# Patient Record
Sex: FEMALE | Race: BLACK OR AFRICAN AMERICAN | Employment: FULL TIME | URBAN - METROPOLITAN AREA
[De-identification: names, ages, dates, MRNs, and addresses within clinical notes are randomized per-mention and may not be internally consistent; named-entity substitution may affect disease eponyms.]

---

## 2017-01-01 ENCOUNTER — HOSPITAL ENCOUNTER (EMERGENCY)
Age: 42
Discharge: HOME | End: 2017-01-01
Attending: EMERGENCY MEDICINE | Admitting: EMERGENCY MEDICINE
Payer: MEDICAID

## 2017-01-01 VITALS
TEMPERATURE: 98.3 F | HEART RATE: 70 BPM | OXYGEN SATURATION: 100 % | HEIGHT: 65 IN | BODY MASS INDEX: 21.99 KG/M2 | DIASTOLIC BLOOD PRESSURE: 65 MMHG | SYSTOLIC BLOOD PRESSURE: 116 MMHG | WEIGHT: 132 LBS

## 2017-01-01 DIAGNOSIS — G43.909 MIGRAINE WITHOUT STATUS MIGRAINOSUS, NOT INTRACTABLE, UNSPECIFIED MIGRAINE TYPE: Primary | ICD-10-CM

## 2017-01-01 PROCEDURE — 96374 THER/PROPH/DIAG INJ IV PUSH: CPT

## 2017-01-01 PROCEDURE — 99282 EMERGENCY DEPT VISIT SF MDM: CPT

## 2017-01-01 PROCEDURE — 96376 TX/PRO/DX INJ SAME DRUG ADON: CPT

## 2017-01-01 PROCEDURE — 74011250636 HC RX REV CODE- 250/636: Performed by: PHYSICIAN ASSISTANT

## 2017-01-01 PROCEDURE — 96375 TX/PRO/DX INJ NEW DRUG ADDON: CPT

## 2017-01-01 RX ORDER — DIPHENHYDRAMINE HYDROCHLORIDE 50 MG/ML
25 INJECTION, SOLUTION INTRAMUSCULAR; INTRAVENOUS
Status: COMPLETED | OUTPATIENT
Start: 2017-01-01 | End: 2017-01-01

## 2017-01-01 RX ORDER — METOCLOPRAMIDE HYDROCHLORIDE 5 MG/ML
5 INJECTION INTRAMUSCULAR; INTRAVENOUS EVERY 6 HOURS
Status: DISCONTINUED | OUTPATIENT
Start: 2017-01-01 | End: 2017-01-01 | Stop reason: HOSPADM

## 2017-01-01 RX ORDER — HYDROMORPHONE HYDROCHLORIDE 1 MG/ML
1 INJECTION, SOLUTION INTRAMUSCULAR; INTRAVENOUS; SUBCUTANEOUS
Status: COMPLETED | OUTPATIENT
Start: 2017-01-01 | End: 2017-01-01

## 2017-01-01 RX ORDER — ONDANSETRON 4 MG/1
4 TABLET, ORALLY DISINTEGRATING ORAL
Qty: 15 TAB | Refills: 0 | Status: SHIPPED | OUTPATIENT
Start: 2017-01-01 | End: 2017-02-27 | Stop reason: SDUPTHER

## 2017-01-01 RX ORDER — HYDROMORPHONE HYDROCHLORIDE 1 MG/ML
0.5 INJECTION, SOLUTION INTRAMUSCULAR; INTRAVENOUS; SUBCUTANEOUS
Status: COMPLETED | OUTPATIENT
Start: 2017-01-01 | End: 2017-01-01

## 2017-01-01 RX ORDER — SUMATRIPTAN 25 MG/1
25 TABLET, FILM COATED ORAL
Qty: 15 TAB | Refills: 0 | Status: SHIPPED | OUTPATIENT
Start: 2017-01-01 | End: 2017-07-24

## 2017-01-01 RX ADMIN — METOCLOPRAMIDE 5 MG: 5 INJECTION, SOLUTION INTRAMUSCULAR; INTRAVENOUS at 19:26

## 2017-01-01 RX ADMIN — DIPHENHYDRAMINE HYDROCHLORIDE 25 MG: 50 INJECTION INTRAMUSCULAR; INTRAVENOUS at 19:23

## 2017-01-01 RX ADMIN — HYDROMORPHONE HYDROCHLORIDE 0.5 MG: 1 INJECTION, SOLUTION INTRAMUSCULAR; INTRAVENOUS; SUBCUTANEOUS at 19:26

## 2017-01-01 RX ADMIN — HYDROMORPHONE HYDROCHLORIDE 1 MG: 1 INJECTION, SOLUTION INTRAMUSCULAR; INTRAVENOUS; SUBCUTANEOUS at 20:07

## 2017-01-01 NOTE — Clinical Note
SPECIFIC PATIENT INSTRUCTIONS FROM THE PROVIDER WHO TREATED YOU IN THE ER TODAY: 
1. Return if any concerns or worsening of condition(s) 2. Imitrex as prescribed for headache. 3.  Take zofran as prescribed for nausea and vomiting. 4.  FOLLOW UP AP POINTMENT:  Your primary doctor in 1-2 days.

## 2017-01-01 NOTE — ED PROVIDER NOTES
HPI Comments: Pt is a 47 yo Female with Hx of asthma and migraines, presents to ED d/t migraine HA that started yesterday, was gradual in onset, and described as aching pain primarily on the left side of her head, worse in the temple. Associated symptoms include dizziness, nausea, photophobia and phonophobia. All these associated symptoms as well as the HA are very typical of her past migraines. She gets 1 to 2 migraines per month. She typically takes fioricet at home for her migraines but is currently out of her prescription, but reports that even that only usually offers mild to moderate relief. Pt tried tylenol today for HA without improvement. Denies fever, vision changes, CP, SOB, ab pain, vomiting, urinary symptoms. She is followed by Dr. Symone Driver for HAs and primary care. Patient is a 46 y.o. female presenting with headaches. The history is provided by the patient. Headache    This is a new problem. The current episode started yesterday. The headache is aggravated by photophobia and nausea. The pain is located in the left unilateral and temporal region. The pain is at a severity of 9/10. Associated symptoms include dizziness and nausea. Pertinent negatives include no anorexia, no fever, no malaise/fatigue, no chest pressure, no near-syncope, no orthopnea, no palpitations, no syncope, no shortness of breath, no weakness, no tingling, no visual change and no vomiting. She has tried acetaminophen and darkened room for the symptoms. The treatment provided no relief.         Past Medical History:   Diagnosis Date    Anxiety attack     Asthma     Hernia     Migraine     Psychiatric disorder      anxiety        Past Surgical History:   Procedure Laterality Date    Hx back surgery      Hx  section      Pr  delivery only           Family History:   Problem Relation Age of Onset    Heart Disease Other      grandmothers    Asthma Brother     Lung Disease Paternal Aunt      cronic bronchitis    Asthma Paternal Grandmother        Social History     Social History    Marital status:      Spouse name: N/A    Number of children: N/A    Years of education: N/A     Occupational History    Not on file. Social History Main Topics    Smoking status: Never Smoker    Smokeless tobacco: Never Used    Alcohol use No    Drug use: No    Sexual activity: Yes     Partners: Male     Other Topics Concern    Not on file     Social History Narrative         ALLERGIES: Aspirin; Ibuprofen; Pcn [penicillins]; Claritin [loratadine]; Phenergan [promethazine]; Seafood; and Shellfish containing products    Review of Systems   Constitutional: Negative for chills, fever and malaise/fatigue. HENT: Negative for ear pain, rhinorrhea and sore throat. Eyes: Negative for pain and redness. Respiratory: Negative for cough and shortness of breath. Cardiovascular: Negative for chest pain, palpitations, orthopnea, syncope and near-syncope. Gastrointestinal: Positive for nausea. Negative for abdominal pain, anorexia, constipation, diarrhea and vomiting. Genitourinary: Negative for dysuria. Musculoskeletal: Negative for neck pain and neck stiffness. Skin: Negative. Neurological: Positive for dizziness and headaches. Negative for tingling, tremors, seizures, syncope, facial asymmetry, speech difficulty, weakness, light-headedness and numbness. Psychiatric/Behavioral: Negative. Vitals:    01/01/17 1826   BP: 118/69   Pulse: 77   Temp: 98.3 °F (36.8 °C)   SpO2: 100%   Weight: 59.9 kg (132 lb)   Height: 5' 5\" (1.651 m)            Physical Exam   Constitutional: She is oriented to person, place, and time. She appears well-developed and well-nourished. No distress. Pt seated in dark room, holding temples. HENT:   Head: Normocephalic and atraumatic.    Right Ear: Tympanic membrane, external ear and ear canal normal.   Left Ear: Tympanic membrane, external ear and ear canal normal. Nose: Nose normal.   Mouth/Throat: Oropharynx is clear and moist and mucous membranes are normal. No oropharyngeal exudate. Eyes: Conjunctivae and EOM are normal. Pupils are equal, round, and reactive to light. Right eye exhibits no discharge. Left eye exhibits no discharge. Neck: Normal range of motion. Cardiovascular: Normal rate, regular rhythm and normal heart sounds. Pulmonary/Chest: Effort normal and breath sounds normal. No respiratory distress. She has no wheezes. She has no rales. Abdominal: Soft. Bowel sounds are normal. There is no tenderness. Musculoskeletal: Normal range of motion. Lymphadenopathy:     She has no cervical adenopathy. Neurological: She is alert and oriented to person, place, and time. She has normal strength. She is not disoriented. She displays no atrophy, no tremor and normal reflexes. No cranial nerve deficit or sensory deficit. She exhibits normal muscle tone. She displays no seizure activity. Coordination and gait normal. GCS eye subscore is 4. GCS verbal subscore is 5. GCS motor subscore is 6. Skin: Skin is warm and dry. She is not diaphoretic. Psychiatric: She has a normal mood and affect. Her behavior is normal. Judgment and thought content normal.   Nursing note and vitals reviewed. MDM  Number of Diagnoses or Management Options  Migraine without status migrainosus, not intractable, unspecified migraine type:   Diagnosis management comments: DDx: HA, migraine, sinusitis, meningitis, SAH, head injury/concussion, contusion, TIA, stroke, aneurism, change in visual acuity, vertigo, eye emergency, dental pain, viral illness, temporal/Giant cell arteritis, scalp/skin etiology, malignancy    ED COURSE:    This patient presents with a headache that is similar to previous headaches. No atypical features by history.   The patient does not appear toxic and is neurologically normal on exam.  No evidence for SAH, meningitis, tumor, or other malignant cause today based on evaluation today. Given the history and physical exam, I believe symptomatic treatment is appropriate at this time. Pt has high risk allergy to NSAIDS, so will give low dose of dilaudid rather than toradol, along with reglan and benadryl for migraine control here in the ED. Pastor Dwayne PA-C 7:10 PM     Pt reassessed, the patient's migraine headache was markedly improved with the noted medications. Pastor Dwayne PA-C 8:26 PM     IMPRESSION AND MEDICAL DECISION MAKING:  Based upon the patient's presentation with noted HPI and PE, along with the work up done in the emergency department, I believe that the patient is having the patient's typical migraine presentation. Will discharge patient to home with symptomatic abortive prescription for migraines. Pt reports that fioricet has not worked well in past, will give imitrex instead. The patient understands the need for follow-up and the symptoms and signs that would mandate immediate return to the ED for re-evaluation. Follow-up Activity limitations:  None  Condition on Discharge:  Stable     DIAGNOSIS:  1. Migraine headache in patient with history of migraines. SPECIFIC PATIENT INSTRUCTIONS FROM THE PROVIDER WHO TREATED YOU IN THE ER TODAY:  1. Return if any concerns or worsening of condition(s)  2. Imitrex as prescribed for headache. 3.  Take zofran as prescribed for nausea and vomiting. 4.  FOLLOW UP APPOINTMENT:  Your primary doctor in 1-2 days. Pt results have been reviewed with them. They have been counseled regarding diagnosis, treatment, and plan. Pt verbally conveys understanding and agreement of the signs, symptoms, diagnosis, treatment and prognosis and additionally agrees to follow up as discussed. Pt also agrees with the care-plan and conveys that all of their questions have been answered.  I have also provided discharge instructions for them that include: educational information regarding their diagnosis and treatment, and list of reasons why they would want to return to the ED prior to their follow-up appointment, should their condition change. Inna Keyes PA-C         Amount and/or Complexity of Data Reviewed  Discussion of test results with the performing providers: yes  Decide to obtain previous medical records or to obtain history from someone other than the patient: yes  Obtain history from someone other than the patient: yes  Review and summarize past medical records: yes  Discuss the patient with other providers: yes    Risk of Complications, Morbidity, and/or Mortality  Presenting problems: low  Diagnostic procedures: low  Management options: low    Patient Progress  Patient progress: stable    ED Course       Procedures    Diagnosis:   1. Migraine without status migrainosus, not intractable, unspecified migraine type          Disposition: home    Follow-up Information     Follow up With Details Comments Contact Info    [facility] EMERGENCY DEPT  As needed, If symptoms worsen [address] [place], [place] In 2 days  [address] [place], [place] [id number]  [phone]            Patient's Medications   Start Taking    ONDANSETRON (ZOFRAN ODT) 4 MG DISINTEGRATING TABLET    Take 1 Tab by mouth every eight (8) hours as needed for Nausea. SUMATRIPTAN (IMITREX) 25 MG TABLET    Take 1 Tab by mouth once as needed for Migraine. May repeat dose (one tab) once after 2 hours. Maximum dose is 200mg in 24 hours. Continue Taking    ALBUTEROL (PROAIR HFA) 90 MCG/ACTUATION INHALER    Take 2 Puffs by inhalation every four (4) hours as needed for Wheezing. ALBUTEROL-IPRATROPIUM (DUO-NEB) 2.5 MG-0.5 MG/3 ML NEBU    3 mL by Nebulization route every four (4) hours as needed. For SOB or Wheezing Diag Code J45.909 File Medicare B    BUDESONIDE-FORMOTEROL (SYMBICORT) 160-4.5 MCG/ACTUATION HFA INHALER    Take 2 Puffs by inhalation two (2) times a day. EPINEPHRINE (EPIPEN 2-GALE) 0.3 MG/0.3 ML INJECTION    0.3 mL by IntraMUSCular route once as needed for up to 1 dose. FEXOFENADINE-PSEUDOEPHEDRINE (ALLEGRA-D 24 HOUR) 180-240 MG PER TABLET    Take 1 Tab by mouth daily. FLUTICASONE (FLONASE) 50 MCG/ACTUATION NASAL SPRAY    instill 2 sprays into each nostril once daily    MONTELUKAST (SINGULAIR) 10 MG TABLET    Take 1 Tab by mouth daily. PRENATAL VITS W-CA,FE,FA,<1MG, (PRENATAL VITAMIN PO)    Take  by mouth.      These Medications have changed    No medications on file   Stop Taking    No medications on file

## 2017-01-16 RX ORDER — BUDESONIDE AND FORMOTEROL FUMARATE DIHYDRATE 160; 4.5 UG/1; UG/1
2 AEROSOL RESPIRATORY (INHALATION) 2 TIMES DAILY
Qty: 1 INHALER | Refills: 5 | Status: SHIPPED | OUTPATIENT
Start: 2017-01-16 | End: 2017-01-30 | Stop reason: CLARIF

## 2017-01-16 NOTE — TELEPHONE ENCOUNTER
Pt needs a refill on her symbicort sent to the [facility] on [address]. She has been out of medication since Saturday so is it possible to get one of the other doctors to refill it for her?  Please call [phone]

## 2017-01-17 ENCOUNTER — TELEPHONE (OUTPATIENT)
Dept: PULMONOLOGY | Age: 42
End: 2017-01-17

## 2017-01-20 ENCOUNTER — HOSPITAL ENCOUNTER (EMERGENCY)
Age: 42
Discharge: HOME | End: 2017-01-20
Attending: EMERGENCY MEDICINE
Payer: MEDICAID

## 2017-01-20 VITALS
DIASTOLIC BLOOD PRESSURE: 62 MMHG | RESPIRATION RATE: 18 BRPM | WEIGHT: 130 LBS | OXYGEN SATURATION: 100 % | BODY MASS INDEX: 21.66 KG/M2 | HEIGHT: 65 IN | HEART RATE: 95 BPM | TEMPERATURE: 98.3 F | SYSTOLIC BLOOD PRESSURE: 112 MMHG

## 2017-01-20 DIAGNOSIS — G43.009 MIGRAINE WITHOUT AURA AND WITHOUT STATUS MIGRAINOSUS, NOT INTRACTABLE: Primary | ICD-10-CM

## 2017-01-20 LAB
APPEARANCE UR: CLEAR
BILIRUB UR QL: NEGATIVE
COLOR UR: YELLOW
GLUCOSE UR STRIP.AUTO-MCNC: NEGATIVE MG/DL
HGB UR QL STRIP: NEGATIVE
KETONES UR QL STRIP.AUTO: NEGATIVE MG/DL
LEUKOCYTE ESTERASE UR QL STRIP.AUTO: NEGATIVE
NITRITE UR QL STRIP.AUTO: NEGATIVE
PH UR STRIP: 6.5 [PH] (ref 5–8)
PROT UR STRIP-MCNC: NEGATIVE MG/DL
SP GR UR REFRACTOMETRY: 1.02 (ref 1–1.03)
UROBILINOGEN UR QL STRIP.AUTO: 1 EU/DL (ref 0.2–1)

## 2017-01-20 PROCEDURE — 99283 EMERGENCY DEPT VISIT LOW MDM: CPT

## 2017-01-20 PROCEDURE — 74011000258 HC RX REV CODE- 258: Performed by: EMERGENCY MEDICINE

## 2017-01-20 PROCEDURE — 96375 TX/PRO/DX INJ NEW DRUG ADDON: CPT

## 2017-01-20 PROCEDURE — 81003 URINALYSIS AUTO W/O SCOPE: CPT | Performed by: EMERGENCY MEDICINE

## 2017-01-20 PROCEDURE — 96376 TX/PRO/DX INJ SAME DRUG ADON: CPT

## 2017-01-20 PROCEDURE — 74011250636 HC RX REV CODE- 250/636: Performed by: EMERGENCY MEDICINE

## 2017-01-20 PROCEDURE — 74011250636 HC RX REV CODE- 250/636: Performed by: PHYSICIAN ASSISTANT

## 2017-01-20 PROCEDURE — 96374 THER/PROPH/DIAG INJ IV PUSH: CPT

## 2017-01-20 PROCEDURE — 96361 HYDRATE IV INFUSION ADD-ON: CPT

## 2017-01-20 RX ORDER — METOCLOPRAMIDE HYDROCHLORIDE 5 MG/ML
5 INJECTION INTRAMUSCULAR; INTRAVENOUS EVERY 6 HOURS
Status: DISCONTINUED | OUTPATIENT
Start: 2017-01-20 | End: 2017-01-21 | Stop reason: HOSPADM

## 2017-01-20 RX ORDER — DIPHENHYDRAMINE HYDROCHLORIDE 50 MG/ML
25 INJECTION, SOLUTION INTRAMUSCULAR; INTRAVENOUS ONCE
Status: COMPLETED | OUTPATIENT
Start: 2017-01-20 | End: 2017-01-20

## 2017-01-20 RX ORDER — BUTALBITAL, ACETAMINOPHEN, CAFFEINE AND CODEINE PHOSPHATE 50; 325; 40; 30 MG/1; MG/1; MG/1; MG/1
1 CAPSULE ORAL
Qty: 15 CAP | Refills: 0 | Status: SHIPPED | OUTPATIENT
Start: 2017-01-20 | End: 2017-07-24

## 2017-01-20 RX ADMIN — DIPHENHYDRAMINE HYDROCHLORIDE 25 MG: 50 INJECTION INTRAMUSCULAR; INTRAVENOUS at 20:07

## 2017-01-20 RX ADMIN — METOCLOPRAMIDE 5 MG: 5 INJECTION, SOLUTION INTRAMUSCULAR; INTRAVENOUS at 19:05

## 2017-01-20 RX ADMIN — SODIUM CHLORIDE 1000 ML: 900 INJECTION, SOLUTION INTRAVENOUS at 19:05

## 2017-01-20 RX ADMIN — SODIUM CHLORIDE 10 MG: 900 INJECTION, SOLUTION INTRAVENOUS at 20:07

## 2017-01-20 NOTE — ED TRIAGE NOTES
Pt presents to the ED with CC migraine onset this morning. Pt reports out of Fioricet and Zofran. Pt states pain is severe as other migraines. Pt reports dizziness, states typical with previous migraines.  Pt appear in NOAD

## 2017-01-20 NOTE — ED NOTES
I performed a brief evaluation, including history and physical, of the patient here in triage and I have determined that pt will need further treatment and evaluation from the main side ER physician. I have placed initial orders to help in expediting patients care.      January 20, 2017 at 6:28 PM - Coty Day PA-C        Visit Vitals    /84 (BP 1 Location: Left arm, BP Patient Position: Sitting)    Pulse (!) 101    Temp 98.3 °F (36.8 °C)    Resp 20    Ht 5' 5\" (1.651 m)    Wt 59 kg (130 lb)    SpO2 100%    BMI 21.63 kg/m2

## 2017-01-21 NOTE — ED PROVIDER NOTES
HPI Comments: 7:49 PM Anselmo Moctezuma is a 46 y.o. female with a history of asthma and migrianes who presents to the emergency department c/o migraine onset yesterday. Pt claims this happens every month before she starts her MP. Pt took tylenol with no relief. Pt is also c/o dizziness, photophobia, and nausea as associated sx. Pt reports migraine is always \"this bad\" enough to come to the hospital every month. PCP: Jenn Smith MD      The history is provided by the patient. Past Medical History:   Diagnosis Date    Anxiety attack     Asthma     Hernia     Migraine     Psychiatric disorder      anxiety        Past Surgical History:   Procedure Laterality Date    Hx back surgery      Hx  section  2009    Pr  delivery only           Family History:   Problem Relation Age of Onset    Heart Disease Other      grandmothers    Asthma Brother     Lung Disease Paternal Aunt      cronic bronchitis    Asthma Paternal Grandmother        Social History     Social History    Marital status:      Spouse name: N/A    Number of children: N/A    Years of education: N/A     Occupational History    Not on file. Social History Main Topics    Smoking status: Never Smoker    Smokeless tobacco: Never Used    Alcohol use No    Drug use: No    Sexual activity: Yes     Partners: Male     Other Topics Concern    Not on file     Social History Narrative         ALLERGIES: Aspirin; Ibuprofen; Pcn [penicillins]; Claritin [loratadine]; Phenergan [promethazine]; Seafood; and Shellfish containing products    Review of Systems   Constitutional: Negative for diaphoresis and fever. HENT: Negative for ear pain, rhinorrhea and trouble swallowing. Eyes: Positive for photophobia. Negative for visual disturbance. Respiratory: Negative for cough and shortness of breath. Cardiovascular: Negative for chest pain and leg swelling. Gastrointestinal: Positive for nausea.  Negative for abdominal pain, blood in stool, diarrhea and vomiting. Genitourinary: Negative for difficulty urinating, flank pain and hematuria. Musculoskeletal: Negative for back pain and neck pain. Skin: Negative for rash. Neurological: Positive for dizziness and headaches. Negative for weakness and numbness. Hematological: Negative. Psychiatric/Behavioral: Negative. All other systems reviewed and are negative. Vitals:    01/20/17 1821 01/20/17 2011   BP: 124/84 112/62   Pulse: (!) 101 95   Resp: 20 18   Temp: 98.3 °F (36.8 °C)    SpO2: 100% 100%   Weight: 59 kg (130 lb)    Height: 5' 5\" (1.651 m)             Physical Exam   Constitutional: She is oriented to person, place, and time. She appears well-developed and well-nourished. No distress. HENT:   Head: Normocephalic and atraumatic. Right Ear: External ear normal.   Left Ear: External ear normal.   Nose: Nose normal.   Mouth/Throat: Oropharynx is clear and moist.   Eyes: Conjunctivae and EOM are normal. Pupils are equal, round, and reactive to light. No scleral icterus. Neck: Normal range of motion. Neck supple. No JVD present. No tracheal deviation present. No thyromegaly present. Cardiovascular: Normal rate, regular rhythm, normal heart sounds and intact distal pulses. Exam reveals no gallop and no friction rub. No murmur heard. Pulmonary/Chest: Effort normal and breath sounds normal. She exhibits no tenderness. Abdominal: Soft. Bowel sounds are normal. She exhibits no distension. There is no tenderness. There is no rebound and no guarding. Musculoskeletal: Normal range of motion. She exhibits no edema or tenderness. Lymphadenopathy:     She has no cervical adenopathy. Neurological: She is alert and oriented to person, place, and time. No cranial nerve deficit. Coordination normal.   No sensory loss, Gait normal, Motor 5/5   Skin: Skin is warm and dry. Psychiatric: She has a normal mood and affect.  Her behavior is normal. Judgment and thought content normal.   Nursing note and vitals reviewed. MDM  Number of Diagnoses or Management Options  Migraine without aura and without status migrainosus, not intractable:   Diagnosis management comments: Feeling better after meds, wants to go home, agrees with dispo and F/U plan. Marlon Gil MD  9:27 PM      ED Course       Procedures    Vitals:  Patient Vitals for the past 12 hrs:   Temp Pulse Resp BP SpO2   01/20/17 2011 - 95 18 112/62 100 %   01/20/17 1821 98.3 °F (36.8 °C) (!) 101 20 124/84 100 %   100%. Percentage is within normal limits. Reevaluation of patient:   Pt has been reassessed. Discussed all results with pt and pt agrees with plan for discharge. All questions answered at this time. ED warnings given for any new or worsening symptoms. Pt voices understanding. Pt discharged in stable condition. Disposition: Discharge    Diagnosis:   1. Migraine without aura and without status migrainosus, not intractable          Follow-up Information     Follow up With Details Comments Contact Info    Michelle Almeida MD Call in 2 days ED follow up , If symptoms worsen [address] [place] [address]  [phone]      [facility] EMERGENCY DEPT Go to If symptoms worsen, As needed [address] [facility]  [phone]            Scribe Attestation:     Everardo Reyez, scribing for and in the presence of Marlon Gil MD January 20, 2017 at 9:23 PM     Signed by: Roverto Simental, January 20, 2017 at 9:23 PM     Physician Attestation:   I personally performed the services described in this documentation, reviewed and edited the documentation which was dictated to the scribe in my presence, and it accurately records my words and actions.  Marlon Gil MD  January 20, 2017 at 9:23 PM

## 2017-01-21 NOTE — ED NOTES
Pt states she is feeling better at discharge. I have reviewed discharge instructions with the patient and spouse. The patient and spouse verbalized understanding. Pts spouse to drive home.   Pt ambulatory from ED in NAD

## 2017-01-21 NOTE — DISCHARGE INSTRUCTIONS
Migraine Headache: Care Instructions  Your Care Instructions  Migraines are painful, throbbing headaches that often start on one side of the head. They may cause nausea and vomiting and make you sensitive to light, sound, or smell. Without treatment, migraines can last from 4 hours to a few days. Medicines can help prevent migraines or stop them after they have started. Your doctor can help you find which ones work best for you. Follow-up care is a key part of your treatment and safety. Be sure to make and go to all appointments, and call your doctor if you are having problems. It's also a good idea to know your test results and keep a list of the medicines you take. How can you care for yourself at home? · Do not drive if you have taken a prescription pain medicine. · Rest in a quiet, dark room until your headache is gone. Close your eyes, and try to relax or go to sleep. Don't watch TV or read. · Put a cold, moist cloth or cold pack on the painful area for 10 to 20 minutes at a time. Put a thin cloth between the cold pack and your skin. · Use a warm, moist towel or a heating pad set on low to relax tight shoulder and neck muscles. · Have someone gently massage your neck and shoulders. · Take your medicines exactly as prescribed. Call your doctor if you think you are having a problem with your medicine. You will get more details on the specific medicines your doctor prescribes. · Be careful not to take pain medicine more often than the instructions allow. You could get worse or more frequent headaches when the medicine wears off. To prevent migraines  · Keep a headache diary so you can figure out what triggers your headaches. Avoiding triggers may help you prevent headaches. Record when each headache began, how long it lasted, and what the pain was like.  (Was it throbbing, aching, stabbing, or dull?) Write down any other symptoms you had with the headache, such as nausea, flashing lights or dark spots, or sensitivity to bright light or loud noise. Note if the headache occurred near your period. List anything that might have triggered the headache. Triggers may include certain foods (chocolate, cheese, wine) or odors, smoke, bright light, stress, or lack of sleep. · If your doctor has prescribed medicine for your migraines, take it as directed. You may have medicine that you take only when you get a migraine and medicine that you take all the time to help prevent migraines. ¨ If your doctor has prescribed medicine for when you get a headache, take it at the first sign of a migraine, unless your doctor has given you other instructions. ¨ If your doctor has prescribed medicine to prevent migraines, take it exactly as prescribed. Call your doctor if you think you are having a problem with your medicine. · Find healthy ways to deal with stress. Migraines are most common during or right after stressful times. Take time to relax before and after you do something that has caused a migraine in the past.  · Try to keep your muscles relaxed by keeping good posture. Check your jaw, face, neck, and shoulder muscles for tension. Try to relax them. When you sit at a desk, change positions often. And make sure to stretch for 30 seconds each hour. · Get plenty of sleep and exercise. · Eat meals on a regular schedule. Avoid foods and drinks that often trigger migraines. These include chocolate, alcohol (especially red wine and port), aspartame, monosodium glutamate (MSG), and some additives found in foods (such as hot dogs, stephenson, cold cuts, aged cheeses, and pickled foods). · Limit caffeine. Don't drink too much coffee, tea, or soda. But don't quit caffeine suddenly. That can also give you migraines. · Do not smoke or allow others to smoke around you. If you need help quitting, talk to your doctor about stop-smoking programs and medicines. These can increase your chances of quitting for good.   · If you are taking birth control pills or hormone therapy, talk to your doctor about whether they are triggering your migraines. When should you call for help? Call 911 anytime you think you may need emergency care. For example, call if:  · You have signs of a stroke. These may include:  ¨ Sudden numbness, paralysis, or weakness in your face, arm, or leg, especially on only one side of your body. ¨ Sudden vision changes. ¨ Sudden trouble speaking. ¨ Sudden confusion or trouble understanding simple statements. ¨ Sudden problems with walking or balance. ¨ A sudden, severe headache that is different from past headaches. Call your doctor now or seek immediate medical care if:  · You have new or worse nausea and vomiting. · You have a new or higher fever. · Your headache gets much worse. Watch closely for changes in your health, and be sure to contact your doctor if:  · You are not getting better after 2 days (48 hours). Where can you learn more? Go to http://jaci-elina.info/. Enter V152 in the search box to learn more about \"Migraine Headache: Care Instructions. \"  Current as of: February 19, 2016  Content Version: 11.1  © 6895-5280 [employer]. Care instructions adapted under license by [employer] (which disclaims liability or warranty for this information). If you have questions about a medical condition or this instruction, always ask your healthcare professional. [address] any warranty or liability for your use of this information.

## 2017-01-30 DIAGNOSIS — J45.909 UNCOMPLICATED ASTHMA, UNSPECIFIED ASTHMA SEVERITY: Primary | ICD-10-CM

## 2017-02-01 RX ORDER — FLUTICASONE FUROATE AND VILANTEROL 200; 25 UG/1; UG/1
1 POWDER RESPIRATORY (INHALATION) DAILY
Qty: 1 INHALER | Refills: 6 | Status: SHIPPED | OUTPATIENT
Start: 2017-02-01 | End: 2017-02-21 | Stop reason: CLARIF

## 2017-02-08 RX ORDER — AZITHROMYCIN 250 MG/1
TABLET, FILM COATED ORAL
Qty: 6 TAB | Refills: 0 | Status: SHIPPED | OUTPATIENT
Start: 2017-02-08 | End: 2017-02-13

## 2017-02-08 RX ORDER — PREDNISONE 10 MG/1
TABLET ORAL
Qty: 18 TAB | Refills: 0 | Status: SHIPPED | OUTPATIENT
Start: 2017-02-08 | End: 2017-07-24

## 2017-02-08 NOTE — TELEPHONE ENCOUNTER
PT PQSYEV([phone]). COUGHING UP YELLOW PHLEGM, ACHES, CHILLS AND CHEST PAIN WHEN COUGHING. THINKS SHE NEEDS MEDS CALLED TO [facility] [id number]. PLEASE CALL BACK.

## 2017-02-08 NOTE — TELEPHONE ENCOUNTER
Patient reports cough producing yellow mucus, sob that she is using her nebulizer for, body aches, chest and throat due to coughing. Started yesterday.

## 2017-02-08 NOTE — TELEPHONE ENCOUNTER
Payal Bolton MD   You Less than a minute ago (3:54 PM)                 We can start her on Pred taper and Zithromax z louise if not too SOB (Routing comment

## 2017-02-20 NOTE — TELEPHONE ENCOUNTER
Patient was switched from Symbicort to [facility] 2/1/17 due to her insurance on 1/30/17 would not cover the Symbicort but would cover Breo. NOW as on 2/1/17 she now has [facility] which is a different insurance and they will not cover Breo but will cover Symbicort. Patient needs Symbicort sent to [facility] now.

## 2017-02-20 NOTE — TELEPHONE ENCOUNTER
PT OHUD([phone]). NEEDS REFILL FOR SYMBICORT CALLED TO [facility] [phone].

## 2017-02-21 RX ORDER — BUDESONIDE AND FORMOTEROL FUMARATE DIHYDRATE 160; 4.5 UG/1; UG/1
2 AEROSOL RESPIRATORY (INHALATION) 2 TIMES DAILY
Qty: 1 INHALER | Refills: 5 | Status: SHIPPED | OUTPATIENT
Start: 2017-02-21 | End: 2017-07-24

## 2017-02-27 ENCOUNTER — DOCUMENTATION ONLY (OUTPATIENT)
Dept: NEUROLOGY | Age: 42
End: 2017-02-27

## 2017-02-27 ENCOUNTER — OFFICE VISIT (OUTPATIENT)
Dept: NEUROLOGY | Age: 42
End: 2017-02-27

## 2017-02-27 VITALS
TEMPERATURE: 98 F | BODY MASS INDEX: 21.13 KG/M2 | WEIGHT: 126.8 LBS | HEART RATE: 89 BPM | HEIGHT: 65 IN | OXYGEN SATURATION: 98 % | SYSTOLIC BLOOD PRESSURE: 114 MMHG | DIASTOLIC BLOOD PRESSURE: 83 MMHG

## 2017-02-27 DIAGNOSIS — N92.6 CATAMENIAL DISORDER: ICD-10-CM

## 2017-02-27 DIAGNOSIS — G43.009 MIGRAINE WITHOUT AURA AND WITHOUT STATUS MIGRAINOSUS, NOT INTRACTABLE: Primary | ICD-10-CM

## 2017-02-27 DIAGNOSIS — G43.709 TRANSFORMED MIGRAINE WITHOUT AURA: ICD-10-CM

## 2017-02-27 RX ORDER — ONDANSETRON 4 MG/1
4 TABLET, ORALLY DISINTEGRATING ORAL
Qty: 15 TAB | Refills: 1 | Status: SHIPPED | OUTPATIENT
Start: 2017-02-27 | End: 2017-11-22 | Stop reason: SDUPTHER

## 2017-02-27 RX ORDER — TOPIRAMATE 25 MG/1
TABLET ORAL
Qty: 60 TAB | Refills: 1 | Status: SHIPPED | OUTPATIENT
Start: 2017-02-27 | End: 2017-07-24

## 2017-02-27 NOTE — PROGRESS NOTES
Reviewed medications and chart with the patient. Patients thad Gentry Edmond is in the room. Headache form is completed.

## 2017-02-27 NOTE — MR AVS SNAPSHOT
Visit Information Date & Time Provider Department Dept. Phone Encounter #  
 2/27/2017 11:15 AM Chelsea Hardin MD [facility] [phone] [id number] Follow-up Instructions Return in about 1 month (around 3/27/2017). Follow-up and Disposition History Your Appointments 4/19/2017 11:00 AM  
Follow Up with hCelsea Hardin MD  
[facility] Appt Note: 1mon f/u; med ck [address] [place] [id number] [phone]  
  
   
 [facility] [id number] Upcoming Health Maintenance Date Due  
 PAP AKA CERVICAL CYTOLOGY 6/24/1998 DTaP/Tdap/Td series (2 - Td) 2/6/2026 Allergies as of 2/27/2017  Review Complete On: 2/27/2017 By: Chelsea Hardin MD  
  
 Severity Noted Reaction Type Reactions Aspirin High   Anaphylaxis Ibuprofen High 01/23/2014    Anaphylaxis Pcn [Penicillins] High 03/14/2013    Itching Claritin [Loratadine]    Anxiety, Cough Phenergan [Promethazine]  11/13/2012    Anxiety, Swelling Seafood  07/18/2014    Swelling Shellfish Containing Products    Hives Current Immunizations  Reviewed on 11/13/2014 Name Date Influenza Vaccine 11/13/2014 Influenza Vaccine (Quad) 9/29/2016 10:13 AM  
 Influenza Vaccine Split 10/15/2012 Pneumococcal Polysaccharide (PPSV-23) 5/1/2015 10:45 AM  
 Tdap 2/6/2016 12:14 PM  
  
 Not reviewed this visit You Were Diagnosed With   
  
 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  
  
  
  
  
  
 114/83 BMI and BSA Data Body Mass Index Body Surface Area  
 21.1 kg/m 2 1.62 m 2 Preferred Pharmacy Pharmacy Name Phone [id number]. Precious Butcher, [address] [facility]. [phone] Your Updated Medication List  
  
   
This list is accurate as of: 2/27/17 12:21 PM.  Always use your most recent med list.  
  
  
  
  
 albuterol 90 mcg/actuation inhaler Commonly known as:  PROAIR HFA Take 2 Puffs by inhalation every four (4) hours as needed for Wheezing. albuterol-ipratropium 2.5 mg-0.5 mg/3 ml Nebu Commonly known as:  DUO-NEB  
3 mL by Nebulization route every four (4) hours as needed. For SOB or Wheezing Diag Code J45.909 File Medicare B ALLEGRA-D 24 HOUR 180-240 mg per tablet Generic drug:  fexofenadine-pseudoephedrine Take 1 Tab by mouth daily. BREO ELLIPTA IN Take  by inhalation. budesonide-formoterol 160-4.5 mcg/actuation HFA inhaler Commonly known as:  SYMBICORT Take 2 Puffs by inhalation two (2) times a day. codeine-butalbital-acetaminophen-caffeine -87-30 mg per capsule Commonly known as:  FIORICET WITH CODEINE Take 1 Cap by mouth every four (4) hours as needed for Headache. Max Daily Amount: 6 Caps. EPINEPHrine 0.3 mg/0.3 mL injection Commonly known as:  EPIPEN 2-GALE  
0.3 mL by IntraMUSCular route once as needed for up to 1 dose. fluticasone 50 mcg/actuation nasal spray Commonly known as:  FLONASE  
instill 2 sprays into each nostril once daily  
  
 montelukast 10 mg tablet Commonly known as:  SINGULAIR Take 1 Tab by mouth daily. ondansetron 4 mg disintegrating tablet Commonly known as:  ZOFRAN ODT Take 1 Tab by mouth every eight (8) hours as needed for Nausea. predniSONE 10 mg tablet Commonly known as:  Kaya Gelineau Take 3 tabs X 3 days, 2 tabs X 3 days, then 1 daily PRENATAL VITAMIN PO Take  by mouth. SUMAtriptan 25 mg tablet Commonly known as:  IMITREX Take 1 Tab by mouth once as needed for Migraine.  May repeat dose (one tab) once after 2 hours. Maximum dose is 200mg in 24 hours. topiramate 25 mg tablet Commonly known as:  TOPAMAX  
1 qhs for 7 days then 1 twice  A day Prescriptions Sent to Pharmacy Refills  
 topiramate (TOPAMAX) 25 mg tablet 1 Si qhs for 7 days then 1 twice  A day Class: Normal  
 Pharmacy: [id number] [address] Ph #: [phone]  
 ondansetron (ZOFRAN ODT) 4 mg disintegrating tablet 1 Sig: Take 1 Tab by mouth every eight (8) hours as needed for Nausea. Class: Normal  
 Pharmacy: [id number] [address] Ph #: [phone] Route: Oral  
  
Follow-up Instructions Return in about 1 month (around 3/27/2017). Introducing [facility] SERVICES! [employer] introduces KAL patient portal. Now you can access parts of your medical record, email your doctor's office, and request medication refills online. 1. In your internet browser, go to [email or web address] 2. Click on the First Time User? Click Here link in the Sign In box. You will see the New Member Sign Up page. 3. Enter your KAL Access Code exactly as it appears below. You will not need to use this code after youve completed the sign-up process. If you do not sign up before the expiration date, you must request a new code. · KAL Access Code: [email or web address] Expires: 2017  6:24 PM 
 
4. Enter the last four digits of your Social Security Number (xxxx) and Date of Birth (mm/dd/yyyy) as indicated and click Submit. You will be taken to the next sign-up page. 5. Create a [employer] ID. This will be your KAL login ID and cannot be changed, so think of one that is secure and easy to remember. 6. Create a KAL password. You can change your password at any time. 7. Enter your Password Reset Question and Answer. This can be used at a later time if you forget your password. 8. Enter your e-mail address. You will receive e-mail notification when new information is available in [address] 9. Click Sign Up. You can now view and download portions of your medical record. 10. Click the Download Summary menu link to download a portable copy of your medical information. If you have questions, please visit the Frequently Asked Questions section of the [employer] website. Remember, [employer] is NOT to be used for urgent needs. For medical emergencies, dial 911. Now available from your iPhone and Android! Please provide this summary of care documentation to your next provider. Your primary care clinician is listed as [address] [place] [address]. If you have any questions after today's visit, please call [phone].

## 2017-02-27 NOTE — PROGRESS NOTES
[facility]             [address] [place], [address]    2017           Nish Corley is a 46 y.o. female who comes in for evaluation of headaches. Nish Corley does have a headache at this time. Description of Headaches:  Location of pain: bilateral, frontal  Radiation of pain?:none  Character of pain:aching, severe, throbbing  Severity of pain: 4-10 out of 10. Accompanying symptoms: nausea, photophobia, scotomata, neck pain  Prodromal sx?: no  Rapidity of onset: gradual  Typical duration of individual headache: 3 days  Are most headaches similar in presentation? yes  Typical precipitants: stress, odors, menses  Worst time of day: evening  Awakens from sleep with pain?: yes -     Current Use of Meds to Treat Headaches:  Abortive meds? acetaminophen, sumatriptan PO,fiorinal with codeine  Daily use? no  Prophylactic meds? none    Additional Relevant History:  History of head/neck trauma? no  Family h/o headache problems? yes - aunt and cousin      Past Medical History:   Diagnosis Date    Anxiety attack     Asthma     Hernia     Migraine     Psychiatric disorder     anxiety        Past Surgical History:   Procedure Laterality Date     DELIVERY ONLY      HX BACK SURGERY      HX  SECTION  2009       Social History     Social History    Marital status:      Spouse name: N/A    Number of children: N/A    Years of education: N/A     Occupational History    Not on file.      Social History Main Topics    Smoking status: Never Smoker    Smokeless tobacco: Never Used    Alcohol use No    Drug use: No    Sexual activity: Yes     Partners: Male     Other Topics Concern    Not on file     Social History Narrative       Review of Systems  A comprehensive review of systems was negative except for: Respiratory: positive for asthma    Physical Exam:    VITAL SIGNS:    Visit Vitals    /83    Pulse 89    Temp 98 °F (36.7 °C) (Oral)    Ht 5' 5\" (1.651 m)    Wt 57.5 kg (126 lb 12.8 oz)    LMP 02/26/2017    SpO2 98%    BMI 21.1 kg/m2       GENERAL: The patient is well developed, well nourished, and in no apparent distress. EXTREMITIES: No clubbing, cyanosis, or edema is identified. Pulses 2+  and symmetrical.    HEAD:   Ear, nose, and throat appear to be without trauma. The     patient is normocephalic. MUSCULOSKELETAL: Normal Posture. No point tenderness. NEUROLOGIC EXAMINATION    MENTAL STATUS: The patient is awake, alert, and oriented x 3. Speech is fluent and memory appears to be intact, both long and short term. No aphasias or apraxias. CRANIAL NERVES: II - Visual fields are full to confrontation. Funduscopic examination reveals flat disks bilaterally. Pupils are both equal and reactive to light and accommodation. III, IV, VI - Extraocular movements are intact and there is no nystagmus. V - Facial sensation is intact to pinprick and light touch. VII - Face is symmetrical.   VIII - Hearing is present. IX, X - Palate rises symmetrically. Gag is present. Tongue is in the midline. MOTOR:  Tone is normal and symmetric. There is no pronator drift present. The strength is intact for all muscle groups tested in all four extremities. SENSORY:          Sensory examination is intact to pinprick, light touch and position sense testing. COORDINATION:      Finger to nose, heel to shin gait testing are normal as well as stress gait testing. REFLEXES:  2+ and symmetrical planters are down going    Assessment and Plan:  Asia Yates is a 46 y.o. right handed female whose history and physical are consistent with common migraine. Asia Yates who has risk factors including catemenial headaches    Herberth was seen today for headache.     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 Follow-up Disposition:  Return in about 1 month (around 3/27/2017). Reviewed need for dietary supplements, risks and benefits of topamax therapy. I spent 50 minutes with the patient in face-to-face consultation, of which greater than 50% was spent in counseling and coordination of care as described above.

## 2017-02-28 NOTE — TELEPHONE ENCOUNTER
Pt states that she need an refill on her (singulair) and that it need and prior auth from [facility]

## 2017-03-01 ENCOUNTER — TELEPHONE (OUTPATIENT)
Dept: PULMONOLOGY | Age: 42
End: 2017-03-01

## 2017-03-01 NOTE — TELEPHONE ENCOUNTER
Patient has rx for singular but when tring to fill at [facility] aid they state it needs prior auth.    [facility] to re-fax prior auth form

## 2017-03-01 NOTE — TELEPHONE ENCOUNTER
We received notification that [facility] has authorized Symbicort. Eff. 03/01/17 - 03/01/2018    The authorization is faxed to [employer], [employer].

## 2017-04-02 ENCOUNTER — HOSPITAL ENCOUNTER (EMERGENCY)
Age: 42
Discharge: HOME | End: 2017-04-02
Attending: EMERGENCY MEDICINE
Payer: MEDICAID

## 2017-04-02 VITALS
TEMPERATURE: 98.1 F | HEIGHT: 65 IN | HEART RATE: 84 BPM | RESPIRATION RATE: 18 BRPM | WEIGHT: 124 LBS | BODY MASS INDEX: 20.66 KG/M2 | SYSTOLIC BLOOD PRESSURE: 109 MMHG | DIASTOLIC BLOOD PRESSURE: 72 MMHG | OXYGEN SATURATION: 99 %

## 2017-04-02 DIAGNOSIS — R10.84 ABDOMINAL PAIN, GENERALIZED: ICD-10-CM

## 2017-04-02 DIAGNOSIS — R11.2 NON-INTRACTABLE VOMITING WITH NAUSEA, UNSPECIFIED VOMITING TYPE: Primary | ICD-10-CM

## 2017-04-02 DIAGNOSIS — B34.9 VIRAL ILLNESS: ICD-10-CM

## 2017-04-02 LAB
ALBUMIN SERPL BCP-MCNC: 3.9 G/DL (ref 3.4–5)
ALBUMIN/GLOB SERPL: 0.9 {RATIO} (ref 0.8–1.7)
ALP SERPL-CCNC: 78 U/L (ref 45–117)
ALT SERPL-CCNC: 30 U/L (ref 13–56)
ANION GAP BLD CALC-SCNC: 12 MMOL/L (ref 3–18)
APPEARANCE UR: CLEAR
AST SERPL W P-5'-P-CCNC: 21 U/L (ref 15–37)
BACTERIA URNS QL MICRO: ABNORMAL /HPF
BASOPHILS # BLD AUTO: 0 K/UL (ref 0–0.06)
BASOPHILS # BLD: 0 % (ref 0–2)
BILIRUB SERPL-MCNC: 0.5 MG/DL (ref 0.2–1)
BILIRUB UR QL: NEGATIVE
BUN SERPL-MCNC: 13 MG/DL (ref 7–18)
BUN/CREAT SERPL: 15 (ref 12–20)
CALCIUM SERPL-MCNC: 9.3 MG/DL (ref 8.5–10.1)
CHLORIDE SERPL-SCNC: 103 MMOL/L (ref 100–108)
CO2 SERPL-SCNC: 23 MMOL/L (ref 21–32)
COLOR UR: YELLOW
CREAT SERPL-MCNC: 0.87 MG/DL (ref 0.6–1.3)
DIFFERENTIAL METHOD BLD: ABNORMAL
EOSINOPHIL # BLD: 0 K/UL (ref 0–0.4)
EOSINOPHIL NFR BLD: 0 % (ref 0–5)
EPITH CASTS URNS QL MICRO: ABNORMAL /LPF (ref 0–5)
ERYTHROCYTE [DISTWIDTH] IN BLOOD BY AUTOMATED COUNT: 13.9 % (ref 11.6–14.5)
GLOBULIN SER CALC-MCNC: 4.5 G/DL (ref 2–4)
GLUCOSE SERPL-MCNC: 95 MG/DL (ref 74–99)
GLUCOSE UR STRIP.AUTO-MCNC: NEGATIVE MG/DL
HCG UR QL: NEGATIVE
HCT VFR BLD AUTO: 41 % (ref 35–45)
HGB BLD-MCNC: 13 G/DL (ref 12–16)
HGB UR QL STRIP: NEGATIVE
KETONES UR QL STRIP.AUTO: 15 MG/DL
LEUKOCYTE ESTERASE UR QL STRIP.AUTO: NEGATIVE
LIPASE SERPL-CCNC: 79 U/L (ref 73–393)
LYMPHOCYTES # BLD AUTO: 9 % (ref 21–52)
LYMPHOCYTES # BLD: 0.5 K/UL (ref 0.9–3.6)
MCH RBC QN AUTO: 25.5 PG (ref 24–34)
MCHC RBC AUTO-ENTMCNC: 31.7 G/DL (ref 31–37)
MCV RBC AUTO: 80.6 FL (ref 74–97)
MONOCYTES # BLD: 0.3 K/UL (ref 0.05–1.2)
MONOCYTES NFR BLD AUTO: 6 % (ref 3–10)
MUCOUS THREADS URNS QL MICRO: ABNORMAL /LPF
NEUTS SEG # BLD: 4.4 K/UL (ref 1.8–8)
NEUTS SEG NFR BLD AUTO: 85 % (ref 40–73)
NITRITE UR QL STRIP.AUTO: NEGATIVE
PH UR STRIP: 8.5 [PH] (ref 5–8)
PLATELET # BLD AUTO: 170 K/UL (ref 135–420)
PMV BLD AUTO: 10.8 FL (ref 9.2–11.8)
POTASSIUM SERPL-SCNC: 4.1 MMOL/L (ref 3.5–5.5)
PROT SERPL-MCNC: 8.4 G/DL (ref 6.4–8.2)
PROT UR STRIP-MCNC: ABNORMAL MG/DL
RBC # BLD AUTO: 5.09 M/UL (ref 4.2–5.3)
RBC #/AREA URNS HPF: ABNORMAL /HPF (ref 0–5)
SODIUM SERPL-SCNC: 138 MMOL/L (ref 136–145)
SP GR UR REFRACTOMETRY: 1.02 (ref 1–1.03)
UROBILINOGEN UR QL STRIP.AUTO: 1 EU/DL (ref 0.2–1)
WBC # BLD AUTO: 5.2 K/UL (ref 4.6–13.2)
WBC URNS QL MICRO: ABNORMAL /HPF (ref 0–4)

## 2017-04-02 PROCEDURE — 81025 URINE PREGNANCY TEST: CPT | Performed by: PHYSICIAN ASSISTANT

## 2017-04-02 PROCEDURE — 74011250636 HC RX REV CODE- 250/636: Performed by: PHYSICIAN ASSISTANT

## 2017-04-02 PROCEDURE — 96374 THER/PROPH/DIAG INJ IV PUSH: CPT

## 2017-04-02 PROCEDURE — 80053 COMPREHEN METABOLIC PANEL: CPT | Performed by: PHYSICIAN ASSISTANT

## 2017-04-02 PROCEDURE — 83690 ASSAY OF LIPASE: CPT | Performed by: PHYSICIAN ASSISTANT

## 2017-04-02 PROCEDURE — 96361 HYDRATE IV INFUSION ADD-ON: CPT

## 2017-04-02 PROCEDURE — 81001 URINALYSIS AUTO W/SCOPE: CPT | Performed by: PHYSICIAN ASSISTANT

## 2017-04-02 PROCEDURE — 96372 THER/PROPH/DIAG INJ SC/IM: CPT

## 2017-04-02 PROCEDURE — 96375 TX/PRO/DX INJ NEW DRUG ADDON: CPT

## 2017-04-02 PROCEDURE — 85025 COMPLETE CBC W/AUTO DIFF WBC: CPT | Performed by: PHYSICIAN ASSISTANT

## 2017-04-02 PROCEDURE — 99283 EMERGENCY DEPT VISIT LOW MDM: CPT

## 2017-04-02 RX ORDER — PROCHLORPERAZINE MALEATE 10 MG
10 TABLET ORAL
Qty: 12 TAB | Refills: 0 | Status: SHIPPED | OUTPATIENT
Start: 2017-04-02 | End: 2017-04-09

## 2017-04-02 RX ORDER — DIPHENHYDRAMINE HYDROCHLORIDE 50 MG/ML
50 INJECTION, SOLUTION INTRAMUSCULAR; INTRAVENOUS ONCE
Status: COMPLETED | OUTPATIENT
Start: 2017-04-02 | End: 2017-04-02

## 2017-04-02 RX ORDER — MORPHINE SULFATE 4 MG/ML
3 INJECTION, SOLUTION INTRAMUSCULAR; INTRAVENOUS
Status: COMPLETED | OUTPATIENT
Start: 2017-04-02 | End: 2017-04-02

## 2017-04-02 RX ORDER — PROCHLORPERAZINE EDISYLATE 5 MG/ML
10 INJECTION INTRAMUSCULAR; INTRAVENOUS
Status: DISCONTINUED | OUTPATIENT
Start: 2017-04-02 | End: 2017-04-02 | Stop reason: HOSPADM

## 2017-04-02 RX ORDER — DIPHENHYDRAMINE HCL 25 MG
CAPSULE ORAL
Qty: 16 CAP | Refills: 0 | Status: SHIPPED | OUTPATIENT
Start: 2017-04-02 | End: 2018-03-26

## 2017-04-02 RX ORDER — ONDANSETRON 2 MG/ML
4 INJECTION INTRAMUSCULAR; INTRAVENOUS
Status: COMPLETED | OUTPATIENT
Start: 2017-04-02 | End: 2017-04-02

## 2017-04-02 RX ADMIN — PROCHLORPERAZINE EDISYLATE 10 MG: 5 INJECTION INTRAMUSCULAR; INTRAVENOUS at 11:51

## 2017-04-02 RX ADMIN — DIPHENHYDRAMINE HYDROCHLORIDE 50 MG: 50 INJECTION, SOLUTION INTRAMUSCULAR; INTRAVENOUS at 11:51

## 2017-04-02 RX ADMIN — ONDANSETRON HYDROCHLORIDE 4 MG: 2 SOLUTION INTRAMUSCULAR; INTRAVENOUS at 11:00

## 2017-04-02 RX ADMIN — Medication 3 MG: at 11:05

## 2017-04-02 RX ADMIN — SODIUM CHLORIDE 1000 ML: 900 INJECTION, SOLUTION INTRAVENOUS at 10:25

## 2017-04-02 NOTE — ED PROVIDER NOTES
[facility]ERG[facility]Y DEPT      46 y.o. female with noted PMH including Asthma, Anxiety, and Migraines presents to the ED c/o0 nausea, vomiting, abdominal pain, and diarrhea. Patient reports eating potato and spinach late last night and then at 3am developed symptoms. Notes her pain is diffuse soreness over her entire abdomen, and comes and goes. Says it is not a constant pain. She has vomited 4 times and had diarrhea x 2; both are without any blood. Denies fever, chills, vaginal bleeding/discharge, urinary complaints, or other symptoms at this time. Current Facility-Administered Medications   Medication Dose Route Frequency    prochlorperazine (COMPAZINE) injection 10 mg  10 mg IntraMUSCular Q6H PRN     Current Outpatient Prescriptions   Medication Sig    prochlorperazine (COMPAZINE) 10 mg tablet Take 1 Tab by mouth every eight (8) hours as needed for Nausea for up to 7 days.  diphenhydrAMINE (BENADRYL) 25 mg capsule Take 1-2 tabs every 8 hours as needed with the Compazine.  ondansetron (ZOFRAN ODT) 4 mg disintegrating tablet Take 1 Tab by mouth every eight (8) hours as needed for Nausea.  budesonide-formoterol (SYMBICORT) 160-4.5 mcg/actuation HFA inhaler Take 2 Puffs by inhalation two (2) times a day.  codeine-butalbital-acetaminophen-caffeine (FIORICET WITH CODEINE) -59-30 mg per capsule Take 1 Cap by mouth every four (4) hours as needed for Headache. Max Daily Amount: 6 Caps.  montelukast (SINGULAIR) 10 mg tablet Take 1 Tab by mouth daily.  fluticasone (FLONASE) 50 mcg/actuation nasal spray instill 2 sprays into each nostril once daily    albuterol (PROAIR HFA) 90 mcg/actuation inhaler Take 2 Puffs by inhalation every four (4) hours as needed for Wheezing.  PRENATAL VITS W-CA,FE,FA,<1MG, (PRENATAL VITAMIN PO) Take  by mouth.  FLUTICASONE/VILANTEROL (BREO ELLIPTA IN) Take  by inhalation.     topiramate (TOPAMAX) 25 mg tablet 1 qhs for 7 days then 1 twice  A day  predniSONE (DELTASONE) 10 mg tablet Take 3 tabs X 3 days, 2 tabs X 3 days, then 1 daily    SUMAtriptan (IMITREX) 25 mg tablet Take 1 Tab by mouth once as needed for Migraine. May repeat dose (one tab) once after 2 hours. Maximum dose is 200mg in 24 hours.  EPINEPHrine (EPIPEN 2-GALE) 0.3 mg/0.3 mL injection 0.3 mL by IntraMUSCular route once as needed for up to 1 dose.  albuterol-ipratropium (DUO-NEB) 2.5 mg-0.5 mg/3 ml nebu 3 mL by Nebulization route every four (4) hours as needed. For SOB or Wheezing Diag Code J45.909 File Medicare B    fexofenadine-pseudoephedrine (ALLEGRA-D 24 HOUR) 180-240 mg per tablet Take 1 Tab by mouth daily. Past Medical History:   Diagnosis Date    Anxiety attack     Asthma     Hernia     Migraine     Psychiatric disorder     anxiety        Past Surgical History:   Procedure Laterality Date     DELIVERY ONLY      HX BACK SURGERY      HX  SECTION         Family History   Problem Relation Age of Onset    Heart Disease Other      grandmothers    Asthma Brother     Lung Disease Paternal Aunt      cronic bronchitis    Asthma Paternal Grandmother     High Cholesterol Mother        Social History     Social History    Marital status:      Spouse name: N/A    Number of children: N/A    Years of education: N/A     Occupational History    Not on file.      Social History Main Topics    Smoking status: Never Smoker    Smokeless tobacco: Never Used    Alcohol use No    Drug use: No    Sexual activity: Yes     Partners: Male     Other Topics Concern    Not on file     Social History Narrative       Allergies   Allergen Reactions    Aspirin Anaphylaxis    Ibuprofen Anaphylaxis    Pcn [Penicillins] Itching    Claritin [Loratadine] Anxiety and Cough    Phenergan [Promethazine] Anxiety and Swelling    Seafood Swelling    Shellfish Containing Products Hives       Patient's primary care provider (as noted in EPIC):  Cali Hudson, MD    REVIEW OF SYSTEMS:    Constitutional:  Negative for fever, diaphoresis. HENT:  Negative for congestion. Respiratory:  Negative for cough and shortness of breath. Cardiovascular:  Negative for chest pain and palpitations. Gastrointestinal: + nausea, vomiting, diarrhea, abdominal pain. Negative for rectal bleeding. Genitourinary:  Negative for flank pain. Musculoskeletal:  Negative for back pain. Skin:  Negative for pallor. Neurological:  Negative for weakness. All other systems negative as reviewed. Visit Vitals    /72 (BP 1 Location: Left arm, BP Patient Position: At rest)    Pulse 84    Temp 98.1 °F (36.7 °C)    Resp 18    Ht 5' 5\" (1.651 m)    Wt 56.2 kg (124 lb)    SpO2 99%    BMI 20.63 kg/m2       PHYSICAL EXAM:    CONSTITUTIONAL:  Alert, appears uncomforable;  well developed;  well nourished. HEAD:  Normocephalic, atraumatic. EYES:  EOMI. Non-icteric sclera. Normal conjunctiva. ENTM:   Mouth: mucous membranes moist.  NECK:  Supple  RESPIRATORY:  Chest clear, equal breath sounds, good air movement. CARDIOVASCULAR:  Regular rate and rhythm. No murmurs, rubs, or gallops. GI:  Normal bowel sounds, abdomen soft and non-tender. No rebound or guarding. No palpable masses. BACK:  Non-tender. NEURO:  Moves all four extremities, and grossly normal motor exam.  SKIN:  No rashes;  Normal for age. PSYCH:  Alert and normal affect. DIFFERENTIAL DIAGNOSES/ MEDICAL DECISION MAKING:  Viral vomiting and diarrhea, food poisoning, bacterial vomiting and diarrhea. Lower on differential diagnosis in this patient is early small bowel obstruction (given diarrhea as well), Clostridium difficile related diarrhea, irritable bowel syndrome, crohn's disease, or ulcerative colitis.     Abnormal lab results from this emergency department encounter:  Labs Reviewed   CBC WITH AUTOMATED DIFF - Abnormal; Notable for the following:        Result Value    NEUTROPHILS 85 (*)     LYMPHOCYTES 9 (*)     ABS. LYMPHOCYTES 0.5 (*)     All other components within normal limits   METABOLIC PANEL, COMPREHENSIVE - Abnormal; Notable for the following:     Protein, total 8.4 (*)     Globulin 4.5 (*)     All other components within normal limits   URINALYSIS W/ RFLX MICROSCOPIC - Abnormal; Notable for the following:     pH (UA) 8.5 (*)     Protein TRACE (*)     Ketone 15 (*)     All other components within normal limits   URINE MICROSCOPIC ONLY - Abnormal; Notable for the following:     Bacteria FEW (*)     Mucus FEW (*)     All other components within normal limits   HCG URINE, QL   LIPASE       Lab values for this patient within approximately the last 12 hours:  Recent Results (from the past 12 hour(s))   CBC WITH AUTOMATED DIFF    Collection Time: 04/02/17 10:20 AM   Result Value Ref Range    WBC 5.2 4.6 - 13.2 K/uL    RBC 5.09 4. 20 - 5.30 M/uL    HGB 13.0 12.0 - 16.0 g/dL    HCT 41.0 35.0 - 45.0 %    MCV 80.6 74.0 - 97.0 FL    MCH 25.5 24.0 - 34.0 PG    MCHC 31.7 31.0 - 37.0 g/dL    RDW 13.9 11.6 - 14.5 %    PLATELET 779 692 - 622 K/uL    MPV 10.8 9.2 - 11.8 FL    NEUTROPHILS 85 (H) 40 - 73 %    LYMPHOCYTES 9 (L) 21 - 52 %    MONOCYTES 6 3 - 10 %    EOSINOPHILS 0 0 - 5 %    BASOPHILS 0 0 - 2 %    ABS. NEUTROPHILS 4.4 1.8 - 8.0 K/UL    ABS. LYMPHOCYTES 0.5 (L) 0.9 - 3.6 K/UL    ABS. MONOCYTES 0.3 0.05 - 1.2 K/UL    ABS. EOSINOPHILS 0.0 0.0 - 0.4 K/UL    ABS.  BASOPHILS 0.0 0.0 - 0.06 K/UL    DF AUTOMATED     METABOLIC PANEL, COMPREHENSIVE    Collection Time: 04/02/17 10:20 AM   Result Value Ref Range    Sodium 138 136 - 145 mmol/L    Potassium 4.1 3.5 - 5.5 mmol/L    Chloride 103 100 - 108 mmol/L    CO2 23 21 - 32 mmol/L    Anion gap 12 3.0 - 18 mmol/L    Glucose 95 74 - 99 mg/dL    BUN 13 7.0 - 18 MG/DL    Creatinine 0.87 0.6 - 1.3 MG/DL    BUN/Creatinine ratio 15 12 - 20      GFR est AA >60 >60 ml/min/1.73m2    GFR est non-AA >60 >60 ml/min/1.73m2    Calcium 9.3 8.5 - 10.1 MG/DL    Bilirubin, total 0.5 0.2 - 1.0 MG/DL    ALT (SGPT) 30 13 - 56 U/L    AST (SGOT) 21 15 - 37 U/L    Alk. phosphatase 78 45 - 117 U/L    Protein, total 8.4 (H) 6.4 - 8.2 g/dL    Albumin 3.9 3.4 - 5.0 g/dL    Globulin 4.5 (H) 2.0 - 4.0 g/dL    A-G Ratio 0.9 0.8 - 1.7     LIPASE    Collection Time: 04/02/17 10:20 AM   Result Value Ref Range    Lipase 79 73 - 393 U/L   URINALYSIS W/ RFLX MICROSCOPIC    Collection Time: 04/02/17 11:35 AM   Result Value Ref Range    Color YELLOW      Appearance CLEAR      Specific gravity 1.022 1.005 - 1.030      pH (UA) 8.5 (H) 5.0 - 8.0      Protein TRACE (A) NEG mg/dL    Glucose NEGATIVE  NEG mg/dL    Ketone 15 (A) NEG mg/dL    Bilirubin NEGATIVE  NEG      Blood NEGATIVE  NEG      Urobilinogen 1.0 0.2 - 1.0 EU/dL    Nitrites NEGATIVE  NEG      Leukocyte Esterase NEGATIVE  NEG     HCG URINE, QL    Collection Time: 04/02/17 11:35 AM   Result Value Ref Range    HCG urine, Ql. NEGATIVE  NEG     URINE MICROSCOPIC ONLY    Collection Time: 04/02/17 11:35 AM   Result Value Ref Range    WBC 0 to 3 0 - 4 /hpf    RBC 0 to 3 0 - 5 /hpf    Epithelial cells 2+ 0 - 5 /lpf    Bacteria FEW (A) NEG /hpf    Mucus FEW (A) NEG /lpf     Medication(s) ordered for patient during this emergency visit encounter:  Medications   prochlorperazine (COMPAZINE) injection 10 mg (10 mg IntraMUSCular Given 4/2/17 1151)   sodium chloride 0.9 % bolus infusion 1,000 mL (0 mL IntraVENous IV Completed 4/2/17 1125)   ondansetron (ZOFRAN) injection 4 mg (4 mg IntraVENous Given 4/2/17 1100)   morphine injection 3 mg (3 mg IntraVENous Given 4/2/17 1105)   diphenhydrAMINE (BENADRYL) injection 50 mg (50 mg IntraVENous Given 4/2/17 1151)       ED COURSE:      IMPRESSION AND MEDICAL DECISION MAKING:  Based upon the patient's presentation with noted HPI and PE, along with the work up done in the emergency department, I believe that the patient is having vomiting, diarrhea, and abdominal pain from a viral illness.    states he just recovered from similar symptoms. She does not have any abdominal tenderness. Pain resolved after pain medication and nausea medication. Repeat abdominal exam prior to discharge was still benign. I do believe that the patient is well enough for discharge home. Will prescribe compazine (and benadryl) for nausea and vomiting. Diagnosis:   1. Non-intractable vomiting with nausea, unspecified vomiting type    2. Abdominal pain, generalized    3. Viral illness      Disposition: Discharge    Follow-up Information     Follow up With Details Comments MD Sheba In 1 day within 24 hours if abdominal pain persists. Richard Jimenez [facility]  [phone]      [facility] EMERGENCY DEPT  If symptoms worsen, including localized or worsening pain, or continued pain and you are not able to see [facility] primary care doctor within 24 housr. [address] [facility]  [phone]          Discharge Medication List as of 4/2/2017 12:19 PM      START taking these medications    Details   prochlorperazine (COMPAZINE) 10 mg tablet Take 1 Tab by mouth every eight (8) hours as needed for Nausea for up to 7 days. , Normal, Disp-12 Tab, R-0      diphenhydrAMINE (BENADRYL) 25 mg capsule Take 1-2 tabs every 8 hours as needed with the Compazine. , Normal, Disp-16 Cap, R-0         CONTINUE these medications which have NOT CHANGED    Details   ondansetron (ZOFRAN ODT) 4 mg disintegrating tablet Take 1 Tab by mouth every eight (8) hours as needed for Nausea., Normal, Disp-15 Tab, R-1      budesonide-formoterol (SYMBICORT) 160-4.5 mcg/actuation HFA inhaler Take 2 Puffs by inhalation two (2) times a day., Normal, Disp-1 Inhaler, R-5      codeine-butalbital-acetaminophen-caffeine (FIORICET WITH CODEINE) -64-30 mg per capsule Take 1 Cap by mouth every four (4) hours as needed for Headache.  Max Daily Amount: 6 Caps., Print, Disp-15 Cap, R-0      montelukast (SINGULAIR) 10 mg tablet Take 1 Tab by mouth daily. , Normal, Disp-30 Tab, R-5      fluticasone (FLONASE) 50 mcg/actuation nasal spray instill 2 sprays into each nostril once daily, Normal, Disp-1 Bottle, R-2      albuterol (PROAIR HFA) 90 mcg/actuation inhaler Take 2 Puffs by inhalation every four (4) hours as needed for Wheezing., Normal, Disp-1 Inhaler, R-5      PRENATAL VITS W-CA,FE,FA,<1MG, (PRENATAL VITAMIN PO) Take  by mouth.  , Historical Med      FLUTICASONE/VILANTEROL (BREO ELLIPTA IN) Take  by inhalation. , Historical Med      topiramate (TOPAMAX) 25 mg tablet 1 qhs for 7 days then 1 twice  A day, Normal, Disp-60 Tab, R-1      predniSONE (DELTASONE) 10 mg tablet Take 3 tabs X 3 days, 2 tabs X 3 days, then 1 daily, Normal, Disp-18 Tab, R-0      SUMAtriptan (IMITREX) 25 mg tablet Take 1 Tab by mouth once as needed for Migraine. May repeat dose (one tab) once after 2 hours. Maximum dose is 200mg in 24 hours. , Print, Disp-15 Tab, R-0      EPINEPHrine (EPIPEN 2-GALE) 0.3 mg/0.3 mL injection 0.3 mL by IntraMUSCular route once as needed for up to 1 dose., Normal, Disp-2 Syringe, R-5      albuterol-ipratropium (DUO-NEB) 2.5 mg-0.5 mg/3 ml nebu 3 mL by Nebulization route every four (4) hours as needed. For SOB or Wheezing Diag Code J45.909 File Medicare B, Normal, Disp-120 mL, R-5      fexofenadine-pseudoephedrine (ALLEGRA-D 24 HOUR) 180-240 mg per tablet Take 1 Tab by mouth daily. , Historical Med           Emelia Mistry, [id number]

## 2017-04-02 NOTE — DISCHARGE INSTRUCTIONS
Abdominal Pain: Care Instructions  Your Care Instructions    Abdominal pain has many possible causes. Some aren't serious and get better on their own in a few days. Others need more testing and treatment. If your pain continues or gets worse, you need to be rechecked and may need more tests to find out what is wrong. You may need surgery to correct the problem. Don't ignore new symptoms, such as fever, nausea and vomiting, urination problems, pain that gets worse, and dizziness. These may be signs of a more serious problem. Your doctor may have recommended a follow-up visit in the next 8 to 12 hours. If you are not getting better, you may need more tests or treatment. The doctor has checked you carefully, but problems can develop later. If you notice any problems or new symptoms, get medical treatment right away. Follow-up care is a key part of your treatment and safety. Be sure to make and go to all appointments, and call your doctor if you are having problems. It's also a good idea to know your test results and keep a list of the medicines you take. How can you care for yourself at home? · Rest until you feel better. · To prevent dehydration, drink plenty of fluids, enough so that your urine is light yellow or clear like water. Choose water and other caffeine-free clear liquids until you feel better. If you have kidney, heart, or liver disease and have to limit fluids, talk with your doctor before you increase the amount of fluids you drink. · If your stomach is upset, eat mild foods, such as rice, dry toast or crackers, bananas, and applesauce. Try eating several small meals instead of two or three large ones. · Wait until 48 hours after all symptoms have gone away before you have spicy foods, alcohol, and drinks that contain caffeine. · Do not eat foods that are high in fat. · Avoid anti-inflammatory medicines such as aspirin, ibuprofen (Advil, Motrin), and naproxen (Aleve).  These can cause stomach upset. Talk to your doctor if you take daily aspirin for another health problem. When should you call for help? Call 911 anytime you think you may need emergency care. For example, call if:  · You passed out (lost consciousness). · You pass maroon or very bloody stools. · You vomit blood or what looks like coffee grounds. · You have new, severe belly pain. Call your doctor now or seek immediate medical care if:  · Your pain gets worse, especially if it becomes focused in one area of your belly. · You have a new or higher fever. · Your stools are black and look like tar, or they have streaks of blood. · You have unexpected vaginal bleeding. · You have symptoms of a urinary tract infection. These may include:  ¨ Pain when you urinate. ¨ Urinating more often than usual.  ¨ Blood in your urine. · You are dizzy or lightheaded, or you feel like you may faint. Watch closely for changes in your health, and be sure to contact your doctor if:  · You are not getting better after 1 day (24 hours). Where can you learn more? Go to http://jaciUluleelina.info/. Enter S242 in the search box to learn more about \"Abdominal Pain: Care Instructions. \"  Current as of: May 27, 2016  Content Version: 11.2  © 9900-4593 [employer]. Care instructions adapted under license by [employer] (which disclaims liability or warranty for this information). If you have questions about a medical condition or this instruction, always ask your healthcare professional. [place] [address] any warranty or liability for your use of this information. Nausea and Vomiting: Care Instructions  Your Care Instructions    When you are nauseated, you may feel weak and sweaty and notice a lot of saliva in your mouth. Nausea often leads to vomiting. Most of the time you do not need to worry about nausea and vomiting, but they can be signs of other illnesses.   Two common causes of nausea and vomiting are stomach flu and food poisoning. Nausea and vomiting from viral stomach flu will usually start to improve within 24 hours. Nausea and vomiting from food poisoning may last from 12 to 48 hours. The doctor has checked you carefully, but problems can develop later. If you notice any problems or new symptoms, get medical treatment right away. Follow-up care is a key part of your treatment and safety. Be sure to make and go to all appointments, and call your doctor if you are having problems. It's also a good idea to know your test results and keep a list of the medicines you take. How can you care for yourself at home? · To prevent dehydration, drink plenty of fluids, enough so that your urine is light yellow or clear like water. Choose water and other caffeine-free clear liquids until you feel better. If you have kidney, heart, or liver disease and have to limit fluids, talk with your doctor before you increase the amount of fluids you drink. · Rest in bed until you feel better. · When you are able to eat, try clear soups, mild foods, and liquids until all symptoms are gone for 12 to 48 hours. Other good choices include dry toast, crackers, cooked cereal, and gelatin dessert, such as Jell-O. When should you call for help? Call 911 anytime you think you may need emergency care. For example, call if:  · You passed out (lost consciousness). Call your doctor now or seek immediate medical care if:  · You have symptoms of dehydration, such as:  ¨ Dry eyes and a dry mouth. ¨ Passing only a little dark urine. ¨ Feeling thirstier than usual.  · You have new or worsening belly pain. · You have a new or higher fever. · You vomit blood or what looks like coffee grounds. Watch closely for changes in your health, and be sure to contact your doctor if:  · You have ongoing nausea and vomiting. · Your vomiting is getting worse. · Your vomiting lasts longer than 2 days.   · You are not getting better as expected. Where can you learn more? Go to http://jaci-elina.info/. Enter 25 [id number] in the search box to learn more about \"Nausea and Vomiting: Care Instructions. \"  Current as of: May 27, 2016  Content Version: 11.2  © 7318-4298 [employer]. Care instructions adapted under license by [employer] (which disclaims liability or warranty for this information). If you have questions about a medical condition or this instruction, always ask your healthcare professional. [place] [address] any warranty or liability for your use of this information.

## 2017-04-02 NOTE — ED TRIAGE NOTES
abd pain began at 0300 this morning.  Woke up with lower abd and vomithing times 4 and diarrhea x2 + HA

## 2017-04-06 NOTE — TELEPHONE ENCOUNTER
After speaking with [facility] where the pt gets her Rx filled. The pt has 2 insurance plans for Rx's. [facility] and [employer]  [facility] prior auth for Symbicort has already been done Approved 3/1/17-3/1/2018 Pharmacy has this information. Still with $150 co-payment, needs the 2nd insurance prior auth done with [employer]. Pharmacy to sent request for the [employer] to cover the co-payment   Pt advised we are waiting on pharmacy to fax the [employer] information on prior auth.

## 2017-04-06 NOTE — TELEPHONE ENCOUNTER
Pt states that her pharmacy sent over two forms of prior auth on her med of (symbicort) pt states that she has been waiting for 2wks now.  pls call pt

## 2017-04-12 NOTE — TELEPHONE ENCOUNTER
Pt calling back to check on status of prior auth from her secondary insurance for Symbicort. Also, can she take the [facility] she has until she gets the Symbicort.

## 2017-04-12 NOTE — TELEPHONE ENCOUNTER
No prior Auth form was received from the Pharmacy. I went on line and found the [employer] symbicort prior auth. Form. I questioned the pt. Re: Advair use and she did use it but, if wasn't effective in controlling her symptoms. Next was a question re: use of Breo. She is on Breo at present and doing fine with it. The pt. Is instructed to use the Breo Ellipta (#6 RFs) in place of the Symbicort. She is agreeable to same.

## 2017-05-31 ENCOUNTER — OFFICE VISIT (OUTPATIENT)
Dept: NEUROLOGY | Age: 42
End: 2017-05-31

## 2017-05-31 VITALS
HEART RATE: 77 BPM | OXYGEN SATURATION: 98 % | DIASTOLIC BLOOD PRESSURE: 62 MMHG | BODY MASS INDEX: 21.02 KG/M2 | TEMPERATURE: 98.6 F | SYSTOLIC BLOOD PRESSURE: 110 MMHG | HEIGHT: 65 IN | WEIGHT: 126.2 LBS

## 2017-05-31 DIAGNOSIS — N92.6 CATAMENIAL DISORDER: ICD-10-CM

## 2017-05-31 DIAGNOSIS — G43.009 MIGRAINE WITHOUT AURA AND WITHOUT STATUS MIGRAINOSUS, NOT INTRACTABLE: Primary | ICD-10-CM

## 2017-05-31 NOTE — PROGRESS NOTES
[facility]             [address] [place], [address]    2017           Maikel Alfaro is a 46 y.o. female who comes in for evaluation of headaches. Maikel Alfaro does have a headache at this time. She did not try the Topamax due to concerns about side effects review of her chart chart reviews that she is allergic to aspirin and NSAIDs. She is out of the [id number]. She reports approximately 4-5 headaches over the past month reviewed alternative therapy at length    Description of Headaches:  Location of pain: bilateral, frontal  Radiation of pain?:none  Character of pain:aching, severe, throbbing  Severity of pain: 4-10 out of 10. Accompanying symptoms: nausea, photophobia, scotomata, neck pain  Prodromal sx?: no  Rapidity of onset: gradual  Typical duration of individual headache: 3 days  Are most headaches similar in presentation? yes  Typical precipitants: stress, odors, menses  Worst time of day: evening  Awakens from sleep with pain?: yes -     Current Use of Meds to Treat Headaches:  Abortive meds? acetaminophen, sumatriptan PO,fiorinal with codeine  Daily use? no  Prophylactic meds? none    Additional Relevant History:  History of head/neck trauma? no  Family h/o headache problems? yes - aunt and cousin      Past Medical History:   Diagnosis Date    Anxiety attack     Asthma     Hernia     Migraine     Psychiatric disorder     anxiety        Past Surgical History:   Procedure Laterality Date     DELIVERY ONLY      HX BACK SURGERY      HX  SECTION         Social History     Social History    Marital status:      Spouse name: N/A    Number of children: N/A    Years of education: N/A     Occupational History    Not on file.      Social History Main Topics    Smoking status: Never Smoker    Smokeless tobacco: Never Used    Alcohol use No    Drug use: No    Sexual activity: Yes     Partners: Male     Other Topics Concern    Not on file     Social History Narrative       Review of Systems  A comprehensive review of systems was negative except for: Respiratory: positive for asthma    Physical Exam:    VITAL SIGNS:    Visit Vitals    /62    Pulse 77    Temp 98.6 °F (37 °C) (Oral)    Ht 5' 5\" (1.651 m)    Wt 57.2 kg (126 lb 3.2 oz)    LMP 05/31/2017    SpO2 98%    BMI 21 kg/m2       GENERAL: The patient is well developed, well nourished, and in no apparent distress. EXTREMITIES: No clubbing, cyanosis, or edema is identified. Pulses 2+  and symmetrical.    HEAD:   Ear, nose, and throat appear to be without trauma. The     patient is normocephalic. MUSCULOSKELETAL: Normal Posture. No point tenderness. NEUROLOGIC EXAMINATION    MENTAL STATUS: The patient is awake, alert, and oriented x 3. Speech is fluent and memory appears to be intact, both long and short term. No aphasias or apraxias. CRANIAL NERVES: II - Visual fields are full to confrontation. Funduscopic examination reveals flat disks bilaterally. Pupils are both equal and reactive to light and accommodation. III, IV, VI - Extraocular movements are intact and there is no nystagmus. V - Facial sensation is intact to pinprick and light touch. VII - Face is symmetrical.   VIII - Hearing is present. IX, X - Palate rises symmetrically. Gag is present. Tongue is in the midline. MOTOR:  Tone is normal and symmetric. There is no pronator drift present. The strength is intact for all muscle groups tested in all four extremities. SENSORY:          Sensory examination is intact to pinprick, light touch and position sense testing. COORDINATION:      Finger to nose, heel to shin gait testing are normal as well as stress gait testing. REFLEXES:  2+ and symmetrical planters are down going    Assessment and Plan:  Christiana Ochoa is a 46 y.o. right handed female whose history and physical are consistent with common migraine. Julito Cardoso who has risk factors including catemenial headaches    Herberth was seen today for headache. Diagnoses and all orders for this visit:    Migraine without aura and without status migrainosus, not intractable    Catamenial disorder      Follow-up Disposition:  Return in about 2 months (around 7/31/2017). Reviewed need for dietary supplements,   I spent 30 minutes with the patient in face-to-face consultation, of which greater than 50% was spent in counseling and coordination of care as described above.

## 2017-05-31 NOTE — MR AVS SNAPSHOT
Visit Information Date & Time Provider Department Dept. Phone Encounter #  
 5/31/2017  9:15 AM Cadence Maguire  [facility] [address] [address] [id number] Follow-up Instructions Return in about 2 months (around 7/31/2017). Follow-up and Disposition History Upcoming Health Maintenance Date Due  
 PAP AKA CERVICAL CYTOLOGY 6/24/1998 INFLUENZA AGE 9 TO ADULT 8/1/2017 DTaP/Tdap/Td series (2 - Td) 2/6/2026 Allergies as of 5/31/2017  Review Complete On: 5/31/2017 By: Cadence Maguire MD  
  
 Severity Noted Reaction Type Reactions Aspirin High   Anaphylaxis Ibuprofen High 01/23/2014    Anaphylaxis Pcn [Penicillins] High 03/14/2013    Itching Claritin [Loratadine]    Anxiety, Cough Phenergan [Promethazine]  11/13/2012    Anxiety, Swelling Seafood  07/18/2014    Swelling Shellfish Containing Products    Hives Current Immunizations  Reviewed on 11/13/2014 Name Date Influenza Vaccine 11/13/2014 Influenza Vaccine (Quad) 9/29/2016 10:13 AM  
 Influenza Vaccine Split 10/15/2012 Pneumococcal Polysaccharide (PPSV-23) 5/1/2015 10:45 AM  
 Tdap 2/6/2016 12:14 PM  
  
 Not reviewed this visit You Were Diagnosed With   
  
 Codes Comments Migraine without aura and without status migrainosus, not intractable    -  Primary ICD-10-CM: G43.009 ICD-9-CM: 346.10 Catamenial disorder     ICD-10-CM: N92.6 ICD-9-CM: 626.9 Vitals BP Pulse Temp Height(growth percentile) Weight(growth percentile) LMP  
 110/62 77 98.6 °F (37 °C) (Oral) 5' 5\" (1.651 m) 126 lb 3.2 oz (57.2 kg) 05/31/2017 SpO2 BMI OB Status Smoking Status 98% 21 kg/m2 Having regular periods Never Smoker BMI and BSA Data Body Mass Index Body Surface Area  
 21 kg/m 2 1.62 m 2 Preferred Pharmacy Pharmacy Name Phone [id number] [address] [facility]. Jovan Sonja, [address] [facility] [phone] Your Updated Medication List  
  
   
This list is accurate as of: 5/31/17 10:16 AM.  Always use your most recent med list.  
  
  
  
  
 albuterol 90 mcg/actuation inhaler Commonly known as:  PROAIR HFA Take 2 Puffs by inhalation every four (4) hours as needed for Wheezing. albuterol-ipratropium 2.5 mg-0.5 mg/3 ml Nebu Commonly known as:  DUO-NEB  
3 mL by Nebulization route every four (4) hours as needed. For SOB or Wheezing Diag Code J45.909 File Medicare B ALLEGRA-D 24 HOUR 180-240 mg per tablet Generic drug:  fexofenadine-pseudoephedrine Take 1 Tab by mouth daily. BREO ELLIPTA IN Take  by inhalation. budesonide-formoterol 160-4.5 mcg/actuation HFA inhaler Commonly known as:  SYMBICORT Take 2 Puffs by inhalation two (2) times a day. codeine-butalbital-acetaminophen-caffeine -86-30 mg per capsule Commonly known as:  FIORICET WITH CODEINE Take 1 Cap by mouth every four (4) hours as needed for Headache. Max Daily Amount: 6 Caps. diphenhydrAMINE 25 mg capsule Commonly known as:  BENADRYL Take 1-2 tabs every 8 hours as needed with the Compazine. EPINEPHrine 0.3 mg/0.3 mL injection Commonly known as:  EPIPEN 2-GALE  
0.3 mL by IntraMUSCular route once as needed for up to 1 dose. fluticasone 50 mcg/actuation nasal spray Commonly known as:  FLONASE  
instill 2 sprays into each nostril once daily  
  
 montelukast 10 mg tablet Commonly known as:  SINGULAIR Take 1 Tab by mouth daily. ondansetron 4 mg disintegrating tablet Commonly known as:  ZOFRAN ODT Take 1 Tab by mouth every eight (8) hours as needed for Nausea. PNV No12-Iron-FA-DSS-OM-3 29 mg iron-1 mg -50 mg Cpkd Take  by mouth.  
  
 predniSONE 10 mg tablet Commonly known as:  Jolyne Crea Take 3 tabs X 3 days, 2 tabs X 3 days, then 1 daily PRENATAL VITAMIN PO Take  by mouth. SUMAtriptan 25 mg tablet Commonly known as:  IMITREX Take 1 Tab by mouth once as needed for Migraine. May repeat dose (one tab) once after 2 hours. Maximum dose is 200mg in 24 hours. topiramate 25 mg tablet Commonly known as:  TOPAMAX  
1 qhs for 7 days then 1 twice  A day Follow-up Instructions Return in about 2 months (around 7/31/2017). Patient Instructions Vit b2 coenzyme q10 100mg, magox 400mg daily supplementation recommended Introducing [facility]! [employer] introduces Re[employer] patient portal. Now you can access parts of your medical record, email your doctor's office, and request medication refills online. 1. In your internet browser, go to [email or web address] 2. Click on the First Time User? Click Here link in the Sign In box. You will see the New Member Sign Up page. 3. Enter your Reebonz Access Code exactly as it appears below. You will not need to use this code after youve completed the sign-up process. If you do not sign up before the expiration date, you must request a new code. · Reebonz Access Code: [email or web address] Expires: 7/1/2017  9:32 AM 
 
4. Enter the last four digits of your Social Security Number (xxxx) and Date of Birth (mm/dd/yyyy) as indicated and click Submit. You will be taken to the next sign-up page. 5. Create a Reebonz ID. This will be your Reebonz login ID and cannot be changed, so think of one that is secure and easy to remember. 6. Create a Reebonz password. You can change your password at any time. 7. Enter your Password Reset Question and Answer. This can be used at a later time if you forget your password. 8. Enter your e-mail address. You will receive e-mail notification when new information is available in [address] 9. Click Sign Up. You can now view and download portions of your medical record. 10. Click the Download Summary menu link to download a portable copy of your medical information. If you have questions, please visit the Frequently Asked Questions section of the [employer] website. Remember, [employer] is NOT to be used for urgent needs. For medical emergencies, dial 911. Now available from your iPhone and Android! Please provide this summary of care documentation to your next provider. Your primary care clinician is listed as [address] [place] [address]. If you have any questions after today's visit, please call [phone].

## 2017-07-24 ENCOUNTER — APPOINTMENT (OUTPATIENT)
Dept: GENERAL RADIOLOGY | Age: 42
End: 2017-07-24
Attending: EMERGENCY MEDICINE
Payer: MEDICAID

## 2017-07-24 ENCOUNTER — HOSPITAL ENCOUNTER (EMERGENCY)
Age: 42
Discharge: HOME | End: 2017-07-24
Attending: EMERGENCY MEDICINE
Payer: MEDICAID

## 2017-07-24 ENCOUNTER — TELEPHONE (OUTPATIENT)
Dept: PULMONOLOGY | Age: 42
End: 2017-07-24

## 2017-07-24 VITALS
WEIGHT: 130 LBS | OXYGEN SATURATION: 100 % | BODY MASS INDEX: 21.66 KG/M2 | HEART RATE: 87 BPM | TEMPERATURE: 98.4 F | SYSTOLIC BLOOD PRESSURE: 113 MMHG | HEIGHT: 65 IN | DIASTOLIC BLOOD PRESSURE: 70 MMHG | RESPIRATION RATE: 20 BRPM

## 2017-07-24 DIAGNOSIS — J45.21 MILD INTERMITTENT ASTHMA WITH ACUTE EXACERBATION: Primary | ICD-10-CM

## 2017-07-24 LAB
ANION GAP BLD CALC-SCNC: 12 MMOL/L (ref 3–18)
ATRIAL RATE: 81 BPM
BASOPHILS # BLD AUTO: 0 K/UL (ref 0–0.06)
BASOPHILS # BLD: 1 % (ref 0–3)
BUN SERPL-MCNC: 12 MG/DL (ref 7–18)
BUN/CREAT SERPL: 12 (ref 12–20)
CALCIUM SERPL-MCNC: 8.8 MG/DL (ref 8.5–10.1)
CALCULATED P AXIS, ECG09: 54 DEGREES
CALCULATED R AXIS, ECG10: 68 DEGREES
CALCULATED T AXIS, ECG11: 49 DEGREES
CHLORIDE SERPL-SCNC: 107 MMOL/L (ref 100–108)
CO2 SERPL-SCNC: 23 MMOL/L (ref 21–32)
CREAT SERPL-MCNC: 0.98 MG/DL (ref 0.6–1.3)
DIAGNOSIS, 93000: NORMAL
DIFFERENTIAL METHOD BLD: ABNORMAL
EOSINOPHIL # BLD: 0 K/UL (ref 0–0.4)
EOSINOPHIL NFR BLD: 1 % (ref 0–5)
ERYTHROCYTE [DISTWIDTH] IN BLOOD BY AUTOMATED COUNT: 13.5 % (ref 11.6–14.5)
GLUCOSE SERPL-MCNC: 77 MG/DL (ref 74–99)
HCT VFR BLD AUTO: 35.8 % (ref 35–45)
HGB BLD-MCNC: 11.7 G/DL (ref 12–16)
LYMPHOCYTES # BLD AUTO: 56 % (ref 20–51)
LYMPHOCYTES # BLD: 1.6 K/UL (ref 0.8–3.5)
MCH RBC QN AUTO: 26.4 PG (ref 24–34)
MCHC RBC AUTO-ENTMCNC: 32.7 G/DL (ref 31–37)
MCV RBC AUTO: 80.8 FL (ref 74–97)
MONOCYTES # BLD: 0.3 K/UL (ref 0–1)
MONOCYTES NFR BLD AUTO: 9 % (ref 2–9)
NEUTS SEG # BLD: 0.9 K/UL (ref 1.8–8)
NEUTS SEG NFR BLD AUTO: 33 % (ref 42–75)
P-R INTERVAL, ECG05: 126 MS
PLATELET # BLD AUTO: 197 K/UL (ref 135–420)
PLATELET COMMENTS,PCOM: ABNORMAL
PMV BLD AUTO: 11.4 FL (ref 9.2–11.8)
POTASSIUM SERPL-SCNC: 4 MMOL/L (ref 3.5–5.5)
Q-T INTERVAL, ECG07: 386 MS
QRS DURATION, ECG06: 68 MS
QTC CALCULATION (BEZET), ECG08: 448 MS
RBC # BLD AUTO: 4.43 M/UL (ref 4.2–5.3)
RBC MORPH BLD: ABNORMAL
SODIUM SERPL-SCNC: 142 MMOL/L (ref 136–145)
VENTRICULAR RATE, ECG03: 81 BPM
WBC # BLD AUTO: 2.8 K/UL (ref 4.6–13.2)

## 2017-07-24 PROCEDURE — 99284 EMERGENCY DEPT VISIT MOD MDM: CPT

## 2017-07-24 PROCEDURE — 74011250637 HC RX REV CODE- 250/637: Performed by: EMERGENCY MEDICINE

## 2017-07-24 PROCEDURE — 74011000250 HC RX REV CODE- 250: Performed by: EMERGENCY MEDICINE

## 2017-07-24 PROCEDURE — 85025 COMPLETE CBC W/AUTO DIFF WBC: CPT | Performed by: EMERGENCY MEDICINE

## 2017-07-24 PROCEDURE — 71020 XR CHEST PA LAT: CPT

## 2017-07-24 PROCEDURE — 96374 THER/PROPH/DIAG INJ IV PUSH: CPT

## 2017-07-24 PROCEDURE — 94640 AIRWAY INHALATION TREATMENT: CPT

## 2017-07-24 PROCEDURE — 93005 ELECTROCARDIOGRAM TRACING: CPT

## 2017-07-24 PROCEDURE — 80048 BASIC METABOLIC PNL TOTAL CA: CPT | Performed by: EMERGENCY MEDICINE

## 2017-07-24 PROCEDURE — 77030029684 HC NEB SM VOL KT MONA -A

## 2017-07-24 PROCEDURE — 74011250636 HC RX REV CODE- 250/636: Performed by: EMERGENCY MEDICINE

## 2017-07-24 RX ORDER — GUAIFENESIN DEXTROMETHORPHAN HYDROBROMIDE ORAL SOLUTION 10; 100 MG/5ML; MG/5ML
10 SOLUTION ORAL
Qty: 118 ML | Refills: 0 | Status: SHIPPED | OUTPATIENT
Start: 2017-07-24 | End: 2017-07-31

## 2017-07-24 RX ORDER — CODEINE PHOSPHATE AND GUAIFENESIN 10; 100 MG/5ML; MG/5ML
10 SOLUTION ORAL
Status: COMPLETED | OUTPATIENT
Start: 2017-07-24 | End: 2017-07-24

## 2017-07-24 RX ORDER — PREDNISONE 20 MG/1
40 TABLET ORAL DAILY
Qty: 6 TAB | Refills: 0 | Status: SHIPPED | OUTPATIENT
Start: 2017-07-24 | End: 2017-07-27

## 2017-07-24 RX ORDER — IPRATROPIUM BROMIDE AND ALBUTEROL SULFATE 2.5; .5 MG/3ML; MG/3ML
3 SOLUTION RESPIRATORY (INHALATION)
Status: COMPLETED | OUTPATIENT
Start: 2017-07-24 | End: 2017-07-24

## 2017-07-24 RX ADMIN — METHYLPREDNISOLONE SODIUM SUCCINATE 125 MG: 125 INJECTION, POWDER, FOR SOLUTION INTRAMUSCULAR; INTRAVENOUS at 09:52

## 2017-07-24 RX ADMIN — IPRATROPIUM BROMIDE AND ALBUTEROL SULFATE 3 ML: .5; 3 SOLUTION RESPIRATORY (INHALATION) at 10:42

## 2017-07-24 RX ADMIN — IPRATROPIUM BROMIDE AND ALBUTEROL SULFATE 3 ML: .5; 3 SOLUTION RESPIRATORY (INHALATION) at 09:49

## 2017-07-24 RX ADMIN — GUAIFENESIN AND CODEINE PHOSPHATE 10 ML: 100; 10 SOLUTION ORAL at 11:27

## 2017-07-24 NOTE — ED NOTES
Purposeful rounding completed:    Side rails up x 2:  YES  Bed in low position and wheels locked: YES  Call bell within reach: YES  Comfort addressed: YES  Water given, authorized by MD   Toileting needs addressed: YES  Plan of care reviewed/updated with patient and or family members: YES  IV site assessed: YES  Pain assessed and addressed: YES, 0

## 2017-07-24 NOTE — TELEPHONE ENCOUNTER
Spoke with pt. She states over the weekend she has had to use her neb. Machine and coughing up yellow sputum. Pt offered appt this am or afternoon her in office. Pt states she doesn't want to come to appt. Just wants meds over the phone. Pt last seen one year ago. Advised pt we need to evaluate her. She state she will go to ER instead. Advised pt we could see her much quicker than and ER where she will be spending 4-5 hrs and ER not for cold sxs.  Pt still would not make appt

## 2017-07-24 NOTE — TELEPHONE ENCOUNTER
Pt states she is very sob, coughing up yellow. She would like to have something called in. Please call [phone].

## 2017-07-24 NOTE — ED TRIAGE NOTES
Pt presents to the ED with SOB, wheezing, and cough onset Friday. Pt reports using albuterol inhaler and ipatropium nebulizer at home, however denies relief. Pt with no tachypnea, retractions, or cyanosis noted. Pt without nasal flaring, grunting, stridor, or retractions. Pt appears in NOAD.

## 2017-07-24 NOTE — ED NOTES
Brandon Cole is a 38 y.o. female that was discharged in stable condition. The patients diagnosis, condition and treatment were explained to  patient and aftercare instructions were given. The patient verbalized understanding. Patient armband removed and shredded.

## 2017-07-24 NOTE — DISCHARGE INSTRUCTIONS
Asthma Action Plan: After Your Visit  Your Care Instructions  An asthma action plan is based on peak flow and asthma symptoms. Sorting symptoms and peak flow into red, yellow, and green \"zones\" can help you know how bad your asthma is and what actions you should take. Work with your doctor to make your plan. An action plan may include:  · The peak flow readings and symptoms for each zone. · What medicines to take in each zone. · When to call a doctor. · A list of emergency contact numbers. · A list of your asthma triggers. Follow-up care is a key part of your treatment and safety. Be sure to make and go to all appointments, and call your doctor if you are having problems. It's also a good idea to know your test results and keep a list of the medicines you take. How can you care for yourself at home? · Take your daily medicines to help minimize long-term damage and avoid asthma attacks. · Check your peak flow every morning and evening. This is the best way to know how well your lungs are working. · Check your action plan to see what zone you are in.  ¨ If you are in the green zone, keep taking your daily asthma medicines as prescribed. ¨ If you are in the yellow zone, you may be having or will soon have an asthma attack. You may not have any symptoms, but your lungs are not working as well as they should. Take the medicines listed in your action plan. If you stay in the yellow zone, your doctor may need to increase the dose or add a medicine. ¨ If you are in the red zone, follow your action plan. If your symptoms or peak flow don't improve soon, you may need to go to the emergency room or be admitted to the hospital.  · Use an asthma diary. Write down your peak flow readings in the asthma diary. If you have an attack, write down what caused it (if you know), the symptoms, and what medicine you took.   · Make sure you know how and when to call your doctor or go to the hospital.  · Take both the asthma action plan and the asthma diary--along with your peak flow meter and medicines--when you see your doctor. Tell your doctor if you are having trouble following your action plan. When should you call for help? Call 911 anytime you think you may need emergency care. For example, call if:  · You have severe trouble breathing. Call your doctor now or seek immediate medical care if:  · Your symptoms do not get better after you have followed your asthma action plan. · You cough up yellow, dark brown, or bloody mucus (sputum). Watch closely for changes in your health, and be sure to contact your doctor if:  · Your coughing and wheezing get worse. · You need to use quick-relief medicine on more than 2 days a week (unless it is just for exercise). · You need help figuring out what is triggering your asthma attacks. Where can you learn more? Go to [employer].be  Enter B511 in the search box to learn more about \"Asthma Action Plan: After Your Visit. \"   © 4190-4104 Healthwise, Incorporated. Care instructions adapted under license by [employer] (which disclaims liability or warranty for this information). This care instruction is for use with your licensed healthcare professional. If you have questions about a medical condition or this instruction, always ask your healthcare professional. [address] any warranty or liability for your use of this information. Content Version: 10.8.613881; Last Revised: March 9, 2012                   Learning About Asthma Triggers  What are asthma triggers? When you have asthma, certain things can make your symptoms worse. These are called triggers. Learn what triggers an asthma attack for you, and avoid the triggers when you can. Common triggers include colds, smoke, air pollution, dust, pollen, pets, stress, and cold air. How do asthma triggers affect you? Triggers can make it harder for your lungs to work as they should.  They can lead to sudden breathing problems and other symptoms. When you are around a trigger, an asthma attack is more likely. If your symptoms are severe, you may need emergency treatment or have to go to the hospital for treatment. What can you do to avoid triggers? The first thing is to know your triggers. When you are having symptoms, note the things around you that might be causing them. Then look for patterns that may be triggering your symptoms. Record your triggers on a piece of paper or in an asthma diary. When you have your list of possible triggers, work with your doctor to find ways to avoid them. Avoid colds and flu. Get a pneumococcal vaccine shot. If you have had one before, ask your doctor whether you need a second dose. Get a flu vaccine every year, as soon as it's available. If you must be around people with colds or the flu, wash your hands often. Here are some ways to avoid a few common triggers. · Do not smoke or allow others to smoke around you. If you need help quitting, talk to your doctor about stop-smoking programs and medicines. These can increase your chances of quitting for good. · If there is a lot of pollution, pollen, or dust outside, stay at home and keep your windows closed. Use an air conditioner or air filter in your home. Check your local weather report or newspaper for air quality and pollen reports. What else should you know? · Take your controller medicine every day, not just when you have symptoms. It helps prevent problems before they occur. · Your doctor may suggest that you check how well your lungs are working by measuring your peak expiratory flow (PEF) throughout the day. Your PEF may drop when you are near things that trigger symptoms. Where can you learn more? Go to http://jaci-elina.info/. Enter T584 in the search box to learn more about \"Learning About Asthma Triggers. \"  Current as of: March 25, 2017  Content Version: 11.3  © 6933-4639 [employer]. Care instructions adapted under license by [employer] (which disclaims liability or warranty for this information). If you have questions about a medical condition or this instruction, always ask your healthcare professional. [address] any warranty or liability for your use of this information.

## 2017-07-24 NOTE — ED PROVIDER NOTES
HPI Comments: 9:29 AM  The patient is a 38 y.o. Female with a history of asthma who presents with shortness of breath and coughing which started 3 days ago.n The cough is productive of yellow sputum. Pt also complains of sore throat but denies wheezing, fever, chills, nausea, vomiting, and diarrhea. She has had 2 nebulizer treatments at home today. Pt also states having past history of intubation and ICU admission. She denies any recent traveling and was ambulatory at check-in this morning. No smoking and no ill contacts. PCP: Gagan Mea MD    Patient is a 38 y.o. female presenting with shortness of breath and cough. The history is provided by the patient. No  was used. Shortness of Breath   Associated symptoms include cough. Pertinent negatives include no fever, no headaches, no sore throat, no wheezing, no vomiting and no abdominal pain. Cough   Associated symptoms include shortness of breath. Pertinent negatives include no chills, no eye redness, no headaches, no sore throat, no wheezing, no nausea and no vomiting. Past Medical History:   Diagnosis Date    Anxiety attack     Asthma     Hernia     Migraine     Psychiatric disorder     anxiety        Past Surgical History:   Procedure Laterality Date     DELIVERY ONLY      HX BACK SURGERY      HX  SECTION           Family History:   Problem Relation Age of Onset    Heart Disease Other      grandmothers    Asthma Brother     Lung Disease Paternal Aunt      cronic bronchitis    Asthma Paternal Grandmother     High Cholesterol Mother        Social History     Social History    Marital status:      Spouse name: N/A    Number of children: N/A    Years of education: N/A     Occupational History    Not on file.      Social History Main Topics    Smoking status: Never Smoker    Smokeless tobacco: Never Used    Alcohol use No    Drug use: No    Sexual activity: Yes     Partners: Male Other Topics Concern    Not on file     Social History Narrative         ALLERGIES: Aspirin; Ibuprofen; Pcn [penicillins]; Claritin [loratadine]; Phenergan [promethazine]; Seafood; and Shellfish containing products    Review of Systems   Constitutional: Negative for chills and fever. HENT: Negative for sore throat. No sore throat   Eyes: Negative for redness. Respiratory: Positive for cough and shortness of breath. Negative for wheezing. Gastrointestinal: Negative for abdominal pain, diarrhea, nausea and vomiting. Genitourinary: Negative for dysuria. Musculoskeletal: Negative for neck stiffness. Skin: Negative for pallor. Neurological: Negative for headaches. Hematological: Does not bruise/bleed easily. All other systems reviewed and are negative. Vitals:    07/24/17 1045 07/24/17 1100 07/24/17 1115 07/24/17 1130   BP: 101/74 117/87 (!) 129/110 113/70   Pulse:       Resp:       Temp:       SpO2: 100% 100% 100% 100%   Weight:       Height:                Physical Exam   Constitutional: She is oriented to person, place, and time. She appears well-developed and well-nourished. No distress. HENT:   Head: Normocephalic and atraumatic. Mouth/Throat: Oropharynx is clear and moist.   Eyes: Conjunctivae are normal. Pupils are equal, round, and reactive to light. No scleral icterus. Neck: Normal range of motion. Neck supple. Cardiovascular: Normal rate and intact distal pulses. Capillary refill < 3 seconds   Pulmonary/Chest: She is in respiratory distress. She has no wheezes. She has no rales. Decreased breath sounds   Abdominal: Soft. Bowel sounds are normal. She exhibits no distension. There is no tenderness. Musculoskeletal: Normal range of motion. She exhibits no edema. Lymphadenopathy:     She has no cervical adenopathy. Neurological: She is alert and oriented to person, place, and time. No cranial nerve deficit. Skin: Skin is warm and dry. She is not diaphoretic. Nursing note and vitals reviewed. MDM  Number of Diagnoses or Management Options  Mild intermittent asthma with acute exacerbation:   Diagnosis management comments: ddx asthma, bronchitis, PNA, URI    Labs, CxR, multiple nebs, steroids    WBC mild decrease; pt's WBC has been lower than 2.8 in the past; pt is nontoxic appearing  AFVSS  O2 100% consistently on RA  CxR nothing acute    11:10 AM pt is feeling much better    I have reassessed the patient. I have discussed the workup, results and plan with the patient and patient is in agreement. Patient is feeling better. Patient will be prescribed prednisone, robitussin ac. Patient was discharge in stable condition. Patient was given outpatient follow up. Patient is to return to emergency department if any new or worsening condition.         Amount and/or Complexity of Data Reviewed  Clinical lab tests: ordered and reviewed  Tests in the radiology section of CPT®: ordered and reviewed  Tests in the medicine section of CPT®: ordered and reviewed  Review and summarize past medical records: yes  Independent visualization of images, tracings, or specimens: yes    Risk of Complications, Morbidity, and/or Mortality  Presenting problems: moderate  Diagnostic procedures: moderate  Management options: moderate    Patient Progress  Patient progress: improved    ED Course       Procedures    Vitals:  Patient Vitals for the past 12 hrs:   Temp Pulse Resp BP SpO2   07/24/17 1130 - - - 113/70 100 %   07/24/17 1115 - - - (!) 129/110 100 %   07/24/17 1100 - - - 117/87 100 %   07/24/17 1045 - - - 101/74 100 %   07/24/17 1030 - - - 114/70 100 %   07/24/17 1025 - - - 123/70 98 %   07/24/17 1015 - - - 114/82 100 %   07/24/17 1000 - - - 102/73 100 %   07/24/17 0945 - - - 106/76 100 %   07/24/17 0930 - - - 110/69 100 %   07/24/17 0916 98.4 °F (36.9 °C) 87 20 126/77 100 %   07/24/17 0915 - - - 118/78 98 %       Medications Ordered:  Medications   albuterol-ipratropium (DUO-NEB) 2.5 MG-0.5 MG/3 ML (3 mL Nebulization Given 7/24/17 0991)   methylPREDNISolone (PF) (SOLU-MEDROL) injection 125 mg (125 mg IntraVENous Given 7/24/17 0930)   albuterol-ipratropium (DUO-NEB) 2.5 MG-0.5 MG/3 ML (3 mL Nebulization Given 7/24/17 1042)   guaiFENesin-codeine (ROBITUSSIN AC) 100-10 mg/5 mL solution 10 mL (10 mL Oral Given 7/24/17 1127)         Lab Findings:  Recent Results (from the past 12 hour(s))   EKG, 12 LEAD, INITIAL    Collection Time: 07/24/17  9:17 AM   Result Value Ref Range    Ventricular Rate 81 BPM    Atrial Rate 81 BPM    P-R Interval 126 ms    QRS Duration 68 ms    Q-T Interval 386 ms    QTC Calculation (Bezet) 448 ms    Calculated P Axis 54 degrees    Calculated R Axis 68 degrees    Calculated T Axis 49 degrees    Diagnosis       Normal sinus rhythm  Normal ECG  When compared with ECG of 14-AUG-2014 10:14,  fusion complexes are no longer present  Nonspecific T wave abnormality has replaced inverted T waves in Inferior   leads     CBC WITH AUTOMATED DIFF    Collection Time: 07/24/17  9:50 AM   Result Value Ref Range    WBC 2.8 (L) 4.6 - 13.2 K/uL    RBC 4.43 4.20 - 5.30 M/uL    HGB 11.7 (L) 12.0 - 16.0 g/dL    HCT 35.8 35.0 - 45.0 %    MCV 80.8 74.0 - 97.0 FL    MCH 26.4 24.0 - 34.0 PG    MCHC 32.7 31.0 - 37.0 g/dL    RDW 13.5 11.6 - 14.5 %    PLATELET 627 370 - 407 K/uL    MPV 11.4 9.2 - 11.8 FL    NEUTROPHILS 33 (L) 42 - 75 %    LYMPHOCYTES 56 (H) 20 - 51 %    MONOCYTES 9 2 - 9 %    EOSINOPHILS 1 0 - 5 %    BASOPHILS 1 0 - 3 %    ABS. NEUTROPHILS 0.9 (L) 1.8 - 8.0 K/UL    ABS. LYMPHOCYTES 1.6 0.8 - 3.5 K/UL    ABS. MONOCYTES 0.3 0 - 1.0 K/UL    ABS. EOSINOPHILS 0.0 0.0 - 0.4 K/UL    ABS.  BASOPHILS 0.0 0.0 - 0.06 K/UL    DF MANUAL      PLATELET COMMENTS ADEQUATE PLATELETS      RBC COMMENTS ANISOCYTOSIS  1+       METABOLIC PANEL, BASIC    Collection Time: 07/24/17  9:50 AM   Result Value Ref Range    Sodium 142 136 - 145 mmol/L    Potassium 4.0 3.5 - 5.5 mmol/L    Chloride 107 100 - 108 mmol/L CO2 23 21 - 32 mmol/L    Anion gap 12 3.0 - 18 mmol/L    Glucose 77 74 - 99 mg/dL    BUN 12 7.0 - 18 MG/DL    Creatinine 0.98 0.6 - 1.3 MG/DL    BUN/Creatinine ratio 12 12 - 20      GFR est AA >60 >60 ml/min/1.73m2    GFR est non-AA >60 >60 ml/min/1.73m2    Calcium 8.8 8.5 - 10.1 MG/DL       X-ray, CT or radiology findings or impressions:  Xr Chest Pa Lat   IMPRESSION: 1. No acute cardiopulmonary process. No change. Interpreted by Radiologist        Diagnosis:   1. Mild intermittent asthma with acute exacerbation        Disposition: Discharge Home        Patient's Medications   Start Taking    GUAIFENESIN-DEXTROMETHORPHAN (TUSSI-ORGANIDIN DM)  MG/5 ML LIQD    Take 10 mL by mouth every six (6) hours as needed for up to 7 days. Indications: COLD SYMPTOMS, COUGH    PREDNISONE (DELTASONE) 20 MG TABLET    Take 2 Tabs by mouth daily for 3 days. Start this medication on Tuesday 7/25/17. Take With Breakfast   Continue Taking    ALBUTEROL (PROAIR HFA) 90 MCG/ACTUATION INHALER    Take 2 Puffs by inhalation every four (4) hours as needed for Wheezing. ALBUTEROL-IPRATROPIUM (DUO-NEB) 2.5 MG-0.5 MG/3 ML NEBU    3 mL by Nebulization route every four (4) hours as needed. For SOB or Wheezing Diag Code J45.909 File Medicare B    DIPHENHYDRAMINE (BENADRYL) 25 MG CAPSULE    Take 1-2 tabs every 8 hours as needed with the Compazine. EPINEPHRINE (EPIPEN 2-GALE) 0.3 MG/0.3 ML INJECTION    0.3 mL by IntraMUSCular route once as needed for up to 1 dose. FEXOFENADINE-PSEUDOEPHEDRINE (ALLEGRA-D 24 HOUR) 180-240 MG PER TABLET    Take 1 Tab by mouth daily. FLUTICASONE (FLONASE) 50 MCG/ACTUATION NASAL SPRAY    instill 2 sprays into each nostril once daily    FLUTICASONE/VILANTEROL (BREO ELLIPTA IN)    Take  by inhalation. MONTELUKAST (SINGULAIR) 10 MG TABLET    Take 1 Tab by mouth daily. ONDANSETRON (ZOFRAN ODT) 4 MG DISINTEGRATING TABLET    Take 1 Tab by mouth every eight (8) hours as needed for Nausea.     PNV NO12-IRON-FA-DSS-OM-3 29 MG IRON-1 MG -50 MG CPKD    Take  by mouth. PRENATAL VITS W-CA,FE,FA,<1MG, (PRENATAL VITAMIN PO)    Take  by mouth. These Medications have changed    No medications on file   Stop Taking    BUDESONIDE-FORMOTEROL (SYMBICORT) 160-4.5 MCG/ACTUATION HFA INHALER    Take 2 Puffs by inhalation two (2) times a day. CODEINE-BUTALBITAL-ACETAMINOPHEN-CAFFEINE (FIORICET WITH CODEINE) -65-30 MG PER CAPSULE    Take 1 Cap by mouth every four (4) hours as needed for Headache. Max Daily Amount: 6 Caps. PREDNISONE (DELTASONE) 10 MG TABLET    Take 3 tabs X 3 days, 2 tabs X 3 days, then 1 daily    SUMATRIPTAN (IMITREX) 25 MG TABLET    Take 1 Tab by mouth once as needed for Migraine. May repeat dose (one tab) once after 2 hours. Maximum dose is 200mg in 24 hours. TOPIRAMATE (TOPAMAX) 25 MG TABLET    1 qhs for 7 days then 1 twice  A day       Scribe Attestation      [employer] acting as a scribe for and in the presence of Isaak Serrano DO      July 24, 2017 at 9:51 AM       Provider Attestation:      I personally performed the services described in the documentation, reviewed the documentation, as recorded by the scribe in my presence, and it accurately and completely records my words and actions. Isaak Serrano DO      Signed by:  Roverto Carpio, July 24, 2017 at 9:51 AM

## 2017-08-07 ENCOUNTER — OFFICE VISIT (OUTPATIENT)
Dept: PULMONOLOGY | Age: 42
End: 2017-08-07

## 2017-08-07 VITALS
HEIGHT: 65 IN | TEMPERATURE: 98.6 F | HEART RATE: 88 BPM | WEIGHT: 130 LBS | BODY MASS INDEX: 21.66 KG/M2 | DIASTOLIC BLOOD PRESSURE: 70 MMHG | SYSTOLIC BLOOD PRESSURE: 120 MMHG | OXYGEN SATURATION: 98 % | RESPIRATION RATE: 20 BRPM

## 2017-08-07 DIAGNOSIS — J45.30 MILD PERSISTENT ASTHMA WITHOUT COMPLICATION: ICD-10-CM

## 2017-08-07 NOTE — PROGRESS NOTES
04/07/21    Clyde Montana Smrz  [address]      Dear Luba Deluca,    [facility] records indicate that you have outstanding lab work and or testing that was ordered for you and has not yet been completed:  Orders Placed This Encounter      Comp Me HISTORY OF PRESENT ILLNESS  Gracy Feliz is a 38 y.o. female. HPI Comments: Follow up for asthma previously requiring Xolair for control. Pt noted dramatic improvement in symptoms during her last pregnancy, lasting even after delivery and even more so when she left a stress filled work environment. Pt now with very infrequent exacerbations, last used rescue Albuterol months agoo, and now tolerated at least moderae physical activity. Pt does have occasional SOB with humidity. Pt with occasional upper airway congestion relieved by prn Allegra. Denies ED visits or hospital admissions for exacerbation. Asthma   The history is provided by the patient. This is a chronic problem. Episode onset: years. The problem occurs rarely. The problem has been rapidly improving. Pertinent negatives include no chest pain, no abdominal pain and no headaches. Shortness of breath: rare  The symptoms are aggravated by stress (pollen). The symptoms are relieved by medications. Review of Systems   Constitutional: Negative for chills, diaphoresis, fever, malaise/fatigue and weight loss. HENT: Negative for congestion, ear discharge, ear pain, hearing loss, nosebleeds, sore throat and tinnitus. Eyes: Negative for blurred vision, double vision, photophobia, pain, discharge and redness. Respiratory: Negative for cough, hemoptysis, sputum production and stridor. Shortness of breath: rare  Wheezing: rare     Cardiovascular: Negative for chest pain, palpitations, orthopnea, claudication, leg swelling and PND. Gastrointestinal: Negative for abdominal pain, blood in stool, constipation, diarrhea, heartburn, melena, nausea and vomiting. Genitourinary: Negative for dysuria, flank pain, frequency, hematuria and urgency. Musculoskeletal: Negative for back pain, falls, joint pain, myalgias and neck pain. Skin: Negative for itching and rash.    Neurological: Negative for dizziness, tingling, tremors, sensory change, speech change, focal weakness, seizures, loss of consciousness, weakness and headaches. Endo/Heme/Allergies: Negative for environmental allergies. Does not bruise/bleed easily. Psychiatric/Behavioral: Negative for depression, hallucinations, memory loss, substance abuse and suicidal ideas. The patient is not nervous/anxious and does not have insomnia. Past Medical History:   Diagnosis Date    Anxiety attack     Asthma     Hernia     Migraine     Psychiatric disorder     anxiety      Current Outpatient Prescriptions on File Prior to Visit   Medication Sig Dispense Refill    diphenhydrAMINE (BENADRYL) 25 mg capsule Take 1-2 tabs every 8 hours as needed with the Compazine. 16 Cap 0    FLUTICASONE/VILANTEROL (BREO ELLIPTA IN) Take  by inhalation.  montelukast (SINGULAIR) 10 mg tablet Take 1 Tab by mouth daily. 30 Tab 5    fluticasone (FLONASE) 50 mcg/actuation nasal spray instill 2 sprays into each nostril once daily 1 Bottle 2    EPINEPHrine (EPIPEN 2-GALE) 0.3 mg/0.3 mL injection 0.3 mL by IntraMUSCular route once as needed for up to 1 dose. 2 Syringe 5    albuterol-ipratropium (DUO-NEB) 2.5 mg-0.5 mg/3 ml nebu 3 mL by Nebulization route every four (4) hours as needed. For SOB or Wheezing Diag Code J45.909 File Medicare B 120 mL 5    albuterol (PROAIR HFA) 90 mcg/actuation inhaler Take 2 Puffs by inhalation every four (4) hours as needed for Wheezing. 1 Inhaler 5    fexofenadine-pseudoephedrine (ALLEGRA-D 24 HOUR) 180-240 mg per tablet Take 1 Tab by mouth daily.  PRENATAL VITS W-CA,FE,FA,<1MG, (PRENATAL VITAMIN PO) Take  by mouth.  PNV No12-Iron-FA-DSS-OM-3 29 mg iron-1 mg -50 mg CPKD Take  by mouth.  ondansetron (ZOFRAN ODT) 4 mg disintegrating tablet Take 1 Tab by mouth every eight (8) hours as needed for Nausea. 15 Tab 1     No current facility-administered medications on file prior to visit.       Allergies   Allergen Reactions    Aspirin Anaphylaxis    Ibuprofen Anaphylaxis    Pcn [Penicillins] Itching    Claritin [Loratadine] Anxiety and Cough    Phenergan [Promethazine] Anxiety and Swelling    Seafood Swelling    Shellfish Containing Products Hives     Social History     Social History    Marital status:      Spouse name: N/A    Number of children: N/A    Years of education: N/A     Occupational History    Not on file. Social History Main Topics    Smoking status: Never Smoker    Smokeless tobacco: Never Used    Alcohol use No    Drug use: No    Sexual activity: Yes     Partners: Male     Other Topics Concern    Not on file     Social History Narrative     Blood pressure 120/70, pulse 88, temperature 98.6 °F (37 °C), temperature source Oral, resp. rate 20, height 5' 5\" (1.651 m), weight 59 kg (130 lb), last menstrual period 07/22/2017, SpO2 98 %, not currently breastfeeding. Physical Exam   Constitutional: She is oriented to person, place, and time. She appears well-developed and well-nourished. No distress. HENT:   Head: Normocephalic and atraumatic. Nose: Nose normal.   Mouth/Throat: No oropharyngeal exudate. Eyes: Conjunctivae and EOM are normal. Pupils are equal, round, and reactive to light. Right eye exhibits no discharge. Left eye exhibits no discharge. No scleral icterus. Neck: No JVD present. No tracheal deviation present. No thyromegaly present. Cardiovascular: Normal rate, regular rhythm, normal heart sounds and intact distal pulses. Exam reveals no gallop. No murmur heard. Pulmonary/Chest: Effort normal and breath sounds normal. No stridor. No respiratory distress. She has no wheezes. She has no rales. She exhibits no tenderness. Abdominal: Soft. She exhibits no mass. There is no tenderness. Musculoskeletal: She exhibits no edema or tenderness. Neurological: She is alert and oriented to person, place, and time. Skin: Skin is warm and dry. No rash noted. She is not diaphoretic. No erythema.    Psychiatric: She has a normal mood and affect. Her behavior is normal. Judgment and thought content normal.     Spirometry: mild obstruction , improved from study done 6/14/2012  ASSESSMENT and PLAN  Encounter Diagnoses   Name Primary?  Mild persistent asthma without complication      Pt to continue Breo and rescue Albuterol. see orders for details. Reviewed spirometry with pt. Advised on avoidance of allergens and extremes in weather conditions. Flu vaccine in the fall  RTC 12 months with spirometry.

## 2017-08-28 RX ORDER — IPRATROPIUM BROMIDE AND ALBUTEROL SULFATE 2.5; .5 MG/3ML; MG/3ML
3 SOLUTION RESPIRATORY (INHALATION)
Qty: 120 ML | Refills: 5 | Status: SHIPPED | OUTPATIENT
Start: 2017-08-28 | End: 2018-01-30 | Stop reason: SDUPTHER

## 2017-08-28 RX ORDER — ALBUTEROL SULFATE 90 UG/1
2 AEROSOL, METERED RESPIRATORY (INHALATION)
Qty: 1 INHALER | Refills: 5 | Status: SHIPPED | OUTPATIENT
Start: 2017-08-28 | End: 2017-08-28 | Stop reason: SDUPTHER

## 2017-08-28 RX ORDER — MONTELUKAST SODIUM 10 MG/1
10 TABLET ORAL DAILY
Qty: 30 TAB | Refills: 5 | Status: SHIPPED | OUTPATIENT
Start: 2017-08-28 | End: 2018-09-13 | Stop reason: SDUPTHER

## 2017-08-28 RX ORDER — ALBUTEROL SULFATE 90 UG/1
2 AEROSOL, METERED RESPIRATORY (INHALATION)
Qty: 1 INHALER | Refills: 5 | Status: SHIPPED | OUTPATIENT
Start: 2017-08-28 | End: 2018-08-25

## 2017-08-28 RX ORDER — EPINEPHRINE 0.3 MG/.3ML
0.3 INJECTION SUBCUTANEOUS
Qty: 2 SYRINGE | Refills: 5 | Status: SHIPPED | OUTPATIENT
Start: 2017-08-28 | End: 2019-01-08 | Stop reason: SDUPTHER

## 2017-08-28 RX ORDER — FLUTICASONE FUROATE AND VILANTEROL 200; 25 UG/1; UG/1
1 POWDER RESPIRATORY (INHALATION) DAILY
Qty: 1 INHALER | Refills: 5 | Status: SHIPPED | OUTPATIENT
Start: 2017-08-28 | End: 2017-08-28 | Stop reason: SDUPTHER

## 2017-08-28 RX ORDER — EPINEPHRINE 0.3 MG/.3ML
0.3 INJECTION SUBCUTANEOUS
Qty: 2 SYRINGE | Refills: 5 | Status: SHIPPED | OUTPATIENT
Start: 2017-08-28 | End: 2017-08-28 | Stop reason: SDUPTHER

## 2017-08-28 RX ORDER — FLUTICASONE PROPIONATE 50 MCG
SPRAY, SUSPENSION (ML) NASAL
Qty: 1 BOTTLE | Refills: 2 | Status: SHIPPED | OUTPATIENT
Start: 2017-08-28 | End: 2017-08-28 | Stop reason: SDUPTHER

## 2017-08-28 RX ORDER — FLUTICASONE FUROATE AND VILANTEROL 200; 25 UG/1; UG/1
1 POWDER RESPIRATORY (INHALATION) DAILY
Qty: 1 INHALER | Refills: 5 | Status: SHIPPED | OUTPATIENT
Start: 2017-08-28 | End: 2017-11-20 | Stop reason: SDUPTHER

## 2017-08-28 RX ORDER — FLUTICASONE PROPIONATE 50 MCG
SPRAY, SUSPENSION (ML) NASAL
Qty: 1 BOTTLE | Refills: 5 | Status: SHIPPED | OUTPATIENT
Start: 2017-08-28 | End: 2018-04-09 | Stop reason: SDUPTHER

## 2017-08-28 RX ORDER — MONTELUKAST SODIUM 10 MG/1
10 TABLET ORAL DAILY
Qty: 30 TAB | Refills: 5 | Status: SHIPPED | OUTPATIENT
Start: 2017-08-28 | End: 2017-08-28 | Stop reason: SDUPTHER

## 2017-08-28 NOTE — TELEPHONE ENCOUNTER
PT GEMA([phone]). SHE NEEDS REFILLS FOR ALL OF HER MEDS CALLED TO [facility] [phone]. SHE CALLED LAST WEEK AND NOTHING WAS CALLED IN. PLEASE CHECK.

## 2017-11-02 ENCOUNTER — DOCUMENTATION ONLY (OUTPATIENT)
Dept: PULMONOLOGY | Age: 42
End: 2017-11-02

## 2017-11-02 NOTE — PROGRESS NOTES
Received fax from [address] [place] [address] at [facility] regarding request to change Ventolin to ProAir. Called pt (10/30/2017) tel # [phone], message left to call me back. Reviewed record; previous px written by Dr Maddie Espinoza on 8/28/2017  Albuterol ProAir 90 mcg. # 1 with 5 refills    Not sure what [facility] fax want ???  Px is already written per insurance coverage

## 2017-11-20 RX ORDER — FLUTICASONE FUROATE AND VILANTEROL 200; 25 UG/1; UG/1
1 POWDER RESPIRATORY (INHALATION) DAILY
Qty: 1 INHALER | Refills: 5 | Status: SHIPPED | OUTPATIENT
Start: 2017-11-20 | End: 2018-05-24 | Stop reason: SDUPTHER

## 2017-11-22 ENCOUNTER — OFFICE VISIT (OUTPATIENT)
Dept: NEUROLOGY | Age: 42
End: 2017-11-22

## 2017-11-22 VITALS
WEIGHT: 138 LBS | DIASTOLIC BLOOD PRESSURE: 70 MMHG | HEART RATE: 108 BPM | BODY MASS INDEX: 22.99 KG/M2 | OXYGEN SATURATION: 98 % | TEMPERATURE: 99.1 F | SYSTOLIC BLOOD PRESSURE: 120 MMHG | HEIGHT: 65 IN | RESPIRATION RATE: 16 BRPM

## 2017-11-22 DIAGNOSIS — N92.6 CATAMENIAL DISORDER: ICD-10-CM

## 2017-11-22 DIAGNOSIS — G43.709 TRANSFORMED MIGRAINE WITHOUT AURA: ICD-10-CM

## 2017-11-22 DIAGNOSIS — G43.009 MIGRAINE WITHOUT AURA AND WITHOUT STATUS MIGRAINOSUS, NOT INTRACTABLE: Primary | ICD-10-CM

## 2017-11-22 RX ORDER — ONDANSETRON 4 MG/1
4 TABLET, ORALLY DISINTEGRATING ORAL
Qty: 15 TAB | Refills: 1 | Status: SHIPPED | OUTPATIENT
Start: 2017-11-22 | End: 2018-02-20 | Stop reason: SDUPTHER

## 2017-11-22 RX ORDER — AMITRIPTYLINE HYDROCHLORIDE 25 MG/1
TABLET, FILM COATED ORAL
Qty: 60 TAB | Refills: 2 | Status: SHIPPED | OUTPATIENT
Start: 2017-11-22 | End: 2018-02-15 | Stop reason: SDUPTHER

## 2017-11-22 RX ORDER — BUTALBITAL, ACETAMINOPHEN, CAFFEINE AND CODEINE PHOSPHATE 50; 325; 40; 30 MG/1; MG/1; MG/1; MG/1
1 CAPSULE ORAL
COMMUNITY
End: 2018-03-15

## 2017-11-22 RX ORDER — AMITRIPTYLINE HYDROCHLORIDE 25 MG/1
TABLET, FILM COATED ORAL
COMMUNITY
End: 2017-11-22 | Stop reason: SDUPTHER

## 2017-11-22 NOTE — MR AVS SNAPSHOT
Visit Information Date & Time Provider Department Dept. Phone Encounter #  
 11/22/2017  1:00 PM Chantel Chapa MD [facility] at [address] [facility] [id number] Follow-up Instructions Return in about 6 weeks (around 1/3/2018). Follow-up and Disposition History Your Appointments 2/2/2018  3:00 PM  
Follow Up with Chantel Chapa MD  
[facility] at [address] [facility]) Appt Note: 6 week fu  
 [address] [address] [address] [facility] [address] [facility] [address] [facility] Upcoming Health Maintenance Date Due  
 PAP AKA CERVICAL CYTOLOGY 6/24/1998 Influenza Age 5 to Adult 8/1/2017 DTaP/Tdap/Td series (2 - Td) 2/6/2026 Allergies as of 11/22/2017  Review Complete On: 11/22/2017 By: Chantel Chapa MD  
  
 Severity Noted Reaction Type Reactions Aspirin High   Anaphylaxis Ibuprofen High 01/23/2014    Anaphylaxis Pcn [Penicillins] High 03/14/2013    Itching Claritin [Loratadine]    Anxiety, Cough Phenergan [Promethazine]  11/13/2012    Anxiety, Swelling Seafood  07/18/2014    Swelling Shellfish Containing Products    Hives Current Immunizations  Reviewed on 11/13/2014 Name Date Influenza Vaccine 11/13/2014 Influenza Vaccine (Quad) 9/29/2016 10:13 AM  
 Influenza Vaccine Split 10/15/2012 Pneumococcal Polysaccharide (PPSV-23) 5/1/2015 10:45 AM  
 Tdap 2/6/2016 12:14 PM  
  
 Not reviewed this visit You Were Diagnosed With   
  
 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  
 22.96 kg/m 2 1.69 m 2 Preferred Pharmacy Pharmacy Name Phone [facility]Y #[id number] León  [phone] Your Updated Medication List  
  
   
This list is accurate as of: 11/22/17  1:17 PM.  Always use your most recent med list.  
  
  
  
  
 albuterol 90 mcg/actuation inhaler Commonly known as:  PROAIR HFA Take 2 Puffs by inhalation every four (4) hours as needed for Wheezing. albuterol-ipratropium 2.5 mg-0.5 mg/3 ml Nebu Commonly known as:  DUO-NEB  
3 mL by Nebulization route every four (4) hours as needed. For SOB or Wheezing Diag Code J45.909 File Medicare B ALLEGRA-D 24 HOUR 180-240 mg per tablet Generic drug:  fexofenadine-pseudoephedrine Take 1 Tab by mouth daily. amitriptyline 25 mg tablet Commonly known as:  ELAVIL  
1 qhs for 7 days then 2 qhs  
  
 codeine-butalbital-acetaminophen-caffeine -86-30 mg capsule Commonly known as:  FIORICET WITH CODEINE Take 1 Cap by mouth every four (4) hours as needed for Headache. diphenhydrAMINE 25 mg capsule Commonly known as:  BENADRYL Take 1-2 tabs every 8 hours as needed with the Compazine. EPINEPHrine 0.3 mg/0.3 mL injection Commonly known as:  EPIPEN 2-GALE  
0.3 mL by IntraMUSCular route once as needed for up to 1 dose. fluticasone 50 mcg/actuation nasal spray Commonly known as:  FLONASE  
instill 2 sprays into each nostril once daily  
  
 fluticasone-vilanterol 200-25 mcg/dose inhaler Commonly known as:  BREO ELLIPTA Take 1 Puff by inhalation daily. montelukast 10 mg tablet Commonly known as:  SINGULAIR Take 1 Tab by mouth daily. ondansetron 4 mg disintegrating tablet Commonly known as:  ZOFRAN ODT Take 1 Tab by mouth every eight (8) hours as needed for Nausea. PNV No12-Iron-FA-DSS-OM-3 29 mg iron-1 mg -50 mg Cpkd Take  by mouth. PRENATAL VITAMIN PO Take  by mouth. Prescriptions Sent to Pharmacy Refills  
 amitriptyline (ELAVIL) 25 mg tablet 2 Si qhs for 7 days then 2 qhs  
 Class: Normal  
 Pharmacy: [address] [place] [address] Ph #: [phone]  
 ondansetron (ZOFRAN ODT) 4 mg disintegrating tablet 1 Sig: Take 1 Tab by mouth every eight (8) hours as needed for Nausea. Class: Normal  
 Pharmacy: [address] [place] [address] #: [phone] Route: Oral  
  
Follow-up Instructions Return in about 6 weeks (around 1/3/2018). Patient Instructions Vit b2 and magox daily Introducing [facility]! [facility] introduces Pinchd patient portal. Now you can access parts of your medical record, email your doctor's office, and request medication refills online. 1. In your internet browser, go to [email or web address] 2. Click on the First Time User? Click Here link in the Sign In box. You will see the New Member Sign Up page. 3. Enter your Pinchd Access Code exactly as it appears below. You will not need to use this code after youve completed the sign-up process. If you do not sign up before the expiration date, you must request a new code. · Pinchd Access Code: [email or web address] Expires: 2018 12:34 PM 
 
4. Enter the last four digits of your Social Security Number (xxxx) and Date of Birth (mm/dd/yyyy) as indicated and click Submit. You will be taken to the next sign-up page. 5. Create a Pinchd ID. This will be your Pinchd login ID and cannot be changed, so think of one that is secure and easy to remember. 6. Create a avox password. You can change your password at any time. 7. Enter your Password Reset Question and Answer. This can be used at a later time if you forget your password. 8. Enter your e-mail address. You will receive e-mail notification when new information is available in [address] 9. Click Sign Up. You can now view and download portions of your medical record. 10. Click the Download Summary menu link to download a portable copy of your medical information. If you have questions, please visit the Frequently Asked Questions section of the avox website. Remember, avox is NOT to be used for urgent needs. For medical emergencies, dial 911. Now available from your iPhone and Android! Please provide this summary of care documentation to your next provider. Your primary care clinician is listed as [facility]. If you have any questions after today's visit, please call [phone].

## 2017-11-22 NOTE — PROGRESS NOTES
[facility]             [address] [place] [address] Dr Reed, [address]    2017           Rosa Palomo is a 38 y.o. female who comes in for evaluation of headaches. Rosa Palomo does have a headache at this time. She did not try the Topamax due to concerns about side effects review of her chart chart reviews that she is allergic to aspirin and NSAIDs. She is out of the [address] [facility]. She reports approximately 4-5 headaches over the past month reviewed alternative therapy at length    She did have a response to Elavil but has been out for a week or 2 she has tolerated that medication she reports she has headaches now at approximately 10 out of 30 days months worse around her period. Description of Headaches:  Location of pain: bilateral, frontal  Radiation of pain?:none  Character of pain:aching, severe, throbbing  Severity of pain: 4-10 out of 10. Accompanying symptoms: nausea, photophobia, scotomata, neck pain  Prodromal sx?: no  Rapidity of onset: gradual  Typical duration of individual headache: 3 days  Are most headaches similar in presentation? yes  Typical precipitants: stress, odors, menses  Worst time of day: evening  Awakens from sleep with pain?: yes -     Current Use of Meds to Treat Headaches:  Abortive meds? acetaminophen, sumatriptan PO,fiorinal with codeine  Daily use? no  Prophylactic meds? none    Additional Relevant History:  History of head/neck trauma? no  Family h/o headache problems?  yes - aunt and cousin      Past Medical History:   Diagnosis Date    Anxiety attack     Asthma     Hernia     Migraine     Psychiatric disorder     anxiety        Past Surgical History:   Procedure Laterality Date     DELIVERY ONLY      HX BACK SURGERY      HX  SECTION  2009       Social History     Social History    Marital status:      Spouse name: N/A    Number of children: N/A    Years of education: N/A Occupational History    Not on file. Social History Main Topics    Smoking status: Never Smoker    Smokeless tobacco: Never Used    Alcohol use No    Drug use: No    Sexual activity: Yes     Partners: Male     Other Topics Concern    Not on file     Social History Narrative       Review of Systems  A comprehensive review of systems was negative except for: Respiratory: positive for asthma    Physical Exam:    VITAL SIGNS:    Visit Vitals    /70    Pulse (!) 108    Temp 99.1 °F (37.3 °C) (Oral)    Resp 16    Ht 5' 5\" (1.651 m)    Wt 62.6 kg (138 lb)    LMP 10/16/2017 (Approximate)    SpO2 98%    BMI 22.96 kg/m2       GENERAL: The patient is well developed, well nourished, and in no apparent distress. EXTREMITIES: No clubbing, cyanosis, or edema is identified. Pulses 2+  and symmetrical.    HEAD:   Ear, nose, and throat appear to be without trauma. The     patient is normocephalic. MUSCULOSKELETAL: Normal Posture. NEUROLOGIC EXAMINATION    MENTAL STATUS: The patient is awake, alert, and oriented x 3. Speech is fluent and memory appears to be intact, both long and short term. No aphasias or apraxias. CRANIAL NERVES: II - Visual fields are full to confrontation. Pupils are both equal and reactive to light and accommodation. III, IV, VI - Extraocular movements are intact and there is no nystagmus. VII - Face is symmetrical.   VIII - Hearing is present. Loletta Nunnery MOTOR:  Tone is normal and symmetric. There is no pronator drift present. The strength is intact for all muscle groups tested in all four extremities. COORDINATION:      gait testing  normal       Assessment and Plan:  Hank Rivas is a 38 y.o. right handed female whose history and physical are consistent with common migraine. Hank Rivas who has risk factors including catemenial headaches    Diagnoses and all orders for this visit:    1.  Migraine without aura and without status migrainosus, not intractable    2. Catamenial disorder    3. 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 Follow-up Disposition:  Return in about 6 weeks (around 1/3/2018). Reviewed need for dietary supplements, Reviewed risks and benefits of therapy  I spent 30 minutes with the patient in face-to-face consultation, of which greater than 50% was spent in counseling and coordination of care as described above.

## 2017-12-08 RX ORDER — PREDNISONE 10 MG/1
TABLET ORAL
Qty: 18 TAB | Refills: 0 | Status: SHIPPED | OUTPATIENT
Start: 2017-12-08 | End: 2018-03-15

## 2017-12-08 RX ORDER — FLUCONAZOLE 200 MG/1
200 TABLET ORAL DAILY
Qty: 7 TAB | Refills: 0 | Status: SHIPPED | OUTPATIENT
Start: 2017-12-08 | End: 2017-12-15

## 2017-12-08 RX ORDER — AZITHROMYCIN 250 MG/1
250 TABLET, FILM COATED ORAL SEE ADMIN INSTRUCTIONS
Qty: 6 TAB | Refills: 0 | Status: SHIPPED | OUTPATIENT
Start: 2017-12-08 | End: 2017-12-13

## 2017-12-08 NOTE — PROGRESS NOTES
Pt called and complained of increased SOB and wheezing similar to previous Asthma exacerbations. This was precede by URI after exposure to sick contacts. Pt denies fever but has some chills. No myalgias or prostration. Rx sent for Pred taper and Azithromycin. Also sent Rx for Fluconazole to be started after completing Azithromycin as pt reports vaginal thrush consistently after antibiotics and steroids.

## 2017-12-14 ENCOUNTER — CLINICAL SUPPORT (OUTPATIENT)
Dept: PULMONOLOGY | Age: 42
End: 2017-12-14

## 2017-12-14 DIAGNOSIS — Z23 ENCOUNTER FOR IMMUNIZATION: Primary | ICD-10-CM

## 2017-12-14 NOTE — PATIENT INSTRUCTIONS
Vaccine Information Statement    Influenza (Flu) Vaccine (Inactivated or Recombinant): What you need to know    Many Vaccine Information Statements are available in [place] and other languages. See www.immunize.org/vis  Hojas de Información Sobre Vacunas están disponibles en Español y en muchos otros idiomas. Visite www.immunize.org/vis    1. Why get vaccinated? Influenza (flu) is a contagious disease that spreads around the [place] every year, usually between October and May. Flu is caused by influenza viruses, and is spread mainly by coughing, sneezing, and close contact. Anyone can get flu. Flu strikes suddenly and can last several days. Symptoms vary by age, but can include:   fever/chills   sore throat   muscle aches   fatigue   cough   headache    runny or stuffy nose    Flu can also lead to pneumonia and blood infections, and cause diarrhea and seizures in children. If you have a medical condition, such as heart or lung disease, flu can make it worse. Flu is more dangerous for some people. Infants and young children, people 72years of age and older, pregnant women, and people with certain health conditions or a weakened immune system are at greatest risk. Each year thousands of people in the [facility] die from flu, and many more are hospitalized. Flu vaccine can:   keep you from getting flu,   make flu less severe if you do get it, and   keep you from spreading flu to your family and other people. 2. Inactivated and recombinant flu vaccines    A dose of flu vaccine is recommended every flu season. Children 6 months through 6years of age may need two doses during the same flu season. Everyone else needs only one dose each flu season.        Some inactivated flu vaccines contain a very small amount of a mercury-based preservative called thimerosal. Studies have not shown thimerosal in vaccines to be harmful, but flu vaccines that do not contain thimerosal are available. There is no live flu virus in flu shots. They cannot cause the flu. There are many flu viruses, and they are always changing. Each year a new flu vaccine is made to protect against three or four viruses that are likely to cause disease in the upcoming flu season. But even when the vaccine doesnt exactly match these viruses, it may still provide some protection    Flu vaccine cannot prevent:   flu that is caused by a virus not covered by the vaccine, or   illnesses that look like flu but are not. It takes about 2 weeks for protection to develop after vaccination, and protection lasts through the flu season. 3. Some people should not get this vaccine    Tell the person who is giving you the vaccine:     If you have any severe, life-threatening allergies. If you ever had a life-threatening allergic reaction after a dose of flu vaccine, or have a severe allergy to any part of this vaccine, you may be advised not to get vaccinated. Most, but not all, types of flu vaccine contain a small amount of egg protein.  If you ever had Guillain-Barré Syndrome (also called GBS). Some people with a history of GBS should not get this vaccine. This should be discussed with your doctor.  If you are not feeling well. It is usually okay to get flu vaccine when you have a mild illness, but you might be asked to come back when you feel better. 4. Risks of a vaccine reaction    With any medicine, including vaccines, there is a chance of reactions. These are usually mild and go away on their own, but serious reactions are also possible. Most people who get a flu shot do not have any problems with it.      Minor problems following a flu shot include:    soreness, redness, or swelling where the shot was given     hoarseness   sore, red or itchy eyes   cough   fever   aches   headache   itching   fatigue  If these problems occur, they usually begin soon after the shot and last 1 or 2 days. More serious problems following a flu shot can include the following:     There may be a small increased risk of Guillain-Barré Syndrome (GBS) after inactivated flu vaccine. This risk has been estimated at 1 or 2 additional cases per million people vaccinated. This is much lower than the risk of severe complications from flu, which can be prevented by flu vaccine.  Young children who get the flu shot along with pneumococcal vaccine (PCV13) and/or DTaP vaccine at the same time might be slightly more likely to have a seizure caused by fever. Ask your doctor for more information. Tell your doctor if a child who is getting flu vaccine has ever had a seizure. Problems that could happen after any injected vaccine:      People sometimes faint after a medical procedure, including vaccination. Sitting or lying down for about 15 minutes can help prevent fainting, and injuries caused by a fall. Tell your doctor if you feel dizzy, or have vision changes or ringing in the ears.  Some people get severe pain in the shoulder and have difficulty moving the arm where a shot was given. This happens very rarely.  Any medication can cause a severe allergic reaction. Such reactions from a vaccine are very rare, estimated at about 1 in a million doses, and would happen within a few minutes to a few hours after the vaccination. As with any medicine, there is a very remote chance of a vaccine causing a serious injury or death. The safety of vaccines is always being monitored. For more information, visit: www.cdc.gov/vaccinesafety/    5. What if there is a serious reaction? What should I look for?  Look for anything that concerns you, such as signs of a severe allergic reaction, very high fever, or unusual behavior.     Signs of a severe allergic reaction can include hives, swelling of the face and throat, difficulty breathing, a fast heartbeat, dizziness, and weakness - usually within a few minutes to a few hours after the vaccination. What should I do?  If you think it is a severe allergic reaction or other emergency that cant wait, call 9-1-1 and get the person to the nearest hospital. Otherwise, call your doctor.  Reactions should be reported to the Vaccine Adverse Event Reporting System (VAERS). Your doctor should file this report, or you can do it yourself through  the VAERS web site at www.vaers. [facility].gov, or by calling 6-925.534.1216. VAERS does not give medical advice. 6. The National Vaccine Injury Compensation Program    The [facility] Vaccine Injury Compensation Program (VICP) is a federal program that was created to compensate people who may have been injured by certain vaccines. Persons who believe they may have been injured by a vaccine can learn about the program and about filing a claim by calling 7-989.577.9892 or visiting the [employer] website at www.[facility].gov/vaccinecompensation. There is a time limit to file a claim for compensation. 7. How can I learn more?  Ask your healthcare provider. He or she can give you the vaccine package insert or suggest other sources of information.  Call your local or state health department.  Contact the Centers for Disease Control and Prevention (CDC):  - Call 3-594.469.2392 (1-800-CDC-INFO) or  - Visit CDCs website at www.cdc.gov/flu    Vaccine Information Statement   Inactivated Influenza Vaccine   8/7/2015  42 CRISTINA Jaymie Spencer [id number]-28    Department of Health and Human Services  Centers for Disease Control and Prevention    Office Use Only

## 2017-12-14 NOTE — PROGRESS NOTES
Chief Complaint   Patient presents with    Injection     Flu shot         Asia Yates is a 38 y.o. female who presents for routine immunizations. She denies any symptoms , reactions or allergies that would exclude them from being immunized today. Risks and adverse reactions were discussed and the VIS was given to them. All questions were addressed. She was observed for 15 min post injection. There were no reactions observed.     Mirtha Castillo LPN

## 2018-01-22 RX ORDER — FEXOFENADINE HCL AND PSEUDOEPHEDRINE HCI 180; 240 MG/1; MG/1
1 TABLET, EXTENDED RELEASE ORAL DAILY
Qty: 15 TAB | Refills: 1 | Status: SHIPPED | OUTPATIENT
Start: 2018-01-22 | End: 2018-03-24 | Stop reason: SDUPTHER

## 2018-01-22 NOTE — TELEPHONE ENCOUNTER
Pt would like rx for allegra D. She said that it is not the same as the [place] [address] and she would like the rx so that her insurance will cover it. Uses [facility] on [address]  Please call [phone]

## 2018-01-30 RX ORDER — IPRATROPIUM BROMIDE AND ALBUTEROL SULFATE 2.5; .5 MG/3ML; MG/3ML
SOLUTION RESPIRATORY (INHALATION)
Qty: 180 ML | Refills: 4 | Status: SHIPPED | OUTPATIENT
Start: 2018-01-30 | End: 2019-01-08 | Stop reason: SDUPTHER

## 2018-02-19 RX ORDER — AMITRIPTYLINE HYDROCHLORIDE 25 MG/1
TABLET, FILM COATED ORAL
Qty: 60 TAB | Refills: 2 | Status: SHIPPED | OUTPATIENT
Start: 2018-02-19 | End: 2018-05-24 | Stop reason: SDUPTHER

## 2018-02-20 RX ORDER — ONDANSETRON 4 MG/1
TABLET, ORALLY DISINTEGRATING ORAL
Qty: 15 TAB | Refills: 1 | Status: SHIPPED | OUTPATIENT
Start: 2018-02-20 | End: 2018-06-11 | Stop reason: SDUPTHER

## 2018-03-05 ENCOUNTER — OFFICE VISIT (OUTPATIENT)
Dept: NEUROLOGY | Age: 43
End: 2018-03-05

## 2018-03-05 NOTE — PROGRESS NOTES
Patient decided not to be seen today because she has another appt in [facility] at 2pm and her appt here was scheduled for 145pm.

## 2018-03-06 NOTE — PROGRESS NOTES
Due to formulary changes Symbicort is no longer preferred. Replaced with Breo as per formulary recommendation. home

## 2018-03-15 ENCOUNTER — HOSPITAL ENCOUNTER (EMERGENCY)
Age: 43
Discharge: HOME | End: 2018-03-15
Attending: EMERGENCY MEDICINE
Payer: COMMERCIAL

## 2018-03-15 VITALS
RESPIRATION RATE: 18 BRPM | HEIGHT: 65 IN | DIASTOLIC BLOOD PRESSURE: 69 MMHG | WEIGHT: 132 LBS | SYSTOLIC BLOOD PRESSURE: 115 MMHG | HEART RATE: 100 BPM | BODY MASS INDEX: 21.99 KG/M2 | TEMPERATURE: 98.5 F | OXYGEN SATURATION: 100 %

## 2018-03-15 DIAGNOSIS — N30.00 ACUTE CYSTITIS WITHOUT HEMATURIA: ICD-10-CM

## 2018-03-15 DIAGNOSIS — G43.809 OTHER MIGRAINE WITHOUT STATUS MIGRAINOSUS, NOT INTRACTABLE: Primary | ICD-10-CM

## 2018-03-15 LAB
APPEARANCE UR: ABNORMAL
BACTERIA URNS QL MICRO: ABNORMAL /HPF
BILIRUB UR QL: NEGATIVE
COLOR UR: YELLOW
EPITH CASTS URNS QL MICRO: ABNORMAL /LPF (ref 0–5)
GLUCOSE UR STRIP.AUTO-MCNC: NEGATIVE MG/DL
HCG UR QL: NEGATIVE
HGB UR QL STRIP: ABNORMAL
KETONES UR QL STRIP.AUTO: NEGATIVE MG/DL
LEUKOCYTE ESTERASE UR QL STRIP.AUTO: ABNORMAL
NITRITE UR QL STRIP.AUTO: NEGATIVE
PH UR STRIP: 8 [PH] (ref 5–8)
PROT UR STRIP-MCNC: 30 MG/DL
RBC #/AREA URNS HPF: ABNORMAL /HPF (ref 0–5)
SERVICE CMNT-IMP: NORMAL
SP GR UR REFRACTOMETRY: 1.03 (ref 1–1.03)
UROBILINOGEN UR QL STRIP.AUTO: 1 EU/DL (ref 0.2–1)
WBC URNS QL MICRO: ABNORMAL /HPF (ref 0–4)
WET PREP GENITAL: NORMAL

## 2018-03-15 PROCEDURE — 87491 CHLMYD TRACH DNA AMP PROBE: CPT | Performed by: PHYSICIAN ASSISTANT

## 2018-03-15 PROCEDURE — 81025 URINE PREGNANCY TEST: CPT | Performed by: EMERGENCY MEDICINE

## 2018-03-15 PROCEDURE — 81001 URINALYSIS AUTO W/SCOPE: CPT | Performed by: EMERGENCY MEDICINE

## 2018-03-15 PROCEDURE — 96375 TX/PRO/DX INJ NEW DRUG ADDON: CPT

## 2018-03-15 PROCEDURE — 96361 HYDRATE IV INFUSION ADD-ON: CPT

## 2018-03-15 PROCEDURE — 96372 THER/PROPH/DIAG INJ SC/IM: CPT

## 2018-03-15 PROCEDURE — 74011250636 HC RX REV CODE- 250/636: Performed by: PHYSICIAN ASSISTANT

## 2018-03-15 PROCEDURE — 87210 SMEAR WET MOUNT SALINE/INK: CPT | Performed by: PHYSICIAN ASSISTANT

## 2018-03-15 PROCEDURE — 99284 EMERGENCY DEPT VISIT MOD MDM: CPT

## 2018-03-15 PROCEDURE — 96365 THER/PROPH/DIAG IV INF INIT: CPT

## 2018-03-15 RX ORDER — DEXAMETHASONE SODIUM PHOSPHATE 4 MG/ML
10 INJECTION, SOLUTION INTRA-ARTICULAR; INTRALESIONAL; INTRAMUSCULAR; INTRAVENOUS; SOFT TISSUE
Status: COMPLETED | OUTPATIENT
Start: 2018-03-15 | End: 2018-03-15

## 2018-03-15 RX ORDER — METOCLOPRAMIDE HYDROCHLORIDE 5 MG/ML
10 INJECTION INTRAMUSCULAR; INTRAVENOUS
Status: COMPLETED | OUTPATIENT
Start: 2018-03-15 | End: 2018-03-15

## 2018-03-15 RX ORDER — FLUCONAZOLE 150 MG/1
150 TABLET ORAL
Qty: 1 TAB | Refills: 0 | Status: SHIPPED | OUTPATIENT
Start: 2018-03-15 | End: 2018-03-15

## 2018-03-15 RX ORDER — MAGNESIUM SULFATE HEPTAHYDRATE 40 MG/ML
2 INJECTION, SOLUTION INTRAVENOUS ONCE
Status: COMPLETED | OUTPATIENT
Start: 2018-03-15 | End: 2018-03-15

## 2018-03-15 RX ORDER — DIPHENHYDRAMINE HYDROCHLORIDE 50 MG/ML
25 INJECTION, SOLUTION INTRAMUSCULAR; INTRAVENOUS ONCE
Status: COMPLETED | OUTPATIENT
Start: 2018-03-15 | End: 2018-03-15

## 2018-03-15 RX ORDER — MAGNESIUM SULFATE HEPTAHYDRATE 500 MG/ML
2 INJECTION, SOLUTION INTRAMUSCULAR; INTRAVENOUS
Status: DISCONTINUED | OUTPATIENT
Start: 2018-03-15 | End: 2018-03-15

## 2018-03-15 RX ORDER — SULFAMETHOXAZOLE AND TRIMETHOPRIM 800; 160 MG/1; MG/1
1 TABLET ORAL 2 TIMES DAILY
Qty: 6 TAB | Refills: 0 | Status: SHIPPED | OUTPATIENT
Start: 2018-03-15 | End: 2018-03-18

## 2018-03-15 RX ADMIN — DEXAMETHASONE SODIUM PHOSPHATE 10 MG: 4 INJECTION, SOLUTION INTRAMUSCULAR; INTRAVENOUS at 19:40

## 2018-03-15 RX ADMIN — MAGNESIUM SULFATE HEPTAHYDRATE 2 G: 40 INJECTION, SOLUTION INTRAVENOUS at 19:40

## 2018-03-15 RX ADMIN — DIPHENHYDRAMINE HYDROCHLORIDE 25 MG: 50 INJECTION, SOLUTION INTRAMUSCULAR; INTRAVENOUS at 19:06

## 2018-03-15 RX ADMIN — SODIUM CHLORIDE 1000 ML: 900 INJECTION, SOLUTION INTRAVENOUS at 19:06

## 2018-03-15 RX ADMIN — METOCLOPRAMIDE 10 MG: 5 INJECTION, SOLUTION INTRAMUSCULAR; INTRAVENOUS at 19:08

## 2018-03-15 NOTE — ED TRIAGE NOTES
Patient states migraine headache x 4 days. Patient c/o dysuria and possible UTI. She states irritation to vaginal area and burning sensation to skin following urination. States possible yeast infection.

## 2018-03-15 NOTE — ED PROVIDER NOTES
EMERGENCY DEPARTMENT HISTORY AND PHYSICAL EXAM    Date: 3/15/2018  Patient Name: Diana Jones    History of Presenting Illness     Chief Complaint   Patient presents with    Migraine    (LUTS) Lower Urinary Tract Symptoms         History Provided By: Patient    Chief Complaint: migraine HA, UTI  Duration: HA - 4 days, UTI -1 week   Timing:  Acute  Location: NA  Quality: Aching and Burning  Severity: Moderate  Modifying Factors: none  Associated Symptoms: denies any other associated signs or symptoms      Additional History (Context): Diana Jones is a 38 y.o. female with h/o asthma, migraine who presents with HA x 4 days. HA located on left side of head, c/w previous migraines. Has associated nausea and photophobia. Has been taking her usual HA medication without improvement in sx. Does not have breakthrough medication. Also c/o dysuria x 1 week. Took Azo without improvement in sx. Has some associated vaginal itching and discharge. PCP: Donna Perdomo MD    Current Facility-Administered Medications   Medication Dose Route Frequency Provider Last Rate Last Dose    sodium chloride 0.9 % bolus infusion 1,000 mL  1,000 mL IntraVENous ONCE Naveen Escobedo PA-C 1,000 mL/hr at 03/15/18 1906 1,000 mL at 03/15/18 1906     Current Outpatient Prescriptions   Medication Sig Dispense Refill    amitriptyline (ELAVIL) 25 mg tablet TAKE 1 TABLET BY MOUTH AT BEDTIME FOR 7 DAYS, THEN 2 AT BEDTIME 60 Tab 2    albuterol-ipratropium (DUO-NEB) 2.5 mg-0.5 mg/3 ml nebu INHALE CONTENTS OF 1 VIAL VIA NEBULIZER EVERY 4 HOURS AS NEEDED FOR SHORTNESS OF BREATH/WHEEZING. 180 mL 4    fexofenadine-pseudoephedrine (ALLEGRA-D 24 HOUR) 180-240 mg per tablet Take 1 Tab by mouth daily. 15 Tab 1    fluticasone-vilanterol (BREO ELLIPTA) 200-25 mcg/dose inhaler Take 1 Puff by inhalation daily. 1 Inhaler 5    albuterol (PROAIR HFA) 90 mcg/actuation inhaler Take 2 Puffs by inhalation every four (4) hours as needed for Wheezing.  1 Inhaler 5    fluticasone (FLONASE) 50 mcg/actuation nasal spray instill 2 sprays into each nostril once daily 1 Bottle 5    montelukast (SINGULAIR) 10 mg tablet Take 1 Tab by mouth daily. 30 Tab 5    ondansetron (ZOFRAN ODT) 4 mg disintegrating tablet TAKE 1 TAB BY MOUTH EVERY EIGHT (8) HOURS AS NEEDED FOR NAUSEA. 15 Tab 1    EPINEPHrine (EPIPEN 2-GALE) 0.3 mg/0.3 mL injection 0.3 mL by IntraMUSCular route once as needed for up to 1 dose. 2 Syringe 5    diphenhydrAMINE (BENADRYL) 25 mg capsule Take 1-2 tabs every 8 hours as needed with the Compazine. 16 Cap 0    PRENATAL VITS W-CA,FE,FA,<1MG, (PRENATAL VITAMIN PO) Take  by mouth. Past History     Past Medical History:  Past Medical History:   Diagnosis Date    Anxiety attack     Asthma     Asthmatic bronchitis     Catamenial disorder     Hernia     Migraine     Mild asthma     Psychiatric disorder     anxiety     Torticollis        Past Surgical History:  Past Surgical History:   Procedure Laterality Date     DELIVERY ONLY      HX BACK SURGERY      HX  SECTION         Family History:  Family History   Problem Relation Age of Onset    Heart Disease Other      grandmothers    Asthma Brother     Lung Disease Paternal Aunt      cronic bronchitis    Asthma Paternal Grandmother     High Cholesterol Mother        Social History:  Social History   Substance Use Topics    Smoking status: Never Smoker    Smokeless tobacco: Never Used    Alcohol use No       Allergies: Allergies   Allergen Reactions    Aspirin Anaphylaxis    Ibuprofen Anaphylaxis    Pcn [Penicillins] Itching    Claritin [Loratadine] Anxiety and Cough    Phenergan [Promethazine] Anxiety and Swelling    Seafood Swelling    Shellfish Containing Products Hives         Review of Systems   Review of Systems   Constitutional: Negative for chills and fever. Eyes: Positive for photophobia. Respiratory: Negative for cough and shortness of breath. Cardiovascular: Negative for chest pain and leg swelling. Gastrointestinal: Positive for nausea. Negative for abdominal pain and vomiting. Genitourinary: Positive for dysuria and vaginal discharge. All other systems reviewed and are negative. All Other Systems Negative  Physical Exam     Vitals:    03/15/18 1758   BP: 114/71   Pulse: 100   Resp: 18   Temp: 98.5 °F (36.9 °C)   SpO2: 100%   Weight: 59.9 kg (132 lb)   Height: 5' 5\" (1.651 m)     Physical Exam   Constitutional: She is oriented to person, place, and time. She appears well-developed and well-nourished. No distress. HENT:   Head: Normocephalic and atraumatic. Eyes: Conjunctivae and EOM are normal. Pupils are equal, round, and reactive to light. Neck: Normal range of motion. Neck supple. Cardiovascular: Normal rate, regular rhythm and normal heart sounds. Pulmonary/Chest: Effort normal and breath sounds normal. No respiratory distress. She has no wheezes. She has no rales. Abdominal: Soft. She exhibits no distension. There is no tenderness. There is no rebound. Genitourinary: Vagina normal and uterus normal. There is no lesion or injury on the right labia. There is no lesion or injury on the left labia. Cervix exhibits no motion tenderness. Right adnexum displays no tenderness and no fullness. Left adnexum displays no tenderness and no fullness. No vaginal discharge found. Genitourinary Comments: LILY Ramos at bedside   Musculoskeletal: Normal range of motion. Neurological: She is alert and oriented to person, place, and time. Skin: Skin is warm and dry. Psychiatric: She has a normal mood and affect. Her behavior is normal. Judgment and thought content normal.   Nursing note and vitals reviewed.              Diagnostic Study Results     Labs -     Recent Results (from the past 12 hour(s))   URINALYSIS W/ RFLX MICROSCOPIC    Collection Time: 03/15/18  6:00 PM   Result Value Ref Range    Color YELLOW      Appearance CLOUDY Specific gravity 1.026 1.005 - 1.030      pH (UA) 8.0 5.0 - 8.0      Protein 30 (A) NEG mg/dL    Glucose NEGATIVE  NEG mg/dL    Ketone NEGATIVE  NEG mg/dL    Bilirubin NEGATIVE  NEG      Blood SMALL (A) NEG      Urobilinogen 1.0 0.2 - 1.0 EU/dL    Nitrites NEGATIVE  NEG      Leukocyte Esterase LARGE (A) NEG     HCG URINE, QL    Collection Time: 03/15/18  6:00 PM   Result Value Ref Range    HCG urine, QL NEGATIVE  NEG     URINE MICROSCOPIC ONLY    Collection Time: 03/15/18  6:00 PM   Result Value Ref Range    WBC TOO NUMEROUS TO COUNT 0 - 4 /hpf    RBC 4 to 10 0 - 5 /hpf    Epithelial cells FEW 0 - 5 /lpf    Bacteria 2+ (A) NEG /hpf   WET PREP    Collection Time: 03/15/18  6:50 PM   Result Value Ref Range    Special Requests: NO SPECIAL REQUESTS      Wet prep NO YEAST,TRICHOMONAS OR CLUE CELLS NOTED         Radiologic Studies -   No orders to display     CT Results  (Last 48 hours)    None        CXR Results  (Last 48 hours)    None            Medical Decision Making   I am the first provider for this patient. I reviewed the vital signs, available nursing notes, past medical history, past surgical history, family history and social history. Records Reviewed: Nursing Notes    Procedures:  Procedures    Provider Notes (Medical Decision Making):     DDX: migraine, uti, sti, other vaginal infection    Pt well appearing and in NAD. Feeling better with meds. HA similar to previous, no red flags. UA c/w UTI. Wet prep neg.  Will d/h to f/u with PCP For further eval.     MED RECONCILIATION:  Current Facility-Administered Medications   Medication Dose Route Frequency    sodium chloride 0.9 % bolus infusion 1,000 mL  1,000 mL IntraVENous ONCE     Current Outpatient Prescriptions   Medication Sig    amitriptyline (ELAVIL) 25 mg tablet TAKE 1 TABLET BY MOUTH AT BEDTIME FOR 7 DAYS, THEN 2 AT BEDTIME    albuterol-ipratropium (DUO-NEB) 2.5 mg-0.5 mg/3 ml nebu INHALE CONTENTS OF 1 VIAL VIA NEBULIZER EVERY 4 HOURS AS NEEDED FOR SHORTNESS OF BREATH/WHEEZING.  fexofenadine-pseudoephedrine (ALLEGRA-D 24 HOUR) 180-240 mg per tablet Take 1 Tab by mouth daily.  fluticasone-vilanterol (BREO ELLIPTA) 200-25 mcg/dose inhaler Take 1 Puff by inhalation daily.  albuterol (PROAIR HFA) 90 mcg/actuation inhaler Take 2 Puffs by inhalation every four (4) hours as needed for Wheezing.  fluticasone (FLONASE) 50 mcg/actuation nasal spray instill 2 sprays into each nostril once daily    montelukast (SINGULAIR) 10 mg tablet Take 1 Tab by mouth daily.  ondansetron (ZOFRAN ODT) 4 mg disintegrating tablet TAKE 1 TAB BY MOUTH EVERY EIGHT (8) HOURS AS NEEDED FOR NAUSEA.  EPINEPHrine (EPIPEN 2-GALE) 0.3 mg/0.3 mL injection 0.3 mL by IntraMUSCular route once as needed for up to 1 dose.  diphenhydrAMINE (BENADRYL) 25 mg capsule Take 1-2 tabs every 8 hours as needed with the Compazine.  PRENATAL VITS W-CA,FE,FA,<1MG, (PRENATAL VITAMIN PO) Take  by mouth. Disposition:  home    DISCHARGE NOTE:     Patient is improved, resting quietly and comfortably. The patient will be discharged home.      The patient was reassured that these symptoms do not appear to represent a serious or life threatening condition at this time. Warning signs of worsening condition were discussed and understood by the patient.      Based on patient's age, coexisting illness, exam, and the results of this ED evaluation, the decision to treat as an outpatient was made. Based on the information available at time of discharge, acute pathology requiring immediate intervention was deemed relative unlikely. While it is impossible to completely exclude the possibility of underlying serious disease or worsening of condition, I feel the relative likelihood is extremely low.  I discussed this uncertainty with the patient, who understood ED evaluation and treatment and felt comfortable with the outpatient treatment plan.      All questions regarding care, test results, and follow up were answered. The patient is stable and appropriate to discharge. They understand that they should return to the emergency department for any new or worsening symptoms. I stressed the importance of follow up for repeat assessment and possibly further evaluation/treatment. Follow-up Information     None          Current Discharge Medication List            Diagnosis     Clinical Impression:   1.  Other migraine without status migrainosus, not intractable

## 2018-03-16 LAB
C TRACH RRNA SPEC QL NAA+PROBE: NEGATIVE
N GONORRHOEA RRNA SPEC QL NAA+PROBE: NEGATIVE
SPECIMEN SOURCE: NORMAL

## 2018-03-16 NOTE — DISCHARGE INSTRUCTIONS
Migraine Headache: Care Instructions  Your Care Instructions  Migraines are painful, throbbing headaches that often start on one side of the head. They may cause nausea and vomiting and make you sensitive to light, sound, or smell. Without treatment, migraines can last from 4 hours to a few days. Medicines can help prevent migraines or stop them after they have started. Your doctor can help you find which ones work best for you. Follow-up care is a key part of your treatment and safety. Be sure to make and go to all appointments, and call your doctor if you are having problems. It's also a good idea to know your test results and keep a list of the medicines you take. How can you care for yourself at home? · Do not drive if you have taken a prescription pain medicine. · Rest in a quiet, dark room until your headache is gone. Close your eyes, and try to relax or go to sleep. Don't watch TV or read. · Put a cold, moist cloth or cold pack on the painful area for 10 to 20 minutes at a time. Put a thin cloth between the cold pack and your skin. · Use a warm, moist towel or a heating pad set on low to relax tight shoulder and neck muscles. · Have someone gently massage your neck and shoulders. · Take your medicines exactly as prescribed. Call your doctor if you think you are having a problem with your medicine. You will get more details on the specific medicines your doctor prescribes. · Be careful not to take pain medicine more often than the instructions allow. You could get worse or more frequent headaches when the medicine wears off. To prevent migraines  · Keep a headache diary so you can figure out what triggers your headaches. Avoiding triggers may help you prevent headaches. Record when each headache began, how long it lasted, and what the pain was like.  (Was it throbbing, aching, stabbing, or dull?) Write down any other symptoms you had with the headache, such as nausea, flashing lights or dark spots, or sensitivity to bright light or loud noise. Note if the headache occurred near your period. List anything that might have triggered the headache. Triggers may include certain foods (chocolate, cheese, wine) or odors, smoke, bright light, stress, or lack of sleep. · If your doctor has prescribed medicine for your migraines, take it as directed. You may have medicine that you take only when you get a migraine and medicine that you take all the time to help prevent migraines. ¨ If your doctor has prescribed medicine for when you get a headache, take it at the first sign of a migraine, unless your doctor has given you other instructions. ¨ If your doctor has prescribed medicine to prevent migraines, take it exactly as prescribed. Call your doctor if you think you are having a problem with your medicine. · Find healthy ways to deal with stress. Migraines are most common during or right after stressful times. Take time to relax before and after you do something that has caused a migraine in the past.  · Try to keep your muscles relaxed by keeping good posture. Check your jaw, face, neck, and shoulder muscles for tension. Try to relax them. When you sit at a desk, change positions often. And make sure to stretch for 30 seconds each hour. · Get plenty of sleep and exercise. · Eat meals on a regular schedule. Avoid foods and drinks that often trigger migraines. These include chocolate, alcohol (especially red wine and port), aspartame, monosodium glutamate (MSG), and some additives found in foods (such as hot dogs, stephenson, cold cuts, aged cheeses, and pickled foods). · Limit caffeine. Don't drink too much coffee, tea, or soda. But don't quit caffeine suddenly. That can also give you migraines. · Do not smoke or allow others to smoke around you. If you need help quitting, talk to your doctor about stop-smoking programs and medicines. These can increase your chances of quitting for good.   · If you are taking birth control pills or hormone therapy, talk to your doctor about whether they are triggering your migraines. When should you call for help? Call 911 anytime you think you may need emergency care. For example, call if:  ? · You have signs of a stroke. These may include:  ¨ Sudden numbness, paralysis, or weakness in your face, arm, or leg, especially on only one side of your body. ¨ Sudden vision changes. ¨ Sudden trouble speaking. ¨ Sudden confusion or trouble understanding simple statements. ¨ Sudden problems with walking or balance. ¨ A sudden, severe headache that is different from past headaches. ?Call your doctor now or seek immediate medical care if:  ? · You have new or worse nausea and vomiting. ? · You have a new or higher fever. ? · Your headache gets much worse. ? Watch closely for changes in your health, and be sure to contact your doctor if:  ? · You are not getting better after 2 days (48 hours). Where can you learn more? Go to http://jaci-elina.info/. Enter C297 in the search box to learn more about \"Migraine Headache: Care Instructions. \"  Current as of: October 14, 2016  Content Version: 11.4  © 6201-4413 [employer]. Care instructions adapted under license by [employer] (which disclaims liability or warranty for this information). If you have questions about a medical condition or this instruction, always ask your healthcare professional. [place] [address] any warranty or liability for your use of this information. Urinary Tract Infection in Female Teens: Care Instructions  Your Care Instructions    A urinary tract infection, or UTI, is a general term for an infection anywhere between the kidneys and the urethra (where urine comes out). Most UTIs are bladder infections. They often cause pain or burning when you urinate. UTIs are caused by bacteria and can be cured with antibiotics.  Be sure to complete your treatment so that the infection does not get worse. Follow-up care is a key part of your treatment and safety. Be sure to make and go to all appointments, and call your doctor if you are having problems. It's also a good idea to know your test results and keep a list of the medicines you take. How can you care for yourself at home? · Take your antibiotics as directed. Do not stop taking them just because you feel better. You need to take the full course of antibiotics. · Drink extra water and other fluids for the next day or two. This will help make the urine less concentrated and help wash out the bacteria that are causing the infection. (If you have kidney, heart, or liver disease and have to limit fluids, talk with your doctor before you increase the amount of fluids you drink.)  · Avoid drinks that are carbonated or have caffeine. They can irritate the bladder. · Urinate often. Try to empty your bladder each time. · To relieve pain, take a hot bath or lay a heating pad set on low over your lower belly or genital area. Never go to sleep with a heating pad in place. To prevent UTIs  · Drink plenty of water each day. This helps you urinate often, which clears bacteria from your system. (If you have kidney, heart, or liver disease and have to limit fluids, talk with your doctor before you increase the amount of fluids you drink.)  · Urinate when you need to. · If you are sexually active, urinate right after you have sex. · Change sanitary pads often. · Avoid douches, bubble baths, feminine hygiene sprays, and other feminine hygiene products that have deodorants. · After going to the bathroom, wipe from front to back. When should you call for help? Call your doctor now or seek immediate medical care if:  ? · Symptoms such as fever, chills, nausea, or vomiting get worse or appear for the first time. ? · You have new pain in your back just below your rib cage. This is called flank pain.    ? · There is new blood or pus in your urine. ? · You have any problems with your antibiotic medicine. ? Watch closely for changes in your health, and be sure to contact your doctor if:  ? · You are not getting better after taking an antibiotic for 2 days. ? · Your symptoms go away but then come back. Where can you learn more? Go to http://jaci-elina.info/. Enter I502 in the search box to learn more about \"Urinary Tract Infection in Female Teens: Care Instructions. \"  Current as of: May 12, 2017  Content Version: 11.4  © 1492-7993 [employer]. Care instructions adapted under license by [employer] (which disclaims liability or warranty for this information). If you have questions about a medical condition or this instruction, always ask your healthcare professional. [address] any warranty or liability for your use of this information.

## 2018-03-26 RX ORDER — FEXOFENADINE HYDROCHLORIDE AND PSEUDOEPHEDRINE HYDROCHLORIDE 180; 240 MG/1; MG/1
TABLET, FILM COATED, EXTENDED RELEASE ORAL
Qty: 15 TAB | Refills: 0 | Status: SHIPPED | OUTPATIENT
Start: 2018-03-26 | End: 2020-02-24

## 2018-04-09 DIAGNOSIS — J30.1 SEASONAL ALLERGIC RHINITIS DUE TO POLLEN: ICD-10-CM

## 2018-04-09 DIAGNOSIS — J45.30 MILD PERSISTENT ASTHMA WITHOUT COMPLICATION: Primary | ICD-10-CM

## 2018-04-09 RX ORDER — FLUTICASONE PROPIONATE 50 MCG
SPRAY, SUSPENSION (ML) NASAL
Qty: 1 BOTTLE | Refills: 2 | Status: SHIPPED | OUTPATIENT
Start: 2018-04-09 | End: 2018-09-12 | Stop reason: CLARIF

## 2018-04-09 RX ORDER — FLUTICASONE PROPIONATE 50 MCG
SPRAY, SUSPENSION (ML) NASAL
Qty: 1 BOTTLE | Refills: 2 | Status: SHIPPED | OUTPATIENT
Start: 2018-04-09 | End: 2019-09-18 | Stop reason: SDUPTHER

## 2018-04-11 ENCOUNTER — OFFICE VISIT (OUTPATIENT)
Dept: NEUROLOGY | Age: 43
End: 2018-04-11

## 2018-04-11 VITALS
TEMPERATURE: 97.9 F | OXYGEN SATURATION: 98 % | HEIGHT: 65 IN | WEIGHT: 152.6 LBS | RESPIRATION RATE: 16 BRPM | SYSTOLIC BLOOD PRESSURE: 110 MMHG | DIASTOLIC BLOOD PRESSURE: 78 MMHG | HEART RATE: 108 BPM | BODY MASS INDEX: 25.43 KG/M2

## 2018-04-11 DIAGNOSIS — G43.831 INTRACTABLE MENSTRUAL MIGRAINE WITH STATUS MIGRAINOSUS: Primary | ICD-10-CM

## 2018-04-11 RX ORDER — FROVATRIPTAN SUCCINATE 2.5 MG/1
TABLET, FILM COATED ORAL
Qty: 9 TAB | Refills: 3 | Status: SHIPPED | OUTPATIENT
Start: 2018-04-11 | End: 2018-05-21 | Stop reason: SDUPTHER

## 2018-04-11 NOTE — MR AVS SNAPSHOT
[address] [address] 
[phone] Patient: Brannon Rees MRN: [id number] :[id number] Visit Information Date & Time Provider Department Dept. Phone Encounter #  
 2018 11:30 AM Reather Phalen, [facility] [phone] [id number] Follow-up Instructions Return in about 3 months (around 2018). Your Appointments 2018 11:30 AM  
Follow Up with Reather Phalen, NP [facility] ([address]) Appt Note: 6 week fu; LVM regarding apt of 2018 @ HV  Address left on ; p/t r/s to an earlier time bdc; LVM to call office to rs with NP Homero  provider out of office; LVM regarding apt of 2018 @   Address left on ; rs'd due to having appointment; LVM regarding apt of 2018 @ ; letter Sent; r/s to this date & time w/pt. ...ywp; 4/10 lm on vm to confirm appt & to come in at 10:15am...ywp  
 [address] [place] [id number] [phone]  
  
   
 [facility] [id number] Upcoming Health Maintenance Date Due  
 PAP AKA CERVICAL CYTOLOGY 1998 MEDICARE YEARLY EXAM 2018 DTaP/Tdap/Td series (2 - Td) 2026 Allergies as of 2018  Review Complete On: 2018 By: Reather Phalen, NP Severity Noted Reaction Type Reactions Aspirin High   Anaphylaxis Ibuprofen High 2014    Anaphylaxis Pcn [Penicillins] High 2013    Itching Claritin [Loratadine]    Anxiety, Cough Phenergan [Promethazine]  2012    Anxiety, Swelling Seafood  2014    Swelling Shellfish Containing Products    Hives Current Immunizations  Reviewed on 2014 Name Date Influenza Vaccine 2014 Influenza Vaccine (Quad) 2016 10:13 AM  
 Influenza Vaccine (Quad) PF 2017 Influenza Vaccine Split 10/15/2012 Pneumococcal Polysaccharide (PPSV-23) 5/1/2015 10:45 AM  
 Tdap 2/6/2016 12:14 PM  
  
 Not reviewed this visit You Were Diagnosed With   
  
 Codes Comments Intractable menstrual migraine with status migrainosus    -  Primary ICD-10-CM: H81.549 ICD-9-CM: 346.43 Vitals BP Pulse Temp Resp Height(growth percentile) Weight(growth percentile) 110/78 (!) 108 97.9 °F (36.6 °C) (Temporal) 16 5' 5\" (1.651 m) 152 lb 9.6 oz (69.2 kg) LMP SpO2 BMI OB Status Smoking Status 04/08/2018 (Approximate) 98% 25.39 kg/m2 Having regular periods Never Smoker BMI and BSA Data Body Mass Index Body Surface Area  
 25.39 kg/m 2 1.78 m 2 Preferred Pharmacy Pharmacy Name Phone [id number] [address] [phone] Your Updated Medication List  
  
   
This list is accurate as of 4/11/18 10:52 AM.  Always use your most recent med list.  
  
  
  
  
 albuterol 90 mcg/actuation inhaler Commonly known as:  PROAIR HFA Take 2 Puffs by inhalation every four (4) hours as needed for Wheezing. albuterol-ipratropium 2.5 mg-0.5 mg/3 ml Nebu Commonly known as:  Lonzell Dorado INHALE CONTENTS OF 1 VIAL VIA NEBULIZER EVERY 4 HOURS AS NEEDED FOR SHORTNESS OF BREATH/WHEEZING. ALLEGRA-D 24 HOUR 180-240 mg per tablet Generic drug:  fexofenadine-pseudoephedrine TAKE 1 TABLET BY MOUTH DAILY  
  
 amitriptyline 25 mg tablet Commonly known as:  ELAVIL TAKE 1 TABLET BY MOUTH AT BEDTIME FOR 7 DAYS, THEN 2 AT BEDTIME  
  
 EPINEPHrine 0.3 mg/0.3 mL injection Commonly known as:  EPIPEN 2-GALE  
0.3 mL by IntraMUSCular route once as needed for up to 1 dose. * fluticasone 50 mcg/actuation nasal spray Commonly known as:  Smith Kittson INSTILL 2 SPRAYS INTO EACH NOSTRIL ONCE DAILY * fluticasone 50 mcg/actuation nasal spray Commonly known as:  FLONASE  
instill 2 sprays into each nostril once daily  Indications: Allergic Rhinitis fluticasone-vilanterol 200-25 mcg/dose inhaler Commonly known as:  BREO ELLIPTA Take 1 Puff by inhalation daily. frovatriptan 2.5 mg tablet Commonly known as:  Janel Debbie Take one tablet at HA onset, may repeat x1 > 2 hrs if needed. Max 5 mg in 24 hours * montelukast 10 mg tablet Commonly known as:  SINGULAIR Take 1 Tab by mouth daily. * montelukast 10 mg tablet Commonly known as:  SINGULAIR  
TAKE 1 TABLET BY MOUTH DAILY  
  
 ondansetron 4 mg disintegrating tablet Commonly known as:  ZOFRAN ODT  
TAKE 1 TAB BY MOUTH EVERY EIGHT (8) HOURS AS NEEDED FOR NAUSEA. PRENATAL VITAMIN PO Take  by mouth. * Notice: This list has 4 medication(s) that are the same as other medications prescribed for you. Read the directions carefully, and ask your doctor or other care provider to review them with you. Prescriptions Sent to Pharmacy Refills  
 frovatriptan (FROVA) 2.5 mg tablet 3 Sig: Take one tablet at HA onset, may repeat x1 > 2 hrs if needed. Max 5 mg in 24 hours Class: Normal  
 Pharmacy: [address] [place] [address] #: [phone] Follow-up Instructions Return in about 3 months (around 7/11/2018). Patient Instructions I have sent Frova to your pharmacy to take at headache onset. Continue Elavil as prescribed. Track headaches and follow up in 3 months. Introducing [facility]! [employer] introduces [employer] patient portal. Now you can access parts of your medical record, email your doctor's office, and request medication refills online. 1. In your internet browser, go to [email or web address] 2. Click on the First Time User? Click Here link in the Sign In box. You will see the New Member Sign Up page. 3. Enter your [employer] Access Code exactly as it appears below. You will not need to use this code after youve completed the sign-up process.  If you do not sign up before the expiration date, you must request a new code. · Fanminder Access Code: [email or web address] Expires: 6/3/2018  2:16 PM 
 
4. Enter the last four digits of your Social Security Number (xxxx) and Date of Birth (mm/dd/yyyy) as indicated and click Submit. You will be taken to the next sign-up page. 5. Create a Fanminder ID. This will be your Fanminder login ID and cannot be changed, so think of one that is secure and easy to remember. 6. Create a Fanminder password. You can change your password at any time. 7. Enter your Password Reset Question and Answer. This can be used at a later time if you forget your password. 8. Enter your e-mail address. You will receive e-mail notification when new information is available in [address] 9. Click Sign Up. You can now view and download portions of your medical record. 10. Click the Download Summary menu link to download a portable copy of your medical information. If you have questions, please visit the Frequently Asked Questions section of the Fanminder website. Remember, Fan[employer]der is NOT to be used for urgent needs. For medical emergencies, dial 911. Now available from your iPhone and Android! Please provide this summary of care documentation to your next provider. Your primary care clinician is listed as [facility]. If you have any questions after today's visit, please call [phone].

## 2018-04-11 NOTE — PROGRESS NOTES
Chief Complaint   Patient presents with    Neurologic Problem     f/u migraines. Patient states that she has approx 10-12 headache days per month and just had an ED visit for migraines. Patient placed in room 6. Chart and medications reviewed with patient.

## 2018-04-11 NOTE — PATIENT INSTRUCTIONS
I have sent Frova to your pharmacy to take at headache onset. Continue Elavil as prescribed. Track headaches and follow up in 3 months.

## 2018-04-11 NOTE — PROGRESS NOTES
[facility]  [address] [facility], [address], [place], [address]  [facility] Dustin [place]opes, [address]  Office:  [phone]  Fax: [phone]  Chief Complaint   Patient presents with    Neurologic Problem     f/u migraines. Patient states that she has approx 10-12 headache days per month and just had an ED visit for migraines. This is a 46-year-old female who presents for follow-up appointment. Endorses headaches. Headaches are occurring around her menstrual cycle. She said one headache can last several days. In the interim went to the ED for a headache. She is maintained on Elavil 50 mg nightly. Tolerating this well. She says that she started this at the beginning of the year and it is not really helping her headaches. She denies being on any migraine specific abortive medication. Denies weakness with headache. Denies focal deficits. Denies history of stroke or heart attack. Denies issues with her blood pressure. Denies tobacco use. Past Medical History:   Diagnosis Date    Anxiety attack     Asthma     Asthmatic bronchitis     Catamenial disorder     Hernia     Migraine     Mild asthma     Psychiatric disorder     anxiety     Torticollis        Past Surgical History:   Procedure Laterality Date     DELIVERY ONLY      HX BACK SURGERY      HX  SECTION         Current Outpatient Prescriptions   Medication Sig Dispense Refill    frovatriptan (FROVA) 2.5 mg tablet Take one tablet at HA onset, may repeat x1 > 2 hrs if needed.  Max 5 mg in 24 hours 9 Tab 3    fluticasone (FLONASE) 50 mcg/actuation nasal spray INSTILL 2 SPRAYS INTO EACH NOSTRIL ONCE DAILY 1 Bottle 2    ALLEGRA-D 24 HOUR 180-240 mg per tablet TAKE 1 TABLET BY MOUTH DAILY 15 Tab 0    ondansetron (ZOFRAN ODT) 4 mg disintegrating tablet TAKE 1 TAB BY MOUTH EVERY EIGHT (8) HOURS AS NEEDED FOR NAUSEA. 15 Tab 1    amitriptyline (ELAVIL) 25 mg tablet TAKE 1 TABLET BY MOUTH AT BEDTIME FOR 7 DAYS, THEN 2 AT BEDTIME 60 Tab 2    albuterol-ipratropium (DUO-NEB) 2.5 mg-0.5 mg/3 ml nebu INHALE CONTENTS OF 1 VIAL VIA NEBULIZER EVERY 4 HOURS AS NEEDED FOR SHORTNESS OF BREATH/WHEEZING. 180 mL 4    fluticasone-vilanterol (BREO ELLIPTA) 200-25 mcg/dose inhaler Take 1 Puff by inhalation daily. 1 Inhaler 5    albuterol (PROAIR HFA) 90 mcg/actuation inhaler Take 2 Puffs by inhalation every four (4) hours as needed for Wheezing. 1 Inhaler 5    montelukast (SINGULAIR) 10 mg tablet Take 1 Tab by mouth daily. 30 Tab 5    EPINEPHrine (EPIPEN 2-GALE) 0.3 mg/0.3 mL injection 0.3 mL by IntraMUSCular route once as needed for up to 1 dose. 2 Syringe 5    PRENATAL VITS W-CA,FE,FA,<1MG, (PRENATAL VITAMIN PO) Take  by mouth.  fluticasone (FLONASE) 50 mcg/actuation nasal spray instill 2 sprays into each nostril once daily  Indications: Allergic Rhinitis 1 Bottle 2    montelukast (SINGULAIR) 10 mg tablet TAKE 1 TABLET BY MOUTH DAILY 30 Tab 5        Allergies   Allergen Reactions    Aspirin Anaphylaxis    Ibuprofen Anaphylaxis    Pcn [Penicillins] Itching    Claritin [Loratadine] Anxiety and Cough    Phenergan [Promethazine] Anxiety and Swelling    Seafood Swelling    Shellfish Containing Products Hives       Social History   Substance Use Topics    Smoking status: Never Smoker    Smokeless tobacco: Never Used    Alcohol use No       Family History   Problem Relation Age of Onset    Heart Disease Other      grandmothers    Asthma Brother     Lung Disease Paternal Aunt      cronic bronchitis    Asthma Paternal Grandmother     High Cholesterol Mother        Review of Systems  Pertinent positives and negatives as noted otherwise comprehensive review is negative.     Examination  Visit Vitals    /78    Pulse (!) 108    Temp 97.9 °F (36.6 °C) (Temporal)    Resp 16    Ht 5' 5\" (1.651 m)    Wt 69.2 kg (152 lb 9.6 oz)    LMP 04/08/2018 (Approximate)    SpO2 98%    BMI 25.39 kg/m2       Pleasant 38year old female, well appearing. No icterus. Oropharynx clear. Supple neck without bruit. Heart regular. No murmur. No edema. Neurologically, she is awake, alert, and oriented with normal speech and language. Her cognition is normal.  She is able to discuss her medical history. She is able to discuss her medications. She is able to discuss current events. Intact cranial nerves 2-12. No nystagmus. Visual fields full to confrontation. Disk margins are flat bilaterally. She has normal bulk and tone. She has no abnormal movement. She has no pronation or drift. She generates full strength in the upper and lower extremities. Reflexes are symmetrical in the upper and lower extremities bilaterally. Her toes are down bilaterally. Finger nose finger and rapid alternating movements are normal.  Steady gait. Impression/Plan  Rachid Flores is a 38 y.o. female whose history and physical are consistent with menstrual migraine. Patient presents for follow-up appointment. Headaches have not improved. She is maintained on Elavil 50 mg nightly. This was starting at the beginning of the year. In the interim she was seen in the emergency room for headache. She denies having migraine specific abortive medication. We discussed frovatriptan for abortive headache treatment. Discussed medication, indication, side effects, and dosing. Patient verbalized understanding. She will track headaches. Continue Elavil as previously prescribed. All questions addressed and patient is agreeable and plan of care. Follow-up in 3 months. Diagnoses and all orders for this visit:    1. Intractable menstrual migraine with status migrainosus    Other orders  -     frovatriptan (FROVA) 2.5 mg tablet; Take one tablet at HA onset, may repeat x1 > 2 hrs if needed.  Max 5 mg in 24 hours    Total time: 25 min   Counseling / coordination time: 19 min   > 50% counseling / coordination?: Yes re: symptoms, management, medications, indication, chart review, answering questions, and plan of care. Signed By: Smita Ferrer NP    This note will not be viewable in [address]

## 2018-04-12 ENCOUNTER — OFFICE VISIT (OUTPATIENT)
Dept: PULMONOLOGY | Age: 43
End: 2018-04-12

## 2018-04-12 ENCOUNTER — TELEPHONE (OUTPATIENT)
Dept: PULMONOLOGY | Age: 43
End: 2018-04-12

## 2018-04-12 VITALS
SYSTOLIC BLOOD PRESSURE: 110 MMHG | DIASTOLIC BLOOD PRESSURE: 60 MMHG | BODY MASS INDEX: 25.33 KG/M2 | OXYGEN SATURATION: 98 % | RESPIRATION RATE: 18 BRPM | HEIGHT: 65 IN | TEMPERATURE: 98.4 F | HEART RATE: 101 BPM | WEIGHT: 152 LBS

## 2018-04-12 DIAGNOSIS — J30.1 NON-SEASONAL ALLERGIC RHINITIS DUE TO POLLEN: ICD-10-CM

## 2018-04-12 DIAGNOSIS — J45.909 MILD ASTHMA, UNSPECIFIED WHETHER COMPLICATED, UNSPECIFIED WHETHER PERSISTENT: Primary | ICD-10-CM

## 2018-04-12 DIAGNOSIS — B37.0 ORAL THRUSH: ICD-10-CM

## 2018-04-12 DIAGNOSIS — R05.9 COUGH: ICD-10-CM

## 2018-04-12 RX ORDER — BENZONATATE 100 MG/1
100 CAPSULE ORAL
Qty: 21 CAP | Refills: 0 | Status: SHIPPED | OUTPATIENT
Start: 2018-04-12 | End: 2018-04-19

## 2018-04-12 RX ORDER — AZITHROMYCIN 250 MG/1
TABLET, FILM COATED ORAL
Qty: 6 TAB | Refills: 0 | Status: SHIPPED | OUTPATIENT
Start: 2018-04-12 | End: 2018-05-15

## 2018-04-12 RX ORDER — NYSTATIN 100000 [USP'U]/ML
SUSPENSION ORAL
Qty: 60 ML | Refills: 1 | Status: SHIPPED | OUTPATIENT
Start: 2018-04-12 | End: 2018-05-15

## 2018-04-12 NOTE — TELEPHONE ENCOUNTER
NAVIN ZIMMER([phone]). COUGHING UP YELLOW PHLEGM AND HAD 3 NEBULIZER TREATMENTS TODAY. PLEASE CALL.

## 2018-04-12 NOTE — PATIENT INSTRUCTIONS
Instructions:    Continue Breo 1 inhalation daily and remember to exhale fully before inhaling and also remember to wash mouth with water and spit it out after inhaling (put it together toothbrush/mouthwash/    Continue Albuterol 2 inhalations every 4 hours as needed or by nebulizer every 4 hours as needed and if you require albuterol too often to control respiratory symptoms call the office for severe symptoms go to the emergency room    Always call for symptoms such as increasing shortness of breath or a cough productive of discolored mucus. Nystatin swish/swallow-follow direction.     Z pack (antibiotic) take Activia with probiotic    Tessalon Perles-if cause drowsiness NO DRIVING

## 2018-04-12 NOTE — PROGRESS NOTES
[facility]  Pulmonary, Critical Care, and Sleep Medicine      Pulmonary Office Progress Notes    Name: Shayy Mora     : 1975     Date: 2018        Subjective:     Patient is a 38 y.o. female is here for sick visit. Interval history:  Pt presented today ambulatory on room air with SpO2 98%. She report since  start coughing yellow colored mucus, no hemoptysis, no fever or chills. She report no SOB as long she takes her time walking, no SOB at rest or wakening at night. She report runny nose but denies posterior drainage-with clear mucus. She report never wheeze but had chest tightness when coughing only. She denies dysphagia or choking. She report compliant with BREO daily-forget to rinse mouth after tx sometimes. She report use Vicki neb via neb 3X/yeterday and once early am today due to excessive coughing with chest tightness-with relief. Travel history: no  Sick contact: no     Past Medical History:   Diagnosis Date    Anxiety attack     Asthma     Asthmatic bronchitis     Catamenial disorder     Hernia     Migraine     Mild asthma     Psychiatric disorder     anxiety     Torticollis      Social History     Social History    Marital status:      Spouse name: N/A    Number of children: N/A    Years of education: N/A     Occupational History    Not on file.      Social History Main Topics    Smoking status: Never Smoker    Smokeless tobacco: Never Used    Alcohol use No    Drug use: No    Sexual activity: Yes     Partners: Male     Other Topics Concern    Not on file     Social History Narrative      Past Surgical History:   Procedure Laterality Date     DELIVERY ONLY      HX BACK SURGERY      HX  SECTION  2009        Allergies   Allergen Reactions    Aspirin Anaphylaxis    Ibuprofen Anaphylaxis    Pcn [Penicillins] Itching    Claritin [Loratadine] Anxiety and Cough    Phenergan [Promethazine] Anxiety and Swelling    Seafood Swelling    Shellfish Containing Products Hives       Current Outpatient Prescriptions   Medication Sig Dispense Refill    nystatin (MYCOSTATIN) 100,000 unit/mL suspension swish and swallow  Indications: oral candidiasis 60 mL 1    benzonatate (TESSALON) 100 mg capsule Take 1 Cap by mouth three (3) times daily as needed for Cough for up to 7 days. Indications: Cough 21 Cap 0    azithromycin (ZITHROMAX) 250 mg tablet 1 tab daily for  6 days 6 Tab 0    frovatriptan (FROVA) 2.5 mg tablet Take one tablet at HA onset, may repeat x1 > 2 hrs if needed. Max 5 mg in 24 hours 9 Tab 3    fluticasone (FLONASE) 50 mcg/actuation nasal spray INSTILL 2 SPRAYS INTO EACH NOSTRIL ONCE DAILY 1 Bottle 2    fluticasone (FLONASE) 50 mcg/actuation nasal spray instill 2 sprays into each nostril once daily  Indications: Allergic Rhinitis 1 Bottle 2    ALLEGRA-D 24 HOUR 180-240 mg per tablet TAKE 1 TABLET BY MOUTH DAILY 15 Tab 0    montelukast (SINGULAIR) 10 mg tablet TAKE 1 TABLET BY MOUTH DAILY 30 Tab 5    ondansetron (ZOFRAN ODT) 4 mg disintegrating tablet TAKE 1 TAB BY MOUTH EVERY EIGHT (8) HOURS AS NEEDED FOR NAUSEA. 15 Tab 1    amitriptyline (ELAVIL) 25 mg tablet TAKE 1 TABLET BY MOUTH AT BEDTIME FOR 7 DAYS, THEN 2 AT BEDTIME 60 Tab 2    albuterol-ipratropium (DUO-NEB) 2.5 mg-0.5 mg/3 ml nebu INHALE CONTENTS OF 1 VIAL VIA NEBULIZER EVERY 4 HOURS AS NEEDED FOR SHORTNESS OF BREATH/WHEEZING. 180 mL 4    fluticasone-vilanterol (BREO ELLIPTA) 200-25 mcg/dose inhaler Take 1 Puff by inhalation daily. 1 Inhaler 5    albuterol (PROAIR HFA) 90 mcg/actuation inhaler Take 2 Puffs by inhalation every four (4) hours as needed for Wheezing. 1 Inhaler 5    montelukast (SINGULAIR) 10 mg tablet Take 1 Tab by mouth daily. 30 Tab 5    EPINEPHrine (EPIPEN 2-GALE) 0.3 mg/0.3 mL injection 0.3 mL by IntraMUSCular route once as needed for up to 1 dose.  2 Syringe 5    PRENATAL VITS W-CA,FE,FA,<1MG, (PRENATAL VITAMIN PO) Take by mouth. Patient Active Problem List   Diagnosis Code    Asthma J45.909    Pregnancy Z34.90    Migraine without aura and without status migrainosus, not intractable G43.009      Review of Systems:  Constitutional: No fever, no chills, no weight loss, no night sweats   HEENT: No epistaxis, no nasal drainage, no difficulty in swallowing, no redness in eyes  Respiratory: as above  Cardiovascular: no chest pain, no palpitations, no chronic leg edema, no syncope  Gastrointestinal: no abd pain, no vomiting, no diarrhea, no bleeding symptoms  Genitourinary: No urinary symptoms or hematuria  Integument/breast: No ulcers or rashes  Musculoskeletal:Neg  Neurological: No focal weakness, no seizures, no headaches  Behvioral/Psych: No anxiety, no depression     Objective:     Visit Vitals    /60 (BP 1 Location: Right arm, BP Patient Position: Sitting)    Pulse (!) 101    Temp 98.4 °F (36.9 °C) (Oral)    Resp 18    Ht 5' 5\" (1.651 m)    Wt 68.9 kg (152 lb)    LMP 04/08/2018 (Approximate)    SpO2 98%    BMI 25.29 kg/m2     Physical Exam:  General/Neurology:   Alert to name, place and time. NAD. Head:  Normocephalic, without obvious abnormality, atraumatic. Eye:  No scleral icterus, no pallor, no cyanosis  Nose:  No sinus tenderness, no erythema, no induration, no discharge  Throat:   No tonsillar enlargement, no erythema, no exudates. No oral thrush. Neck:  Supple, symmetric. No JVD. Thyroid: no enlargement/tenderness/nodule. No lymphadenopathy. Trachea midline  Lung: Moderate air entry bilateral equal. No stridors. No rales. No rhonchi. No wheezing. No prolonged expiration. No accessory muscle use. Heart:   Regular rate & rhythm. S1 S2 present. No murmur. Abdomen/: Soft, NT, ND, +BS, no masses, no organomegaly  Extremities:  No pedal edema. No cyanosis. No clubbing  Pulses:  2+ and symmetric in DP  Lymphatic:  No cervical, supraclavicular palpable lymphadenopathy. Musculoskeletal: No joint swelling or tenderness. Skin:  Color, texture, turgor normal. No rashes or lesions    Data review:       Admission on 03/15/2018, Discharged on 03/15/2018   Component Date Value Ref Range Status    Color 03/15/2018 YELLOW    Final    Appearance 03/15/2018 CLOUDY    Final    Specific gravity 03/15/2018 1.026  1.005 - 1.030   Final    pH (UA) 03/15/2018 8.0  5.0 - 8.0   Final    Protein 03/15/2018 30* NEG mg/dL Final    Glucose 03/15/2018 NEGATIVE   NEG mg/dL Final    Ketone 03/15/2018 NEGATIVE   NEG mg/dL Final    Bilirubin 03/15/2018 NEGATIVE   NEG   Final    Blood 03/15/2018 SMALL* NEG   Final    Urobilinogen 03/15/2018 1.0  0.2 - 1.0 EU/dL Final    Nitrites 03/15/2018 NEGATIVE   NEG   Final    Leukocyte Esterase 03/15/2018 LARGE* NEG   Final    HCG urine, QL 03/15/2018 NEGATIVE   NEG   Final    Test results should be confirmed using serum quantitative hCG when detection of pregnancy is critical and before performing any critical medical procedure.  WBC 03/15/2018 TOO NUMEROUS TO COUNT  0 - 4 /hpf Final    RBC 03/15/2018 4 to 10  0 - 5 /hpf Final    Epithelial cells 03/15/2018 FEW  0 - 5 /lpf Final    Bacteria 03/15/2018 2+* NEG /hpf Final    Special Requests: 03/15/2018 NO SPECIAL REQUESTS    Final    Wet prep 03/15/2018 NO YEAST,TRICHOMONAS OR CLUE CELLS NOTED    Final    Chlamydia amplification 03/15/2018 NEGATIVE   NEG   Final    N. gonorrhoeae amplification 03/15/2018 NEGATIVE   NEG   Final    Specimen Source 03/15/2018 ENDOCERVICAL    Final      Imaging:  I have personally reviewed the patients radiographs and have reviewed the reports:       Results from Hospital Encounter encounter on 07/24/17   XR CHEST PA LAT   Narrative HISTORY: Cough. Exam: Chest.    Technique: Two views of the chest were obtained. Compared to 8/14/2014 exam.    FINDINGS: There is no pneumothorax, pneumonia or pleural effusions. Heart and  mediastinal structures are unremarkable. Visualized bony thorax and soft tissues  are within normal limits. Impression IMPRESSION:  1. No acute cardiopulmonary process. No change. Results from [place] encounter on 06/18/14   CT ABD PELV W CONT   Narrative CT of abdomen and pelvis with contrast    INDICATION: Paraumbilical abdominal pain. COMPARISON: None. TECHNIQUE: 5 mm helical scan to the abdomen and pelvis is obtained  from the  diaphragm to the symphysis pubis after uneventful nonionic IV contrast  administration. CONTRAST: 100 cc Optiray 320. FINDINGS:    CT OF ABDOMEN:    Lung Bases: Clear. Liver: Normal.    Gallbladder: Normal.    Biliary System: No common bile ductal dilatation. Borderline prominent  pancreatic duct identified no focal abnormality as nonspecific finding. Spleen: Normal.    Pancreas: Normal.    Kidneys: Unremarkable. Adrenal Glands: Normal.    Bowel: The stomach is poorly distended. Small hiatal hernia present. Tiny fatty  containing periumbilical hernia without associated bowel obstruction or  incarceration. The small and large bowel are nondilated. Peritoneum/Retroperitoneum: No adenopathy. No free air or fluid. Vasculature: Unremarkable for age. CT PELVIS:    Bowel: The small and large bowel are nondilated. Patulous and the lower setting  cecum in the lower right pelvis containing moderate feces. The appendix is not  clearly visualized. No pericecal fluid collection identified. Mild colonic  diverticular disease. Bladder: Poorly distended. Peritoneum/Retroperitoneum: No adenopathy. Other: Retroverted uterus is normal in size. Prominent cervix as nonspecific  finding. Minimal fluid in the cul-de-sac, likely physiologic. [place] CT OSSEOUS STRUCTURES:    Unremarkable for age. Impression IMPRESSION:    Tiny periumbilical fatty containing hernia without inflammation or bowel  obstruction. Diverticulosis coli without diverticulitis.  Nonvisualization of appendix. No CT  evidence of appendicitis. Tiny pelvic fluid in the cul-de-sac, likely physiologic. Thank you for this referral.          Results for orders placed or performed during the hospital encounter of 07/24/17   EKG, 12 LEAD, INITIAL   Result Value Ref Range    Ventricular Rate 81 BPM    Atrial Rate 81 BPM    P-R Interval 126 ms    QRS Duration 68 ms    Q-T Interval 386 ms    QTC Calculation (Bezet) 448 ms    Calculated P Axis 54 degrees    Calculated R Axis 68 degrees    Calculated T Axis 49 degrees    Diagnosis       Normal sinus rhythm  Normal ECG  When compared with ECG of 14-AUG-2014 10:14,  fusion complexes are no longer present  Nonspecific T wave abnormality has replaced inverted T waves in Inferior   leads  Confirmed by Arthur Rose MD, --- ([id number]) on 7/24/2017 1:42:46 PM           No results found for this or any previous visit. Lab Results   Component Value Date/Time    D DIMER <0.27 08/14/2014 10:20 AM          Ms. Pilo Kaplan has a reminder for a \"due or due soon\" health maintenance. I have asked that she contact her primary care provider for follow-up on this health maintenance. IMPRESSION:   · Productive Cough   · Asthma mild exacerbation likeyl trigger acute bronchitis and exposure to allergens. · Oral thrush  · Allergic rhinitis. RECOMMENDATIONS:   · Tessalon Perles PRN script send, med's action and adverse reaction instructed, no driving if cause drowsiness, pt states understanding. · Z pack for bronchitis, encouraged to rake Activia with probiotic if no allergy, med's action and adverse reaction instructed. Scrip send  · Nystatin swiss for oral thrush, Script send  · Continue Breo daily, instructed proper use and inhaler technique, reinforced to rinse/gargle mouth thoroughly, pt states understanding. · Continue Duo neb by nebulization every 4 hours for 2-3 days, then as needed. If unable to control respiratory symptoms go to the emergency room or call 911.   · Continue Singulair 10 mg every day , allegra daily, nasal steroid and saline flush. · Discussed avoidance of precipitants/allergens. · Pulmonary toilette, encouraged deep breathing, asthma action plan and continue to optimized tx/mgt. · Reviewed all medications and discussed concerns, answered questions. · Follow-upas scheduled with Dr Jared Salcedo or come back sooner should new symptoms or problems arise.        Monika Pierce NP

## 2018-04-12 NOTE — TELEPHONE ENCOUNTER
Pt c/o cough, sob and congestion x 3 days. Coughing up yellowish sputum. No fever.  Appt given today

## 2018-04-20 ENCOUNTER — TELEPHONE (OUTPATIENT)
Dept: NEUROLOGY | Age: 43
End: 2018-04-20

## 2018-04-20 ENCOUNTER — TELEPHONE (OUTPATIENT)
Dept: PULMONOLOGY | Age: 43
End: 2018-04-20

## 2018-04-20 NOTE — TELEPHONE ENCOUNTER
Patient returned my call and was given the information as detailed by provider. Patient verbalized understanding. CAL Palacios LPN           Caller: Unspecified (Today,  9:56 AM) Carolina Alanis                    Please allow for more time. This is an appropriate medication to take for abortive headache therapy especially for menstrual related headaches. Please take early at headache onset. If she knows she is getting her period she can try to take this before headache onset. This was just prescribed one week ago. Meaning this has not been sufficient timing to assess efficacy. Allow for several cycles. Thank you.

## 2018-04-20 NOTE — TELEPHONE ENCOUNTER
Pt states she finished antibiotics, but she is still coughing up yellow and it is thicker now. Please call pt at [phone].

## 2018-04-20 NOTE — TELEPHONE ENCOUNTER
CAL Dillard LPN        Caller: Unspecified (Today,  9:56 AM)                     Please allow for more time. This is an appropriate medication to take for abortive headache therapy especially for menstrual related headaches. Please take early at headache onset. If she knows she is getting her period she can try to take this before headache onset. This was just prescribed one week ago. Meaning this has not been sufficient timing to assess efficacy. Allow for several cycles. Thank you.

## 2018-04-20 NOTE — TELEPHONE ENCOUNTER
Pt states Madungsalomon Dillard is not working for her migraines and would like something different. Would appreciate a phone call to discuss what her options are.   Best # [phone]

## 2018-04-20 NOTE — TELEPHONE ENCOUNTER
Left a message on patients voicemail for her to call back in reference to her call here today. Waiting for a return call.

## 2018-04-20 NOTE — TELEPHONE ENCOUNTER
Spoke with Jose Luis Rasmussen NP. She wants to see patient before more meds are ordered. Appointment offered. Patient states she is starting a new job. She is encouraged to go to ED or to urgent care today or tomorrow.

## 2018-04-24 ENCOUNTER — TELEPHONE (OUTPATIENT)
Dept: PULMONOLOGY | Age: 43
End: 2018-04-24

## 2018-04-24 NOTE — TELEPHONE ENCOUNTER
Pt seen 4/12/18 and tx with antibiotic and cough med. She is feeling better but still with slight cough and yellowish sputum. No fever. Pt has not used any cough med (Tessalon Perls). Advise ok to use the cough med for cough. Push fluids to help with thinning secretions. Pt agrees with plan.

## 2018-04-24 NOTE — TELEPHONE ENCOUNTER
Pt said she has had a cough for two weeks. She said she has been doing her breathing treatments but she is still coughing up yellow mucus.  She would like to speak with a nurse [phone]

## 2018-05-15 ENCOUNTER — TELEPHONE (OUTPATIENT)
Dept: PULMONOLOGY | Age: 43
End: 2018-05-15

## 2018-05-15 ENCOUNTER — OFFICE VISIT (OUTPATIENT)
Dept: PULMONOLOGY | Age: 43
End: 2018-05-15

## 2018-05-15 VITALS
RESPIRATION RATE: 18 BRPM | BODY MASS INDEX: 25.66 KG/M2 | OXYGEN SATURATION: 99 % | WEIGHT: 154 LBS | TEMPERATURE: 98.6 F | HEART RATE: 115 BPM | HEIGHT: 65 IN | DIASTOLIC BLOOD PRESSURE: 76 MMHG | SYSTOLIC BLOOD PRESSURE: 124 MMHG

## 2018-05-15 DIAGNOSIS — B37.0 ORAL THRUSH: ICD-10-CM

## 2018-05-15 DIAGNOSIS — R05.8 COUGH PRODUCTIVE OF PURULENT SPUTUM: ICD-10-CM

## 2018-05-15 DIAGNOSIS — J45.30 MILD PERSISTENT ASTHMA WITHOUT COMPLICATION: Primary | ICD-10-CM

## 2018-05-15 DIAGNOSIS — R06.02 SOB (SHORTNESS OF BREATH): ICD-10-CM

## 2018-05-15 DIAGNOSIS — B37.0 OROPHARYNGEAL CANDIDIASIS: ICD-10-CM

## 2018-05-15 PROBLEM — J45.901 ASTHMA EXACERBATION: Status: ACTIVE | Noted: 2018-05-15

## 2018-05-15 RX ORDER — FLUCONAZOLE 100 MG/1
100 TABLET ORAL DAILY
Qty: 7 TAB | Refills: 0 | Status: SHIPPED | OUTPATIENT
Start: 2018-05-15 | End: 2018-05-22

## 2018-05-15 NOTE — TELEPHONE ENCOUNTER
Pt called because she is still having shortness of breath and coughing up yellow mucus. She was put on antibiotics on 4/12/18 and she said she is still having symptoms.  Please call [phone]

## 2018-05-15 NOTE — TELEPHONE ENCOUNTER
Pt with bronchitis sxs again. Was treated 1 month ago with antibiotic. Never really completely went away. Using inhalers, cough meds. Pt having yellowish sputum.  Appt given for today

## 2018-05-15 NOTE — PATIENT INSTRUCTIONS
Bring sputum specimen to [facility]    Diflucan daily X 7 days

## 2018-05-15 NOTE — PROGRESS NOTES
[facility]  Pulmonary, Critical Care, and Sleep Medicine      Pulmonary Office Progress Notes    Name: Julisa Landers     : 1975     Date: 5/15/2018        Subjective:     Patient is a 38 y.o. female is here for sick visit for persistent productive cough and worsening SOB >a month. Interval history:  Pt presented today ambulatory on room air with SpO2 98%. She report since last visit had mild improvement when treated with Z- Pack but continue coughing yellow colored mucus, no hemoptysis, no fever or chills. She report noted worsening SOB with activity such as walking, no SOB at rest or wakening at night. She report never wheeze since dx with asthma (as a child). She denies dysphagia or choking. She report compliant with BREO daily and  Duo neb via neb 2-3X/daily for excessive coughing. Travel history: no  Sick contact: no     Past Medical History:   Diagnosis Date    Anxiety attack     Asthma     Asthmatic bronchitis     Catamenial disorder     Hernia     Migraine     Mild asthma     Psychiatric disorder     anxiety     Torticollis      Social History     Social History    Marital status:      Spouse name: N/A    Number of children: N/A    Years of education: N/A     Occupational History    Not on file.      Social History Main Topics    Smoking status: Never Smoker    Smokeless tobacco: Never Used    Alcohol use No    Drug use: No    Sexual activity: Yes     Partners: Male     Other Topics Concern    Not on file     Social History Narrative      Past Surgical History:   Procedure Laterality Date     DELIVERY ONLY      HX BACK SURGERY      HX  SECTION  2009        Allergies   Allergen Reactions    Aspirin Anaphylaxis    Ibuprofen Anaphylaxis    Pcn [Penicillins] Itching    Claritin [Loratadine] Anxiety and Cough    Phenergan [Promethazine] Anxiety and Swelling    Seafood Swelling    Shellfish Containing Products Hives Current Outpatient Prescriptions   Medication Sig Dispense Refill    nystatin (MYCOSTATIN) 100,000 unit/mL suspension swish and swallow  Indications: oral candidiasis 60 mL 1    azithromycin (ZITHROMAX) 250 mg tablet 1 tab daily for  6 days 6 Tab 0    frovatriptan (FROVA) 2.5 mg tablet Take one tablet at HA onset, may repeat x1 > 2 hrs if needed. Max 5 mg in 24 hours 9 Tab 3    fluticasone (FLONASE) 50 mcg/actuation nasal spray INSTILL 2 SPRAYS INTO EACH NOSTRIL ONCE DAILY 1 Bottle 2    fluticasone (FLONASE) 50 mcg/actuation nasal spray instill 2 sprays into each nostril once daily  Indications: Allergic Rhinitis 1 Bottle 2    ALLEGRA-D 24 HOUR 180-240 mg per tablet TAKE 1 TABLET BY MOUTH DAILY 15 Tab 0    montelukast (SINGULAIR) 10 mg tablet TAKE 1 TABLET BY MOUTH DAILY 30 Tab 5    ondansetron (ZOFRAN ODT) 4 mg disintegrating tablet TAKE 1 TAB BY MOUTH EVERY EIGHT (8) HOURS AS NEEDED FOR NAUSEA. 15 Tab 1    amitriptyline (ELAVIL) 25 mg tablet TAKE 1 TABLET BY MOUTH AT BEDTIME FOR 7 DAYS, THEN 2 AT BEDTIME 60 Tab 2    albuterol-ipratropium (DUO-NEB) 2.5 mg-0.5 mg/3 ml nebu INHALE CONTENTS OF 1 VIAL VIA NEBULIZER EVERY 4 HOURS AS NEEDED FOR SHORTNESS OF BREATH/WHEEZING. 180 mL 4    fluticasone-vilanterol (BREO ELLIPTA) 200-25 mcg/dose inhaler Take 1 Puff by inhalation daily. 1 Inhaler 5    albuterol (PROAIR HFA) 90 mcg/actuation inhaler Take 2 Puffs by inhalation every four (4) hours as needed for Wheezing. 1 Inhaler 5    montelukast (SINGULAIR) 10 mg tablet Take 1 Tab by mouth daily. 30 Tab 5    EPINEPHrine (EPIPEN 2-GALE) 0.3 mg/0.3 mL injection 0.3 mL by IntraMUSCular route once as needed for up to 1 dose. 2 Syringe 5    PRENATAL VITS W-CA,FE,FA,<1MG, (PRENATAL VITAMIN PO) Take  by mouth. Patient Active Problem List   Diagnosis Code    Asthma J45.909    Pregnancy Z34.90    Migraine without aura and without status migrainosus, not intractable G43.009    Asthma exacerbation J45. Viru 65 Review of Systems:  Constitutional: No fever, no chills, no weight loss, no night sweats   HEENT: No epistaxis, no nasal drainage, no difficulty in swallowing, no redness in eyes  Respiratory: as above  Cardiovascular: no chest pain, no palpitations, no chronic leg edema, no syncope  Gastrointestinal: no abd pain, no vomiting, no diarrhea, no bleeding symptoms  Genitourinary: No urinary symptoms or hematuria  Integument/breast: No ulcers or rashes  Musculoskeletal:Neg  Neurological: No focal weakness, no seizures, no headaches  Behvioral/Psych: No anxiety, no depression     Objective:     Visit Vitals    /76 (BP 1 Location: Left arm, BP Patient Position: Sitting)    Pulse (!) 115    Temp 98.6 °F (37 °C) (Oral)    Resp 18    Ht 5' 5\" (1.651 m)    Wt 69.9 kg (154 lb)    SpO2 99%    BMI 25.63 kg/m2     Physical Exam:  General/Neurology:   Alert to name, place and time. NAD. Head:  Normocephalic, without obvious abnormality, atraumatic. Eye:  No scleral icterus, no pallor, no cyanosis  Nose:  No sinus tenderness, no erythema, no induration, no discharge  Throat:   Whitish colored plague inside mouth mucosa on both side. Neck:  Supple, symmetric. No JVD. Thyroid:  No lymphadenopathy. Trachea midline  Lung: Moderate air entry bilateral equal. No stridors. No rales. No rhonchi. No wheezing. No prolonged expiration. No accessory muscle use. Heart:   Regular rate & rhythm. S1 S2 present. No murmur. Abdomen/: Soft, NT, ND, +BS, no masses, no organomegaly  Extremities:  No pedal edema. No cyanosis. No clubbing  Pulses:  2+ and symmetric in DP  Lymphatic:  No cervical, supraclavicular palpable lymphadenopathy. Musculoskeletal: No joint swelling or tenderness.    Skin:  Color, texture, turgor normal. No rashes or lesions    Data review:       Admission on 03/15/2018, Discharged on 03/15/2018   Component Date Value Ref Range Status    Color 03/15/2018 YELLOW    Final    Appearance 03/15/2018 CLOUDY    Final    Specific gravity 03/15/2018 1.026  1.005 - 1.030   Final    pH (UA) 03/15/2018 8.0  5.0 - 8.0   Final    Protein 03/15/2018 30* NEG mg/dL Final    Glucose 03/15/2018 NEGATIVE   NEG mg/dL Final    Ketone 03/15/2018 NEGATIVE   NEG mg/dL Final    Bilirubin 03/15/2018 NEGATIVE   NEG   Final    Blood 03/15/2018 SMALL* NEG   Final    Urobilinogen 03/15/2018 1.0  0.2 - 1.0 EU/dL Final    Nitrites 03/15/2018 NEGATIVE   NEG   Final    Leukocyte Esterase 03/15/2018 LARGE* NEG   Final    HCG urine, QL 03/15/2018 NEGATIVE   NEG   Final    Test results should be confirmed using serum quantitative hCG when detection of pregnancy is critical and before performing any critical medical procedure.  WBC 03/15/2018 TOO NUMEROUS TO COUNT  0 - 4 /hpf Final    RBC 03/15/2018 4 to 10  0 - 5 /hpf Final    Epithelial cells 03/15/2018 FEW  0 - 5 /lpf Final    Bacteria 03/15/2018 2+* NEG /hpf Final    Special Requests: 03/15/2018 NO SPECIAL REQUESTS    Final    Wet prep 03/15/2018 NO YEAST,TRICHOMONAS OR CLUE CELLS NOTED    Final    Chlamydia amplification 03/15/2018 NEGATIVE   NEG   Final    N. gonorrhoeae amplification 03/15/2018 NEGATIVE   NEG   Final    Specimen Source 03/15/2018 ENDOCERVICAL    Final      Imaging:  I have personally reviewed the patients radiographs and have reviewed the reports:       Results from Hospital Encounter encounter on 07/24/17   XR CHEST PA LAT   Narrative HISTORY: Cough. Exam: Chest.    Technique: Two views of the chest were obtained. Compared to 8/14/2014 exam.    FINDINGS: There is no pneumothorax, pneumonia or pleural effusions. Heart and  mediastinal structures are unremarkable. Visualized bony thorax and soft tissues  are within normal limits. Impression IMPRESSION:  1. No acute cardiopulmonary process. No change.            Results from [place] encounter on 06/18/14   CT ABD PELV W CONT   Narrative CT of abdomen and pelvis with contrast    INDICATION: Paraumbilical abdominal pain. COMPARISON: None. TECHNIQUE: 5 mm helical scan to the abdomen and pelvis is obtained  from the  diaphragm to the symphysis pubis after uneventful nonionic IV contrast  administration. CONTRAST: 100 cc Optiray 320. FINDINGS:    CT OF ABDOMEN:    Lung Bases: Clear. Liver: Normal.    Gallbladder: Normal.    Biliary System: No common bile ductal dilatation. Borderline prominent  pancreatic duct identified no focal abnormality as nonspecific finding. Spleen: Normal.    Pancreas: Normal.    Kidneys: Unremarkable. Adrenal Glands: Normal.    Bowel: The stomach is poorly distended. Small hiatal hernia present. Tiny fatty  containing periumbilical hernia without associated bowel obstruction or  incarceration. The small and large bowel are nondilated. Peritoneum/Retroperitoneum: No adenopathy. No free air or fluid. Vasculature: Unremarkable for age. CT PELVIS:    Bowel: The small and large bowel are nondilated. Patulous and the lower setting  cecum in the lower right pelvis containing moderate feces. The appendix is not  clearly visualized. No pericecal fluid collection identified. Mild colonic  diverticular disease. Bladder: Poorly distended. Peritoneum/Retroperitoneum: No adenopathy. Other: Retroverted uterus is normal in size. Prominent cervix as nonspecific  finding. Minimal fluid in the cul-de-sac, likely physiologic. Benna Him CT OSSEOUS STRUCTURES:    Unremarkable for age. Impression IMPRESSION:    Tiny periumbilical fatty containing hernia without inflammation or bowel  obstruction. Diverticulosis coli without diverticulitis. Nonvisualization of appendix. No CT  evidence of appendicitis. Tiny pelvic fluid in the cul-de-sac, likely physiologic.     Thank you for this referral.          Results for orders placed or performed during the hospital encounter of 07/24/17   EKG, 12 LEAD, INITIAL   Result Value Ref Range Ventricular Rate 81 BPM    Atrial Rate 81 BPM    P-R Interval 126 ms    QRS Duration 68 ms    Q-T Interval 386 ms    QTC Calculation (Bezet) 448 ms    Calculated P Axis 54 degrees    Calculated R Axis 68 degrees    Calculated T Axis 49 degrees    Diagnosis       Normal sinus rhythm  Normal ECG  When compared with ECG of 14-AUG-2014 10:14,  fusion complexes are no longer present  Nonspecific T wave abnormality has replaced inverted T waves in Inferior   leads  Confirmed by Caitie Walker MD, --- ([id number]) on 7/24/2017 1:42:46 PM           No results found for this or any previous visit. Lab Results   Component Value Date/Time    D DIMER <0.27 08/14/2014 10:20 AM          Ms. Jordyn Pierson has a reminder for a \"due or due soon\" health maintenance. I have asked that she contact her primary care provider for follow-up on this health maintenance. IMPRESSION:   · Persistent Productive Cough   · Asthma exacerbation likey trigger slow resolving acute bronchitis, exposure to allergens and other possible etiology. · Oropharyngeal fungal infection  · Chronic tachycardia likely due to LABA, asymptomatic. RECOMMENDATIONS:   · Walk 330 ft on room air, baseline SpO2 98, , briefly desat 87% but went back up 94%, SpO2 ranges 94-98%,  -[id number] symptoms, report SOB scale 5-8(scale 1-10) and fatigue, no acute distress noted. · Diflucan 100 mg 1 tab po every day X 7 days, script send, med's adverse reaction instructed. · CXR done today, prelim imaging reviewed by Dr Robbert Goodpasture, no infiltrate or CHF-d/w pt states understanding. · Sputum culture-script and specimen container given, before starting on another antibiotic. · Continue Breo daily, instructed proper use and inhaler technique, reinforced to rinse/gargle mouth thoroughly, pt states understanding. · Continue Duo neb by nebulization every 4 hours as needed. If unable to control respiratory symptoms go to the emergency room or call 911.   · Continue Singulair 10 mg every day, allegra daily, nasal steroid and saline flush. · Discussed avoidance of precipitants/allergens. · Pulmonary toilette, encouraged deep breathing, asthma action plan and continue to optimized tx/mgt. · Reviewed all medications and discussed concerns, answered questions. · Recommend to f/u with Cardiology for appear chronic tachycardia (per previous ECG reviewed, but no Echo). · Discussed with Dr Сергей Engel, agreeable with above plan  · Follow-up 1 month with Dr Audelia Barcenas or come back sooner should new symptoms or problems arise. Azalea Nazario NP    Addendum: 5/15/18: Patient with cough with mucus production despite antibiotic treatment. History of asthma and oral thrush. Now without significant wheezing. Chest x-ray obtained and was reviewed personally and was unremarkable for significant cardiopulmonary pathology. It was felt that the patient should be treated aggressively for oral thrush with Diflucan and that a sputum culture was obtained concerning her cough unresponsive to antibiotics. She will follow-up with Dr. Audelia Barcenas.   Ai Garduno MD

## 2018-05-16 NOTE — TELEPHONE ENCOUNTER
Per Jhon Nyhan NP the 2 medications where to be an antibiotic once sputum given and identifies bacteria and second Diflucan which was sent already to be used when taking the antibiotic. Pt states she understands now and will get sputum specimen asap.

## 2018-05-16 NOTE — TELEPHONE ENCOUNTER
PT [phone]). NEEDS SCRIPTS FOR PREDNISONE AND MOUTH RINSE SENT TO [facility]  [phone].

## 2018-05-21 NOTE — TELEPHONE ENCOUNTER
Requested Prescriptions     Pending Prescriptions Disp Refills    frovatriptan (FROVA) 2.5 mg tablet 9 Tab 3     Sig: Take one tablet at HA onset, may repeat x1 > 2 hrs if needed.  Max 5 mg in 24 hours     Patient called in request

## 2018-05-23 RX ORDER — FROVATRIPTAN SUCCINATE 2.5 MG/1
TABLET, FILM COATED ORAL
Qty: 9 TAB | Refills: 3 | Status: SHIPPED | OUTPATIENT
Start: 2018-05-23 | End: 2018-07-31 | Stop reason: ALTCHOICE

## 2018-05-24 RX ORDER — FLUTICASONE FUROATE AND VILANTEROL 200; 25 UG/1; UG/1
1 POWDER RESPIRATORY (INHALATION) DAILY
Qty: 1 INHALER | Refills: 5 | Status: SHIPPED | OUTPATIENT
Start: 2018-05-24 | End: 2018-09-13 | Stop reason: CLARIF

## 2018-05-24 NOTE — TELEPHONE ENCOUNTER
Requested Prescriptions     Pending Prescriptions Disp Refills    amitriptyline (ELAVIL) 25 mg tablet 60 Tab 2

## 2018-05-25 RX ORDER — AMITRIPTYLINE HYDROCHLORIDE 25 MG/1
TABLET, FILM COATED ORAL
Qty: 60 TAB | Refills: 2 | Status: SHIPPED | OUTPATIENT
Start: 2018-05-25 | End: 2018-07-31 | Stop reason: DRUGHIGH

## 2018-06-04 ENCOUNTER — TELEPHONE (OUTPATIENT)
Dept: NEUROLOGY | Age: 43
End: 2018-06-04

## 2018-06-04 NOTE — TELEPHONE ENCOUNTER
Potential clinical concern for frovatriptan rec'd and placed in folder at  [facility]

## 2018-06-11 ENCOUNTER — TELEPHONE (OUTPATIENT)
Dept: NEUROLOGY | Age: 43
End: 2018-06-11

## 2018-06-11 NOTE — TELEPHONE ENCOUNTER
Medication concerns rec'd and placed in folder at [facility]

## 2018-06-12 RX ORDER — ONDANSETRON 4 MG/1
TABLET, ORALLY DISINTEGRATING ORAL
Qty: 15 TAB | Refills: 1 | Status: SHIPPED | OUTPATIENT
Start: 2018-06-12 | End: 2018-09-17 | Stop reason: SDUPTHER

## 2018-07-31 ENCOUNTER — OFFICE VISIT (OUTPATIENT)
Dept: NEUROLOGY | Age: 43
End: 2018-07-31

## 2018-07-31 VITALS
HEART RATE: 103 BPM | TEMPERATURE: 97.7 F | SYSTOLIC BLOOD PRESSURE: 110 MMHG | DIASTOLIC BLOOD PRESSURE: 60 MMHG | OXYGEN SATURATION: 98 % | RESPIRATION RATE: 18 BRPM | BODY MASS INDEX: 25.33 KG/M2 | WEIGHT: 152 LBS | HEIGHT: 65 IN

## 2018-07-31 DIAGNOSIS — G43.831 INTRACTABLE MENSTRUAL MIGRAINE WITH STATUS MIGRAINOSUS: Primary | ICD-10-CM

## 2018-07-31 RX ORDER — RIZATRIPTAN BENZOATE 10 MG/1
TABLET ORAL
Qty: 12 TAB | Refills: 3 | Status: SHIPPED | OUTPATIENT
Start: 2018-07-31 | End: 2019-01-08 | Stop reason: SDUPTHER

## 2018-07-31 RX ORDER — AMITRIPTYLINE HYDROCHLORIDE 75 MG/1
75 TABLET, FILM COATED ORAL
Qty: 30 TAB | Refills: 4 | Status: SHIPPED | OUTPATIENT
Start: 2018-07-31 | End: 2019-01-07 | Stop reason: SDUPTHER

## 2018-07-31 NOTE — MR AVS SNAPSHOT
[address] [facility] [address] [facility] 
[phone] Patient: Meeta Guard MRN: [id number] :1975 Visit Information Date & Time Provider Department Dept. Phone Encounter #  
 2018  1:45 PM Delfina Diaz NP [facility] at [address] [place] [address] [id number] Follow-up Instructions Return in about 3 months (around 10/31/2018). Upcoming Health Maintenance Date Due  
 PAP AKA CERVICAL CYTOLOGY 1998 Influenza Age 5 to Adult 2018 DTaP/Tdap/Td series (2 - Td) 2026 Allergies as of 2018  Review Complete On: 2018 By: Adiel Kerr LPN Severity Noted Reaction Type Reactions Aspirin High   Anaphylaxis Ibuprofen High 2014    Anaphylaxis Pcn [Penicillins] High 2013    Itching Claritin [Loratadine]    Anxiety, Cough Phenergan [Promethazine]  2012    Anxiety, Swelling Seafood  2014    Swelling Shellfish Containing Products    Hives Current Immunizations  Reviewed on 2014 Name Date Influenza Vaccine 2014 Influenza Vaccine (Quad) 2016 10:13 AM  
 Influenza Vaccine (Quad) PF 2017 Influenza Vaccine Split 10/15/2012 Pneumococcal Polysaccharide (PPSV-23) 2015 10:45 AM  
 Tdap 2016 12:14 PM  
  
 Not reviewed this visit You Were Diagnosed With   
  
 Codes Comments Intractable menstrual migraine with status migrainosus    -  Primary ICD-10-CM: S52.361 ICD-9-CM: 346.43 Vitals BP Pulse Temp Resp Height(growth percentile) Weight(growth percentile) 110/60 (BP 1 Location: Right arm, BP Patient Position: Sitting) (!) 103 97.7 °F (36.5 °C) (Oral) 18 5' 5\" (1.651 m) 152 lb (68.9 kg) SpO2 BMI OB Status Smoking Status 98% 25.29 kg/m2 Having regular periods Never Smoker Vitals History BMI and BSA Data Body Mass Index Body Surface Area 25.29 kg/m 2 1.78 m 2 Preferred Pharmacy Pharmacy Name Phone [id number]- [facility]obe[facility] Donohue [phone] Your Updated Medication List  
  
   
This list is accurate as of 7/31/18  1:56 PM.  Always use your most recent med list.  
  
  
  
  
 albuterol 90 mcg/actuation inhaler Commonly known as:  PROAIR HFA Take 2 Puffs by inhalation every four (4) hours as needed for Wheezing. albuterol-ipratropium 2.5 mg-0.5 mg/3 ml Nebu Commonly known as:  Neil Blackwood INHALE CONTENTS OF 1 VIAL VIA NEBULIZER EVERY 4 HOURS AS NEEDED FOR SHORTNESS OF BREATH/WHEEZING. ALLEGRA-D 24 HOUR 180-240 mg per tablet Generic drug:  fexofenadine-pseudoephedrine TAKE 1 TABLET BY MOUTH DAILY  
  
 amitriptyline 75 mg tablet Commonly known as:  ELAVIL Take 1 Tab by mouth nightly. EPINEPHrine 0.3 mg/0.3 mL injection Commonly known as:  EPIPEN 2-GALE  
0.3 mL by IntraMUSCular route once as needed for up to 1 dose. * fluticasone 50 mcg/actuation nasal spray Commonly known as:  Rosa Jumper INSTILL 2 SPRAYS INTO EACH NOSTRIL ONCE DAILY * fluticasone 50 mcg/actuation nasal spray Commonly known as:  FLONASE  
instill 2 sprays into each nostril once daily  Indications: Allergic Rhinitis  
  
 fluticasone-vilanterol 200-25 mcg/dose inhaler Commonly known as:  BREO ELLIPTA Take 1 Puff by inhalation daily. * montelukast 10 mg tablet Commonly known as:  SINGULAIR Take 1 Tab by mouth daily. * montelukast 10 mg tablet Commonly known as:  SINGULAIR  
TAKE 1 TABLET BY MOUTH DAILY  
  
 ondansetron 4 mg disintegrating tablet Commonly known as:  ZOFRAN ODT  
TAKE 1 TAB BY MOUTH EVERY EIGHT (8) HOURS AS NEEDED FOR NAUSEA. PRENATAL VITAMIN PO Take  by mouth.  
  
 rizatriptan 10 mg tablet Commonly known as:  Corinne Hipps Take one tablet at HA onset, May repeat in 2 hours if needed. Max two tabs in 24 hours. * Notice: This list has 4 medication(s) that are the same as other medications prescribed for you. Read the directions carefully, and ask your doctor or other care provider to review them with you. Prescriptions Sent to Pharmacy Refills  
 amitriptyline (ELAVIL) 75 mg tablet 4 Sig: Take 1 Tab by mouth nightly. Class: Normal  
 Pharmacy: [address] [place] [address] #: [phone] Route: Oral  
 rizatriptan (MAXALT) 10 mg tablet 3 Sig: Take one tablet at HA onset, May repeat in 2 hours if needed. Max two tabs in 24 hours. Class: Normal  
 Pharmacy: [address] [place] [address] #: [phone] Follow-up Instructions Return in about 3 months (around 10/31/2018). Patient Instructions We will increase your amitriptyline (Elavil) to 75 mg nightly. I have sent a new prescription to your pharmacy. In the meantime you can complete the dose of your current medication, amitriptyline 50 mg nightly. Instead of Frova I have sent Maxalt to be taken at headache onset. Thank you for choosing [facility] for your  
 
care. You may receive a survey about your visit. We appreciate you taking time  
 
to complete this survey as we use your feedback to improve our services. We  
 
realize we are not perfect, but we strive to provide excellent care. Introducing [facility]! [facility] introduces [employer] patient portal. Now you can access parts of your medical record, email your doctor's office, and request medication refills online. 1. In your internet browser, go to [email or web address] 2. Click on the First Time User? Click Here link in the Sign In box. You will see the New Member Sign Up page. 3. Enter your [employer] Access Code exactly as it appears below.  You will not need to use this code after youve completed the sign-up process. If you do not sign up before the expiration date, you must request a new code. · [employer] Access Code: [email or web address] Expires: 10/29/2018  1:30 PM 
 
4. Enter the last four digits of your Social Security Number (xxxx) and Date of Birth (mm/dd/yyyy) as indicated and click Submit. You will be taken to the next sign-up page. 5. Create a [employer] ID. This will be your [employer] login ID and cannot be changed, so think of one that is secure and easy to remember. 6. Create a [employer] password. You can change your password at any time. 7. Enter your Password Reset Question and Answer. This can be used at a later time if you forget your password. 8. Enter your e-mail address. You will receive e-mail notification when new information is available in [address] 9. Click Sign Up. You can now view and download portions of your medical record. 10. Click the Download Summary menu link to download a portable copy of your medical information. If you have questions, please visit the Frequently Asked Questions section of the [employer] website. Remember, [employer] is NOT to be used for urgent needs. For medical emergencies, dial 911. Now available from your iPhone and Android! Please provide this summary of care documentation to your next provider. Your primary care clinician is listed as [address] [place] [address]. If you have any questions after today's visit, please call [phone].

## 2018-07-31 NOTE — PROGRESS NOTES
[address] Brianna Luis, [address] [address] [facility], Dustin corona, [address] Office:  [phone]  Fax: [phone] Chief Complaint Patient presents with  Migraine  
  follow up medication This is a 56-year-old female who presents for follow-up migraines. Endorses headaches have been continuing. Approximately 15 per month. Surrounding week of her menstrual cycle. Occasional dizziness. Denies LOC or focal deficits. Maintained on Elavil 50 mg nightly. She uses Frova as abortive headache therapy. Denies the Frova helping. Denies any other complaints at this time. Past Medical History:  
Diagnosis Date  Anxiety attack  Asthma  Asthmatic bronchitis  Catamenial disorder  Hernia  Migraine  Mild asthma  Psychiatric disorder   
 anxiety  Torticollis Past Surgical History:  
Procedure Laterality Date   DELIVERY ONLY    
 HX BACK SURGERY    
 HX  SECTION   Current Outpatient Prescriptions Medication Sig Dispense Refill  ondansetron (ZOFRAN ODT) 4 mg disintegrating tablet TAKE 1 TAB BY MOUTH EVERY EIGHT (8) HOURS AS NEEDED FOR NAUSEA. 15 Tab 1  
 amitriptyline (ELAVIL) 25 mg tablet TAKE 1 TABLET BY MOUTH AT BEDTIME FOR 7 DAYS, THEN 2 AT BEDTIME 60 Tab 2  
 fluticasone-vilanterol (BREO ELLIPTA) 200-25 mcg/dose inhaler Take 1 Puff by inhalation daily. 1 Inhaler 5  
 frovatriptan (FROVA) 2.5 mg tablet Take one tablet at HA onset, may repeat x1 > 2 hrs if needed. Max 5 mg in 24 hours 9 Tab 3  
 fluticasone (FLONASE) 50 mcg/actuation nasal spray INSTILL 2 SPRAYS INTO EACH NOSTRIL ONCE DAILY 1 Bottle 2  
 fluticasone (FLONASE) 50 mcg/actuation nasal spray instill 2 sprays into each nostril once daily  Indications:  Allergic Rhinitis 1 Bottle 2  
 ALLEGRA-D 24 HOUR 180-240 mg per tablet TAKE 1 TABLET BY MOUTH DAILY 15 Tab 0  
 montelukast (SINGULAIR) 10 mg tablet TAKE 1 TABLET BY MOUTH DAILY 30 Tab 5  
 albuterol-ipratropium (DUO-NEB) 2.5 mg-0.5 mg/3 ml nebu INHALE CONTENTS OF 1 VIAL VIA NEBULIZER EVERY 4 HOURS AS NEEDED FOR SHORTNESS OF BREATH/WHEEZING. 180 mL 4  
 albuterol (PROAIR HFA) 90 mcg/actuation inhaler Take 2 Puffs by inhalation every four (4) hours as needed for Wheezing. 1 Inhaler 5  
 montelukast (SINGULAIR) 10 mg tablet Take 1 Tab by mouth daily. 30 Tab 5  EPINEPHrine (EPIPEN 2-GALE) 0.3 mg/0.3 mL injection 0.3 mL by IntraMUSCular route once as needed for up to 1 dose. 2 Syringe 5  
 PRENATAL VITS W-CA,FE,FA,<1MG, (PRENATAL VITAMIN PO) Take  by mouth. Allergies Allergen Reactions  Aspirin Anaphylaxis  Ibuprofen Anaphylaxis  Pcn [Penicillins] Itching  Claritin [Loratadine] Anxiety and Cough  Phenergan [Promethazine] Anxiety and Swelling  Seafood Swelling  Shellfish Containing Products Hives Social History Substance Use Topics  Smoking status: Never Smoker  Smokeless tobacco: Never Used  Alcohol use No  
 
 
Family History Problem Relation Age of Onset  Heart Disease Other   
  grandmothers  Asthma Brother  Lung Disease Paternal Aunt   
  cronic bronchitis  Asthma Paternal Grandmother  High Cholesterol Mother Review of Systems Pertinent positives and negatives as noted otherwise comprehensive review is negative. Examination Visit Vitals  Wt 68.9 kg (152 lb)  BMI 25.29 kg/m2 Pleasant 61-year-old female, well appearing. No icterus. She is alert and oriented. She is able to discuss her medications. She is able to discuss her medical history. No abnormal movements. No signs of incoordination or ataxia. Impression/Plan Carlos Osullivan is a 41 y.o. female whose history and physical are consistent with menstrual migraine. Patient presents for follow-up migraine. Headaches have been occurring 15 days/month. Maintain on Elavil 50 mg nightly.  Will increase to 75 mg qhs. 
D/c Hayley, beronica Maxalt. Track headaches. Follow up in 3 months or sooner as needed. All questions addressed and patient is agreeable plan of care. Diagnoses and all orders for this visit: 
 
1. Intractable menstrual migraine with status migrainosus Total time: 15 min Counseling / coordination time: 13 min  
> 50% counseling / coordination?: Yes re: symptoms, management, answer questions, and plan of care. Signed By: Angie Degroot NP This note will not be viewable in [address]

## 2018-07-31 NOTE — PATIENT INSTRUCTIONS
We will increase your amitriptyline (Elavil) to 75 mg nightly. I have sent a new prescription to your pharmacy. In the meantime you can complete the dose of your current medication, amitriptyline 50 mg nightly. Instead of Frova I have sent Maxalt to be taken at headache onset. Thank you for choosing [facility] for your  
 
care. You may receive a survey about your visit. We appreciate you taking time  
 
to complete this survey as we use your feedback to improve our services. We  
 
realize we are not perfect, but we strive to provide excellent care.

## 2018-08-25 ENCOUNTER — HOSPITAL ENCOUNTER (EMERGENCY)
Age: 43
Discharge: HOME | End: 2018-08-25
Attending: EMERGENCY MEDICINE
Payer: COMMERCIAL

## 2018-08-25 VITALS
SYSTOLIC BLOOD PRESSURE: 128 MMHG | DIASTOLIC BLOOD PRESSURE: 78 MMHG | RESPIRATION RATE: 24 BRPM | OXYGEN SATURATION: 100 % | HEIGHT: 65 IN | BODY MASS INDEX: 22.66 KG/M2 | HEART RATE: 128 BPM | WEIGHT: 136 LBS | TEMPERATURE: 98.2 F

## 2018-08-25 DIAGNOSIS — J45.21 MILD INTERMITTENT ASTHMA WITH EXACERBATION: Primary | ICD-10-CM

## 2018-08-25 DIAGNOSIS — J45.20 MILD INTERMITTENT ASTHMA WITHOUT COMPLICATION: ICD-10-CM

## 2018-08-25 LAB
ANION GAP SERPL CALC-SCNC: 11 MMOL/L (ref 3–18)
BASOPHILS # BLD: 0 K/UL (ref 0–0.1)
BASOPHILS NFR BLD: 0 % (ref 0–2)
BUN SERPL-MCNC: 8 MG/DL (ref 7–18)
BUN/CREAT SERPL: 7 (ref 12–20)
CALCIUM SERPL-MCNC: 9.5 MG/DL (ref 8.5–10.1)
CHLORIDE SERPL-SCNC: 104 MMOL/L (ref 100–108)
CO2 SERPL-SCNC: 22 MMOL/L (ref 21–32)
CREAT SERPL-MCNC: 1.07 MG/DL (ref 0.6–1.3)
DIFFERENTIAL METHOD BLD: ABNORMAL
EOSINOPHIL # BLD: 0.1 K/UL (ref 0–0.4)
EOSINOPHIL NFR BLD: 1 % (ref 0–5)
ERYTHROCYTE [DISTWIDTH] IN BLOOD BY AUTOMATED COUNT: 13.8 % (ref 11.6–14.5)
GLUCOSE SERPL-MCNC: 89 MG/DL (ref 74–99)
HCT VFR BLD AUTO: 37.4 % (ref 35–45)
HGB BLD-MCNC: 12 G/DL (ref 12–16)
LYMPHOCYTES # BLD: 0.9 K/UL (ref 0.9–3.6)
LYMPHOCYTES NFR BLD: 13 % (ref 21–52)
MCH RBC QN AUTO: 26 PG (ref 24–34)
MCHC RBC AUTO-ENTMCNC: 32.1 G/DL (ref 31–37)
MCV RBC AUTO: 81.1 FL (ref 74–97)
MONOCYTES # BLD: 0.4 K/UL (ref 0.05–1.2)
MONOCYTES NFR BLD: 6 % (ref 3–10)
NEUTS SEG # BLD: 5.6 K/UL (ref 1.8–8)
NEUTS SEG NFR BLD: 80 % (ref 40–73)
PLATELET # BLD AUTO: 274 K/UL (ref 135–420)
PMV BLD AUTO: 10.2 FL (ref 9.2–11.8)
POTASSIUM SERPL-SCNC: 4.4 MMOL/L (ref 3.5–5.5)
RBC # BLD AUTO: 4.61 M/UL (ref 4.2–5.3)
SODIUM SERPL-SCNC: 137 MMOL/L (ref 136–145)
WBC # BLD AUTO: 7.1 K/UL (ref 4.6–13.2)

## 2018-08-25 PROCEDURE — 94640 AIRWAY INHALATION TREATMENT: CPT

## 2018-08-25 PROCEDURE — 74011250636 HC RX REV CODE- 250/636: Performed by: EMERGENCY MEDICINE

## 2018-08-25 PROCEDURE — 80048 BASIC METABOLIC PNL TOTAL CA: CPT | Performed by: EMERGENCY MEDICINE

## 2018-08-25 PROCEDURE — 85025 COMPLETE CBC W/AUTO DIFF WBC: CPT | Performed by: EMERGENCY MEDICINE

## 2018-08-25 PROCEDURE — 94762 N-INVAS EAR/PLS OXIMTRY CONT: CPT

## 2018-08-25 PROCEDURE — 77030029684 HC NEB SM VOL KT MONA -A

## 2018-08-25 PROCEDURE — 96374 THER/PROPH/DIAG INJ IV PUSH: CPT

## 2018-08-25 PROCEDURE — 74011000250 HC RX REV CODE- 250: Performed by: EMERGENCY MEDICINE

## 2018-08-25 PROCEDURE — 99284 EMERGENCY DEPT VISIT MOD MDM: CPT

## 2018-08-25 RX ORDER — ALBUTEROL SULFATE 90 UG/1
2 AEROSOL, METERED RESPIRATORY (INHALATION)
Qty: 1 INHALER | Refills: 5 | Status: SHIPPED | OUTPATIENT
Start: 2018-08-25 | End: 2019-03-19 | Stop reason: SDUPTHER

## 2018-08-25 RX ORDER — ALBUTEROL SULFATE 90 UG/1
2 AEROSOL, METERED RESPIRATORY (INHALATION)
Qty: 1 INHALER | Refills: 0 | Status: SHIPPED | OUTPATIENT
Start: 2018-08-25 | End: 2018-09-12 | Stop reason: CLARIF

## 2018-08-25 RX ORDER — PREDNISONE 10 MG/1
TABLET ORAL
Qty: 18 TAB | Refills: 0 | Status: SHIPPED | OUTPATIENT
Start: 2018-08-25 | End: 2018-09-12

## 2018-08-25 RX ORDER — AZITHROMYCIN 250 MG/1
250 TABLET, FILM COATED ORAL SEE ADMIN INSTRUCTIONS
Qty: 6 TAB | Refills: 0 | Status: SHIPPED | OUTPATIENT
Start: 2018-08-25 | End: 2018-08-30

## 2018-08-25 RX ORDER — ALBUTEROL SULFATE 0.83 MG/ML
2.5 SOLUTION RESPIRATORY (INHALATION)
Status: COMPLETED | OUTPATIENT
Start: 2018-08-25 | End: 2018-08-25

## 2018-08-25 RX ORDER — IPRATROPIUM BROMIDE AND ALBUTEROL SULFATE 2.5; .5 MG/3ML; MG/3ML
3 SOLUTION RESPIRATORY (INHALATION)
Status: COMPLETED | OUTPATIENT
Start: 2018-08-25 | End: 2018-08-25

## 2018-08-25 RX ADMIN — METHYLPREDNISOLONE SODIUM SUCCINATE 125 MG: 125 INJECTION, POWDER, FOR SOLUTION INTRAMUSCULAR; INTRAVENOUS at 20:02

## 2018-08-25 RX ADMIN — IPRATROPIUM BROMIDE AND ALBUTEROL SULFATE 3 ML: .5; 3 SOLUTION RESPIRATORY (INHALATION) at 19:58

## 2018-08-25 RX ADMIN — ALBUTEROL SULFATE 2.5 MG: 2.5 SOLUTION RESPIRATORY (INHALATION) at 19:58

## 2018-08-25 NOTE — PROGRESS NOTES
Pt called with increased SOB, cough with yellow phlegm x 3 days, wheezing not responding to rescue inhaler and nebulizer use with maintenance medications. No fever or chest pain. Symptoms similar to prior Asthma exacerbations. + sick contacts including daughter who has a fever. Rx written for Prednisone taper and Zithromax Z pack.

## 2018-08-26 NOTE — ED PROVIDER NOTES
EMERGENCY DEPARTMENT HISTORY AND PHYSICAL EXAM    8:03 PM      Date: 8/25/2018  Patient Name: Rodolfo Hernández    History of Presenting Illness     Chief Complaint   Patient presents with    Shortness of Breath         History Provided By: Patient and Patient's Mother    Chief Complaint: SOB  Duration:  Days  Timing:  Worsening  Location:n/a  Quality: n/a  Severity: N/A  Modifying Factors: none  Associated Symptoms: cough      Additional History (Context): Rodolfo Hernández is a 41 y.o. female with asthma and anxiety who presents with family due to continuing SOB with cough for the past couple days. Due to the pt was being seen at [address] [facility] today where she received a breathing treatment but the pt mother felt like they weren't treating her sx properly so they just left and came straight here to [facility] ED. The mother says no steroids were given to the pt. Notes the pt has hx of asthma; according to the mother the pt has been intubated due to her asthma in the past. Doesn't have an albuterol inhaler at home. Has not tried any OTC medications to treat her cold sx. Denies having CP, fever, chills, N/V, abdominal pain, back pain, leg swelling, calf tenderness, weakness, numbness, or ay other associated sx. No other complaints or concerns. PCP: Julisa Gibson MD    Current Outpatient Prescriptions   Medication Sig Dispense Refill    albuterol (PROVENTIL HFA) 90 mcg/actuation inhaler Take 2 Puffs by inhalation every four (4) hours as needed for Wheezing. 1 Inhaler 0    albuterol (PROAIR HFA) 90 mcg/actuation inhaler Take 2 Puffs by inhalation every four (4) hours as needed for Wheezing. 1 Inhaler 5    predniSONE (DELTASONE) 10 mg tablet 30 mg po dailyx 3 days 20 mg po daily x 3 days 10 mg po daily x3 days 18 Tab 0    azithromycin (ZITHROMAX) 250 mg tablet Take 1 Tab by mouth See Admin Instructions for 5 days. 6 Tab 0    amitriptyline (ELAVIL) 75 mg tablet Take 1 Tab by mouth nightly.  30 Tab 4    rizatriptan (MAXALT) 10 mg tablet Take one tablet at HA onset, May repeat in 2 hours if needed. Max two tabs in 24 hours. 12 Tab 3    ondansetron (ZOFRAN ODT) 4 mg disintegrating tablet TAKE 1 TAB BY MOUTH EVERY EIGHT (8) HOURS AS NEEDED FOR NAUSEA. 15 Tab 1    fluticasone-vilanterol (BREO ELLIPTA) 200-25 mcg/dose inhaler Take 1 Puff by inhalation daily. 1 Inhaler 5    fluticasone (FLONASE) 50 mcg/actuation nasal spray INSTILL 2 SPRAYS INTO EACH NOSTRIL ONCE DAILY 1 Bottle 2    fluticasone (FLONASE) 50 mcg/actuation nasal spray instill 2 sprays into each nostril once daily  Indications: Allergic Rhinitis 1 Bottle 2    ALLEGRA-D 24 HOUR 180-240 mg per tablet TAKE 1 TABLET BY MOUTH DAILY 15 Tab 0    montelukast (SINGULAIR) 10 mg tablet TAKE 1 TABLET BY MOUTH DAILY 30 Tab 5    albuterol-ipratropium (DUO-NEB) 2.5 mg-0.5 mg/3 ml nebu INHALE CONTENTS OF 1 VIAL VIA NEBULIZER EVERY 4 HOURS AS NEEDED FOR SHORTNESS OF BREATH/WHEEZING. 180 mL 4    montelukast (SINGULAIR) 10 mg tablet Take 1 Tab by mouth daily. 30 Tab 5    EPINEPHrine (EPIPEN 2-GALE) 0.3 mg/0.3 mL injection 0.3 mL by IntraMUSCular route once as needed for up to 1 dose. 2 Syringe 5    PRENATAL VITS W-CA,FE,FA,<1MG, (PRENATAL VITAMIN PO) Take  by mouth.            Past History     Past Medical History:  Past Medical History:   Diagnosis Date    Anxiety attack     Asthma     Asthmatic bronchitis     Catamenial disorder     Hernia     Migraine     Mild asthma     Psychiatric disorder     anxiety     Torticollis        Past Surgical History:  Past Surgical History:   Procedure Laterality Date     DELIVERY ONLY      HX BACK SURGERY      HX  SECTION  2009       Family History:  Family History   Problem Relation Age of Onset    Heart Disease Other      grandmothers    Asthma Brother     Lung Disease Paternal Aunt      cronic bronchitis    Asthma Paternal Grandmother     High Cholesterol Mother        Social History:  Social History   Substance Use Topics    Smoking status: Never Smoker    Smokeless tobacco: Never Used    Alcohol use No       Allergies: Allergies   Allergen Reactions    Aspirin Anaphylaxis    Ibuprofen Anaphylaxis    Pcn [Penicillins] Itching    Claritin [Loratadine] Anxiety and Cough    Phenergan [Promethazine] Anxiety and Swelling    Seafood Swelling    Shellfish Containing Products Hives     Review of Systems     Review of Systems   Constitutional: Negative for chills and fever. Respiratory: Positive for cough and shortness of breath. Cardiovascular: Negative for chest pain. Gastrointestinal: Negative for nausea and vomiting. All other systems reviewed and are negative. Physical Exam     Visit Vitals    BP (!) 118/94    Pulse (!) 128    Temp 98.2 °F (36.8 °C)    Resp 24    Ht 5' 5\" (1.651 m)    Wt 61.7 kg (136 lb)    SpO2 100%    BMI 22.63 kg/m2     Physical Exam   Constitutional: She is oriented to person, place, and time. She appears well-developed and well-nourished. No distress. HENT:   Head: Normocephalic. Right Ear: External ear normal.   Left Ear: External ear normal.   Mouth/Throat: No oropharyngeal exudate. Eyes: Conjunctivae and EOM are normal. Pupils are equal, round, and reactive to light. Right eye exhibits no discharge. Left eye exhibits no discharge. No scleral icterus. Neck: Normal range of motion. Neck supple. No JVD present. No tracheal deviation present. No thyromegaly present. Cardiovascular: Normal rate, regular rhythm, normal heart sounds and intact distal pulses. Exam reveals no gallop and no friction rub. No murmur heard. Pulmonary/Chest: Effort normal and breath sounds normal. No stridor. No respiratory distress. She has no wheezes. She has no rales. She exhibits no tenderness. Abdominal: Soft. Bowel sounds are normal. She exhibits no distension and no mass. There is no tenderness. There is no rebound and no guarding. Musculoskeletal: Normal range of motion.  She exhibits no edema or tenderness. Lymphadenopathy:     She has no cervical adenopathy. Neurological: She is alert and oriented to person, place, and time. She displays normal reflexes. No cranial nerve deficit. She exhibits normal muscle tone. Coordination normal.   Skin: Skin is warm and dry. No rash noted. She is not diaphoretic. No erythema. No pallor. Psychiatric: Her mood appears anxious. Nursing note and vitals reviewed. Diagnostic Study Results     Labs -  Recent Results (from the past 12 hour(s))   CBC WITH AUTOMATED DIFF    Collection Time: 08/25/18  7:57 PM   Result Value Ref Range    WBC 7.1 4.6 - 13.2 K/uL    RBC 4.61 4.20 - 5.30 M/uL    HGB 12.0 12.0 - 16.0 g/dL    HCT 37.4 35.0 - 45.0 %    MCV 81.1 74.0 - 97.0 FL    MCH 26.0 24.0 - 34.0 PG    MCHC 32.1 31.0 - 37.0 g/dL    RDW 13.8 11.6 - 14.5 %    PLATELET 296 246 - 077 K/uL    MPV 10.2 9.2 - 11.8 FL    NEUTROPHILS 80 (H) 40 - 73 %    LYMPHOCYTES 13 (L) 21 - 52 %    MONOCYTES 6 3 - 10 %    EOSINOPHILS 1 0 - 5 %    BASOPHILS 0 0 - 2 %    ABS. NEUTROPHILS 5.6 1.8 - 8.0 K/UL    ABS. LYMPHOCYTES 0.9 0.9 - 3.6 K/UL    ABS. MONOCYTES 0.4 0.05 - 1.2 K/UL    ABS. EOSINOPHILS 0.1 0.0 - 0.4 K/UL    ABS. BASOPHILS 0.0 0.0 - 0.1 K/UL    DF AUTOMATED     METABOLIC PANEL, BASIC    Collection Time: 08/25/18  7:57 PM   Result Value Ref Range    Sodium 137 136 - 145 mmol/L    Potassium 4.4 3.5 - 5.5 mmol/L    Chloride 104 100 - 108 mmol/L    CO2 22 21 - 32 mmol/L    Anion gap 11 3.0 - 18 mmol/L    Glucose 89 74 - 99 mg/dL    BUN 8 7.0 - 18 MG/DL    Creatinine 1.07 0.6 - 1.3 MG/DL    BUN/Creatinine ratio 7 (L) 12 - 20      GFR est AA >60 >60 ml/min/1.73m2    GFR est non-AA 57 (L) >60 ml/min/1.73m2    Calcium 9.5 8.5 - 10.1 MG/DL       Radiologic Studies -   No orders to display       Medical Decision Making   I am the first provider for this patient.     I reviewed the vital signs, available nursing notes, past medical history, past surgical history, family history and social history. Vital Signs-Reviewed the patient's vital signs. Pulse Oximetry Analysis - 100% RA non hypoxic    Records Reviewed: Nursing Notes and Old Medical Records (Time of Review: 8:03 PM)    ED Course: Progress Notes, Reevaluation, and Consults:  Patient treated with nebulization tx and started on IV steroids. Her symptoms resolved. Pulmonolgist called her medication this morning; states she will  her prescription; will give prescription for inhaler. She says she is ready to go home. Will discharge home. Provider Notes (Medical Decision Making): asthma, anxiety, viral syndrome, pneumonia    Diagnosis     Clinical Impression: asthma    Disposition:  D/C home     Follow-up Information     Follow up With Details Comments Contact Info    Ezequiel White MD Call in 2 days Follow up  [address] [facility], [place]      [id number] [place] [address] EMERGENCY DEPT  If symptoms worsen, As needed [address] [address]  [phone]           Patient's Medications   Start Taking    ALBUTEROL (PROVENTIL HFA) 90 MCG/ACTUATION INHALER    Take 2 Puffs by inhalation every four (4) hours as needed for Wheezing. Continue Taking    ALBUTEROL-IPRATROPIUM (DUO-NEB) 2.5 MG-0.5 MG/3 ML NEBU    INHALE CONTENTS OF 1 VIAL VIA NEBULIZER EVERY 4 HOURS AS NEEDED FOR SHORTNESS OF BREATH/WHEEZING. ALLEGRA-D 24 HOUR 180-240 MG PER TABLET    TAKE 1 TABLET BY MOUTH DAILY    AMITRIPTYLINE (ELAVIL) 75 MG TABLET    Take 1 Tab by mouth nightly. AZITHROMYCIN (ZITHROMAX) 250 MG TABLET    Take 1 Tab by mouth See Admin Instructions for 5 days. EPINEPHRINE (EPIPEN 2-GALE) 0.3 MG/0.3 ML INJECTION    0.3 mL by IntraMUSCular route once as needed for up to 1 dose.     FLUTICASONE (FLONASE) 50 MCG/ACTUATION NASAL SPRAY    INSTILL 2 SPRAYS INTO EACH NOSTRIL ONCE DAILY    FLUTICASONE (FLONASE) 50 MCG/ACTUATION NASAL SPRAY    instill 2 sprays into each nostril once daily  Indications: Allergic Rhinitis    FLUTICASONE-VILANTEROL (BREO ELLIPTA) 200-25 MCG/DOSE INHALER    Take 1 Puff by inhalation daily. MONTELUKAST (SINGULAIR) 10 MG TABLET    Take 1 Tab by mouth daily. MONTELUKAST (SINGULAIR) 10 MG TABLET    TAKE 1 TABLET BY MOUTH DAILY    ONDANSETRON (ZOFRAN ODT) 4 MG DISINTEGRATING TABLET    TAKE 1 TAB BY MOUTH EVERY EIGHT (8) HOURS AS NEEDED FOR NAUSEA. PREDNISONE (DELTASONE) 10 MG TABLET    30 mg po dailyx 3 days 20 mg po daily x 3 days 10 mg po daily x3 days    PRENATAL VITS W-CA,FE,FA,<1MG, (PRENATAL VITAMIN PO)    Take  by mouth. RIZATRIPTAN (MAXALT) 10 MG TABLET    Take one tablet at HA onset, May repeat in 2 hours if needed. Max two tabs in 24 hours. These Medications have changed    Modified Medication Previous Medication    ALBUTEROL (PROAIR HFA) 90 MCG/ACTUATION INHALER albuterol (PROAIR HFA) 90 mcg/actuation inhaler       Take 2 Puffs by inhalation every four (4) hours as needed for Wheezing. Take 2 Puffs by inhalation every four (4) hours as needed for Wheezing. Stop Taking    No medications on file     _______________________________    Attestations:  [address] acting as a scribe for and in the presence of Melissa Mosley MD      August 25, 2018 at 8:03 PM       Provider Attestation:      I personally performed the services described in the documentation, reviewed the documentation, as recorded by the scribe in my presence, and it accurately and completely records my words and actions.  August 25, 2018 at 8:03 PM - Melissa Mosley MD    _______________________________

## 2018-08-26 NOTE — DISCHARGE INSTRUCTIONS

## 2018-09-12 ENCOUNTER — HOSPITAL ENCOUNTER (EMERGENCY)
Age: 43
Discharge: HOME | End: 2018-09-12
Attending: EMERGENCY MEDICINE | Admitting: EMERGENCY MEDICINE
Payer: COMMERCIAL

## 2018-09-12 VITALS
BODY MASS INDEX: 22.66 KG/M2 | HEIGHT: 65 IN | HEART RATE: 97 BPM | RESPIRATION RATE: 18 BRPM | SYSTOLIC BLOOD PRESSURE: 124 MMHG | WEIGHT: 136 LBS | TEMPERATURE: 98.3 F | OXYGEN SATURATION: 99 % | DIASTOLIC BLOOD PRESSURE: 75 MMHG

## 2018-09-12 DIAGNOSIS — L30.9 DERMATITIS: Primary | ICD-10-CM

## 2018-09-12 PROCEDURE — 74011250637 HC RX REV CODE- 250/637: Performed by: EMERGENCY MEDICINE

## 2018-09-12 PROCEDURE — 99283 EMERGENCY DEPT VISIT LOW MDM: CPT

## 2018-09-12 RX ORDER — DEXAMETHASONE SODIUM PHOSPHATE 4 MG/ML
10 INJECTION, SOLUTION INTRA-ARTICULAR; INTRALESIONAL; INTRAMUSCULAR; INTRAVENOUS; SOFT TISSUE
Status: COMPLETED | OUTPATIENT
Start: 2018-09-12 | End: 2018-09-12

## 2018-09-12 RX ORDER — CETIRIZINE HCL 10 MG
TABLET ORAL
Qty: 15 TAB | Refills: 0 | Status: SHIPPED | OUTPATIENT
Start: 2018-09-12 | End: 2019-07-03

## 2018-09-12 RX ADMIN — DEXAMETHASONE SODIUM PHOSPHATE 10 MG: 4 INJECTION, SOLUTION INTRAMUSCULAR; INTRAVENOUS at 11:26

## 2018-09-12 NOTE — ED PROVIDER NOTES
EMERGENCY DEPARTMENT HISTORY AND PHYSICAL EXAM 
 
10:57 AM 
 
 
Date: 9/12/2018 Patient Name: Jose White History of Presenting Illness Chief Complaint Patient presents with  Allergic Reaction History Provided By: Patient Chief Complaint: rash Duration:  2 days Timing:  Constant Location: neck Quality: redness/bumps Severity: Mild Modifying Factors: Pt used a new laundry detergent. She used benadryl/a breathing treatment with slight relief. Associated Symptoms: wheezing. Additional History (Context): Jose White is a 41 y.o. female with asthma who presents with constant mild rash with redness/bumps for the past 2 days. Associated sx are wheezing. Pt used a new laundry detergent. She used benadryl/a breathing treatment with slight relief. Pt does not smoke or drink. PCP: Ezequiel White MD 
 
Current Outpatient Prescriptions Medication Sig Dispense Refill  cetirizine (ZYRTEC) 10 mg tablet Take one tablets by mouth daily for 7 days and then as needed after that. Restart forseven days if sinus congestion recurs. 15 Tab 0  
 albuterol (PROAIR HFA) 90 mcg/actuation inhaler Take 2 Puffs by inhalation every four (4) hours as needed for Wheezing. 1 Inhaler 5  
 amitriptyline (ELAVIL) 75 mg tablet Take 1 Tab by mouth nightly. 30 Tab 4  
 rizatriptan (MAXALT) 10 mg tablet Take one tablet at HA onset, May repeat in 2 hours if needed. Max two tabs in 24 hours. 12 Tab 3  
 ondansetron (ZOFRAN ODT) 4 mg disintegrating tablet TAKE 1 TAB BY MOUTH EVERY EIGHT (8) HOURS AS NEEDED FOR NAUSEA. 15 Tab 1  
 fluticasone-vilanterol (BREO ELLIPTA) 200-25 mcg/dose inhaler Take 1 Puff by inhalation daily.  1 Inhaler 5  
 fluticasone (FLONASE) 50 mcg/actuation nasal spray INSTILL 2 SPRAYS INTO EACH NOSTRIL ONCE DAILY 1 Bottle 2  
 albuterol-ipratropium (DUO-NEB) 2.5 mg-0.5 mg/3 ml nebu INHALE CONTENTS OF 1 VIAL VIA NEBULIZER EVERY 4 HOURS AS NEEDED FOR SHORTNESS OF BREATH/WHEEZING. 180 mL 4  
 montelukast (SINGULAIR) 10 mg tablet Take 1 Tab by mouth daily. 30 Tab 5  EPINEPHrine (EPIPEN 2-GALE) 0.3 mg/0.3 mL injection 0.3 mL by IntraMUSCular route once as needed for up to 1 dose. 2 Syringe 5  ALLEGRA-D 24 HOUR 180-240 mg per tablet TAKE 1 TABLET BY MOUTH DAILY 15 Tab 0 Past History Past Medical History: 
Past Medical History:  
Diagnosis Date  Anxiety attack  Asthma  Asthmatic bronchitis  Catamenial disorder  Hernia  Migraine  Mild asthma  Psychiatric disorder   
 anxiety  Torticollis Past Surgical History: 
Past Surgical History:  
Procedure Laterality Date   DELIVERY ONLY    
 HX BACK SURGERY    
 HX  SECTION  2009 Family History: 
Family History Problem Relation Age of Onset  Heart Disease Other   
  grandmothers  Asthma Brother  Lung Disease Paternal Aunt   
  cronic bronchitis  Asthma Paternal Grandmother  High Cholesterol Mother Social History: 
Social History Substance Use Topics  Smoking status: Never Smoker  Smokeless tobacco: Never Used  Alcohol use No  
 
 
Allergies: Allergies Allergen Reactions  Aspirin Anaphylaxis  Ibuprofen Anaphylaxis  Pcn [Penicillins] Itching  Claritin [Loratadine] Anxiety and Cough  Phenergan [Promethazine] Anxiety and Swelling  Seafood Swelling  Shellfish Containing Products Hives Review of Systems Review of Systems Constitutional: Negative for fatigue and fever. HENT: Negative for congestion. Eyes: Negative for visual disturbance. Respiratory: Positive for wheezing. Negative for shortness of breath. Cardiovascular: Negative for chest pain and leg swelling. Gastrointestinal: Negative for abdominal pain. Endocrine: Negative for polyuria. Genitourinary: Negative for dysuria. Skin: Positive for rash. Neurological: Negative for weakness. Psychiatric/Behavioral: Negative for behavioral problems. All other systems reviewed and are negative. Physical Exam  
 
Visit Vitals  /75 (BP 1 Location: Left arm, BP Patient Position: Sitting)  Pulse 97  Temp 98.3 °F (36.8 °C)  Resp 18  Ht 5' 5\" (1.651 m)  Wt 61.7 kg (136 lb)  SpO2 99%  BMI 22.63 kg/m2 Physical Exam  
Constitutional: She is oriented to person, place, and time. She appears well-developed and well-nourished. HENT:  
Head: Normocephalic and atraumatic. Nose: Nose normal.  
Eyes: Conjunctivae are normal.  
Neck: Neck supple. No tracheal deviation present. Cardiovascular: Normal rate, regular rhythm and normal heart sounds. Pulmonary/Chest: Effort normal and breath sounds normal. No respiratory distress. Abdominal: Soft. She exhibits no distension. There is no tenderness. Musculoskeletal: Normal range of motion. She exhibits no edema. Lymphadenopathy:  
  She has no cervical adenopathy. Neurological: She is alert and oriented to person, place, and time. No cranial nerve deficit. Grossly intact Skin: Skin is warm and dry. Rash (maculopapular) noted. Psychiatric: She has a normal mood and affect. Vitals reviewed. Diagnostic Study Results Labs - No results found for this or any previous visit (from the past 12 hour(s)). Radiologic Studies - No orders to display Medical Decision Making I am the first provider for this patient. I reviewed the vital signs, available nursing notes, past medical history, past surgical history, family history and social history. Vital Signs-Reviewed the patient's vital signs Records Reviewed: Nursing Notes (Time of Review: 10:57 AM) ED Course: Progress Notes, Reevaluation, and Consults: 
 
 
Provider Notes (Medical Decision Making): ED Course Comment By Time Pt well appearing, no mucous membrane involvement- given using OTC topical steroids, will give PO steroid x1 Kika Retana MD 09/12 1121 Diagnosis Clinical Impression: 1. Dermatitis Disposition: HOME Follow-up Information Follow up With Details Comments Contact Info Gerard Pate MD In 2 days  [address] [facility] [place] [address] 
[phone] Discharge Medication List as of 9/12/2018 11:14 AM  
  
START taking these medications Details  
cetirizine (ZYRTEC) 10 mg tablet Take one tablets by mouth daily for 7 days and then as needed after that. Restart forseven days if sinus congestion recurs. , Print, Disp-15 Tab, R-0  
  
  
CONTINUE these medications which have NOT CHANGED Details  
albuterol (PROAIR HFA) 90 mcg/actuation inhaler Take 2 Puffs by inhalation every four (4) hours as needed for Wheezing., Normal, Disp-1 Inhaler, R-5  
  
amitriptyline (ELAVIL) 75 mg tablet Take 1 Tab by mouth nightly., Normal, Disp-30 Tab, R-4  
  
rizatriptan (MAXALT) 10 mg tablet Take one tablet at HA onset, May repeat in 2 hours if needed. Max two tabs in 24 hours. , Normal, Disp-12 Tab, R-3  
  
ondansetron (ZOFRAN ODT) 4 mg disintegrating tablet TAKE 1 TAB BY MOUTH EVERY EIGHT (8) HOURS AS NEEDED FOR NAUSEA., Normal, Disp-15 Tab, R-1  
  
fluticasone-vilanterol (BREO ELLIPTA) 200-25 mcg/dose inhaler Take 1 Puff by inhalation daily. , Normal, Disp-1 Inhaler, R-5  
  
fluticasone (FLONASE) 50 mcg/actuation nasal spray INSTILL 2 SPRAYS INTO EACH NOSTRIL ONCE DAILY, Normal, Disp-1 Bottle, R-2  
  
albuterol-ipratropium (DUO-NEB) 2.5 mg-0.5 mg/3 ml nebu INHALE CONTENTS OF 1 VIAL VIA NEBULIZER EVERY 4 HOURS AS NEEDED FOR SHORTNESS OF BREATH/WHEEZING., Normal, Disp-180 mL, R-4  
  
montelukast (SINGULAIR) 10 mg tablet Take 1 Tab by mouth daily. , Normal, Disp-30 Tab, R-5  
  
EPINEPHrine (EPIPEN 2-GALE) 0.3 mg/0.3 mL injection 0.3 mL by IntraMUSCular route once as needed for up to 1 dose., Normal, Disp-2 Syringe, R-5  
  
 ALLEGRA-D 24 HOUR 180-240 mg per tablet TAKE 1 TABLET BY MOUTH DAILY, Normal, Disp-15 Tab, R-0  
  
  
 
_______________________________ Attestations: 
Scribe Attestation Ovid March acting as a scribe for and in the presence of Tom Tapia MD     
September 12, 2018 at 10:57 AM 
    
Provider Attestation:     
I personally performed the services described in the documentation, reviewed the documentation, as recorded by the scribe in my presence, and it accurately and completely records my words and actions. September 12, 2018 at 10:57 AM - Tom Tapia MD   
_______________________________

## 2018-09-12 NOTE — DISCHARGE INSTRUCTIONS
Please return for increased pain, fevers, chills or any other concerns. Please talk to your       Dermatitis: Care Instructions  Your Care Instructions  Dermatitis is the general name used for any rash or inflammation of the skin. Different kinds of dermatitis cause different kinds of rashes. Common causes of a rash include new medicines, plants (such as poison oak or poison ivy), heat, and stress. Certain illnesses can also cause a rash. An allergic reaction to something that touches your skin, such as latex, nickel, or poison ivy, is called contact dermatitis. Contact dermatitis may also be caused by something that irritates the skin, such as bleach, a chemical, or soap. These types of rashes cannot be spread from person to person. How long your rash will last depends on what caused it. Rashes may last a few days or months. Follow-up care is a key part of your treatment and safety. Be sure to make and go to all appointments, and call your doctor if you are having problems. It's also a good idea to know your test results and keep a list of the medicines you take. How can you care for yourself at home? · Do not scratch the rash. Cut your nails short, and file them smooth. Or wear gloves if this helps keep you from scratching. · Wash the area with water only. Pat dry. · Put cold, wet cloths on the rash to reduce itching. · Keep cool, and stay out of the sun. · Leave the rash open to the air as much as possible. · If the rash itches, use hydrocortisone cream. Follow the directions on the label. Calamine lotion may help for plant rashes. · Take an over-the-counter antihistamine, such as diphenhydramine (Benadryl) or loratadine (Claritin), to help calm the itching. Read and follow all instructions on the label. · If your doctor prescribed a cream, use it as directed. If your doctor prescribed medicine, take it exactly as directed. When should you call for help?   Call your doctor now or seek immediate medical care if:    · You have symptoms of infection, such as:  ¨ Increased pain, swelling, warmth, or redness. ¨ Red streaks leading from the area. ¨ Pus draining from the area. ¨ A fever.     · You have joint pain along with the rash.    Watch closely for changes in your health, and be sure to contact your doctor if:    · Your rash is changing or getting worse.     · You are not getting better as expected. Where can you learn more? Go to http://jaci-elina.info/. Enter [phone] [id number] [id number] in the search box to learn more about \"Dermatitis: Care Instructions. \"  Current as of: October 5, 2017  Content Version: 11.7  © 4439-8040 [employer]. Care instructions adapted under license by [employer] (which disclaims liability or warranty for this information). If you have questions about a medical condition or this instruction, always ask your healthcare professional. [place] [address] any warranty or liability for your use of this information. Atopic Dermatitis: Care Instructions  Your Care Instructions  Atopic dermatitis (also called eczema) is a skin problem that causes intense itching and a red, raised rash. In severe cases, the rash develops clear fluid-filled blisters. The rash is not contagious. People with this condition seem to have very sensitive immune systems that are likely to react to things that cause allergies. The immune system is the body's way of fighting infection. There is no cure for atopic dermatitis, but you may be able to control it with care at home. Follow-up care is a key part of your treatment and safety. Be sure to make and go to all appointments, and call your doctor if you are having problems. It's also a good idea to know your test results and keep a list of the medicines you take. How can you care for yourself at home? · Use moisturizer at least twice a day. · If your doctor prescribes a cream, use it as directed.  If your doctor prescribes other medicine, take it exactly as directed. · Wash the affected area with water only. Soap can make dryness and itching worse. Pat dry. · Apply a moisturizer after bathing. Use a cream such as Lubriderm, Moisturel, or Cetaphil that does not irritate the skin or cause a rash. Apply the cream while your skin is still damp after lightly drying with a towel. · Use cold, wet cloths to reduce itching. · Keep cool, and stay out of the sun. · If itching affects your normal activities, an over-the-counter antihistamine, such as diphenhydramine (Benadryl) or loratadine (Claritin) may help. Read and follow all instructions on the label. When should you call for help? Call your doctor now or seek immediate medical care if:    · Your rash gets worse and you have a fever.     · You have new blisters or bruises, or the rash spreads and looks like a sunburn.     · You have signs of infection, such as:  ¨ Increased pain, swelling, warmth, or redness. ¨ Red streaks leading from the rash. ¨ Pus draining from the rash. ¨ A fever.     · You have crusting or oozing sores.     · You have joint aches or body aches along with your rash.    Watch closely for changes in your health, and be sure to contact your doctor if:    · Your rash does not clear up after 2 to 3 weeks of home treatment.     · Itching interferes with your sleep or daily activities. Where can you learn more? Go to http://jaci-elina.info/. Enter L272 in the search box to learn more about \"Atopic Dermatitis: Care Instructions. \"  Current as of: October 5, 2017  Content Version: 11.7  © 8866-7059 [employer]. Care instructions adapted under license by [employer] (which disclaims liability or warranty for this information).  If you have questions about a medical condition or this instruction, always ask your healthcare professional. [address] any warranty or liability for your use of this information.    doctor about allergy delisa

## 2018-09-12 NOTE — ED TRIAGE NOTES
Pt presents with allergic reaction to unknown substance x 3 days to neck and upper back. Pt states came here Monday evening but left before being seen. Denies seeking treatment elsewhere, states using oral and topical benadryl without relief, last dose of benadryl last pm. Pt denies shortness of breath or difficulty swallowing.

## 2018-09-13 ENCOUNTER — TELEPHONE (OUTPATIENT)
Dept: PULMONOLOGY | Age: 43
End: 2018-09-13

## 2018-09-13 RX ORDER — BUDESONIDE AND FORMOTEROL FUMARATE DIHYDRATE 160; 4.5 UG/1; UG/1
2 AEROSOL RESPIRATORY (INHALATION) 2 TIMES DAILY
Qty: 1 INHALER | Refills: 5 | Status: SHIPPED | OUTPATIENT
Start: 2018-09-13 | End: 2019-03-19 | Stop reason: CLARIF

## 2018-09-13 RX ORDER — MONTELUKAST SODIUM 10 MG/1
10 TABLET ORAL DAILY
Qty: 30 TAB | Refills: 5 | Status: SHIPPED | OUTPATIENT
Start: 2018-09-13 | End: 2019-03-19 | Stop reason: SDUPTHER

## 2018-09-13 NOTE — TELEPHONE ENCOUNTER
Called pharmacy- Singular needs refill, Steve Crowe is not covered by insurance Medicaid anylonger. Advair Diskus, Symbicort and Morena Manifold is preferred and needs to be changed.

## 2018-09-13 NOTE — TELEPHONE ENCOUNTER
Pt stated that [facility] needs authorization for Singulair and Breo Ellipta. Please advise [place] at [phone]. Please advise pt at [phone].

## 2018-09-17 RX ORDER — ONDANSETRON 4 MG/1
TABLET, ORALLY DISINTEGRATING ORAL
Qty: 15 TAB | Refills: 1 | Status: SHIPPED | OUTPATIENT
Start: 2018-09-17 | End: 2019-09-27

## 2018-09-17 NOTE — TELEPHONE ENCOUNTER
Requested Prescriptions     Pending Prescriptions Disp Refills    ondansetron (ZOFRAN ODT) 4 mg disintegrating tablet 15 Tab 1     Sig: TAKE 1 TAB BY MOUTH EVERY EIGHT (8) HOURS AS NEEDED FOR NAUSEA.       Faxed script scanned to chart

## 2018-09-18 NOTE — TELEPHONE ENCOUNTER
Attempted to contact patient regarding medication refill, no answer. Left message to return call to office.

## 2018-09-27 ENCOUNTER — TELEPHONE (OUTPATIENT)
Dept: PULMONOLOGY | Age: 43
End: 2018-09-27

## 2018-09-27 NOTE — TELEPHONE ENCOUNTER
Pt is coming in next week to get flu shot, but would also like to get a pneumonia shot. Can she get both in same day?

## 2018-09-27 NOTE — TELEPHONE ENCOUNTER
Spoke with pt.  She will come to flu shot clinic for the flu shot and talk with Dr. Page Kay about if an when she would need a pneumonia vaccine at next visit with her

## 2018-10-08 ENCOUNTER — CLINICAL SUPPORT (OUTPATIENT)
Dept: PULMONOLOGY | Age: 43
End: 2018-10-08

## 2018-10-08 DIAGNOSIS — Z23 ENCOUNTER FOR IMMUNIZATION: Primary | ICD-10-CM

## 2018-10-08 NOTE — PROGRESS NOTES
Chief Complaint   Patient presents with    Injection     Flu shot     After obtaining informed consent, the immunization is given by Sowmya Velásquez LPN. Fluarix Quad given in the Right Deltoid IM. Flu information sheet given to patient. Hafsa Jaimes is a 41 y.o. female who presents for routine immunizations. She denies any symptoms , reactions or allergies that would exclude them from being immunized today. Risks and adverse reactions were discussed and the VIS was given to them. All questions were addressed. She was observed for 5 min post injection. There were no reactions observed.     Kristy Guerrero LPN

## 2018-10-08 NOTE — PATIENT INSTRUCTIONS
Vaccine Information Statement    Influenza (Flu) Vaccine (Inactivated or Recombinant): What you need to know    Many Vaccine Information Statements are available in [place] and other languages. See www.immunize.org/vis  Hojas de Información Sobre Vacunas están disponibles en Español y en muchos otros idiomas. Visite www.immunize.org/vis    1. Why get vaccinated? Influenza (flu) is a contagious disease that spreads around the [place] every year, usually between October and May. Flu is caused by influenza viruses, and is spread mainly by coughing, sneezing, and close contact. Anyone can get flu. Flu strikes suddenly and can last several days. Symptoms vary by age, but can include:   fever/chills   sore throat   muscle aches   fatigue   cough   headache    runny or stuffy nose    Flu can also lead to pneumonia and blood infections, and cause diarrhea and seizures in children. If you have a medical condition, such as heart or lung disease, flu can make it worse. Flu is more dangerous for some people. Infants and young children, people 72years of age and older, pregnant women, and people with certain health conditions or a weakened immune system are at greatest risk. Each year thousands of people in the [facility] die from flu, and many more are hospitalized. Flu vaccine can:   keep you from getting flu,   make flu less severe if you do get it, and   keep you from spreading flu to your family and other people. 2. Inactivated and recombinant flu vaccines    A dose of flu vaccine is recommended every flu season. Children 6 months through 6years of age may need two doses during the same flu season. Everyone else needs only one dose each flu season.        Some inactivated flu vaccines contain a very small amount of a mercury-based preservative called thimerosal. Studies have not shown thimerosal in vaccines to be harmful, but flu vaccines that do not contain thimerosal are available. There is no live flu virus in flu shots. They cannot cause the flu. There are many flu viruses, and they are always changing. Each year a new flu vaccine is made to protect against three or four viruses that are likely to cause disease in the upcoming flu season. But even when the vaccine doesnt exactly match these viruses, it may still provide some protection    Flu vaccine cannot prevent:   flu that is caused by a virus not covered by the vaccine, or   illnesses that look like flu but are not. It takes about 2 weeks for protection to develop after vaccination, and protection lasts through the flu season. 3. Some people should not get this vaccine    Tell the person who is giving you the vaccine:     If you have any severe, life-threatening allergies. If you ever had a life-threatening allergic reaction after a dose of flu vaccine, or have a severe allergy to any part of this vaccine, you may be advised not to get vaccinated. Most, but not all, types of flu vaccine contain a small amount of egg protein.  If you ever had Guillain-Barré Syndrome (also called GBS). Some people with a history of GBS should not get this vaccine. This should be discussed with your doctor.  If you are not feeling well. It is usually okay to get flu vaccine when you have a mild illness, but you might be asked to come back when you feel better. 4. Risks of a vaccine reaction    With any medicine, including vaccines, there is a chance of reactions. These are usually mild and go away on their own, but serious reactions are also possible. Most people who get a flu shot do not have any problems with it.      Minor problems following a flu shot include:    soreness, redness, or swelling where the shot was given     hoarseness   sore, red or itchy eyes   cough   fever   aches   headache   itching   fatigue  If these problems occur, they usually begin soon after the shot and last 1 or 2 days. More serious problems following a flu shot can include the following:     There may be a small increased risk of Guillain-Barré Syndrome (GBS) after inactivated flu vaccine. This risk has been estimated at 1 or 2 additional cases per million people vaccinated. This is much lower than the risk of severe complications from flu, which can be prevented by flu vaccine.  Young children who get the flu shot along with pneumococcal vaccine (PCV13) and/or DTaP vaccine at the same time might be slightly more likely to have a seizure caused by fever. Ask your doctor for more information. Tell your doctor if a child who is getting flu vaccine has ever had a seizure. Problems that could happen after any injected vaccine:      People sometimes faint after a medical procedure, including vaccination. Sitting or lying down for about 15 minutes can help prevent fainting, and injuries caused by a fall. Tell your doctor if you feel dizzy, or have vision changes or ringing in the ears.  Some people get severe pain in the shoulder and have difficulty moving the arm where a shot was given. This happens very rarely.  Any medication can cause a severe allergic reaction. Such reactions from a vaccine are very rare, estimated at about 1 in a million doses, and would happen within a few minutes to a few hours after the vaccination. As with any medicine, there is a very remote chance of a vaccine causing a serious injury or death. The safety of vaccines is always being monitored. For more information, visit: www.cdc.gov/vaccinesafety/    5. What if there is a serious reaction? What should I look for?  Look for anything that concerns you, such as signs of a severe allergic reaction, very high fever, or unusual behavior.     Signs of a severe allergic reaction can include hives, swelling of the face and throat, difficulty breathing, a fast heartbeat, dizziness, and weakness - usually within a few minutes to a few hours after the vaccination. What should I do?  If you think it is a severe allergic reaction or other emergency that cant wait, call 9-1-1 and get the person to the nearest hospital. Otherwise, call your doctor.  Reactions should be reported to the Vaccine Adverse Event Reporting System (VAERS). Your doctor should file this report, or you can do it yourself through  the VAERS web site at www.vaers. [facility].gov, or by calling 4-476.278.2998. VAERS does not give medical advice. 6. The National Vaccine Injury Compensation Program    The [facility] Vaccine Injury Compensation Program (VICP) is a federal program that was created to compensate people who may have been injured by certain vaccines. Persons who believe they may have been injured by a vaccine can learn about the program and about filing a claim by calling 3-801.532.2438 or visiting the [employer] website at www.[facility].gov/vaccinecompensation. There is a time limit to file a claim for compensation. 7. How can I learn more?  Ask your healthcare provider. He or she can give you the vaccine package insert or suggest other sources of information.  Call your local or state health department.  Contact the Centers for Disease Control and Prevention (CDC):  - Call 5-477.369.4571 (1-800-CDC-INFO) or  - Visit CDCs website at www.cdc.gov/flu    Vaccine Information Statement   Inactivated Influenza Vaccine   8/7/2015  42 CRISTINA Jacobs [id number]    Department of Health and Human Services  Centers for Disease Control and Prevention    Office Use Only

## 2018-11-29 RX ORDER — FLUCONAZOLE 150 MG/1
150 TABLET ORAL DAILY
Qty: 1 TAB | Refills: 0 | Status: SHIPPED | OUTPATIENT
Start: 2018-11-29 | End: 2018-11-30

## 2018-11-29 NOTE — TELEPHONE ENCOUNTER
Verified-Per pharmacy Dr. Britta Quintanilla who was covering call for [facility] called in Levafloxin and Medro dose louise for pt 11/17/18.

## 2018-11-29 NOTE — TELEPHONE ENCOUNTER
Pt states she got antibiotics last week from the dr on call and now has a yeast infection. She would like something called in for that. Please call pt at [phone].

## 2018-12-22 ENCOUNTER — TELEPHONE (OUTPATIENT)
Dept: PULMONOLOGY | Age: 43
End: 2018-12-22

## 2018-12-22 RX ORDER — PREDNISONE 10 MG/1
TABLET ORAL
Qty: 21 TAB | Refills: 0 | Status: SHIPPED | OUTPATIENT
Start: 2018-12-22 | End: 2019-02-12 | Stop reason: SDUPTHER

## 2018-12-22 RX ORDER — AZITHROMYCIN 250 MG/1
TABLET, FILM COATED ORAL
Qty: 6 TAB | Refills: 0 | Status: SHIPPED | OUTPATIENT
Start: 2018-12-22 | End: 2019-03-13

## 2018-12-22 NOTE — PROGRESS NOTES
Patient c/ o cough with yellow mucus x1 week with increase SOB  Nebulizer not helping.   Will prescribe taper of Prednisone and Z- Pack

## 2018-12-22 NOTE — TELEPHONE ENCOUNTER
Patient called with c/o yellow mucus, cough x 1 week with increased symptoms of SOB  Prescription for Z pack an  prednisone taper send to [facility]

## 2019-01-07 RX ORDER — AMITRIPTYLINE HYDROCHLORIDE 75 MG/1
75 TABLET, FILM COATED ORAL
Qty: 30 TAB | Refills: 0 | Status: SHIPPED | OUTPATIENT
Start: 2019-01-07 | End: 2019-01-08

## 2019-01-07 NOTE — TELEPHONE ENCOUNTER
Requested Prescriptions     Pending Prescriptions Disp Refills    amitriptyline (ELAVIL) 75 mg tablet 30 Tab 4     Sig: Take 1 Tab by mouth nightly.      Faxed request scanned to chart

## 2019-01-08 ENCOUNTER — OFFICE VISIT (OUTPATIENT)
Dept: NEUROLOGY | Age: 44
End: 2019-01-08

## 2019-01-08 VITALS
BODY MASS INDEX: 22.66 KG/M2 | SYSTOLIC BLOOD PRESSURE: 96 MMHG | OXYGEN SATURATION: 99 % | WEIGHT: 136 LBS | RESPIRATION RATE: 18 BRPM | TEMPERATURE: 99 F | DIASTOLIC BLOOD PRESSURE: 60 MMHG | HEART RATE: 119 BPM | HEIGHT: 65 IN

## 2019-01-08 DIAGNOSIS — G43.019 INTRACTABLE MIGRAINE WITHOUT AURA AND WITHOUT STATUS MIGRAINOSUS: Primary | ICD-10-CM

## 2019-01-08 RX ORDER — EPINEPHRINE 0.3 MG/.3ML
0.3 INJECTION SUBCUTANEOUS
Qty: 2 SYRINGE | Refills: 0 | Status: SHIPPED | OUTPATIENT
Start: 2019-01-08 | End: 2019-01-08

## 2019-01-08 RX ORDER — PROPRANOLOL HYDROCHLORIDE 60 MG/1
60 CAPSULE, EXTENDED RELEASE ORAL DAILY
Qty: 30 CAP | Refills: 3 | Status: SHIPPED | OUTPATIENT
Start: 2019-01-08 | End: 2019-05-20

## 2019-01-08 RX ORDER — RIZATRIPTAN BENZOATE 10 MG/1
TABLET ORAL
Qty: 12 TAB | Refills: 3 | Status: SHIPPED | OUTPATIENT
Start: 2019-01-08 | End: 2019-05-20 | Stop reason: SDUPTHER

## 2019-01-08 RX ORDER — IPRATROPIUM BROMIDE AND ALBUTEROL SULFATE 2.5; .5 MG/3ML; MG/3ML
SOLUTION RESPIRATORY (INHALATION)
Qty: 180 ML | Refills: 0 | Status: SHIPPED | OUTPATIENT
Start: 2019-01-08 | End: 2019-02-12 | Stop reason: SDUPTHER

## 2019-01-08 NOTE — PROGRESS NOTES
1/8/2019 11:10 AM 
 
SSN: [id number] Subjective:   42 y/o female coming with  with CC of headaches. She used to have mild headaches here and there for years during her adult years, nothing much, but since 2012-13 they became worse. She started seeking medical treatment for them. She saw Dr. Marichuy Weldon, tried topamax, she does not think it worked, eventually started amitryptyline which helps a a little bit, last time here she increased it from 50-75mg. She takes rizatriptan usually two doses to get rid of headaches for headaches that come about a week prior to her period as well as 2-3 days during her period. She estimates she may have an average 10-12 headache free days per month. She may have mild headache the other 20 days, 3-4 days for which she takes tylenol, lays down, cool rag, and it improves some. She would take rizatriptan if tylenol not working. She has regular periods every 28 days, denies excessive bleeding. She had tubal ligation. She does not smoke, does not drink, does have anxiety sometimes. She has probs driving over bridges. She has asthma and sometimes she wakes up coughing and coughing, recently this has interrupted her sleep more often. Social History Socioeconomic History  Marital status:  Spouse name: Not on file  Number of children: Not on file  Years of education: Not on file  Highest education level: Not on file Social Needs  Financial resource strain: Not on file  Food insecurity - worry: Not on file  Food insecurity - inability: Not on file  Transportation needs - medical: Not on file  Transportation needs - non-medical: Not on file Occupational History  Not on file Tobacco Use  Smoking status: Never Smoker  Smokeless tobacco: Never Used Substance and Sexual Activity  Alcohol use: No  
 Drug use: No  
 Sexual activity: Yes  
  Partners: Male Other Topics Concern  Not on file Social History Narrative  Not on file Family History Problem Relation Age of Onset  Heart Disease Other   
     grandmothers  Asthma Brother  Lung Disease Paternal Aunt   
     cronic bronchitis  Asthma Paternal Grandmother  High Cholesterol Mother Current Outpatient Medications Medication Sig Dispense Refill  amitriptyline (ELAVIL) 75 mg tablet Take 1 Tab by mouth nightly. 30 Tab 0  
 ondansetron (ZOFRAN ODT) 4 mg disintegrating tablet TAKE 1 TAB BY MOUTH EVERY EIGHT (8) HOURS AS NEEDED FOR NAUSEA. 15 Tab 1  
 budesonide-formoterol (SYMBICORT) 160-4.5 mcg/actuation HFAA Take 2 Puffs by inhalation two (2) times a day. 1 Inhaler 5  
 montelukast (SINGULAIR) 10 mg tablet Take 1 Tab by mouth daily. 30 Tab 5  cetirizine (ZYRTEC) 10 mg tablet Take one tablets by mouth daily for 7 days and then as needed after that. Restart forseven days if sinus congestion recurs. 15 Tab 0  
 albuterol (PROAIR HFA) 90 mcg/actuation inhaler Take 2 Puffs by inhalation every four (4) hours as needed for Wheezing. 1 Inhaler 5  
 rizatriptan (MAXALT) 10 mg tablet Take one tablet at HA onset, May repeat in 2 hours if needed. Max two tabs in 24 hours. 12 Tab 3  
 fluticasone (FLONASE) 50 mcg/actuation nasal spray INSTILL 2 SPRAYS INTO EACH NOSTRIL ONCE DAILY 1 Bottle 2  
 ALLEGRA-D 24 HOUR 180-240 mg per tablet TAKE 1 TABLET BY MOUTH DAILY 15 Tab 0  
 albuterol-ipratropium (DUO-NEB) 2.5 mg-0.5 mg/3 ml nebu INHALE CONTENTS OF 1 VIAL VIA NEBULIZER EVERY 4 HOURS AS NEEDED FOR SHORTNESS OF BREATH/WHEEZING. 180 mL 4  
 EPINEPHrine (EPIPEN 2-GALE) 0.3 mg/0.3 mL injection 0.3 mL by IntraMUSCular route once as needed for up to 1 dose. 2 Syringe 5  
 azithromycin (ZITHROMAX) 250 mg tablet 2tabs -day 1 then 1 tab X 4 days 6 Tab 0  predniSONE (STERAPRED DS) 10 mg dose pack See administration instruction per 10mg dose pack 21 Tab 0 Past Medical History:  
Diagnosis Date  Anxiety attack  Asthma  Asthmatic bronchitis  Catamenial disorder  Hernia  Migraine  Mild asthma  Psychiatric disorder   
 anxiety  Torticollis Past Surgical History:  
Procedure Laterality Date   DELIVERY ONLY    
 HX BACK SURGERY    
 HX  SECTION  2009 Allergies Allergen Reactions  Aspirin Anaphylaxis  Ibuprofen Anaphylaxis  Pcn [Penicillins] Itching  Claritin [Loratadine] Anxiety and Cough  Phenergan [Promethazine] Anxiety and Swelling  Seafood Swelling  Shellfish Containing Products Hives Vital signs:   
Visit Vitals BP 96/60 (BP 1 Location: Left arm, BP Patient Position: Sitting) Pulse (!) 119 Temp 99 °F (37.2 °C) (Oral) Resp 18 Ht 5' 5\" (1.651 m) Wt 61.7 kg (136 lb) SpO2 99% BMI 22.63 kg/m² Review of Systems:  
GENERAL: Denies fever or fatigue CARDIAC: No CP or SOB PULMONARY: No cough of SOB MUSCULOSKELETAL: No new joint pain NEURO: SEE HPI 
 
 
EXAM: Alert, in NAD. Heart is regular. Oriented x3, EOM's are full, PERRL, no facial asymmetries. Strength and tone are normal. DTR's +2, gait symmetric Assessment/Plan: Chronic menstrually related, but not menstrually exclusive, migraine syndrome, suboptimally responding to a good dose of amitriptyline . Only has tried one 1st line agent (topamax), so propranolol, depakote are options discussed. I will go ahead and try propranolol. SE's discussed.  today, may help with anxiety somewhat too. Warned about risk of asthma worsening with inderal, will let me know. She will continue rizatriptan prn. BOTOX, CGRP MAB's are options if she is not responding on f/u. Given the perimenstrual occurrence, down the line hormonal treatment may be useful. She will return in 3 months. PLEASE NOTE:  
Portions of this document may have been produced using voice recognition software. Unrecognized errors in transcription may be present. This note will not be viewable in [address]

## 2019-01-08 NOTE — PROGRESS NOTES
Chief Complaint Patient presents with  Migraine PHQ over the last two weeks 1/8/2019 Little interest or pleasure in doing things Not at all Feeling down, depressed, irritable, or hopeless Not at all Total Score PHQ 2 0

## 2019-01-08 NOTE — TELEPHONE ENCOUNTER
The pt. Is left a message re: her failure to make a f/u appt. to 8/7/17. I informed the pt. she must make an appt. with Dr. Alberto Carney and we will then give her Neb. Sol. to last until that appt. is met. She has not had the numerous sick calls that she had in the past therefore we will check with Dr. Alberto Carney re: the EpiPen. She is asked to return my call. The pt. Made an appt. 2/12/19.

## 2019-01-08 NOTE — TELEPHONE ENCOUNTER
Pt is requesting refill on albuterol sol and ipratropium combo and also on epipen. Please call [phone] once done.

## 2019-01-09 RX ORDER — ONDANSETRON 4 MG/1
4 TABLET, FILM COATED ORAL
Qty: 15 TAB | Refills: 3 | Status: SHIPPED | OUTPATIENT
Start: 2019-01-09 | End: 2019-03-13

## 2019-01-14 ENCOUNTER — TELEPHONE (OUTPATIENT)
Dept: PULMONOLOGY | Age: 44
End: 2019-01-14

## 2019-01-14 NOTE — TELEPHONE ENCOUNTER
Per pt she is still coughing a lot, and sob. She is also coughing up yellow mucus. Please call [phone].

## 2019-01-14 NOTE — TELEPHONE ENCOUNTER
Pt c/o mostly cough at hs. Congestion. Clear. No fever. Robitussin over the counter is helping. Will continue over the counter cold meds.  If sxs persist or worsen will call for appt

## 2019-01-28 ENCOUNTER — TELEPHONE (OUTPATIENT)
Dept: PULMONOLOGY | Age: 44
End: 2019-01-28

## 2019-01-28 NOTE — TELEPHONE ENCOUNTER
Pt states she has a dry cough. Been using Robitussin which helps at the time but cough comes back. Mostly at HS. Pt to try Musinex and delsym. If sxs worsen to call us back.  Pt has appt 2/12

## 2019-01-28 NOTE — TELEPHONE ENCOUNTER
Pt's  called, and would like for us to call pt in the next 5-10 minutes, due to having problems with phone. Please call [phone].

## 2019-02-12 ENCOUNTER — OFFICE VISIT (OUTPATIENT)
Dept: PULMONOLOGY | Age: 44
End: 2019-02-12

## 2019-02-12 ENCOUNTER — HOSPITAL ENCOUNTER (OUTPATIENT)
Dept: LAB | Age: 44
Discharge: HOME | End: 2019-02-12

## 2019-02-12 VITALS
SYSTOLIC BLOOD PRESSURE: 108 MMHG | WEIGHT: 164 LBS | BODY MASS INDEX: 27.32 KG/M2 | DIASTOLIC BLOOD PRESSURE: 62 MMHG | OXYGEN SATURATION: 99 % | HEIGHT: 65 IN | RESPIRATION RATE: 18 BRPM | TEMPERATURE: 98.4 F | HEART RATE: 117 BPM

## 2019-02-12 DIAGNOSIS — J30.9 ALLERGIC RHINITIS, UNSPECIFIED SEASONALITY, UNSPECIFIED TRIGGER: ICD-10-CM

## 2019-02-12 DIAGNOSIS — J45.41 MODERATE PERSISTENT ASTHMA WITH EXACERBATION: Primary | ICD-10-CM

## 2019-02-12 DIAGNOSIS — F41.9 ANXIETY DISORDER, UNSPECIFIED TYPE: ICD-10-CM

## 2019-02-12 LAB — SENTARA SPECIMEN COL,SENBCF: NORMAL

## 2019-02-12 PROCEDURE — 99001 SPECIMEN HANDLING PT-LAB: CPT

## 2019-02-12 RX ORDER — IPRATROPIUM BROMIDE AND ALBUTEROL SULFATE 2.5; .5 MG/3ML; MG/3ML
SOLUTION RESPIRATORY (INHALATION)
Qty: 180 ML | Refills: 5 | Status: SHIPPED | OUTPATIENT
Start: 2019-02-12 | End: 2020-04-07 | Stop reason: SDUPTHER

## 2019-02-12 RX ORDER — PREDNISONE 10 MG/1
TABLET ORAL
Qty: 18 TAB | Refills: 0 | Status: SHIPPED | OUTPATIENT
Start: 2019-02-12 | End: 2019-03-13

## 2019-02-12 RX ORDER — AMITRIPTYLINE HYDROCHLORIDE 75 MG/1
TABLET, FILM COATED ORAL
Refills: 0 | COMMUNITY
Start: 2019-01-07 | End: 2019-02-12 | Stop reason: SDUPTHER

## 2019-02-12 NOTE — PROGRESS NOTES
Chief Complaint Patient presents with  Asthma 1. Have you been to the ER, urgent care clinic since your last visit? Hospitalized since your last visit? Yes Where: [facility] 2. Have you seen or consulted any other health care providers outside of the [address] [place] [address] since your last visit? Include any pap smears or colon screening.  No

## 2019-02-12 NOTE — TELEPHONE ENCOUNTER
Requested Prescriptions     Pending Prescriptions Disp Refills    amitriptyline (ELAVIL) 75 mg tablet  0

## 2019-02-12 NOTE — PROGRESS NOTES
HISTORY OF PRESENT ILLNESS Leann Marie is a 41 y.o. female. Follow up for asthma previously requiring Xolair for control. Pt noted dramatic improvement in symptoms during her last pregnancy, lasting even after delivery and even more so when she left a stress filled work environment. Pt now with increasing exacerbations starting October 2018 with persistent SOB and wheezing despite a course of steroids and ?antibiotics. Pt does have increased SOB with humidity. Pt with occasional upper airway congestion relieved by prn Allegra. Denies hospital admissions for exacerbation but has been in the ED. Asthma The history is provided by the patient and parent. This is a chronic problem. Episode onset: years. The problem occurs rarely. The problem has been rapidly worsening. Associated symptoms include shortness of breath. Pertinent negatives include no chest pain, no abdominal pain and no headaches. The symptoms are aggravated by stress and exertion (pollen). The symptoms are relieved by medications. Treatments tried: Singulair and Symbicort. The treatment provided mild relief. Review of Systems Constitutional: Negative for chills, diaphoresis, fever, malaise/fatigue and weight loss. Weight gain HENT: Negative for congestion, ear discharge, ear pain, hearing loss, nosebleeds, sinus pain, sore throat and tinnitus. Eyes: Negative for blurred vision, double vision, photophobia, pain, discharge and redness. Respiratory: Positive for shortness of breath. Negative for cough, hemoptysis, sputum production and stridor. Wheezing: rare Cardiovascular: Negative for chest pain, palpitations, orthopnea, claudication, leg swelling and PND. Gastrointestinal: Negative for abdominal pain, blood in stool, constipation, diarrhea, heartburn, melena, nausea and vomiting. Genitourinary: Negative for dysuria, flank pain, frequency, hematuria and urgency. Musculoskeletal: Negative for back pain, falls, joint pain, myalgias and neck pain. Skin: Negative for itching and rash. Neurological: Negative for dizziness, tingling, tremors, sensory change, speech change, focal weakness, seizures, loss of consciousness, weakness and headaches. Endo/Heme/Allergies: Negative for environmental allergies and polydipsia. Does not bruise/bleed easily. Psychiatric/Behavioral: Negative for depression, hallucinations, memory loss, substance abuse and suicidal ideas. The patient is not nervous/anxious and does not have insomnia. Past Medical History:  
Diagnosis Date  Anxiety attack  Asthma  Asthmatic bronchitis  Catamenial disorder  Hernia  Migraine  Migraines  Mild asthma  Psychiatric disorder   
 anxiety  Torticollis Current Outpatient Medications on File Prior to Visit Medication Sig Dispense Refill  amitriptyline (ELAVIL) 75 mg tablet TAKE 1 TABLET BY MOUTH EVERY DAY AT NIGHT  0  
 rizatriptan (MAXALT) 10 mg tablet Take one tablet at HA onset, May repeat in 2 hours if needed. Max two tabs in 24 hours. 12 Tab 3  
 albuterol-ipratropium (DUO-NEB) 2.5 mg-0.5 mg/3 ml nebu INHALE CONTENTS OF 1 VIAL VIA NEBULIZER EVERY 4 HOURS AS NEEDED FOR SHORTNESS OF BREATH/WHEEZING. 180 mL 0  
 ondansetron (ZOFRAN ODT) 4 mg disintegrating tablet TAKE 1 TAB BY MOUTH EVERY EIGHT (8) HOURS AS NEEDED FOR NAUSEA. 15 Tab 1  
 budesonide-formoterol (SYMBICORT) 160-4.5 mcg/actuation HFAA Take 2 Puffs by inhalation two (2) times a day. 1 Inhaler 5  
 montelukast (SINGULAIR) 10 mg tablet Take 1 Tab by mouth daily. 30 Tab 5  cetirizine (ZYRTEC) 10 mg tablet Take one tablets by mouth daily for 7 days and then as needed after that. Restart forseven days if sinus congestion recurs. 15 Tab 0  
 albuterol (PROAIR HFA) 90 mcg/actuation inhaler Take 2 Puffs by inhalation every four (4) hours as needed for Wheezing.  1 Inhaler 5  
  fluticasone (FLONASE) 50 mcg/actuation nasal spray INSTILL 2 SPRAYS INTO EACH NOSTRIL ONCE DAILY 1 Bottle 2  
 ALLEGRA-D 24 HOUR 180-240 mg per tablet TAKE 1 TABLET BY MOUTH DAILY 15 Tab 0  
 ondansetron hcl (ZOFRAN) 4 mg tablet Take 1 Tab by mouth every eight (8) hours as needed for Nausea. 15 Tab 3  propranolol LA (INDERAL LA) 60 mg SR capsule Take 1 Cap by mouth daily. 30 Cap 3  
 azithromycin (ZITHROMAX) 250 mg tablet 2tabs -day 1 then 1 tab X 4 days 6 Tab 0 No current facility-administered medications on file prior to visit. Allergies Allergen Reactions  Aspirin Anaphylaxis  Ibuprofen Anaphylaxis  Pcn [Penicillins] Itching  Claritin [Loratadine] Anxiety and Cough  Phenergan [Promethazine] Anxiety and Swelling  Seafood Swelling  Shellfish Containing Products Hives Social History Socioeconomic History  Marital status:  Spouse name: Not on file  Number of children: Not on file  Years of education: Not on file  Highest education level: Not on file Social Needs  Financial resource strain: Not on file  Food insecurity - worry: Not on file  Food insecurity - inability: Not on file  Transportation needs - medical: Not on file  Transportation needs - non-medical: Not on file Occupational History  Not on file Tobacco Use  Smoking status: Never Smoker  Smokeless tobacco: Never Used Substance and Sexual Activity  Alcohol use: No  
 Drug use: No  
 Sexual activity: Yes  
  Partners: Male Other Topics Concern  Not on file Social History Narrative  Not on file Blood pressure 108/62, pulse (!) 117, temperature 98.4 °F (36.9 °C), temperature source Oral, resp. rate 18, height 5' 5\" (1.651 m), weight 74.4 kg (164 lb), last menstrual period 02/09/2019, SpO2 99 %, not currently breastfeeding. Physical Exam  
Constitutional: She is oriented to person, place, and time.  She appears well-developed and well-nourished. No distress. HENT:  
Head: Normocephalic and atraumatic. Nose: Nose normal.  
Mouth/Throat: No oropharyngeal exudate. Eyes: Conjunctivae and EOM are normal. Pupils are equal, round, and reactive to light. Right eye exhibits no discharge. Left eye exhibits no discharge. No scleral icterus. Neck: No JVD present. No tracheal deviation present. No thyromegaly present. Cardiovascular: Normal rate, regular rhythm, normal heart sounds and intact distal pulses. Exam reveals no gallop and no friction rub. No murmur heard. Pulmonary/Chest: Effort normal and breath sounds normal. No stridor. No respiratory distress. She has no wheezes. She has no rales. She exhibits no tenderness. Abdominal: Soft. She exhibits no mass. There is no tenderness. There is no rebound. Musculoskeletal: She exhibits no edema, tenderness or deformity. Lymphadenopathy:  
  She has no cervical adenopathy. Neurological: She is alert and oriented to person, place, and time. Coordination normal.  
Skin: Skin is warm and dry. No rash noted. She is not diaphoretic. No erythema. No pallor. Psychiatric: She has a normal mood and affect. Her behavior is normal. Judgment and thought content normal.  
 
Spirometry: mild obstruction , improved from study done 6/14/2012 ASSESSMENT and PLAN Encounter Diagnoses Name Primary?  Moderate persistent asthma with exacerbation Yes  Anxiety disorder, unspecified type  Allergic rhinitis, unspecified seasonality, unspecified trigger Pt to continue Symbicort and rescue Albuterol. see orders for details. IgE ordered, if elevated will restart Xolair. Pt to have blood work done prior to Prednisone use. Schedule spirometry. Advised on avoidance of allergens and extremes in weather conditions. RTC 3 months

## 2019-02-13 DIAGNOSIS — J45.41 MODERATE PERSISTENT ASTHMA WITH EXACERBATION: ICD-10-CM

## 2019-02-13 RX ORDER — AMITRIPTYLINE HYDROCHLORIDE 75 MG/1
75 TABLET, FILM COATED ORAL
Qty: 30 TAB | Refills: 3 | Status: SHIPPED | OUTPATIENT
Start: 2019-02-13 | End: 2019-05-20

## 2019-02-13 NOTE — TELEPHONE ENCOUNTER
Attempted to contact patient regarding Rx refill. No answer. Left message to contact office with any questions.

## 2019-02-14 LAB — IMMUNOGLOBULIN E,TOTAL, 002173: 529 KU/L (ref 0–114)

## 2019-02-18 ENCOUNTER — TELEPHONE (OUTPATIENT)
Dept: PULMONOLOGY | Age: 44
End: 2019-02-18

## 2019-02-18 NOTE — TELEPHONE ENCOUNTER
Pt informed the Xolair form has been sent to start the process.  Also she had the pneumonia vaccine 5/2015

## 2019-02-18 NOTE — TELEPHONE ENCOUNTER
Pt stated she completed bloodwork @  2/12 to determine if she is able to begin getting Xolair shots. Would like results. Also, stated she forgot to get her pneumonia shot while in office 2/12, would like to know if she can still come receive shot prior to 5/19 follow-up appt. Please advise [phone].

## 2019-02-27 NOTE — TELEPHONE ENCOUNTER
Spoke with pt, advised that is the quantity doctor prescribed. Only to be used PRN.  Rx has refills when indicated  Pt understands

## 2019-02-27 NOTE — TELEPHONE ENCOUNTER
Pt stated she picked up her prescription for Nebulizer solution and only received 2 boxes. States she normally receives 4, wanting the other 2 to be ordered. Being sent to [address] [place] [address] 289. Please advise [phone].

## 2019-03-06 NOTE — TELEPHONE ENCOUNTER
Pt has been approved for Xolair. Medication has been shipped to our office to start injections.  Pt needs Epi Pen

## 2019-03-07 ENCOUNTER — TELEPHONE (OUTPATIENT)
Dept: PULMONOLOGY | Age: 44
End: 2019-03-07

## 2019-03-07 ENCOUNTER — CLINICAL SUPPORT (OUTPATIENT)
Dept: PULMONOLOGY | Age: 44
End: 2019-03-07

## 2019-03-07 DIAGNOSIS — J45.909 ASTHMA, UNSPECIFIED ASTHMA SEVERITY, UNSPECIFIED WHETHER COMPLICATED, UNSPECIFIED WHETHER PERSISTENT: Primary | ICD-10-CM

## 2019-03-07 RX ORDER — EPINEPHRINE 0.3 MG/.3ML
0.3 INJECTION SUBCUTANEOUS
Qty: 2 SYRINGE | Refills: 0 | Status: SHIPPED | OUTPATIENT
Start: 2019-03-07 | End: 2019-03-07

## 2019-03-07 NOTE — PROGRESS NOTES
Chief Complaint   Patient presents with   Dinorah Nunesch is here for Xolair injection. Patient did not have any reaction to last injection. Patient states the Xolair is helping her Asthma. Injected subcutaneous : 150 mg given in Left upper arm SQ,  150 mg given in Right upper arm SQ. Lot Number: [id number]  Exp Date: 5/2022  Total Xolair Dose: 300 mg every 4 weeks  Epipen is with patient. Patient observed for 15 minutes. No reaction at injection site. Patient given Xolair reaction sheet.

## 2019-03-07 NOTE — TELEPHONE ENCOUNTER
xolair 300mg every 4 weeks started today.  [facility] home infusion pharmacy needs a rx to continue

## 2019-03-07 NOTE — PATIENT INSTRUCTIONS
Be aware that \"Anaphylaxis\" can occur any time after receiving a Xolair injection   Anaphylaxis presenting a bronchospasm, hypotension (low BP), dizziness, faintness, hives, and/or swelling of the tongue and throat. Anaphylaxis can occur after the first dose, but also has occurred beyond one year after beginning treatment with Xolair. *If such symptoms occur, use your EpiPen immediately and call 911. *     Notify your MD office following any treatment of possible reaction. Epinephrine (Injection)   Epinephrine (mg-d-UYW-rin)  Treats severe allergic reactions (including anaphylaxis) in an emergency situation. Brand Name(s):Adrenaclick, Adrenalin, Auvi-Q, EpiPen Auto-Injector, EpiPen Jr Auto-Injector, Epipen, Epipen 2-Jose Juan Auto-Injector, Epipen Jr 2-Jose Juan Auto-Injector, NovaPlus Epinephrine, Twinject   There may be other brand names for this medicine. When This Medicine Should Not Be Used:   A severe allergic reaction can be life-threatening, so there is no reason this medicine should not be used. How to Use This Medicine:   Injectable  · Your doctor should teach you how and when to inject this medicine. Each injection kit contains a single-use dose of medicine prescribed for you. · Give yourself a shot right away if you start to have a severe allergic reaction. · Inject this medicine into the muscle on the outside of your thigh only. Never inject this medicine into a vein, into your hand or foot, or into the muscles of your buttocks. · Read and follow the patient instructions that come with this medicine. Talk to your doctor or pharmacist if you have any questions. · This medicine might come with an autoinjector  so you can practice giving the medicine before you have an actual allergic reaction. The autoinjector  is gray (for EpiPen® or EpiPen Jr®) or beige (for [place]) and does not contain any medicine or needle.   · Do not remove the blue safety release (EpiPen® or EpiPen Jr®) or the gray end caps (Adrenaclick®) on the autoinjector until you are ready to use it. Do not put your thumb, fingers, or hand over the orange (EpiPen® or EpiPen Jr®) or red tip (Adrenaclick®). · You may need to use more than one injection if your allergic reaction does not get better after the first shot. Your doctor will give you additional doses if you need more than 2 injections. · You may inject the medicine through your clothing, if you need to. · Some liquid will remain in the autoinjector after the medicine has been injected. This medicine cannot be reused. Give your used autoinjector to your healthcare provider when you seek medical care. · Carry this medicine with you at all times for emergency use in case you have a severe allergic reaction. · Make sure family members or other people you are with know how to inject the medicine in case you are not able to do it yourself. · Check your injection kits regularly to make sure the liquid has not changed color. You should not use the autoinjector if the liquid has changed color, or if there are solids in the liquid. You should not use the autoinjector if the expiration date has passed. · Store the injection kit at room temperature, away from heat, moisture, and direct light. Do not store the medicine in the refrigerator or freezer, or inside a car. · Keep the autoinjector in its case or carrier tube to protect it from damage. This tube is not waterproof. If you accidentally drop it, check for damage or leaks. Drugs and Foods to Avoid:   Ask your doctor or pharmacist before using any other medicine, including over-the-counter medicines, vitamins, and herbal products. · Some foods and medicines can affect how epinephrine works.  Tell your doctor if you are using digoxin, levothyroxine, phentolamine, certain allergy medicines (such as chlorpheniramine, diphenhydramine, tripelennamine), a beta blocker medicine (such as propranolol), a heart rhythm medicine, a diuretic (water pill), an MAO inhibitor (MAOI), medicine for depression, or ergot medicines. Warnings While Using This Medicine:   · Tell your doctor if you are pregnant or breastfeeding, or if you have asthma, diabetes, heart disease, heart rhythm problems, high blood pressure, an overactive thyroid, or Parkinson disease. · A severe allergic reaction is a medical emergency. Go to an emergency room as soon as possible, even if you feel better after you use this medicine. · Do not inject this medicine into your hands or feet. Go to the emergency room right away if you accidently inject epinephrine into any part of your body other than your thigh. Epinephrine reduces blood flow, and this could damage areas that have small blood vessels, such as hands and feet. · Keep all medicine out of the reach of children. Never share your medicine with anyone. Possible Side Effects While Using This Medicine:   Call your doctor right away if you notice any of these side effects:  · Chest pain  · Fast, pounding, or uneven heartbeat  · Heavy sweating, nausea, vomiting  · Tremors, shakiness  · Trouble breathing  If you notice these less serious side effects, talk with your doctor:   · Feeling anxious, nervous, scared, or weak  · Headache or dizziness  · Pale skin  If you notice other side effects that you think are caused by this medicine, tell your doctor. Call your doctor for medical advice about side effects. You may report side effects to FDA at 4-300-FDA-3334  © 2014 [address] is for End User's use only and may not be sold, redistributed or otherwise used for commercial purposes. The above information is an  only. It is not intended as medical advice for individual conditions or treatments. Talk to your doctor, nurse or pharmacist before following any medical regimen to see if it is safe and effective for you.

## 2019-03-13 ENCOUNTER — HOSPITAL ENCOUNTER (EMERGENCY)
Age: 44
Discharge: HOME | End: 2019-03-13
Attending: EMERGENCY MEDICINE
Payer: MEDICAID

## 2019-03-13 ENCOUNTER — APPOINTMENT (OUTPATIENT)
Dept: GENERAL RADIOLOGY | Age: 44
End: 2019-03-13
Attending: PHYSICIAN ASSISTANT
Payer: MEDICAID

## 2019-03-13 VITALS
RESPIRATION RATE: 18 BRPM | WEIGHT: 160 LBS | TEMPERATURE: 99.4 F | HEIGHT: 65 IN | SYSTOLIC BLOOD PRESSURE: 124 MMHG | BODY MASS INDEX: 26.66 KG/M2 | DIASTOLIC BLOOD PRESSURE: 74 MMHG | HEART RATE: 105 BPM | OXYGEN SATURATION: 100 %

## 2019-03-13 DIAGNOSIS — J20.9 ACUTE BRONCHITIS, UNSPECIFIED ORGANISM: ICD-10-CM

## 2019-03-13 DIAGNOSIS — J45.901 ASTHMA WITH ACUTE EXACERBATION, UNSPECIFIED ASTHMA SEVERITY, UNSPECIFIED WHETHER PERSISTENT: ICD-10-CM

## 2019-03-13 DIAGNOSIS — R05.9 COUGH: Primary | ICD-10-CM

## 2019-03-13 PROCEDURE — 94640 AIRWAY INHALATION TREATMENT: CPT

## 2019-03-13 PROCEDURE — 93005 ELECTROCARDIOGRAM TRACING: CPT

## 2019-03-13 PROCEDURE — 71046 X-RAY EXAM CHEST 2 VIEWS: CPT

## 2019-03-13 PROCEDURE — 74011636637 HC RX REV CODE- 636/637: Performed by: PHYSICIAN ASSISTANT

## 2019-03-13 PROCEDURE — 74011000250 HC RX REV CODE- 250: Performed by: PHYSICIAN ASSISTANT

## 2019-03-13 PROCEDURE — 99282 EMERGENCY DEPT VISIT SF MDM: CPT

## 2019-03-13 PROCEDURE — 77030029684 HC NEB SM VOL KT MONA -A

## 2019-03-13 RX ORDER — FLUCONAZOLE 150 MG/1
150 TABLET ORAL
Qty: 1 TAB | Refills: 0 | Status: SHIPPED | OUTPATIENT
Start: 2019-03-13 | End: 2019-03-13

## 2019-03-13 RX ORDER — PREDNISONE 20 MG/1
60 TABLET ORAL
Status: COMPLETED | OUTPATIENT
Start: 2019-03-13 | End: 2019-03-13

## 2019-03-13 RX ORDER — IPRATROPIUM BROMIDE AND ALBUTEROL SULFATE 2.5; .5 MG/3ML; MG/3ML
3 SOLUTION RESPIRATORY (INHALATION)
Status: COMPLETED | OUTPATIENT
Start: 2019-03-13 | End: 2019-03-13

## 2019-03-13 RX ORDER — PREDNISONE 10 MG/1
TABLET ORAL
Qty: 21 TAB | Refills: 0 | Status: SHIPPED | OUTPATIENT
Start: 2019-03-13 | End: 2019-07-03

## 2019-03-13 RX ORDER — AZITHROMYCIN 250 MG/1
TABLET, FILM COATED ORAL
Qty: 6 TAB | Refills: 0 | Status: SHIPPED | OUTPATIENT
Start: 2019-03-13 | End: 2019-05-20 | Stop reason: ALTCHOICE

## 2019-03-13 RX ADMIN — PREDNISONE 60 MG: 20 TABLET ORAL at 20:33

## 2019-03-13 RX ADMIN — IPRATROPIUM BROMIDE AND ALBUTEROL SULFATE 3 ML: .5; 3 SOLUTION RESPIRATORY (INHALATION) at 20:33

## 2019-03-14 LAB
ATRIAL RATE: 110 BPM
CALCULATED P AXIS, ECG09: 57 DEGREES
CALCULATED R AXIS, ECG10: 45 DEGREES
CALCULATED T AXIS, ECG11: 39 DEGREES
DIAGNOSIS, 93000: NORMAL
P-R INTERVAL, ECG05: 136 MS
Q-T INTERVAL, ECG07: 356 MS
QRS DURATION, ECG06: 74 MS
QTC CALCULATION (BEZET), ECG08: 481 MS
VENTRICULAR RATE, ECG03: 110 BPM

## 2019-03-14 NOTE — ED PROVIDER NOTES
EMERGENCY DEPARTMENT HISTORY AND PHYSICAL EXAM    Date: 3/13/2019  Patient Name: Leann Marie    History of Presenting Illness     Chief Complaint   Patient presents with    Cough         History Provided By: patient     Chief Complaint: cough and wheezing   Duration: few days  Timing acute  Location: chest   Quality: wheezy  Severity:moderate  Modifying Factors: none   Associated Symptoms: cough       Additional History (Context): Leann Marie is a 41 y.o. female with PMH asthma who presents with complaints of cough and wheezing x several days. PCP: Cyndie Almanzar MD    Current Outpatient Medications   Medication Sig Dispense Refill    predniSONE (STERAPRED DS) 10 mg dose pack Use as directed 21 Tab 0    azithromycin (ZITHROMAX) 250 mg tablet Use as directed 6 Tab 0    fluconazole (DIFLUCAN) 150 mg tablet Take 1 Tab by mouth now for 1 dose. FDA advises cautious prescribing of oral fluconazole in pregnancy. 1 Tab 0    omalizumab (XOLAIR) 150 mg solr 300 mg by SubCUTAneous route every thirty (30) days. 300 mg 0    amitriptyline (ELAVIL) 75 mg tablet Take 1 Tab by mouth nightly. 30 Tab 3    albuterol-ipratropium (DUO-NEB) 2.5 mg-0.5 mg/3 ml nebu INHALE CONTENTS OF 1 VIAL VIA NEBULIZER EVERY 4 HOURS AS NEEDED FOR SHORTNESS OF BREATH/WHEEZING. 180 mL 5    propranolol LA (INDERAL LA) 60 mg SR capsule Take 1 Cap by mouth daily. 30 Cap 3    rizatriptan (MAXALT) 10 mg tablet Take one tablet at HA onset, May repeat in 2 hours if needed. Max two tabs in 24 hours. 12 Tab 3    ondansetron (ZOFRAN ODT) 4 mg disintegrating tablet TAKE 1 TAB BY MOUTH EVERY EIGHT (8) HOURS AS NEEDED FOR NAUSEA. 15 Tab 1    budesonide-formoterol (SYMBICORT) 160-4.5 mcg/actuation HFAA Take 2 Puffs by inhalation two (2) times a day. 1 Inhaler 5    montelukast (SINGULAIR) 10 mg tablet Take 1 Tab by mouth daily.  30 Tab 5    cetirizine (ZYRTEC) 10 mg tablet Take one tablets by mouth daily for 7 days and then as needed after that.  Restart even days if sinus congestion recurs. 15 Tab 0    albuterol (PROAIR HFA) 90 mcg/actuation inhaler Take 2 Puffs by inhalation every four (4) hours as needed for Wheezing. 1 Inhaler 5    fluticasone (FLONASE) 50 mcg/actuation nasal spray INSTILL 2 SPRAYS INTO EACH NOSTRIL ONCE DAILY 1 Bottle 2    ALLEGRA-D 24 HOUR 180-240 mg per tablet TAKE 1 TABLET BY MOUTH DAILY 15 Tab 0    omalizumab (XOLAIR) 150 mg solr 300 mg by SubCUTAneous route every thirty (30) days. 300 mg 11       Past History     Past Medical History:  Past Medical History:   Diagnosis Date    Anxiety attack     Asthma     Asthmatic bronchitis     Catamenial disorder     Hernia     Migraine     Migraines     Mild asthma     Psychiatric disorder     anxiety     Torticollis        Past Surgical History:  Past Surgical History:   Procedure Laterality Date     DELIVERY ONLY      HX BACK SURGERY      HX  SECTION         Family History:  Family History   Problem Relation Age of Onset    Heart Disease Other         grandmothers    Asthma Brother     Lung Disease Paternal Aunt         cronic bronchitis    Asthma Paternal Grandmother     High Cholesterol Mother        Social History:  Social History     Tobacco Use    Smoking status: Never Smoker    Smokeless tobacco: Never Used   Substance Use Topics    Alcohol use: No    Drug use: No       Allergies: Allergies   Allergen Reactions    Aspirin Anaphylaxis    Ibuprofen Anaphylaxis    Pcn [Penicillins] Itching    Claritin [Loratadine] Anxiety and Cough    Phenergan [Promethazine] Anxiety and Swelling    Seafood Swelling    Shellfish Containing Products Hives         Review of Systems   Review of Systems   Constitutional: Negative. Negative for chills and fever. HENT: Positive for congestion. Negative for ear pain and rhinorrhea. Eyes: Negative. Negative for pain and redness. Respiratory: Positive for cough and wheezing.  Negative for shortness of breath and stridor. Cardiovascular: Negative. Negative for chest pain and leg swelling. Gastrointestinal: Negative. Negative for abdominal pain, constipation, diarrhea, nausea and vomiting. Genitourinary: Negative. Negative for dysuria and frequency. Musculoskeletal: Negative. Negative for back pain and neck pain. Skin: Negative. Negative for rash and wound. Neurological: Negative. Negative for dizziness, seizures, syncope and headaches. All other systems reviewed and are negative. All Other Systems Negative  Physical Exam     Vitals:    03/13/19 2011 03/13/19 2134   BP: 118/72 124/74   Pulse: (!) 123 (!) 105   Resp: 22 18   Temp: 99.4 °F (37.4 °C)    SpO2: 100% 100%   Weight: 72.6 kg (160 lb)    Height: 5' 5\" (1.651 m)      Physical Exam   Constitutional: She is oriented to person, place, and time. She appears well-developed and well-nourished. No distress. HENT:   Head: Normocephalic and atraumatic. Eyes: Conjunctivae are normal. Right eye exhibits no discharge. Left eye exhibits no discharge. No scleral icterus. Neck: Normal range of motion. Neck supple. Cardiovascular: Regular rhythm and normal heart sounds. Exam reveals no gallop and no friction rub. No murmur heard. Tachycardiac    Pulmonary/Chest: Effort normal and breath sounds normal. No stridor. No respiratory distress. She has no wheezes. She has no rales. Coughing during the exam    Musculoskeletal: Normal range of motion. Neurological: She is alert and oriented to person, place, and time. Coordination normal.   Gait is steady. Able to ambulate without difficulty. Skin: Skin is warm and dry. No rash noted. She is not diaphoretic. No erythema. Psychiatric: She has a normal mood and affect. Her behavior is normal. Thought content normal.   Nursing note and vitals reviewed.              Diagnostic Study Results     Labs -     Recent Results (from the past 12 hour(s))   EKG, 12 LEAD, INITIAL Collection Time: 03/13/19  8:28 PM   Result Value Ref Range    Ventricular Rate 110 BPM    Atrial Rate 110 BPM    P-R Interval 136 ms    QRS Duration 74 ms    Q-T Interval 356 ms    QTC Calculation (Bezet) 481 ms    Calculated P Axis 57 degrees    Calculated R Axis 45 degrees    Calculated T Axis 39 degrees    Diagnosis       Sinus tachycardia  Otherwise normal ECG  When compared with ECG of 24-JUL-2017 09:17,  No significant change was found         Radiologic Studies -   XR CHEST PA LAT    (Results Pending)   no definite infiltrates noted  CT Results  (Last 48 hours)    None        CXR Results  (Last 48 hours)    None            Medical Decision Making   I am the first provider for this patient. I reviewed the vital signs, available nursing notes, past medical history, past surgical history, family history and social history. Vital Signs-Reviewed the patient's vital signs. Records Reviewed: Maryse Lino PA-C      Procedures:  Procedures    Provider Notes (Medical Decision Making): Impression:  Asthma exacerbation, bronchitis     duoneb given sx improved will plan to d/c with abx and prednisone, PCP follow-up recommended. Pt agrees. Maryse Lino PA-C 9:53 PM     MED RECONCILIATION:  No current facility-administered medications for this encounter. Current Outpatient Medications   Medication Sig    predniSONE (STERAPRED DS) 10 mg dose pack Use as directed    azithromycin (ZITHROMAX) 250 mg tablet Use as directed    fluconazole (DIFLUCAN) 150 mg tablet Take 1 Tab by mouth now for 1 dose. FDA advises cautious prescribing of oral fluconazole in pregnancy.  omalizumab (XOLAIR) 150 mg solr 300 mg by SubCUTAneous route every thirty (30) days.  amitriptyline (ELAVIL) 75 mg tablet Take 1 Tab by mouth nightly.  albuterol-ipratropium (DUO-NEB) 2.5 mg-0.5 mg/3 ml nebu INHALE CONTENTS OF 1 VIAL VIA NEBULIZER EVERY 4 HOURS AS NEEDED FOR SHORTNESS OF BREATH/WHEEZING.     propranolol LA (INDERAL LA) 60 mg SR capsule Take 1 Cap by mouth daily.  rizatriptan (MAXALT) 10 mg tablet Take one tablet at HA onset, May repeat in 2 hours if needed. Max two tabs in 24 hours.  ondansetron (ZOFRAN ODT) 4 mg disintegrating tablet TAKE 1 TAB BY MOUTH EVERY EIGHT (8) HOURS AS NEEDED FOR NAUSEA.  budesonide-formoterol (SYMBICORT) 160-4.5 mcg/actuation HFAA Take 2 Puffs by inhalation two (2) times a day.  montelukast (SINGULAIR) 10 mg tablet Take 1 Tab by mouth daily.  cetirizine (ZYRTEC) 10 mg tablet Take one tablets by mouth daily for 7 days and then as needed after that. Restart forseven days if sinus congestion recurs.  albuterol (PROAIR HFA) 90 mcg/actuation inhaler Take 2 Puffs by inhalation every four (4) hours as needed for Wheezing.  fluticasone (FLONASE) 50 mcg/actuation nasal spray INSTILL 2 SPRAYS INTO EACH NOSTRIL ONCE DAILY    ALLEGRA-D 24 HOUR 180-240 mg per tablet TAKE 1 TABLET BY MOUTH DAILY    omalizumab (XOLAIR) 150 mg solr 300 mg by SubCUTAneous route every thirty (30) days. Disposition:  D/c    DISCHARGE NOTE:   Patient is stable for discharge at this time. Rx for prednisone and zpack given. Rest and follow-up with PCP this week. Return to the ED immediately for any new or worsening sx.   Maryse Verdin PA-C 9:50 PM     Follow-up Information     Follow up With Specialties Details Why Contact Info    Scooby Chen MD Family Practice Schedule an appointment as soon as possible for a visit in 1 week  [address] [id number] [facility] EMERGENCY DEPT Emergency Medicine  As needed, If symptoms worsen [address] [facility]  [phone]          Current Discharge Medication List      START taking these medications    Details   predniSONE (STERAPRED DS) 10 mg dose pack Use as directed  Qty: 21 Tab, Refills: 0      azithromycin (ZITHROMAX) 250 mg tablet Use as directed  Qty: 6 Tab, Refills: 0      fluconazole (DIFLUCAN) 150 mg tablet Take 1 Tab by mouth now for 1 dose. FDA advises cautious prescribing of oral fluconazole in pregnancy. Qty: 1 Tab, Refills: 0         STOP taking these medications       predniSONE (DELTASONE) 10 mg tablet Comments:   Reason for Stopping:                   Diagnosis     Clinical Impression:   1. Cough    2. Acute bronchitis, unspecified organism    3.  Asthma with acute exacerbation, unspecified asthma severity, unspecified whether persistent

## 2019-03-14 NOTE — ED NOTES
I have reviewed discharge instructions with the patient. The patient verbalized understanding. Patient armband removed and shredded  Current Discharge Medication List      START taking these medications    Details   predniSONE (STERAPRED DS) 10 mg dose pack Use as directed  Qty: 21 Tab, Refills: 0      azithromycin (ZITHROMAX) 250 mg tablet Use as directed  Qty: 6 Tab, Refills: 0      fluconazole (DIFLUCAN) 150 mg tablet Take 1 Tab by mouth now for 1 dose. FDA advises cautious prescribing of oral fluconazole in pregnancy.   Qty: 1 Tab, Refills: 0         STOP taking these medications       predniSONE (DELTASONE) 10 mg tablet Comments:   Reason for Stopping:

## 2019-03-14 NOTE — DISCHARGE INSTRUCTIONS
Patient Education        Learning About Asthma Triggers  What are asthma triggers? When you have asthma, certain things can make your symptoms worse. These are called triggers. Learn what triggers an asthma attack for you, and avoid the triggers when you can. Common triggers include colds, smoke, air pollution, dust, pollen, pets, stress, and cold air. How do asthma triggers affect you? Triggers can make it harder for your lungs to work as they should. They can lead to sudden breathing problems and other symptoms. When you are around a trigger, an asthma attack is more likely. If your symptoms are severe, you may need emergency treatment or have to go to the hospital for treatment. What can you do to avoid triggers? The first thing is to know your triggers. When you are having symptoms, note the things around you that might be causing them. Then look for patterns that may be triggering your symptoms. Record your triggers on a piece of paper or in an asthma diary. When you have your list of possible triggers, work with your doctor to find ways to avoid them. Avoid colds and flu. Get a pneumococcal vaccine shot. If you have had one before, ask your doctor whether you need a second dose. Get a flu vaccine every year, as soon as it's available. If you must be around people with colds or the flu, wash your hands often. Here are some ways to avoid a few common triggers. · Do not smoke or allow others to smoke around you. If you need help quitting, talk to your doctor about stop-smoking programs and medicines. These can increase your chances of quitting for good. · If there is a lot of pollution, pollen, or dust outside, stay at home and keep your windows closed. Use an air conditioner or air filter in your home. Check your local weather report or newspaper for air quality and pollen reports. What else should you know? · Take your controller medicine every day, not just when you have symptoms.  It helps prevent problems before they occur. · Your doctor may suggest that you check how well your lungs are working by measuring your peak expiratory flow (PEF) throughout the day. Your PEF may drop when you are near things that trigger symptoms. Where can you learn more? Go to http://jaci-elina.info/. Enter B176 in the search box to learn more about \"Learning About Asthma Triggers. \"  Current as of: 2018  Content Version: 11.9  © 8019-7119 [employer]. Care instructions adapted under license by [employer] (which disclaims liability or warranty for this information). If you have questions about a medical condition or this instruction, always ask your healthcare professional. [address] any warranty or liability for your use of this information. [employer] Activation    Thank you for requesting access to [employer]. Please follow the instructions below to securely access and download your online medical record. [employer] allows you to send messages to your doctor, view your test results, renew your prescriptions, schedule appointments, and more. How Do I Sign Up? 1. In your internet browser, go to [email or web address]  2. Click on the First Time User? Click Here link in the Sign In box. You will be redirect to the New Member Sign Up page. 3. Enter your [employer] Access Code exactly as it appears below. You will not need to use this code after youve completed the sign-up process. If you do not sign up before the expiration date, you must request a new code. [employer]ift Access Code: [email or web address]  Expires: 2019  8:08 PM (This is the date your [employer]ift access code will )    4. Enter the last four digits of your Social Security Number (xxxx) and Date of Birth (mm/dd/yyyy) as indicated and click Submit. You will be taken to the next sign-up page. 5. Create a [employer]Gift ID.  This will be your [employer]Gift login ID and cannot be changed, so think of one that is secure and easy to remember. 6. Create a [facility] password. You can change your password at any time. 7. Enter your Password Reset Question and Answer. This can be used at a later time if you forget your password. 8. Enter your e-mail address. You will receive e-mail notification when new information is available in [address] 9. Click Sign Up. You can now view and download portions of your medical record. 10. Click the Download Summary menu link to download a portable copy of your medical information. Additional Information    If you have questions, please visit the Frequently Asked Questions section of the [facility] website at [email or web address] Remember, [facility] is NOT to be used for urgent needs. For medical emergencies, dial 911. Complete all medications as prescribed. Follow-up with primary care doctor in 1 week. Return to the ED immediately for any new or worsening symptoms.

## 2019-03-14 NOTE — ED TRIAGE NOTES
C/o cough productive of yellow sputum x 1 month. Denies fevers. Using nebulizer more frequently lately.

## 2019-03-19 ENCOUNTER — TELEPHONE (OUTPATIENT)
Dept: PULMONOLOGY | Age: 44
End: 2019-03-19

## 2019-03-19 RX ORDER — MONTELUKAST SODIUM 10 MG/1
TABLET ORAL
Qty: 30 TAB | Refills: 5 | Status: SHIPPED | OUTPATIENT
Start: 2019-03-19 | End: 2019-09-12 | Stop reason: SDUPTHER

## 2019-03-19 RX ORDER — ALBUTEROL SULFATE 90 UG/1
2 AEROSOL, METERED RESPIRATORY (INHALATION)
Qty: 3 INHALER | Refills: 1 | Status: SHIPPED | COMMUNITY
Start: 2019-03-19 | End: 2019-03-19 | Stop reason: SDUPTHER

## 2019-03-19 RX ORDER — FLUTICASONE PROPIONATE AND SALMETEROL 250; 50 UG/1; UG/1
1 POWDER RESPIRATORY (INHALATION) 2 TIMES DAILY
Qty: 1 INHALER | Refills: 11 | Status: SHIPPED | OUTPATIENT
Start: 2019-03-19 | End: 2019-03-19 | Stop reason: SDUPTHER

## 2019-03-19 RX ORDER — ALBUTEROL SULFATE 90 UG/1
2 AEROSOL, METERED RESPIRATORY (INHALATION)
Qty: 1 INHALER | Refills: 4 | Status: SHIPPED | OUTPATIENT
Start: 2019-03-19 | End: 2020-05-27 | Stop reason: SDUPTHER

## 2019-03-19 RX ORDER — FLUTICASONE PROPIONATE AND SALMETEROL 250; 50 UG/1; UG/1
1 POWDER RESPIRATORY (INHALATION) 2 TIMES DAILY
Qty: 1 INHALER | Refills: 11 | Status: SHIPPED | OUTPATIENT
Start: 2019-03-19 | End: 2019-04-10 | Stop reason: CLARIF

## 2019-03-19 RX ORDER — FLUTICASONE PROPIONATE AND SALMETEROL 250; 50 UG/1; UG/1
1 POWDER RESPIRATORY (INHALATION) 2 TIMES DAILY
Qty: 3 INHALER | Refills: 3 | Status: SHIPPED | COMMUNITY
Start: 2019-03-19 | End: 2019-03-19 | Stop reason: SDUPTHER

## 2019-03-19 NOTE — TELEPHONE ENCOUNTER
Per pt, [facility] Nolvia states she needs prior auth for her Symbicort and Albuterol inhalers. Please call pt once done at [phone].

## 2019-03-29 ENCOUNTER — CLINICAL SUPPORT (OUTPATIENT)
Dept: PULMONOLOGY | Age: 44
End: 2019-03-29

## 2019-03-29 DIAGNOSIS — J45.909 ASTHMA, UNSPECIFIED ASTHMA SEVERITY, UNSPECIFIED WHETHER COMPLICATED, UNSPECIFIED WHETHER PERSISTENT: Primary | ICD-10-CM

## 2019-03-29 NOTE — PATIENT INSTRUCTIONS
Be aware that \"Anaphylaxis\" can occur any time after receiving a Xolair injection   Anaphylaxis presenting a bronchospasm, hypotension (low BP), dizziness, faintness, hives, and/or swelling of the tongue and throat. Anaphylaxis can occur after the first dose, but also has occurred beyond one year after beginning treatment with Xolair. *If such symptoms occur, use your EpiPen immediately and call 911. *     Notify your MD office following any treatment of possible reaction. Epinephrine (Injection)   Epinephrine ([id number])  Treats severe allergic reactions (including anaphylaxis) in an emergency situation. Brand Name(s):Adrenaclick, Adrenalin, Auvi-Q, EpiPen Auto-Injector, EpiPen Jr Auto-Injector, Epipen, Epipen 2-Jose Juan Auto-Injector, Epipen Jr 2-Jose Juan Auto-Injector, NovaPlus Epinephrine, Twinject   There may be other brand names for this medicine. When This Medicine Should Not Be Used:   A severe allergic reaction can be life-threatening, so there is no reason this medicine should not be used. How to Use This Medicine:   Injectable  · Your doctor should teach you how and when to inject this medicine. Each injection kit contains a single-use dose of medicine prescribed for you. · Give yourself a shot right away if you start to have a severe allergic reaction. · Inject this medicine into the muscle on the outside of your thigh only. Never inject this medicine into a vein, into your hand or foot, or into the muscles of your buttocks. · Read and follow the patient instructions that come with this medicine. Talk to your doctor or pharmacist if you have any questions. · This medicine might come with an autoinjector  so you can practice giving the medicine before you have an actual allergic reaction. The autoinjector  is gray (for EpiPen® or EpiPen Jr®) or beige (for [place]) and does not contain any medicine or needle.   · Do not remove the blue safety release (EpiPen® or EpiPen Jr®) or the gray end caps (Adrenaclick®) on the autoinjector until you are ready to use it. Do not put your thumb, fingers, or hand over the orange (EpiPen® or EpiPen Jr®) or red tip (Adrenaclick®). · You may need to use more than one injection if your allergic reaction does not get better after the first shot. Your doctor will give you additional doses if you need more than 2 injections. · You may inject the medicine through your clothing, if you need to. · Some liquid will remain in the autoinjector after the medicine has been injected. This medicine cannot be reused. Give your used autoinjector to your healthcare provider when you seek medical care. · Carry this medicine with you at all times for emergency use in case you have a severe allergic reaction. · Make sure family members or other people you are with know how to inject the medicine in case you are not able to do it yourself. · Check your injection kits regularly to make sure the liquid has not changed color. You should not use the autoinjector if the liquid has changed color, or if there are solids in the liquid. You should not use the autoinjector if the expiration date has passed. · Store the injection kit at room temperature, away from heat, moisture, and direct light. Do not store the medicine in the refrigerator or freezer, or inside a car. · Keep the autoinjector in its case or carrier tube to protect it from damage. This tube is not waterproof. If you accidentally drop it, check for damage or leaks. Drugs and Foods to Avoid:   Ask your doctor or pharmacist before using any other medicine, including over-the-counter medicines, vitamins, and herbal products. · Some foods and medicines can affect how epinephrine works.  Tell your doctor if you are using digoxin, levothyroxine, phentolamine, certain allergy medicines (such as chlorpheniramine, diphenhydramine, tripelennamine), a beta blocker medicine (such as propranolol), a heart rhythm medicine, a diuretic (water pill), an MAO inhibitor (MAOI), medicine for depression, or ergot medicines. Warnings While Using This Medicine:   · Tell your doctor if you are pregnant or breastfeeding, or if you have asthma, diabetes, heart disease, heart rhythm problems, high blood pressure, an overactive thyroid, or Parkinson disease. · A severe allergic reaction is a medical emergency. Go to an emergency room as soon as possible, even if you feel better after you use this medicine. · Do not inject this medicine into your hands or feet. Go to the emergency room right away if you accidently inject epinephrine into any part of your body other than your thigh. Epinephrine reduces blood flow, and this could damage areas that have small blood vessels, such as hands and feet. · Keep all medicine out of the reach of children. Never share your medicine with anyone. Possible Side Effects While Using This Medicine:   Call your doctor right away if you notice any of these side effects:  · Chest pain  · Fast, pounding, or uneven heartbeat  · Heavy sweating, nausea, vomiting  · Tremors, shakiness  · Trouble breathing  If you notice these less serious side effects, talk with your doctor:   · Feeling anxious, nervous, scared, or weak  · Headache or dizziness  · Pale skin  If you notice other side effects that you think are caused by this medicine, tell your doctor. Call your doctor for medical advice about side effects. You may report side effects to FDA at 6-130-FDA-6757  © 2014 [address] is for End User's use only and may not be sold, redistributed or otherwise used for commercial purposes. The above information is an  only. It is not intended as medical advice for individual conditions or treatments. Talk to your doctor, nurse or pharmacist before following any medical regimen to see if it is safe and effective for you.

## 2019-03-29 NOTE — PROGRESS NOTES
Chief Complaint   Patient presents with   Dinorah Nunesch is here for Xolair injection. Patient did not have any reaction to last injection. Patient states the Xolair is helping her Asthma. Injected subcutaneous : 150 mg given in Left upper arm SQ,  150 mg given in Right upper arm SQ. Lot Number: [id number]  Exp Date: 6/2022  Total Xolair Dose: 300 mg every 4 weeks  Epipen is with patient. Patient observed for 15 minutes. No reaction at injection site. Patient given Xolair reaction sheet.

## 2019-04-08 ENCOUNTER — TELEPHONE (OUTPATIENT)
Dept: PULMONOLOGY | Age: 44
End: 2019-04-08

## 2019-04-08 NOTE — TELEPHONE ENCOUNTER
The pt. Is contacted re: which MDI she is using. We received a fax from [facility] req. 90 day supply on Symbicort. I note on the chart 3/19/19 that Symbicort needed an authorization but, I don't see a note stating it was done. At the same time, Advair is placed as a Med order. A message is left re: same.

## 2019-04-09 NOTE — TELEPHONE ENCOUNTER
The pt. Returns my call stating that yes, due to Insurance she was switched from Symbicort to Advair. The pharmacy is notified of same.

## 2019-04-10 RX ORDER — BUDESONIDE AND FORMOTEROL FUMARATE DIHYDRATE 160; 4.5 UG/1; UG/1
AEROSOL RESPIRATORY (INHALATION)
Qty: 10.2 INHALER | Refills: 4 | Status: SHIPPED | OUTPATIENT
Start: 2019-04-10 | End: 2019-07-03

## 2019-04-17 ENCOUNTER — HOSPITAL ENCOUNTER (EMERGENCY)
Age: 44
Discharge: HOME | End: 2019-04-17
Attending: EMERGENCY MEDICINE
Payer: MEDICAID

## 2019-04-17 VITALS
DIASTOLIC BLOOD PRESSURE: 75 MMHG | SYSTOLIC BLOOD PRESSURE: 131 MMHG | HEIGHT: 65 IN | WEIGHT: 150 LBS | OXYGEN SATURATION: 100 % | HEART RATE: 114 BPM | TEMPERATURE: 99.5 F | RESPIRATION RATE: 20 BRPM | BODY MASS INDEX: 24.99 KG/M2

## 2019-04-17 DIAGNOSIS — H66.90 ACUTE OTITIS MEDIA, UNSPECIFIED OTITIS MEDIA TYPE: Primary | ICD-10-CM

## 2019-04-17 PROCEDURE — 74011250637 HC RX REV CODE- 250/637: Performed by: PHYSICIAN ASSISTANT

## 2019-04-17 PROCEDURE — 99283 EMERGENCY DEPT VISIT LOW MDM: CPT

## 2019-04-17 RX ORDER — AZITHROMYCIN 250 MG/1
TABLET, FILM COATED ORAL
Qty: 6 TAB | Refills: 0 | Status: SHIPPED | OUTPATIENT
Start: 2019-04-17 | End: 2019-05-20 | Stop reason: ALTCHOICE

## 2019-04-17 RX ORDER — TRAMADOL HYDROCHLORIDE 50 MG/1
50 TABLET ORAL
Qty: 18 TAB | Refills: 0 | Status: SHIPPED | OUTPATIENT
Start: 2019-04-17 | End: 2019-04-20

## 2019-04-17 RX ORDER — HYDROCODONE BITARTRATE AND ACETAMINOPHEN 5; 325 MG/1; MG/1
1 TABLET ORAL
Status: COMPLETED | OUTPATIENT
Start: 2019-04-17 | End: 2019-04-17

## 2019-04-17 RX ADMIN — HYDROCODONE BITARTRATE AND ACETAMINOPHEN 1 TABLET: 5; 325 TABLET ORAL at 19:35

## 2019-04-17 NOTE — ED PROVIDER NOTES
EMERGENCY DEPARTMENT HISTORY AND PHYSICAL EXAM 
 
Date: 4/17/2019 Patient Name: Esdras Leal History of Presenting Illness Chief Complaint Patient presents with  Ear Pain  Headache History Provided By: patient Additional History (Context): Esdras Leal is a 43yo F here with left ear, jaw and head pain for 3 days. No headache, dizziness, fever, chills, ENT sx, neck pain or any other sx. Pt states pain is mainly in ear and radiates into left jaw and cheek. PCP: Pilo Nicholas MD 
 
Current Facility-Administered Medications Medication Dose Route Frequency Provider Last Rate Last Dose  
 HYDROcodone-acetaminophen (NORCO) 5-325 mg per tablet 1 Tab  1 Tab Oral NOW Brian Jones PA-C Current Outpatient Medications Medication Sig Dispense Refill  SYMBICORT 160-4.5 mcg/actuation HFAA INHALE 2 PUFFS BY MOUTH TWICE A DAY 10.2 Inhaler 4  
 omalizumab (XOLAIR) 150 mg solr 300 mg by SubCUTAneous route every thirty (30) days. 300 mg 0  
 montelukast (SINGULAIR) 10 mg tablet TAKE 1 TABLET BY MOUTH EVERY DAY 30 Tab 5  
 albuterol (PROVENTIL HFA, VENTOLIN HFA, PROAIR HFA) 90 mcg/actuation inhaler Take 2 Puffs by inhalation every four (4) hours as needed for Wheezing. 1 Inhaler 4  predniSONE (STERAPRED DS) 10 mg dose pack Use as directed 21 Tab 0  
 azithromycin (ZITHROMAX) 250 mg tablet Use as directed 6 Tab 0  
 amitriptyline (ELAVIL) 75 mg tablet Take 1 Tab by mouth nightly. 30 Tab 3  
 albuterol-ipratropium (DUO-NEB) 2.5 mg-0.5 mg/3 ml nebu INHALE CONTENTS OF 1 VIAL VIA NEBULIZER EVERY 4 HOURS AS NEEDED FOR SHORTNESS OF BREATH/WHEEZING. 180 mL 5  propranolol LA (INDERAL LA) 60 mg SR capsule Take 1 Cap by mouth daily. 30 Cap 3  
 rizatriptan (MAXALT) 10 mg tablet Take one tablet at HA onset, May repeat in 2 hours if needed. Max two tabs in 24 hours.  12 Tab 3  
 ondansetron (ZOFRAN ODT) 4 mg disintegrating tablet TAKE 1 TAB BY MOUTH EVERY EIGHT (8) HOURS AS NEEDED FOR NAUSEA. 15 Tab 1  cetirizine (ZYRTEC) 10 mg tablet Take one tablets by mouth daily for 7 days and then as needed after that. Restart forseven days if sinus congestion recurs. 15 Tab 0  
 fluticasone (FLONASE) 50 mcg/actuation nasal spray INSTILL 2 SPRAYS INTO EACH NOSTRIL ONCE DAILY 1 Bottle 2  
 ALLEGRA-D 24 HOUR 180-240 mg per tablet TAKE 1 TABLET BY MOUTH DAILY 15 Tab 0 Past History Past Medical History: 
Past Medical History:  
Diagnosis Date  Anxiety attack  Asthma  Asthmatic bronchitis  Catamenial disorder  Hernia  Migraine  Migraines  Mild asthma  Psychiatric disorder   
 anxiety  Torticollis Past Surgical History: 
Past Surgical History:  
Procedure Laterality Date   DELIVERY ONLY    
 HX BACK SURGERY    
 HX  SECTION  2009 Family History: 
Family History Problem Relation Age of Onset  Heart Disease Other   
     grandmothers  Asthma Brother  Lung Disease Paternal Aunt   
     cronic bronchitis  Asthma Paternal Grandmother  High Cholesterol Mother Social History: 
Social History Tobacco Use  Smoking status: Never Smoker  Smokeless tobacco: Never Used Substance Use Topics  Alcohol use: No  
 Drug use: No  
 
 
Allergies: Allergies Allergen Reactions  Aspirin Anaphylaxis  Ibuprofen Anaphylaxis  Pcn [Penicillins] Itching  Claritin [Loratadine] Anxiety and Cough  Phenergan [Promethazine] Anxiety and Swelling  Seafood Swelling  Shellfish Containing Products Hives Review of Systems Review of Systems Constitutional: Negative for chills and fever. HENT: Negative for nasal congestion, sore throat, rhinorrhea pos for ear pain Eyes: Negative. Respiratory: Negative for cough and negative for shortness of breath. Cardiovascular: Negative for chest pain and palpitations. Gastrointestinal: Negative for abdominal pain, constipation, diarrhea, nausea and vomiting. Genitourinary: Negative. Negative for difficulty urinating and flank pain. Musculoskeletal: Negative for back pain. Negative for gait problem and neck pain. Skin: Negative. Allergic/Immunologic: Negative. Neurological: Negative for dizziness, weakness, numbness and headaches. Psychiatric/Behavioral: Negative. All other systems reviewed and are negative. All Other Systems Negative Physical Exam  
 
Vitals:  
 04/17/19 1933 BP: 131/75 Pulse: (!) 114 Resp: 20 Temp: 99.5 °F (37.5 °C) SpO2: 100% Weight: 68 kg (150 lb) Height: 5' 5\" (1.651 m) Physical Exam  
Constitutional: She is oriented to person, place, and time. She appears well-developed and well-nourished. No distress. NAD, well hydrated, non toxic HENT:  
Head: Normocephalic and atraumatic. Right Ear: Ear canal normal. No mastoid tenderness. Left Ear: Ear canal normal. No mastoid tenderness. Tympanic membrane is injected and bulging. Tympanic membrane mobility is abnormal.  
Nose: Nose normal.  
Mouth/Throat: Oropharynx is clear and moist. No oropharyngeal exudate, posterior oropharyngeal edema, posterior oropharyngeal erythema or tonsillar abscesses. Eyes: Pupils are equal, round, and reactive to light. Conjunctivae and EOM are normal.  
Neck: Normal range of motion. Neck supple. Cardiovascular: Normal rate, regular rhythm and normal heart sounds. No murmur heard. Pulmonary/Chest: Effort normal and breath sounds normal. No respiratory distress. She has no wheezes. She has no rales. Abdominal: Soft. She exhibits no distension. There is no tenderness. There is no guarding. Musculoskeletal: Normal range of motion. Lymphadenopathy:  
  She has no cervical adenopathy. Neurological: She is alert and oriented to person, place, and time. No cranial nerve deficit.  Coordination normal.  
 Skin: Skin is warm. No rash noted. She is not diaphoretic. Psychiatric: She has a normal mood and affect. Her behavior is normal.  
Nursing note and vitals reviewed. Diagnostic Study Results Labs - No results found for this or any previous visit (from the past 12 hour(s)). Radiologic Studies - No orders to display CT Results  (Last 48 hours) None CXR Results  (Last 48 hours) None Medical Decision Making I am the first provider for this patient. I reviewed the vital signs, available nursing notes, past medical history, past surgical history, family history and social history. Vital Signs-Reviewed the patient's vital signs. Records Reviewed: Nursing notes, old medical records and any previous labs, imaging, visits, consultations pertinent to patient care Procedures: 
Procedures Provider Notes (Medical Decision Making): Well-appearing 77-year-old here with left temporal and ear pain. TM infected on exam.  Signs symptoms consistent with otitis media. Patient is in significant pain which is most likely reason for elevated heart rate. No concern for sepsis. Afebrile. Will discharge home with antibiotics and pain control. MED RECONCILIATION: 
Current Facility-Administered Medications Medication Dose Route Frequency  HYDROcodone-acetaminophen (NORCO) 5-325 mg per tablet 1 Tab  1 Tab Oral NOW Current Outpatient Medications Medication Sig  
 SYMBICORT 160-4.5 mcg/actuation HFAA INHALE 2 PUFFS BY MOUTH TWICE A DAY  omalizumab (XOLAIR) 150 mg solr 300 mg by SubCUTAneous route every thirty (30) days.  montelukast (SINGULAIR) 10 mg tablet TAKE 1 TABLET BY MOUTH EVERY DAY  albuterol (PROVENTIL HFA, VENTOLIN HFA, PROAIR HFA) 90 mcg/actuation inhaler Take 2 Puffs by inhalation every four (4) hours as needed for Wheezing.  predniSONE (STERAPRED DS) 10 mg dose pack Use as directed  azithromycin (ZITHROMAX) 250 mg tablet Use as directed  amitriptyline (ELAVIL) 75 mg tablet Take 1 Tab by mouth nightly.  albuterol-ipratropium (DUO-NEB) 2.5 mg-0.5 mg/3 ml nebu INHALE CONTENTS OF 1 VIAL VIA NEBULIZER EVERY 4 HOURS AS NEEDED FOR SHORTNESS OF BREATH/WHEEZING.  propranolol LA (INDERAL LA) 60 mg SR capsule Take 1 Cap by mouth daily.  rizatriptan (MAXALT) 10 mg tablet Take one tablet at HA onset, May repeat in 2 hours if needed. Max two tabs in 24 hours.  ondansetron (ZOFRAN ODT) 4 mg disintegrating tablet TAKE 1 TAB BY MOUTH EVERY EIGHT (8) HOURS AS NEEDED FOR NAUSEA.  cetirizine (ZYRTEC) 10 mg tablet Take one tablets by mouth daily for 7 days and then as needed after that. Restart forseven days if sinus congestion recurs.  fluticasone (FLONASE) 50 mcg/actuation nasal spray INSTILL 2 SPRAYS INTO EACH NOSTRIL ONCE DAILY  ALLEGRA-D 24 HOUR 180-240 mg per tablet TAKE 1 TABLET BY MOUTH DAILY Disposition: 
home DISCHARGE NOTE:  
 
Pt has been reexamined. Patient has no new complaints, changes, or physical findings. Care plan outlined and precautions discussed. Discussed proper way to take medications. Discussed treatment plan, return precautions, symptomatic relief, and expected time to improvement. All questions answered. Patient is stable for discharge and outpatient management. Patient is ready to go home. Follow-up Information Follow up With Specialties Details Why Contact Info [phone] [facility] EMERGENCY DEPT Emergency Medicine   Nick Carrera [place] [id number] [phone] Pilo Nicholas MD [address] 
[phone] Current Discharge Medication List  
  
 
 
 
 
 
Diagnosis Clinical Impression: 1. Acute otitis media, unspecified otitis media type

## 2019-04-17 NOTE — ED TRIAGE NOTES
Patient states onset of left ear pain on Monday. She c/o pain with movement of her jaw. C/o migraine headache.

## 2019-04-17 NOTE — DISCHARGE INSTRUCTIONS

## 2019-04-26 ENCOUNTER — CLINICAL SUPPORT (OUTPATIENT)
Dept: PULMONOLOGY | Age: 44
End: 2019-04-26

## 2019-04-26 DIAGNOSIS — J45.909 ASTHMA, UNSPECIFIED ASTHMA SEVERITY, UNSPECIFIED WHETHER COMPLICATED, UNSPECIFIED WHETHER PERSISTENT: Primary | ICD-10-CM

## 2019-04-26 NOTE — PATIENT INSTRUCTIONS
Be aware that \"Anaphylaxis\" can occur any time after receiving a Xolair injection   Anaphylaxis presenting a bronchospasm, hypotension (low BP), dizziness, faintness, hives, and/or swelling of the tongue and throat. Anaphylaxis can occur after the first dose, but also has occurred beyond one year after beginning treatment with Xolair. *If such symptoms occur, use your EpiPen immediately and call 911. *     Notify your MD office following any treatment of possible reaction. Epinephrine (Injection)   Epinephrine ([id number]-rin)  Treats severe allergic reactions (including anaphylaxis) in an emergency situation. Brand Name(s):Adrenaclick, Adrenalin, Auvi-Q, EpiPen Auto-Injector, EpiPen Jr Auto-Injector, Epipen, Epipen 2-Jose Juan Auto-Injector, Epipen Jr 2-Jose Juan Auto-Injector, NovaPlus Epinephrine, Twinject   There may be other brand names for this medicine. When This Medicine Should Not Be Used:   A severe allergic reaction can be life-threatening, so there is no reason this medicine should not be used. How to Use This Medicine:   Injectable  · Your doctor should teach you how and when to inject this medicine. Each injection kit contains a single-use dose of medicine prescribed for you. · Give yourself a shot right away if you start to have a severe allergic reaction. · Inject this medicine into the muscle on the outside of your thigh only. Never inject this medicine into a vein, into your hand or foot, or into the muscles of your buttocks. · Read and follow the patient instructions that come with this medicine. Talk to your doctor or pharmacist if you have any questions. · This medicine might come with an autoinjector  so you can practice giving the medicine before you have an actual allergic reaction. The autoinjector  is gray (for EpiPen® or EpiPen Jr®) or beige (for [place]) and does not contain any medicine or needle.   · Do not remove the blue safety release (EpiPen® or EpiPen Jr®) or the gray end caps (Adrenaclick®) on the autoinjector until you are ready to use it. Do not put your thumb, fingers, or hand over the orange (EpiPen® or EpiPen Jr®) or red tip (Adrenaclick®). · You may need to use more than one injection if your allergic reaction does not get better after the first shot. Your doctor will give you additional doses if you need more than 2 injections. · You may inject the medicine through your clothing, if you need to. · Some liquid will remain in the autoinjector after the medicine has been injected. This medicine cannot be reused. Give your used autoinjector to your healthcare provider when you seek medical care. · Carry this medicine with you at all times for emergency use in case you have a severe allergic reaction. · Make sure family members or other people you are with know how to inject the medicine in case you are not able to do it yourself. · Check your injection kits regularly to make sure the liquid has not changed color. You should not use the autoinjector if the liquid has changed color, or if there are solids in the liquid. You should not use the autoinjector if the expiration date has passed. · Store the injection kit at room temperature, away from heat, moisture, and direct light. Do not store the medicine in the refrigerator or freezer, or inside a car. · Keep the autoinjector in its case or carrier tube to protect it from damage. This tube is not waterproof. If you accidentally drop it, check for damage or leaks. Drugs and Foods to Avoid:   Ask your doctor or pharmacist before using any other medicine, including over-the-counter medicines, vitamins, and herbal products. · Some foods and medicines can affect how epinephrine works.  Tell your doctor if you are using digoxin, levothyroxine, phentolamine, certain allergy medicines (such as chlorpheniramine, diphenhydramine, tripelennamine), a beta blocker medicine (such as propranolol), a heart rhythm medicine, a diuretic (water pill), an MAO inhibitor (MAOI), medicine for depression, or ergot medicines. Warnings While Using This Medicine:   · Tell your doctor if you are pregnant or breastfeeding, or if you have asthma, diabetes, heart disease, heart rhythm problems, high blood pressure, an overactive thyroid, or Parkinson disease. · A severe allergic reaction is a medical emergency. Go to an emergency room as soon as possible, even if you feel better after you use this medicine. · Do not inject this medicine into your hands or feet. Go to the emergency room right away if you accidently inject epinephrine into any part of your body other than your thigh. Epinephrine reduces blood flow, and this could damage areas that have small blood vessels, such as hands and feet. · Keep all medicine out of the reach of children. Never share your medicine with anyone. Possible Side Effects While Using This Medicine:   Call your doctor right away if you notice any of these side effects:  · Chest pain  · Fast, pounding, or uneven heartbeat  · Heavy sweating, nausea, vomiting  · Tremors, shakiness  · Trouble breathing  If you notice these less serious side effects, talk with your doctor:   · Feeling anxious, nervous, scared, or weak  · Headache or dizziness  · Pale skin  If you notice other side effects that you think are caused by this medicine, tell your doctor. Call your doctor for medical advice about side effects. You may report side effects to FDA at 6-597-FDA-2100  © 2014 [address] is for End User's use only and may not be sold, redistributed or otherwise used for commercial purposes. The above information is an  only. It is not intended as medical advice for individual conditions or treatments. Talk to your doctor, nurse or pharmacist before following any medical regimen to see if it is safe and effective for you.

## 2019-05-02 NOTE — TELEPHONE ENCOUNTER
A fax is received from [facility] pharmacy req. Refill on Symbicort. Once again as on 3/19 I checked with the pt. And she states she is using Advair, Ins. Wouldn't cover Symbicort and the Advair is working very well for her.

## 2019-05-20 ENCOUNTER — OFFICE VISIT (OUTPATIENT)
Dept: NEUROLOGY | Age: 44
End: 2019-05-20

## 2019-05-20 VITALS
HEART RATE: 95 BPM | BODY MASS INDEX: 27.16 KG/M2 | HEIGHT: 65 IN | SYSTOLIC BLOOD PRESSURE: 104 MMHG | DIASTOLIC BLOOD PRESSURE: 76 MMHG | RESPIRATION RATE: 16 BRPM | OXYGEN SATURATION: 98 % | WEIGHT: 163 LBS | TEMPERATURE: 98.6 F

## 2019-05-20 DIAGNOSIS — G43.019 INTRACTABLE MIGRAINE WITHOUT AURA AND WITHOUT STATUS MIGRAINOSUS: ICD-10-CM

## 2019-05-20 DIAGNOSIS — G43.839 INTRACTABLE MENSTRUAL MIGRAINE WITHOUT STATUS MIGRAINOSUS: Primary | ICD-10-CM

## 2019-05-20 RX ORDER — VERAPAMIL HYDROCHLORIDE 120 MG/1
120 TABLET, FILM COATED, EXTENDED RELEASE ORAL
Qty: 30 TAB | Refills: 3 | Status: SHIPPED | OUTPATIENT
Start: 2019-05-20 | End: 2019-07-03

## 2019-05-20 RX ORDER — RIZATRIPTAN BENZOATE 10 MG/1
TABLET ORAL
Qty: 12 TAB | Refills: 3 | Status: SHIPPED | OUTPATIENT
Start: 2019-05-20 | End: 2019-07-03

## 2019-05-20 NOTE — PROGRESS NOTES
ROOM # 4    Audi Blanton presents today for   Chief Complaint   Patient presents with    Follow-up    Migraine         Visit Vitals  /76 (BP 1 Location: Left arm, BP Patient Position: Sitting)   Pulse 95   Temp 98.6 °F (37 °C) (Oral)   Resp 16   Ht 5' 5\" (1.651 m)   Wt 163 lb (73.9 kg)   SpO2 98%   BMI 27.12 kg/m²         Advance Directive:  1. Do you have an advance directive in place? Patient Reply: No    2. If not, would you like material regarding how to put one in place? Patient Reply: No    Coordination of Care:  1. Have you been to the ER, urgent care clinic since your last visit? Hospitalized since your last visit?  No

## 2019-05-20 NOTE — PROGRESS NOTES
5/20/2019 3:14 PM    SSN: [id number]    Subjective:   56-year-old female who returns for a chief complaint of menstrually related migraines. Last time here in January I discussed the use of propranolol, I counseled her about the risks, but since she has severe asthma, she is on omalizumab and had a asthma attack severe enough that she needed to be intubated back in 2005, she discussed it with her pharmacy and she opted not to start this. She has continued to take recent return as needed without prevention medication. She reports she has continued to have frequent headaches, they are usually quite bad the week before. .  She estimates perhaps she may have 10-12 headache free days per month, she has a headache each of the other days, usually with light and noise sensitivity as well as nausea. Recent triptan does give her some relief.       Social History     Socioeconomic History    Marital status:      Spouse name: Not on file    Number of children: Not on file    Years of education: Not on file    Highest education level: Not on file   Occupational History    Not on file   Social Needs    Financial resource strain: Not on file    Food insecurity:     Worry: Not on file     Inability: Not on file    Transportation needs:     Medical: Not on file     Non-medical: Not on file   Tobacco Use    Smoking status: Never Smoker    Smokeless tobacco: Never Used   Substance and Sexual Activity    Alcohol use: No    Drug use: No    Sexual activity: Yes     Partners: Male   Lifestyle    Physical activity:     Days per week: Not on file     Minutes per session: Not on file    Stress: Not on file   Relationships    Social connections:     Talks on phone: Not on file     Gets together: Not on file     Attends Judaism service: Not on file     Active member of club or organization: Not on file     Attends meetings of clubs or organizations: Not on file     Relationship status: Not on file    Intimate partner violence:     Fear of current or ex partner: Not on file     Emotionally abused: Not on file     Physically abused: Not on file     Forced sexual activity: Not on file   Other Topics Concern    Not on file   Social History Narrative    Not on file       Family History   Problem Relation Age of Onset    Heart Disease Other         grandmothers    Asthma Brother     Lung Disease Paternal Aunt         cronic bronchitis    Asthma Paternal Grandmother     High Cholesterol Mother        Current Outpatient Medications   Medication Sig Dispense Refill    rizatriptan (MAXALT) 10 mg tablet Take one tablet at HA onset, May repeat in 2 hours if needed. Max two tabs in 24 hours. 12 Tab 3    verapamil ER (CALAN-SR) 120 mg tablet Take 1 Tab by mouth nightly. 30 Tab 3    omalizumab (XOLAIR) 150 mg solr 300 mg by SubCUTAneous route every thirty (30) days. 300 mg 0    montelukast (SINGULAIR) 10 mg tablet TAKE 1 TABLET BY MOUTH EVERY DAY 30 Tab 5    albuterol (PROVENTIL HFA, VENTOLIN HFA, PROAIR HFA) 90 mcg/actuation inhaler Take 2 Puffs by inhalation every four (4) hours as needed for Wheezing. 1 Inhaler 4    albuterol-ipratropium (DUO-NEB) 2.5 mg-0.5 mg/3 ml nebu INHALE CONTENTS OF 1 VIAL VIA NEBULIZER EVERY 4 HOURS AS NEEDED FOR SHORTNESS OF BREATH/WHEEZING. 180 mL 5    ondansetron (ZOFRAN ODT) 4 mg disintegrating tablet TAKE 1 TAB BY MOUTH EVERY EIGHT (8) HOURS AS NEEDED FOR NAUSEA. 15 Tab 1    cetirizine (ZYRTEC) 10 mg tablet Take one tablets by mouth daily for 7 days and then as needed after that. Restart forseven days if sinus congestion recurs.  15 Tab 0    fluticasone (FLONASE) 50 mcg/actuation nasal spray INSTILL 2 SPRAYS INTO EACH NOSTRIL ONCE DAILY 1 Bottle 2    ALLEGRA-D 24 HOUR 180-240 mg per tablet TAKE 1 TABLET BY MOUTH DAILY 15 Tab 0    SYMBICORT 160-4.5 mcg/actuation HFAA INHALE 2 PUFFS BY MOUTH TWICE A DAY 10.2 Inhaler 4    predniSONE (STERAPRED DS) 10 mg dose pack Use as directed 21 Tab 0       Past Medical History:   Diagnosis Date    Anxiety attack     Asthma     Asthmatic bronchitis     Catamenial disorder     Hernia     Migraine     Migraines     Mild asthma     Psychiatric disorder     anxiety     Torticollis        Past Surgical History:   Procedure Laterality Date     DELIVERY ONLY      HX BACK SURGERY      HX  SECTION  2009       Allergies   Allergen Reactions    Aspirin Anaphylaxis    Ibuprofen Anaphylaxis    Pcn [Penicillins] Itching    Claritin [Loratadine] Anxiety and Cough    Phenergan [Promethazine] Anxiety and Swelling    Seafood Swelling    Shellfish Containing Products Hives       Vital signs:    Visit Vitals  /76 (BP 1 Location: Left arm, BP Patient Position: Sitting)   Pulse 95   Temp 98.6 °F (37 °C) (Oral)   Resp 16   Ht 5' 5\" (1.651 m)   Wt 73.9 kg (163 lb)   LMP  (LMP Unknown)   SpO2 98%   BMI 27.12 kg/m²       Review of Systems:   GENERAL: Denies fever or fatigue  CARDIAC: No CP or SOB  PULMONARY: No cough of SOB  MUSCULOSKELETAL: No new joint pain  NEURO: SEE HPI      EXAM: Alert, in NAD. Heart is regular. Oriented x3, EOM's are full, PERRL, no facial asymmetries. Strength and tone are normal. DTR's +2, gait symmetric           Assessment/Plan: Intractable menstrually related migraines, very frequent and in need of prevention treatment. Given her potential for complications with propranolol, I discussed options. She had failed Topamax in the past.  I will go ahead and try verapamil extended release 120 mg daily. Side effects were discussed. Valproic acid could be an option I do not like. Gabapentin is a second line agent that could be discussed if she is not any better. Botulinum toxin as well as CGRP inhibitors are options if she continues to remain on intractable. She will return in 3 months for reevaluation.           PLEASE NOTE:   Portions of this document may have been produced using voice recognition software. Unrecognized errors in transcription may be present. This note will not be viewable in [address]

## 2019-05-24 ENCOUNTER — CLINICAL SUPPORT (OUTPATIENT)
Dept: PULMONOLOGY | Age: 44
End: 2019-05-24

## 2019-05-24 DIAGNOSIS — J45.909 ASTHMA, UNSPECIFIED ASTHMA SEVERITY, UNSPECIFIED WHETHER COMPLICATED, UNSPECIFIED WHETHER PERSISTENT: Primary | ICD-10-CM

## 2019-05-24 NOTE — PATIENT INSTRUCTIONS
Be aware that \"Anaphylaxis\" can occur any time after receiving a Xolair injection   Anaphylaxis presenting a bronchospasm, hypotension (low BP), dizziness, faintness, hives, and/or swelling of the tongue and throat. Anaphylaxis can occur after the first dose, but also has occurred beyond one year after beginning treatment with Xolair. *If such symptoms occur, use your EpiPen immediately and call 911. *     Notify your MD office following any treatment of possible reaction. Epinephrine (Injection)   Epinephrine (hh-z-[id number]-rin)  Treats severe allergic reactions (including anaphylaxis) in an emergency situation. Brand Name(s):Adrenaclick, Adrenalin, Auvi-Q, EpiPen Auto-Injector, EpiPen Jr Auto-Injector, Epipen, Epipen 2-Jose Juan Auto-Injector, Epipen Jr 2-Jose Juan Auto-Injector, NovaPlus Epinephrine, Twinject   There may be other brand names for this medicine. When This Medicine Should Not Be Used:   A severe allergic reaction can be life-threatening, so there is no reason this medicine should not be used. How to Use This Medicine:   Injectable  · Your doctor should teach you how and when to inject this medicine. Each injection kit contains a single-use dose of medicine prescribed for you. · Give yourself a shot right away if you start to have a severe allergic reaction. · Inject this medicine into the muscle on the outside of your thigh only. Never inject this medicine into a vein, into your hand or foot, or into the muscles of your buttocks. · Read and follow the patient instructions that come with this medicine. Talk to your doctor or pharmacist if you have any questions. · This medicine might come with an autoinjector  so you can practice giving the medicine before you have an actual allergic reaction. The autoinjector  is gray (for EpiPen® or EpiPen Jr®) or beige (for [place]) and does not contain any medicine or needle.   · Do not remove the blue safety release (EpiPen® or EpiPen Jr®) or the gray end caps (Adrenaclick®) on the autoinjector until you are ready to use it. Do not put your thumb, fingers, or hand over the orange (EpiPen® or EpiPen Jr®) or red tip (Adrenaclick®). · You may need to use more than one injection if your allergic reaction does not get better after the first shot. Your doctor will give you additional doses if you need more than 2 injections. · You may inject the medicine through your clothing, if you need to. · Some liquid will remain in the autoinjector after the medicine has been injected. This medicine cannot be reused. Give your used autoinjector to your healthcare provider when you seek medical care. · Carry this medicine with you at all times for emergency use in case you have a severe allergic reaction. · Make sure family members or other people you are with know how to inject the medicine in case you are not able to do it yourself. · Check your injection kits regularly to make sure the liquid has not changed color. You should not use the autoinjector if the liquid has changed color, or if there are solids in the liquid. You should not use the autoinjector if the expiration date has passed. · Store the injection kit at room temperature, away from heat, moisture, and direct light. Do not store the medicine in the refrigerator or freezer, or inside a car. · Keep the autoinjector in its case or carrier tube to protect it from damage. This tube is not waterproof. If you accidentally drop it, check for damage or leaks. Drugs and Foods to Avoid:   Ask your doctor or pharmacist before using any other medicine, including over-the-counter medicines, vitamins, and herbal products. · Some foods and medicines can affect how epinephrine works.  Tell your doctor if you are using digoxin, levothyroxine, phentolamine, certain allergy medicines (such as chlorpheniramine, diphenhydramine, tripelennamine), a beta blocker medicine (such as propranolol), a heart rhythm medicine, a diuretic (water pill), an MAO inhibitor (MAOI), medicine for depression, or ergot medicines. Warnings While Using This Medicine:   · Tell your doctor if you are pregnant or breastfeeding, or if you have asthma, diabetes, heart disease, heart rhythm problems, high blood pressure, an overactive thyroid, or Parkinson disease. · A severe allergic reaction is a medical emergency. Go to an emergency room as soon as possible, even if you feel better after you use this medicine. · Do not inject this medicine into your hands or feet. Go to the emergency room right away if you accidently inject epinephrine into any part of your body other than your thigh. Epinephrine reduces blood flow, and this could damage areas that have small blood vessels, such as hands and feet. · Keep all medicine out of the reach of children. Never share your medicine with anyone. Possible Side Effects While Using This Medicine:   Call your doctor right away if you notice any of these side effects:  · Chest pain  · Fast, pounding, or uneven heartbeat  · Heavy sweating, nausea, vomiting  · Tremors, shakiness  · Trouble breathing  If you notice these less serious side effects, talk with your doctor:   · Feeling anxious, nervous, scared, or weak  · Headache or dizziness  · Pale skin  If you notice other side effects that you think are caused by this medicine, tell your doctor. Call your doctor for medical advice about side effects. You may report side effects to FDA at 0-172-FDA-7707  © 2014 [address] is for End User's use only and may not be sold, redistributed or otherwise used for commercial purposes. The above information is an  only. It is not intended as medical advice for individual conditions or treatments. Talk to your doctor, nurse or pharmacist before following any medical regimen to see if it is safe and effective for you.

## 2019-05-24 NOTE — PROGRESS NOTES
Chief Complaint   Patient presents with   Mini Mercado is here for Xolair injection. Patient did not have any reaction to last injection. Patient states the Xolair is helping her Asthma. Injected subcutaneous : 150 mg given in Left upper arm SQ,  150 mg given in Right upper arm SQ. Lot Number: [id number]  Exp Date: 9/2022  Total Xolair Dose: 300 mg every 4 weeks  Epipen is with patient. Patient observed for 15 minutes. No reaction at injection site. Patient given Xolair reaction sheet.

## 2019-06-17 ENCOUNTER — APPOINTMENT (OUTPATIENT)
Dept: CT IMAGING | Age: 44
End: 2019-06-17
Attending: EMERGENCY MEDICINE
Payer: MEDICAID

## 2019-06-17 ENCOUNTER — HOSPITAL ENCOUNTER (EMERGENCY)
Age: 44
Discharge: HOME | End: 2019-06-17
Attending: EMERGENCY MEDICINE
Payer: MEDICAID

## 2019-06-17 VITALS
HEART RATE: 75 BPM | RESPIRATION RATE: 16 BRPM | DIASTOLIC BLOOD PRESSURE: 68 MMHG | WEIGHT: 140 LBS | BODY MASS INDEX: 23.32 KG/M2 | SYSTOLIC BLOOD PRESSURE: 111 MMHG | HEIGHT: 65 IN | OXYGEN SATURATION: 98 % | TEMPERATURE: 98.7 F

## 2019-06-17 DIAGNOSIS — G43.001 MIGRAINE WITHOUT AURA AND WITH STATUS MIGRAINOSUS, NOT INTRACTABLE: Primary | ICD-10-CM

## 2019-06-17 LAB — HCG UR QL: NEGATIVE

## 2019-06-17 PROCEDURE — 96372 THER/PROPH/DIAG INJ SC/IM: CPT

## 2019-06-17 PROCEDURE — 99284 EMERGENCY DEPT VISIT MOD MDM: CPT

## 2019-06-17 PROCEDURE — 81025 URINE PREGNANCY TEST: CPT

## 2019-06-17 PROCEDURE — 74011250637 HC RX REV CODE- 250/637: Performed by: EMERGENCY MEDICINE

## 2019-06-17 PROCEDURE — 74011250636 HC RX REV CODE- 250/636: Performed by: EMERGENCY MEDICINE

## 2019-06-17 RX ORDER — ONDANSETRON 4 MG/1
4 TABLET, FILM COATED ORAL
Qty: 8 TAB | Refills: 0 | Status: SHIPPED | OUTPATIENT
Start: 2019-06-17 | End: 2019-08-20 | Stop reason: SDUPTHER

## 2019-06-17 RX ORDER — BUTALBITAL, ACETAMINOPHEN AND CAFFEINE 300; 40; 50 MG/1; MG/1; MG/1
CAPSULE ORAL
Qty: 8 CAP | Refills: 0 | Status: SHIPPED | OUTPATIENT
Start: 2019-06-17 | End: 2019-07-03

## 2019-06-17 RX ORDER — MORPHINE SULFATE 4 MG/ML
4 INJECTION INTRAVENOUS
Status: COMPLETED | OUTPATIENT
Start: 2019-06-17 | End: 2019-06-17

## 2019-06-17 RX ORDER — ONDANSETRON 4 MG/1
4 TABLET, ORALLY DISINTEGRATING ORAL
Status: COMPLETED | OUTPATIENT
Start: 2019-06-17 | End: 2019-06-17

## 2019-06-17 RX ADMIN — ONDANSETRON 4 MG: 4 TABLET, ORALLY DISINTEGRATING ORAL at 22:24

## 2019-06-17 RX ADMIN — MORPHINE SULFATE 4 MG: 4 INJECTION INTRAVENOUS at 22:27

## 2019-06-17 NOTE — LETTER
[address] [place] [address] [facility] 
[address] [facility] [id number] [phone] Work/School Note Date: 6/17/2019 To Whom It May concern: 
 
Audi Blanton was seen and treated today in the emergency room by the following provider(s): 
Attending Provider: Gladis Reyez MD.   
 
Audi Blanton may return to work on 06/19/2019. Sincerely, Yaquelin Moise RN

## 2019-06-18 NOTE — ED PROVIDER NOTES
HPI patient is a 81-year-old female who has a history of migraine headaches. This a.m. she developed a severe sudden onset of a headache similar to her past migraines howevere more severe associated with nausea. She states her headache is a pounding sensation with a scale of 10/10 associated with nausea. Her headache has been continuous and none relenting.     Past Medical History:   Diagnosis Date    Anxiety attack     Asthma     Asthmatic bronchitis     Catamenial disorder     Hernia     Migraine     Migraines     Mild asthma     Psychiatric disorder     anxiety     Torticollis        Past Surgical History:   Procedure Laterality Date     DELIVERY ONLY      HX BACK SURGERY      HX  SECTION  2009         Family History:   Problem Relation Age of Onset    Heart Disease Other         grandmothers    Asthma Brother     Lung Disease Paternal Aunt         cronic bronchitis    Asthma Paternal Grandmother     High Cholesterol Mother        Social History     Socioeconomic History    Marital status:      Spouse name: Not on file    Number of children: Not on file    Years of education: Not on file    Highest education level: Not on file   Occupational History    Not on file   Social Needs    Financial resource strain: Not on file    Food insecurity:     Worry: Not on file     Inability: Not on file    Transportation needs:     Medical: Not on file     Non-medical: Not on file   Tobacco Use    Smoking status: Never Smoker    Smokeless tobacco: Never Used   Substance and Sexual Activity    Alcohol use: No    Drug use: No    Sexual activity: Yes     Partners: Male   Lifestyle    Physical activity:     Days per week: Not on file     Minutes per session: Not on file    Stress: Not on file   Relationships    Social connections:     Talks on phone: Not on file     Gets together: Not on file     Attends Yarsanism service: Not on file     Active member of club or organization: Not on file     Attends meetings of clubs or organizations: Not on file     Relationship status: Not on file    Intimate partner violence:     Fear of current or ex partner: Not on file     Emotionally abused: Not on file     Physically abused: Not on file     Forced sexual activity: Not on file   Other Topics Concern    Not on file   Social History Narrative    Not on file         ALLERGIES: Aspirin; Ibuprofen; Pcn [penicillins]; Claritin [loratadine]; Phenergan [promethazine]; Seafood; and Shellfish containing products    Review of Systems   Constitutional: Negative. HENT: Negative. Eyes: Negative. Respiratory: Negative. Cardiovascular: Negative. Gastrointestinal: Negative. Endocrine: Negative. Genitourinary: Negative. Musculoskeletal: Negative. Skin: Negative. Allergic/Immunologic: Negative. Neurological: Negative. Hematological: Negative. Psychiatric/Behavioral: Negative. All other systems reviewed and are negative. Vitals:    06/17/19 2041   BP: 134/82   Pulse: 93   Resp: 18   Temp: 99 °F (37.2 °C)   SpO2: 99%   Weight: 63.5 kg (140 lb)   Height: 5' 5\" (1.651 m)            Physical Exam   Constitutional: She is oriented to person, place, and time. She appears well-developed and well-nourished. No distress. HENT:   Head: Normocephalic. Right Ear: External ear normal.   Left Ear: External ear normal.   Mouth/Throat: No oropharyngeal exudate. Eyes: Pupils are equal, round, and reactive to light. Conjunctivae and EOM are normal. Right eye exhibits no discharge. Left eye exhibits no discharge. No scleral icterus. Neck: Normal range of motion. Neck supple. No JVD present. No tracheal deviation present. No thyromegaly present. Cardiovascular: Normal rate, regular rhythm, normal heart sounds and intact distal pulses. Exam reveals no gallop and no friction rub. No murmur heard.   Pulmonary/Chest: Effort normal and breath sounds normal. No stridor. No respiratory distress. She has no wheezes. She has no rales. She exhibits no tenderness. Abdominal: Soft. Bowel sounds are normal. She exhibits no distension and no mass. There is no tenderness. There is no rebound and no guarding. Musculoskeletal: Normal range of motion. She exhibits no edema or tenderness. Lymphadenopathy:     She has no cervical adenopathy. Neurological: She is alert and oriented to person, place, and time. She displays normal reflexes. No cranial nerve deficit. She exhibits normal muscle tone. Coordination normal.   Skin: Skin is warm and dry. No rash noted. She is not diaphoretic. No erythema. No pallor. Nursing note and vitals reviewed. MDM: Differential diagnosis: Migraine, tension headache, viral syndrome,       Procedures: CT scan of head: patient refused. She requested migraine headache treatment only and discharge. Hospital course: Patient treated with IM morphine and Zofran. Re-assess patient, her headache improved. Patient requested D/C home. Dx; migraine    Disposition: Discharge home, follow-up with PCP    Dictation disclaimer:  Please note that this dictation was completed with [employer], the [employer] voice recognition software. Quite often unanticipated grammatical, syntax, homophones, and other interpretive errors are inadvertently transcribed by the computer software. Please disregard these errors. Please excuse any errors that have escaped final proofreading.

## 2019-06-18 NOTE — ED TRIAGE NOTES
C/o left temporal headache down to left neck with spasms, neck stiffness; onset today while at work 1130 am took tylenol with no relief

## 2019-06-18 NOTE — ED NOTES
I have reviewed discharge instructions with the patient. The patient verbalized understanding. Medication teaching given, to include name, dose, action, and side effects. Patient verbalized understanding of medications. Encouraged patient to voice any concerns with reassurance provided. Instructed not to drive or use heavy machinery while taking this medication. Patient states they have someone to drive them home. Patient armband removed and shredded    Patient Discharged in stable condition.

## 2019-06-21 ENCOUNTER — CLINICAL SUPPORT (OUTPATIENT)
Dept: PULMONOLOGY | Age: 44
End: 2019-06-21

## 2019-06-21 DIAGNOSIS — J45.909 ASTHMA, UNSPECIFIED ASTHMA SEVERITY, UNSPECIFIED WHETHER COMPLICATED, UNSPECIFIED WHETHER PERSISTENT: Primary | ICD-10-CM

## 2019-06-21 NOTE — PROGRESS NOTES
Chief Complaint   Patient presents with   Pasquale Gilliland is here for Xolair injection. Patient did not have any reaction to last injection. Patient states the Xolair is helping her Asthma. Injected subcutaneous : 150 mg given in Left upper arm SQ,  150 mg given in Right upper arm SQ. Lot Number: [id number]  Exp Date: 9/2022  Total Xolair Dose: 300 mg every 4 weeks  Epipen is with patient. Patient observed for 15 minutes. No reaction at injection site. Patient given Xolair reaction sheet.

## 2019-06-21 NOTE — PATIENT INSTRUCTIONS
Be aware that \"Anaphylaxis\" can occur any time after receiving a Xolair injection   Anaphylaxis presenting a bronchospasm, hypotension (low BP), dizziness, faintness, hives, and/or swelling of the tongue and throat. Anaphylaxis can occur after the first dose, but also has occurred beyond one year after beginning treatment with Xolair. *If such symptoms occur, use your EpiPen immediately and call 911. *     Notify your MD office following any treatment of possible reaction. Epinephrine (Injection)   Epinephrine (gx-h-GJA-rin)  Treats severe allergic reactions (including anaphylaxis) in an emergency situation. Brand Name(s):Adrenaclick, Adrenalin, Auvi-Q, EpiPen Auto-Injector, EpiPen Jr Auto-Injector, Epipen, Epipen 2-Jose Juan Auto-Injector, Epipen Jr 2-[place] Juan Auto-Injector, NovaPlus Epinephrine, Twinject   There may be other brand names for this medicine. When This Medicine Should Not Be Used:   A severe allergic reaction can be life-threatening, so there is no reason this medicine should not be used. How to Use This Medicine:   Injectable  · Your doctor should teach you how and when to inject this medicine. Each injection kit contains a single-use dose of medicine prescribed for you. · Give yourself a shot right away if you start to have a severe allergic reaction. · Inject this medicine into the muscle on the outside of your thigh only. Never inject this medicine into a vein, into your hand or foot, or into the muscles of your buttocks. · Read and follow the patient instructions that come with this medicine. Talk to your doctor or pharmacist if you have any questions. · This medicine might come with an autoinjector  so you can practice giving the medicine before you have an actual allergic reaction. The autoinjector  is gray (for EpiPen® or EpiPen Jr®) or beige (for [place]) and does not contain any medicine or needle.   · Do not remove the blue safety release (EpiPen® or EpiPen Jr®) or the gray end caps (Adrenaclick®) on the autoinjector until you are ready to use it. Do not put your thumb, fingers, or hand over the orange (EpiPen® or EpiPen Jr®) or red tip (Adrenaclick®). · You may need to use more than one injection if your allergic reaction does not get better after the first shot. Your doctor will give you additional doses if you need more than 2 injections. · You may inject the medicine through your clothing, if you need to. · Some liquid will remain in the autoinjector after the medicine has been injected. This medicine cannot be reused. Give your used autoinjector to your healthcare provider when you seek medical care. · Carry this medicine with you at all times for emergency use in case you have a severe allergic reaction. · Make sure family members or other people you are with know how to inject the medicine in case you are not able to do it yourself. · Check your injection kits regularly to make sure the liquid has not changed color. You should not use the autoinjector if the liquid has changed color, or if there are solids in the liquid. You should not use the autoinjector if the expiration date has passed. · Store the injection kit at room temperature, away from heat, moisture, and direct light. Do not store the medicine in the refrigerator or freezer, or inside a car. · Keep the autoinjector in its case or carrier tube to protect it from damage. This tube is not waterproof. If you accidentally drop it, check for damage or leaks. Drugs and Foods to Avoid:   Ask your doctor or pharmacist before using any other medicine, including over-the-counter medicines, vitamins, and herbal products. · Some foods and medicines can affect how epinephrine works.  Tell your doctor if you are using digoxin, levothyroxine, phentolamine, certain allergy medicines (such as chlorpheniramine, diphenhydramine, tripelennamine), a beta blocker medicine (such as propranolol), a heart rhythm medicine, a diuretic (water pill), an MAO inhibitor (MAOI), medicine for depression, or ergot medicines. Warnings While Using This Medicine:   · Tell your doctor if you are pregnant or breastfeeding, or if you have asthma, diabetes, heart disease, heart rhythm problems, high blood pressure, an overactive thyroid, or Parkinson disease. · A severe allergic reaction is a medical emergency. Go to an emergency room as soon as possible, even if you feel better after you use this medicine. · Do not inject this medicine into your hands or feet. Go to the emergency room right away if you accidently inject epinephrine into any part of your body other than your thigh. Epinephrine reduces blood flow, and this could damage areas that have small blood vessels, such as hands and feet. · Keep all medicine out of the reach of children. Never share your medicine with anyone. Possible Side Effects While Using This Medicine:   Call your doctor right away if you notice any of these side effects:  · Chest pain  · Fast, pounding, or uneven heartbeat  · Heavy sweating, nausea, vomiting  · Tremors, shakiness  · Trouble breathing  If you notice these less serious side effects, talk with your doctor:   · Feeling anxious, nervous, scared, or weak  · Headache or dizziness  · Pale skin  If you notice other side effects that you think are caused by this medicine, tell your doctor. Call your doctor for medical advice about side effects. You may report side effects to FDA at 0-156-FDA-6444  © 2014 [address] is for End User's use only and may not be sold, redistributed or otherwise used for commercial purposes. The above information is an  only. It is not intended as medical advice for individual conditions or treatments. Talk to your doctor, nurse or pharmacist before following any medical regimen to see if it is safe and effective for you.

## 2019-07-03 ENCOUNTER — HOSPITAL ENCOUNTER (EMERGENCY)
Age: 44
Discharge: HOME | End: 2019-07-03
Attending: EMERGENCY MEDICINE
Payer: MEDICAID

## 2019-07-03 VITALS
HEART RATE: 86 BPM | HEIGHT: 65 IN | SYSTOLIC BLOOD PRESSURE: 121 MMHG | WEIGHT: 150 LBS | TEMPERATURE: 99.1 F | DIASTOLIC BLOOD PRESSURE: 68 MMHG | RESPIRATION RATE: 14 BRPM | OXYGEN SATURATION: 100 % | BODY MASS INDEX: 24.99 KG/M2

## 2019-07-03 DIAGNOSIS — B37.31 YEAST VAGINITIS: Primary | ICD-10-CM

## 2019-07-03 LAB

## 2019-07-03 PROCEDURE — 99283 EMERGENCY DEPT VISIT LOW MDM: CPT

## 2019-07-03 PROCEDURE — 87210 SMEAR WET MOUNT SALINE/INK: CPT

## 2019-07-03 PROCEDURE — 81003 URINALYSIS AUTO W/O SCOPE: CPT

## 2019-07-03 PROCEDURE — 81025 URINE PREGNANCY TEST: CPT

## 2019-07-03 PROCEDURE — 87491 CHLMYD TRACH DNA AMP PROBE: CPT

## 2019-07-03 RX ORDER — FLUTICASONE PROPIONATE AND SALMETEROL 250; 50 UG/1; UG/1
1 POWDER RESPIRATORY (INHALATION) EVERY 12 HOURS
COMMUNITY
End: 2019-08-21 | Stop reason: SDUPTHER

## 2019-07-03 RX ORDER — FLUCONAZOLE 150 MG/1
150 TABLET ORAL
Qty: 1 TAB | Refills: 0 | Status: SHIPPED | OUTPATIENT
Start: 2019-07-03 | End: 2019-07-03

## 2019-07-04 NOTE — ED PROVIDER NOTES
EMERGENCY DEPARTMENT HISTORY AND PHYSICAL EXAM    10:22 PM      Date: 7/3/2019  Patient Name: Constance Babb    History of Presenting Illness     Chief Complaint   Patient presents with    Vaginal Discharge         History Provided By: Patient    Chief Complaint: vaginal discharge, itching  Duration:  Days  Timing:  Acute  Location:   Quality: itching  Severity: Mild  Modifying Factors: none  Associated Symptoms: denies any other associated signs or symptoms      Additional History (Context):Herberth Kamara is a 45 y.o. female who presents to the emergency department for evaluation of itchy vaginal discharge for 2-3 days. States that she has had these sx before and that she was diagnosed with yeast.  She was unable to get in with her OB, so she came here. No concern for STD, new sexual partner, or urinary symptoms. Denies possibility of pregnancy. No recent fevers, chills, AP, n/v/d/c, back pain, or any other complaints. PCP:  Naveen Mccall MD        Current Outpatient Medications   Medication Sig Dispense Refill    fluticasone propion-salmeterol (ADVAIR DISKUS) 250-50 mcg/dose diskus inhaler Take 1 Puff by inhalation every twelve (12) hours.  fluconazole (DIFLUCAN) 150 mg tablet Take 1 Tab by mouth now for 1 dose. FDA advises cautious prescribing of oral fluconazole in pregnancy. 1 Tab 0    omalizumab (XOLAIR) 150 mg solr 300 mg by SubCUTAneous route every thirty (30) days. 300 mg 0    ondansetron hcl (ZOFRAN) 4 mg tablet Take 1 Tab by mouth every eight (8) hours as needed for Nausea. 8 Tab 0    montelukast (SINGULAIR) 10 mg tablet TAKE 1 TABLET BY MOUTH EVERY DAY 30 Tab 5    albuterol (PROVENTIL HFA, VENTOLIN HFA, PROAIR HFA) 90 mcg/actuation inhaler Take 2 Puffs by inhalation every four (4) hours as needed for Wheezing. 1 Inhaler 4    albuterol-ipratropium (DUO-NEB) 2.5 mg-0.5 mg/3 ml nebu INHALE CONTENTS OF 1 VIAL VIA NEBULIZER EVERY 4 HOURS AS NEEDED FOR SHORTNESS OF BREATH/WHEEZING. 180 mL 5    ondansetron (ZOFRAN ODT) 4 mg disintegrating tablet TAKE 1 TAB BY MOUTH EVERY EIGHT (8) HOURS AS NEEDED FOR NAUSEA. 15 Tab 1    fluticasone (FLONASE) 50 mcg/actuation nasal spray INSTILL 2 SPRAYS INTO EACH NOSTRIL ONCE DAILY 1 Bottle 2    ALLEGRA-D 24 HOUR 180-240 mg per tablet TAKE 1 TABLET BY MOUTH DAILY 15 Tab 0       Past History     Past Medical History:  Past Medical History:   Diagnosis Date    Anxiety attack     Asthma     Asthmatic bronchitis     Catamenial disorder     Hernia     Migraine     Migraines     Mild asthma     Psychiatric disorder     anxiety     Torticollis        Past Surgical History:  Past Surgical History:   Procedure Laterality Date     DELIVERY ONLY      HX BACK SURGERY      HX  SECTION  2009       Family History:  Family History   Problem Relation Age of Onset    Heart Disease Other         grandmothers    Asthma Brother     Lung Disease Paternal Aunt         cronic bronchitis    Asthma Paternal Grandmother     High Cholesterol Mother        Social History:  Social History     Tobacco Use    Smoking status: Never Smoker    Smokeless tobacco: Never Used   Substance Use Topics    Alcohol use: No    Drug use: No       Allergies: Allergies   Allergen Reactions    Aspirin Anaphylaxis    Ibuprofen Anaphylaxis    Pcn [Penicillins] Itching    Claritin [Loratadine] Anxiety and Cough    Phenergan [Promethazine] Anxiety and Swelling    Seafood Swelling    Shellfish Containing Products Hives         Review of Systems       Review of Systems   Constitutional: Negative for chills and fever. HENT: Negative for congestion, rhinorrhea and sore throat. Respiratory: Negative for cough and shortness of breath. Cardiovascular: Negative for chest pain. Gastrointestinal: Negative for abdominal pain, blood in stool, constipation, diarrhea, nausea and vomiting. Genitourinary: Positive for vaginal discharge and vaginal pain (itching). Negative for dysuria, frequency and hematuria. Musculoskeletal: Negative for back pain and myalgias. Skin: Negative for rash and wound. Neurological: Negative for dizziness and headaches. All other systems reviewed and are negative. Physical Exam     Visit Vitals  /68 (BP 1 Location: Left arm, BP Patient Position: At rest)   Pulse 86   Temp 99.1 °F (37.3 °C)   Resp 14   Ht 5' 5\" (1.651 m)   Wt 68 kg (150 lb)   SpO2 100%   BMI 24.96 kg/m²       Physical Exam   Constitutional: She is oriented to person, place, and time. She appears well-developed and well-nourished. No distress. HENT:   Head: Normocephalic and atraumatic. Eyes: Conjunctivae are normal.   Neck: Normal range of motion. Neck supple. Cardiovascular: Normal rate, regular rhythm and normal heart sounds. Pulmonary/Chest: Effort normal and breath sounds normal. No respiratory distress. She exhibits no tenderness. Genitourinary:   Genitourinary Comments: Pt self-swabbed   Musculoskeletal: She exhibits no edema or deformity. Neurological: She is alert and oriented to person, place, and time. She has normal reflexes. Skin: Skin is warm and dry. She is not diaphoretic. Psychiatric: She has a normal mood and affect. Nursing note and vitals reviewed.       Diagnostic Study Results     Labs -  Recent Results (from the past 12 hour(s))   URINALYSIS W/ RFLX MICROSCOPIC    Collection Time: 07/03/19  9:00 PM   Result Value Ref Range    Color YELLOW      Appearance CLEAR      Specific gravity 1.012 1.005 - 1.030      pH (UA) 7.5 5.0 - 8.0      Protein NEGATIVE  NEG mg/dL    Glucose NEGATIVE  NEG mg/dL    Ketone NEGATIVE  NEG mg/dL    Bilirubin NEGATIVE  NEG      Blood NEGATIVE  NEG      Urobilinogen 1.0 0.2 - 1.0 EU/dL    Nitrites NEGATIVE  NEG      Leukocyte Esterase NEGATIVE  NEG     HCG URINE, QL    Collection Time: 07/03/19  9:00 PM   Result Value Ref Range    HCG urine, QL NEGATIVE  NEG     WET PREP    Collection Time: 07/03/19 9:08 PM   Result Value Ref Range    Special Requests: NO SPECIAL REQUESTS      Wet prep FEW  YEAST        Wet prep CLUE CELLS ABSENT      Wet prep NO TRICHOMONAS SEEN         Radiologic Studies -   No results found. Medical Decision Making   I am the first provider for this patient. I reviewed the vital signs, available nursing notes, past medical history, past surgical history, family history and social history. Vital Signs-Reviewed the patient's vital signs    Pulse Oximetry Analysis -  100% on room air (Interpretation)    Records Reviewed: Nursing Notes and Old Medical Records (Time of Review: 10:22 PM)    ED Course: Progress Notes, Reevaluation, and Consults:      Provider Notes (Medical Decision Making):   Differential Diagnosis:  Candidiasis, trichomoniasis, bacterial vaginitis, GC/Chlamydia, pregnancy, urethritis/UTI, vaginal foreign body, atrophic vaginitis, contact vulvovaginitis, physiologic discharge    Plan: Patient presents ambulatory in no significant distress with normal vitals. She self swabbed. Urinalysis negative. Wet prep confirms yeast vaginitis. Will discharge home with prescription for Diflucan. At this time, patient is stable and appropriate for discharge home. Patient demonstrates understanding of current diagnoses and is in agreement with the treatment plan. They are advised that while the likelihood of serious underlying condition is low at this point given the evaluation performed today, we cannot fully rule it out. They are advised to immediately return with any new symptoms or worsening of current condition. All questions have been answered. Patient is given educational material regarding their diagnoses, including danger symptoms and when to return to the ED. Diagnosis     Clinical Impression:   1.  Yeast vaginitis        Disposition: DC Home    Follow-up Information     Follow up With Specialties Details Why Contact Info    Greg Caruso MD Family Practice Call in 2 days  [address] [place] [address] [phone] [facility] EMERGENCY DEPT Emergency Medicine Go to As needed, If symptoms worsen [address] [id number] [phone]           Patient's Medications   Start Taking    FLUCONAZOLE (DIFLUCAN) 150 MG TABLET    Take 1 Tab by mouth now for 1 dose. FDA advises cautious prescribing of oral fluconazole in pregnancy. Continue Taking    ALBUTEROL (PROVENTIL HFA, VENTOLIN HFA, PROAIR HFA) 90 MCG/ACTUATION INHALER    Take 2 Puffs by inhalation every four (4) hours as needed for Wheezing. ALBUTEROL-IPRATROPIUM (DUO-NEB) 2.5 MG-0.5 MG/3 ML NEBU    INHALE CONTENTS OF 1 VIAL VIA NEBULIZER EVERY 4 HOURS AS NEEDED FOR SHORTNESS OF BREATH/WHEEZING. ALLEGRA-D 24 HOUR 180-240 MG PER TABLET    TAKE 1 TABLET BY MOUTH DAILY    FLUTICASONE (FLONASE) 50 MCG/ACTUATION NASAL SPRAY    INSTILL 2 SPRAYS INTO EACH NOSTRIL ONCE DAILY    FLUTICASONE PROPION-SALMETEROL (ADVAIR DISKUS) 250-50 MCG/DOSE DISKUS INHALER    Take 1 Puff by inhalation every twelve (12) hours. MONTELUKAST (SINGULAIR) 10 MG TABLET    TAKE 1 TABLET BY MOUTH EVERY DAY    OMALIZUMAB (XOLAIR) 150 MG SOLR    300 mg by SubCUTAneous route every thirty (30) days. ONDANSETRON (ZOFRAN ODT) 4 MG DISINTEGRATING TABLET    TAKE 1 TAB BY MOUTH EVERY EIGHT (8) HOURS AS NEEDED FOR NAUSEA. ONDANSETRON HCL (ZOFRAN) 4 MG TABLET    Take 1 Tab by mouth every eight (8) hours as needed for Nausea. These Medications have changed    No medications on file   Stop Taking    BUTALBITAL-ACETAMINOPHEN-CAFF (FIORICET) -40 MG PER CAPSULE    1 to 2 tab po q 6 h prn headache    CETIRIZINE (ZYRTEC) 10 MG TABLET    Take one tablets by mouth daily for 7 days and then as needed after that. Restart forseven days if sinus congestion recurs.     PREDNISONE (STERAPRED DS) 10 MG DOSE PACK    Use as directed    RIZATRIPTAN (MAXALT) 10 MG TABLET    Take one tablet at HA onset, May repeat in 2 hours if needed. Max two tabs in 24 hours.     SYMBICORT 160-4.5 MCG/ACTUATION HFAA    INHALE 2 PUFFS BY MOUTH TWICE A DAY    VERAPAMIL ER (CALAN-SR) 120 MG TABLET    Take 1 Tab by mouth nightly.     _______________________________

## 2019-07-04 NOTE — ED TRIAGE NOTES
Pt c/o vaginal discharge with burninig on urination x 2 days. .. She thinks she may have a yeast infection. ..

## 2019-07-04 NOTE — DISCHARGE INSTRUCTIONS
Patient Education        Vaginal Yeast Infection: Care Instructions  Your Care Instructions    A vaginal yeast infection is caused by too many yeast cells in the vagina. This is common in women of all ages. Itching, vaginal discharge and irritation, and other symptoms can bother you. But yeast infections don't often cause other health problems. Some medicines can increase your risk of getting a yeast infection. These include antibiotics, birth control pills, hormones, and steroids. You may also be more likely to get a yeast infection if you are pregnant, have diabetes, douche, or wear tight clothes. With treatment, most yeast infections get better in 2 to 3 days. Follow-up care is a key part of your treatment and safety. Be sure to make and go to all appointments, and call your doctor if you are having problems. It's also a good idea to know your test results and keep a list of the medicines you take. How can you care for yourself at home? · Take your medicines exactly as prescribed. Call your doctor if you think you are having a problem with your medicine. · Ask your doctor about over-the-counter (OTC) medicines for yeast infections. They may cost less than prescription medicines. If you use an OTC treatment, read and follow all instructions on the label. · Do not use tampons while using a vaginal cream or suppository. The tampons can absorb the medicine. Use pads instead. · Wear loose cotton clothing. Do not wear nylon or other fabric that holds body heat and moisture close to the skin. · Try sleeping without underwear. · Do not scratch. Relieve itching with a cold pack or a cool bath. · Do not wash your vaginal area more than once a day. Use plain water or a mild, unscented soap. Air-dry the vaginal area. · Change out of wet swimsuits after swimming. · Do not have sex until you have finished your treatment. · Do not douche. When should you call for help?   Call your doctor now or seek immediate medical care if:    · You have unexpected vaginal bleeding.     · You have new or increased pain in your vagina or pelvis.    Watch closely for changes in your health, and be sure to contact your doctor if:    · You have a fever.     · You are not getting better after 2 days.     · Your symptoms come back after you finish your medicines. Where can you learn more? Go to http://jaci-elina.info/. Enter O428 in the search box to learn more about \"Vaginal Yeast Infection: Care Instructions. \"  Current as of: May 14, 2018  Content Version: 11.9  © 7791-6777 [employer]. Care instructions adapted under license by [employer] (which disclaims liability or warranty for this information). If you have questions about a medical condition or this instruction, always ask your healthcare professional. [address] any warranty or liability for your use of this information.

## 2019-07-19 ENCOUNTER — CLINICAL SUPPORT (OUTPATIENT)
Dept: PULMONOLOGY | Age: 44
End: 2019-07-19

## 2019-07-19 DIAGNOSIS — J45.909 ASTHMA, UNSPECIFIED ASTHMA SEVERITY, UNSPECIFIED WHETHER COMPLICATED, UNSPECIFIED WHETHER PERSISTENT: Primary | ICD-10-CM

## 2019-07-19 NOTE — PATIENT INSTRUCTIONS
Be aware that \"Anaphylaxis\" can occur any time after receiving a Xolair injection   Anaphylaxis presenting a bronchospasm, hypotension (low BP), dizziness, faintness, hives, and/or swelling of the tongue and throat. Anaphylaxis can occur after the first dose, but also has occurred beyond one year after beginning treatment with Xolair. *If such symptoms occur, use your EpiPen immediately and call 911. *     Notify your MD office following any treatment of possible reaction. Epinephrine (Injection)   Epinephrine (pk-[id number]-rin)  Treats severe allergic reactions (including anaphylaxis) in an emergency situation. Brand Name(s):Adrenaclick, Adrenalin, Auvi-Q, EpiPen Auto-Injector, EpiPen Jr Auto-Injector, Epipen, Epipen 2-Jose Juan Auto-Injector, Epipen Jr 2-Jose Juan Auto-Injector, NovaPlus Epinephrine, Twinject   There may be other brand names for this medicine. When This Medicine Should Not Be Used:   A severe allergic reaction can be life-threatening, so there is no reason this medicine should not be used. How to Use This Medicine:   Injectable  · Your doctor should teach you how and when to inject this medicine. Each injection kit contains a single-use dose of medicine prescribed for you. · Give yourself a shot right away if you start to have a severe allergic reaction. · Inject this medicine into the muscle on the outside of your thigh only. Never inject this medicine into a vein, into your hand or foot, or into the muscles of your buttocks. · Read and follow the patient instructions that come with this medicine. Talk to your doctor or pharmacist if you have any questions. · This medicine might come with an autoinjector  so you can practice giving the medicine before you have an actual allergic reaction. The autoinjector  is gray (for EpiPen® or EpiPen Jr®) or beige (for [place]) and does not contain any medicine or needle.   · Do not remove the blue safety release (EpiPen® or EpiPen Jr®) or the gray end caps (Adrenaclick®) on the autoinjector until you are ready to use it. Do not put your thumb, fingers, or hand over the orange (EpiPen® or EpiPen Jr®) or red tip (Adrenaclick®). · You may need to use more than one injection if your allergic reaction does not get better after the first shot. Your doctor will give you additional doses if you need more than 2 injections. · You may inject the medicine through your clothing, if you need to. · Some liquid will remain in the autoinjector after the medicine has been injected. This medicine cannot be reused. Give your used autoinjector to your healthcare provider when you seek medical care. · Carry this medicine with you at all times for emergency use in case you have a severe allergic reaction. · Make sure family members or other people you are with know how to inject the medicine in case you are not able to do it yourself. · Check your injection kits regularly to make sure the liquid has not changed color. You should not use the autoinjector if the liquid has changed color, or if there are solids in the liquid. You should not use the autoinjector if the expiration date has passed. · Store the injection kit at room temperature, away from heat, moisture, and direct light. Do not store the medicine in the refrigerator or freezer, or inside a car. · Keep the autoinjector in its case or carrier tube to protect it from damage. This tube is not waterproof. If you accidentally drop it, check for damage or leaks. Drugs and Foods to Avoid:   Ask your doctor or pharmacist before using any other medicine, including over-the-counter medicines, vitamins, and herbal products. · Some foods and medicines can affect how epinephrine works.  Tell your doctor if you are using digoxin, levothyroxine, phentolamine, certain allergy medicines (such as chlorpheniramine, diphenhydramine, tripelennamine), a beta blocker medicine (such as propranolol), a heart rhythm medicine, a diuretic (water pill), an MAO inhibitor (MAOI), medicine for depression, or ergot medicines. Warnings While Using This Medicine:   · Tell your doctor if you are pregnant or breastfeeding, or if you have asthma, diabetes, heart disease, heart rhythm problems, high blood pressure, an overactive thyroid, or Parkinson disease. · A severe allergic reaction is a medical emergency. Go to an emergency room as soon as possible, even if you feel better after you use this medicine. · Do not inject this medicine into your hands or feet. Go to the emergency room right away if you accidently inject epinephrine into any part of your body other than your thigh. Epinephrine reduces blood flow, and this could damage areas that have small blood vessels, such as hands and feet. · Keep all medicine out of the reach of children. Never share your medicine with anyone. Possible Side Effects While Using This Medicine:   Call your doctor right away if you notice any of these side effects:  · Chest pain  · Fast, pounding, or uneven heartbeat  · Heavy sweating, nausea, vomiting  · Tremors, shakiness  · Trouble breathing  If you notice these less serious side effects, talk with your doctor:   · Feeling anxious, nervous, scared, or weak  · Headache or dizziness  · Pale skin  If you notice other side effects that you think are caused by this medicine, tell your doctor. Call your doctor for medical advice about side effects. You may report side effects to FDA at 6-770-FDA-9244  © 2014 [address] is for End User's use only and may not be sold, redistributed or otherwise used for commercial purposes. The above information is an  only. It is not intended as medical advice for individual conditions or treatments. Talk to your doctor, nurse or pharmacist before following any medical regimen to see if it is safe and effective for you.

## 2019-07-19 NOTE — PROGRESS NOTES
Chief Complaint   Patient presents with   Jasbir Mittal is here for Xolair injection. Patient did not have any reaction to last injection. Patient states the Xolair is helping her Asthma. Injected subcutaneous : 150 mg given in Left upper arm SQ,  150 mg given in Right upper arm SQ. Lot Number: [id number]  Exp Date: 9/2022  Total Xolair Dose: 300 mg every 4 weeks  Epipen is with patient. Patient observed for 15 minutes. No reaction at injection site. Patient given Xolair reaction sheet.

## 2019-08-05 ENCOUNTER — TELEPHONE (OUTPATIENT)
Dept: PULMONOLOGY | Age: 44
End: 2019-08-05

## 2019-08-15 ENCOUNTER — TELEPHONE (OUTPATIENT)
Dept: NEUROLOGY | Age: 44
End: 2019-08-15

## 2019-08-16 ENCOUNTER — CLINICAL SUPPORT (OUTPATIENT)
Dept: PULMONOLOGY | Age: 44
End: 2019-08-16

## 2019-08-16 DIAGNOSIS — J45.909 ASTHMA, UNSPECIFIED ASTHMA SEVERITY, UNSPECIFIED WHETHER COMPLICATED, UNSPECIFIED WHETHER PERSISTENT: Primary | ICD-10-CM

## 2019-08-16 NOTE — PATIENT INSTRUCTIONS
Be aware that \"Anaphylaxis\" can occur any time after receiving a Xolair injection   Anaphylaxis presenting a bronchospasm, hypotension (low BP), dizziness, faintness, hives, and/or swelling of the tongue and throat. Anaphylaxis can occur after the first dose, but also has occurred beyond one year after beginning treatment with Xolair. *If such symptoms occur, use your EpiPen immediately and call 911. *     Notify your MD office following any treatment of possible reaction. Epinephrine (Injection)   Epinephrine ([id number]-rin)  Treats severe allergic reactions (including anaphylaxis) in an emergency situation. Brand Name(s):Adrenaclick, Adrenalin, Auvi-Q, EpiPen Auto-Injector, EpiPen Jr Auto-Injector, Epipen, Epipen 2-Jose Juan Auto-Injector, Epipen Jr 2-Jose Juan Auto-Injector, NovaPlus Epinephrine, Twinject   There may be other brand names for this medicine. When This Medicine Should Not Be Used:   A severe allergic reaction can be life-threatening, so there is no reason this medicine should not be used. How to Use This Medicine:   Injectable  · Your doctor should teach you how and when to inject this medicine. Each injection kit contains a single-use dose of medicine prescribed for you. · Give yourself a shot right away if you start to have a severe allergic reaction. · Inject this medicine into the muscle on the outside of your thigh only. Never inject this medicine into a vein, into your hand or foot, or into the muscles of your buttocks. · Read and follow the patient instructions that come with this medicine. Talk to your doctor or pharmacist if you have any questions. · This medicine might come with an autoinjector  so you can practice giving the medicine before you have an actual allergic reaction. The autoinjector  is gray (for EpiPen® or EpiPen Jr®) or beige (for [place]) and does not contain any medicine or needle.   · Do not remove the blue safety release (EpiPen® or EpiPen Jr®) or the gray end caps (Adrenaclick®) on the autoinjector until you are ready to use it. Do not put your thumb, fingers, or hand over the orange (EpiPen® or EpiPen Jr®) or red tip (Adrenaclick®). · You may need to use more than one injection if your allergic reaction does not get better after the first shot. Your doctor will give you additional doses if you need more than 2 injections. · You may inject the medicine through your clothing, if you need to. · Some liquid will remain in the autoinjector after the medicine has been injected. This medicine cannot be reused. Give your used autoinjector to your healthcare provider when you seek medical care. · Carry this medicine with you at all times for emergency use in case you have a severe allergic reaction. · Make sure family members or other people you are with know how to inject the medicine in case you are not able to do it yourself. · Check your injection kits regularly to make sure the liquid has not changed color. You should not use the autoinjector if the liquid has changed color, or if there are solids in the liquid. You should not use the autoinjector if the expiration date has passed. · Store the injection kit at room temperature, away from heat, moisture, and direct light. Do not store the medicine in the refrigerator or freezer, or inside a car. · Keep the autoinjector in its case or carrier tube to protect it from damage. This tube is not waterproof. If you accidentally drop it, check for damage or leaks. Drugs and Foods to Avoid:   Ask your doctor or pharmacist before using any other medicine, including over-the-counter medicines, vitamins, and herbal products. · Some foods and medicines can affect how epinephrine works.  Tell your doctor if you are using digoxin, levothyroxine, phentolamine, certain allergy medicines (such as chlorpheniramine, diphenhydramine, tripelennamine), a beta blocker medicine (such as propranolol), a heart rhythm medicine, a diuretic (water pill), an MAO inhibitor (MAOI), medicine for depression, or ergot medicines. Warnings While Using This Medicine:   · Tell your doctor if you are pregnant or breastfeeding, or if you have asthma, diabetes, heart disease, heart rhythm problems, high blood pressure, an overactive thyroid, or Parkinson disease. · A severe allergic reaction is a medical emergency. Go to an emergency room as soon as possible, even if you feel better after you use this medicine. · Do not inject this medicine into your hands or feet. Go to the emergency room right away if you accidently inject epinephrine into any part of your body other than your thigh. Epinephrine reduces blood flow, and this could damage areas that have small blood vessels, such as hands and feet. · Keep all medicine out of the reach of children. Never share your medicine with anyone. Possible Side Effects While Using This Medicine:   Call your doctor right away if you notice any of these side effects:  · Chest pain  · Fast, pounding, or uneven heartbeat  · Heavy sweating, nausea, vomiting  · Tremors, shakiness  · Trouble breathing  If you notice these less serious side effects, talk with your doctor:   · Feeling anxious, nervous, scared, or weak  · Headache or dizziness  · Pale skin  If you notice other side effects that you think are caused by this medicine, tell your doctor. Call your doctor for medical advice about side effects. You may report side effects to FDA at 2-141-FDA-9687  © 2014 [address] is for End User's use only and may not be sold, redistributed or otherwise used for commercial purposes. The above information is an  only. It is not intended as medical advice for individual conditions or treatments. Talk to your doctor, nurse or pharmacist before following any medical regimen to see if it is safe and effective for you.

## 2019-08-16 NOTE — PROGRESS NOTES
Chief Complaint   Patient presents with   Dudley Dural is here for Xolair injection. Patient did not have any reaction to last injection. Patient states the Xolair is helping her Asthma. Injected subcutaneous : 150 mg given in Left upper arm SQ,  150 mg given in Right upper arm SQ. Lot Number: [id number]  Exp Date: 10/2022  Total Xolair Dose: 300 mg every 4 weeks  Epipen is with patient. Patient observed for 15 minutes. No reaction at injection site. Patient given Xolair reaction sheet.

## 2019-08-20 ENCOUNTER — OFFICE VISIT (OUTPATIENT)
Dept: NEUROLOGY | Age: 44
End: 2019-08-20

## 2019-08-20 VITALS
DIASTOLIC BLOOD PRESSURE: 60 MMHG | HEART RATE: 80 BPM | RESPIRATION RATE: 18 BRPM | WEIGHT: 155 LBS | HEIGHT: 65 IN | BODY MASS INDEX: 25.83 KG/M2 | SYSTOLIC BLOOD PRESSURE: 110 MMHG | TEMPERATURE: 97.8 F | OXYGEN SATURATION: 99 %

## 2019-08-20 DIAGNOSIS — N92.6 CATAMENIAL DISORDER: ICD-10-CM

## 2019-08-20 DIAGNOSIS — G43.019 INTRACTABLE MIGRAINE WITHOUT AURA AND WITHOUT STATUS MIGRAINOSUS: Primary | ICD-10-CM

## 2019-08-20 RX ORDER — RIZATRIPTAN BENZOATE 10 MG/1
TABLET ORAL
Qty: 12 TAB | Refills: 5 | Status: SHIPPED | OUTPATIENT
Start: 2019-08-20 | End: 2019-10-21 | Stop reason: SDUPTHER

## 2019-08-20 RX ORDER — RIZATRIPTAN BENZOATE 10 MG/1
10 TABLET ORAL
COMMUNITY
End: 2019-08-20 | Stop reason: SDUPTHER

## 2019-08-20 RX ORDER — ONDANSETRON 4 MG/1
4 TABLET, FILM COATED ORAL
Qty: 15 TAB | Refills: 3 | Status: SHIPPED | OUTPATIENT
Start: 2019-08-20 | End: 2019-12-13 | Stop reason: SDUPTHER

## 2019-08-20 RX ORDER — TOPIRAMATE 25 MG/1
TABLET ORAL
Qty: 60 TAB | Refills: 2 | Status: SHIPPED | OUTPATIENT
Start: 2019-08-20 | End: 2019-10-18 | Stop reason: DRUGHIGH

## 2019-08-20 NOTE — PROGRESS NOTES
Chief Complaint   Patient presents with    Migraine     follow up     3 most recent PHQ Screens 8/20/2019   Little interest or pleasure in doing things Several days   Feeling down, depressed, irritable, or hopeless Several days   Total Score PHQ 2 2

## 2019-08-20 NOTE — PATIENT INSTRUCTIONS
Thank you for choosing [employer] and [facility] for your     care. You may receive a survey about your visit. We appreciate you taking time     to complete this survey as we use your feedback to improve our services. We     realize we are not perfect, but we strive to provide excellent care.

## 2019-08-20 NOTE — PROGRESS NOTES
[facility]  [address], [address], [place], [address]  [address] [facility], [address]  Office:  [phone]  Fax: [phone]  Chief Complaint   Patient presents with    Migraine     follow up     This is a 28-year-old female who presents for follow-up headaches. Last seen in office by Dr. Beena Crowe in May regarding menstrual migraines. Currently having 2 migraine headache days a week. She is not on any preventatives at this time. Unable to take Inderal due to asthma and worried about weight gain with Verapamil. Using Maxalt with relief from headaches at times taking two doses to resolve headache. Endorses nausea with headache and uses Zofran. Denies focal deficits. No other concerns at this time. Past Medical History:   Diagnosis Date    Anxiety attack     Asthma     Asthmatic bronchitis     Catamenial disorder     Hernia     Migraine     Migraines     Mild asthma     Psychiatric disorder     anxiety     Torticollis        Past Surgical History:   Procedure Laterality Date     DELIVERY ONLY      HX BACK SURGERY      HX  SECTION         Current Outpatient Medications   Medication Sig Dispense Refill    topiramate (TOPAMAX) 25 mg tablet Take 25 mg nightly x two weeks then take 50 mg nightly 60 Tab 2    ondansetron hcl (ZOFRAN) 4 mg tablet Take 1 Tab by mouth every eight (8) hours as needed for Nausea. 15 Tab 3    rizatriptan (MAXALT) 10 mg tablet One tab at HA onset May repeat in 2 hours if needed. Max two tabs in 24 hours 12 Tab 5    omalizumab (XOLAIR) 150 mg solr 300 mg by SubCUTAneous route every thirty (30) days. 300 mg 0    fluticasone propion-salmeterol (ADVAIR DISKUS) 250-50 mcg/dose diskus inhaler Take 1 Puff by inhalation every twelve (12) hours.       montelukast (SINGULAIR) 10 mg tablet TAKE 1 TABLET BY MOUTH EVERY DAY 30 Tab 5    albuterol (PROVENTIL HFA, VENTOLIN HFA, PROAIR HFA) 90 mcg/actuation inhaler Take 2 Puffs by inhalation every four (4) hours as needed for Wheezing. 1 Inhaler 4    albuterol-ipratropium (DUO-NEB) 2.5 mg-0.5 mg/3 ml nebu INHALE CONTENTS OF 1 VIAL VIA NEBULIZER EVERY 4 HOURS AS NEEDED FOR SHORTNESS OF BREATH/WHEEZING. 180 mL 5    ondansetron (ZOFRAN ODT) 4 mg disintegrating tablet TAKE 1 TAB BY MOUTH EVERY EIGHT (8) HOURS AS NEEDED FOR NAUSEA. 15 Tab 1    fluticasone (FLONASE) 50 mcg/actuation nasal spray INSTILL 2 SPRAYS INTO EACH NOSTRIL ONCE DAILY 1 Bottle 2    ALLEGRA-D 24 HOUR 180-240 mg per tablet TAKE 1 TABLET BY MOUTH DAILY 15 Tab 0        Allergies   Allergen Reactions    Aspirin Anaphylaxis    Ibuprofen Anaphylaxis    Pcn [Penicillins] Itching    Claritin [Loratadine] Anxiety and Cough    Phenergan [Promethazine] Anxiety and Swelling    Seafood Swelling    Shellfish Containing Products Hives       Social History     Tobacco Use    Smoking status: Never Smoker    Smokeless tobacco: Never Used   Substance Use Topics    Alcohol use: No    Drug use: No       Family History   Problem Relation Age of Onset    Heart Disease Other         grandmothers    Asthma Brother     Lung Disease Paternal Aunt         cronic bronchitis    Asthma Paternal Grandmother     High Cholesterol Mother        Review of Systems  GENERAL: Denies fever or fatigue  CARDIAC: No CP or SOB  PULMONARY: No cough of SOB  MUSCULOSKELETAL: No new joint pain  NEURO: SEE HPI    Examination  Visit Vitals  /60 (BP 1 Location: Left arm, BP Patient Position: Sitting)   Pulse 80   Temp 97.8 °F (36.6 °C)   Resp 18   Ht 5' 5\" (1.651 m)   Wt 70.3 kg (155 lb)   SpO2 99%   BMI 25.79 kg/m²       This is a very pleasant 45year old female. She is alert and in NAD. Well nourished and well kempt. Full fund of knowledge. AOx3. Speech clear. No abnormal movements or signs of incoordination. Freely move the upper and lower extremity's. Steady gait.     Impression/Plan  Gabbie Vasquez is a 45 y.o. female whose history and physical are consistent with migraine. Migraine headaches. Certainly qualifies to be on preventative. Previously unable to take Inderal due to asthma and patient did not start verapamil due to concerns of weight gain. Per chart review she was placed on topiramate in the past.  She is unsure why this was discontinued. At this time she would like to reinstate this preventative. Will start conservatively at 25 mg nightly x2 weeks then increase to 50 mg nightly thereafter. Discussed medication, indication, side effects, and dosing. Patient verbalized understanding. Maxalt for abortive headache therapy. Zofran for nausea. Refills provided. Follow-up in 8 weeks or sooner as needed. Continue to track headaches. All questions addressed and patient is agreeable with plan of care. Diagnoses and all orders for this visit:    1. Intractable migraine without aura and without status migrainosus    2. Catamenial disorder    Other orders  -     topiramate (TOPAMAX) 25 mg tablet; Take 25 mg nightly x two weeks then take 50 mg nightly  -     ondansetron hcl (ZOFRAN) 4 mg tablet; Take 1 Tab by mouth every eight (8) hours as needed for Nausea. -     rizatriptan (MAXALT) 10 mg tablet; One tab at HA onset May repeat in 2 hours if needed. Max two tabs in 24 hours      Total time: 30 min   Counseling / coordination time: 25 min   > 50% counseling / coordination?: Yes re: symptoms, management, chart review, medications, answering questions, and plan of care. Signed By: Addie Sheridan NP    This note will not be viewable in [address] PLEASE NOTE:   Portions of this document may have been produced using voice recognition software. Unrecognized errors in transcription may be present.

## 2019-08-21 RX ORDER — FLUTICASONE PROPIONATE AND SALMETEROL 250; 50 UG/1; UG/1
1 POWDER RESPIRATORY (INHALATION) EVERY 12 HOURS
Qty: 3 INHALER | Refills: 3 | Status: SHIPPED | OUTPATIENT
Start: 2019-08-21 | End: 2019-09-13 | Stop reason: SDUPTHER

## 2019-09-12 RX ORDER — MONTELUKAST SODIUM 10 MG/1
TABLET ORAL
Qty: 90 TAB | Refills: 1 | Status: SHIPPED | OUTPATIENT
Start: 2019-09-12 | End: 2019-09-13 | Stop reason: SDUPTHER

## 2019-09-13 ENCOUNTER — CLINICAL SUPPORT (OUTPATIENT)
Dept: PULMONOLOGY | Age: 44
End: 2019-09-13

## 2019-09-13 DIAGNOSIS — J45.909 ASTHMA, UNSPECIFIED ASTHMA SEVERITY, UNSPECIFIED WHETHER COMPLICATED, UNSPECIFIED WHETHER PERSISTENT: Primary | ICD-10-CM

## 2019-09-13 DIAGNOSIS — J45.41 MODERATE PERSISTENT ASTHMA WITH EXACERBATION: ICD-10-CM

## 2019-09-13 RX ORDER — MONTELUKAST SODIUM 10 MG/1
10 TABLET ORAL DAILY
Qty: 90 TAB | Refills: 3 | Status: CANCELLED | OUTPATIENT
Start: 2019-09-13

## 2019-09-13 RX ORDER — FLUTICASONE PROPIONATE AND SALMETEROL 250; 50 UG/1; UG/1
1 POWDER RESPIRATORY (INHALATION) EVERY 12 HOURS
Qty: 3 INHALER | Refills: 3 | Status: SHIPPED | OUTPATIENT
Start: 2019-09-13 | End: 2020-05-21 | Stop reason: DRUGHIGH

## 2019-09-13 NOTE — PATIENT INSTRUCTIONS
Be aware that \"Anaphylaxis\" can occur any time after receiving a Xolair injection   Anaphylaxis presenting a bronchospasm, hypotension (low BP), dizziness, faintness, hives, and/or swelling of the tongue and throat. Anaphylaxis can occur after the first dose, but also has occurred beyond one year after beginning treatment with Xolair. *If such symptoms occur, use your EpiPen immediately and call 911. *     Notify your MD office following any treatment of possible reaction. Epinephrine (Injection)   Epinephrine (yi-z-KYK-rin)  Treats severe allergic reactions (including anaphylaxis) in an emergency situation. Brand Name(s):Adrenaclick, Adrenalin, Auvi-Q, EpiPen Auto-Injector, EpiPen Jr Auto-Injector, Epipen, Epipen 2-Jose Juan Auto-Injector, Epipen Jr 2-Jose Juan Auto-Injector, NovaPlus Epinephrine, Twinject   There may be other brand names for this medicine. When This Medicine Should Not Be Used:   A severe allergic reaction can be life-threatening, so there is no reason this medicine should not be used. How to Use This Medicine:   Injectable  · Your doctor should teach you how and when to inject this medicine. Each injection kit contains a single-use dose of medicine prescribed for you. · Give yourself a shot right away if you start to have a severe allergic reaction. · Inject this medicine into the muscle on the outside of your thigh only. Never inject this medicine into a vein, into your hand or foot, or into the muscles of your buttocks. · Read and follow the patient instructions that come with this medicine. Talk to your doctor or pharmacist if you have any questions. · This medicine might come with an autoinjector  so you can practice giving the medicine before you have an actual allergic reaction. The autoinjector  is gray (for EpiPen® or EpiPen Jr®) or beige (for [place]) and does not contain any medicine or needle.   · Do not remove the blue safety release (EpiPen® or EpiPen Jr®) or the gray end caps (Adrenaclick®) on the autoinjector until you are ready to use it. Do not put your thumb, fingers, or hand over the orange (EpiPen® or EpiPen Jr®) or red tip (Adrenaclick®). · You may need to use more than one injection if your allergic reaction does not get better after the first shot. Your doctor will give you additional doses if you need more than 2 injections. · You may inject the medicine through your clothing, if you need to. · Some liquid will remain in the autoinjector after the medicine has been injected. This medicine cannot be reused. Give your used autoinjector to your healthcare provider when you seek medical care. · Carry this medicine with you at all times for emergency use in case you have a severe allergic reaction. · Make sure family members or other people you are with know how to inject the medicine in case you are not able to do it yourself. · Check your injection kits regularly to make sure the liquid has not changed color. You should not use the autoinjector if the liquid has changed color, or if there are solids in the liquid. You should not use the autoinjector if the expiration date has passed. · Store the injection kit at room temperature, away from heat, moisture, and direct light. Do not store the medicine in the refrigerator or freezer, or inside a car. · Keep the autoinjector in its case or carrier tube to protect it from damage. This tube is not waterproof. If you accidentally drop it, check for damage or leaks. Drugs and Foods to Avoid:   Ask your doctor or pharmacist before using any other medicine, including over-the-counter medicines, vitamins, and herbal products. · Some foods and medicines can affect how epinephrine works.  Tell your doctor if you are using digoxin, levothyroxine, phentolamine, certain allergy medicines (such as chlorpheniramine, diphenhydramine, tripelennamine), a beta blocker medicine (such as propranolol), a heart rhythm medicine, a diuretic (water pill), an MAO inhibitor (MAOI), medicine for depression, or ergot medicines. Warnings While Using This Medicine:   · Tell your doctor if you are pregnant or breastfeeding, or if you have asthma, diabetes, heart disease, heart rhythm problems, high blood pressure, an overactive thyroid, or Parkinson disease. · A severe allergic reaction is a medical emergency. Go to an emergency room as soon as possible, even if you feel better after you use this medicine. · Do not inject this medicine into your hands or feet. Go to the emergency room right away if you accidently inject epinephrine into any part of your body other than your thigh. Epinephrine reduces blood flow, and this could damage areas that have small blood vessels, such as hands and feet. · Keep all medicine out of the reach of children. Never share your medicine with anyone. Possible Side Effects While Using This Medicine:   Call your doctor right away if you notice any of these side effects:  · Chest pain  · Fast, pounding, or uneven heartbeat  · Heavy sweating, nausea, vomiting  · Tremors, shakiness  · Trouble breathing  If you notice these less serious side effects, talk with your doctor:   · Feeling anxious, nervous, scared, or weak  · Headache or dizziness  · Pale skin  If you notice other side effects that you think are caused by this medicine, tell your doctor. Call your doctor for medical advice about side effects. You may report side effects to FDA at 8-741-FDA-4003  © 2014 [address] is for End User's use only and may not be sold, redistributed or otherwise used for commercial purposes. The above information is an  only. It is not intended as medical advice for individual conditions or treatments. Talk to your doctor, nurse or pharmacist before following any medical regimen to see if it is safe and effective for you.

## 2019-09-13 NOTE — PROGRESS NOTES
Orvan Kanner is here for Xolair injection. No c/os related to past Xolair injections. Injected subcutaneous : 150 mg in right upper arm. 150 mg in left upper arm. Lot Number: [id number] x 2 vials  Exp Date: Oct. 2022 x 2 vials  Total Xolair Dose: 300 mg  Epipen is with patient. Yes  Patient observed for 15 minutes. No reaction at injection site. No c/os offered upon discharge.

## 2019-09-25 ENCOUNTER — TELEPHONE (OUTPATIENT)
Dept: PULMONOLOGY | Age: 44
End: 2019-09-25

## 2019-09-25 NOTE — TELEPHONE ENCOUNTER
Pt states she started off with cold, but is now coughing up yellow sputum. She has tried OTC cough medicine and mucinex, she seems to be getting worse. Please call [phone].

## 2019-09-27 ENCOUNTER — HOSPITAL ENCOUNTER (EMERGENCY)
Age: 44
Discharge: HOME | End: 2019-09-27
Attending: EMERGENCY MEDICINE
Payer: MEDICAID

## 2019-09-27 ENCOUNTER — APPOINTMENT (OUTPATIENT)
Dept: GENERAL RADIOLOGY | Age: 44
End: 2019-09-27
Attending: PHYSICIAN ASSISTANT
Payer: MEDICAID

## 2019-09-27 VITALS
TEMPERATURE: 98.9 F | DIASTOLIC BLOOD PRESSURE: 83 MMHG | BODY MASS INDEX: 23.32 KG/M2 | HEART RATE: 97 BPM | OXYGEN SATURATION: 100 % | RESPIRATION RATE: 18 BRPM | SYSTOLIC BLOOD PRESSURE: 120 MMHG | HEIGHT: 65 IN | WEIGHT: 140 LBS

## 2019-09-27 DIAGNOSIS — J20.9 ACUTE BRONCHITIS, UNSPECIFIED ORGANISM: Primary | ICD-10-CM

## 2019-09-27 PROCEDURE — 71046 X-RAY EXAM CHEST 2 VIEWS: CPT

## 2019-09-27 PROCEDURE — 99283 EMERGENCY DEPT VISIT LOW MDM: CPT

## 2019-09-27 RX ORDER — METHYLPREDNISOLONE 4 MG/1
TABLET ORAL
Qty: 1 DOSE PACK | Refills: 0 | Status: SHIPPED | OUTPATIENT
Start: 2019-09-27 | End: 2019-10-14 | Stop reason: ALTCHOICE

## 2019-09-27 RX ORDER — FLUCONAZOLE 150 MG/1
150 TABLET ORAL
Qty: 1 TAB | Refills: 0 | Status: SHIPPED | OUTPATIENT
Start: 2019-09-27 | End: 2019-09-27

## 2019-09-27 RX ORDER — CODEINE PHOSPHATE AND GUAIFENESIN 10; 100 MG/5ML; MG/5ML
5 SOLUTION ORAL
Qty: 45 ML | Refills: 0 | Status: SHIPPED | OUTPATIENT
Start: 2019-09-27 | End: 2019-09-30

## 2019-09-27 RX ORDER — AZITHROMYCIN 250 MG/1
TABLET, FILM COATED ORAL
Qty: 6 TAB | Refills: 0 | Status: SHIPPED | OUTPATIENT
Start: 2019-09-27 | End: 2019-10-14 | Stop reason: ALTCHOICE

## 2019-09-27 NOTE — ED TRIAGE NOTES
Pt presents with productive cough, nasal and chest congestion x 1 week. Pt denies n/v/d, states had sore throat earlier but that has resolved. States using otc mucinex without relief.

## 2019-09-27 NOTE — ED PROVIDER NOTES
EMERGENCY DEPARTMENT HISTORY AND PHYSICAL EXAM    Date: 9/27/2019  Patient Name: Natalie Garcia    History of Presenting Illness     Chief Complaint   Patient presents with    Cough    Nasal Congestion    Chest Congestion             History Provided By: Patient    Chief Complaint:   Chief Complaint   Patient presents with    Cough    Nasal Congestion    Chest Congestion     Duration: 7 Days  Timing:  Progressive  Location: chest  Quality: no  pain  Severity: N/A  Modifying Factors: cough, cold  Associated Symptoms: cough, congestion      Additional History (Context): Natalie Garcia is a 45 y.o. female with asthma who presents with 2-week history of cough. She feels like she started out with a cold and congestion and is taking Mucinex and has been getting progressively worse. She does take inhalers at home for asthma including Advair and Proventil. She feels the cough has been keeping her up at night and she is coughing up like a thick whitish mucus. She denies fever chills nausea vomiting sore throat or ear pain. Though she does not smoke she is exposed to secondhand smoke. PCP: Otha Curling, MD    Current Outpatient Medications   Medication Sig Dispense Refill    azithromycin (ZITHROMAX Z-GALE) 250 mg tablet Take two tablets today then one tablet daily  Indications: bronchitis 6 Tab 0    fluconazole (DIFLUCAN) 150 mg tablet Take 1 Tab by mouth now for 1 dose. FDA advises cautious prescribing of oral fluconazole in pregnancy. 1 Tab 0    guaiFENesin-codeine (ROBITUSSIN AC) 100-10 mg/5 mL solution Take 5 mL by mouth three (3) times daily as needed for Cough for up to 3 days. Max Daily Amount: 15 mL. 45 mL 0    dextromethorphan-guaiFENesin (ROBITUSSIN-DM)  mg/5 mL syrup Take 10 mL by mouth every six (6) hours as needed for Cough. SIG:  As prescribed during the daytime.  240 mL 0    methylPREDNISolone (MEDROL, GALE,) 4 mg tablet Per dose pack instructions 1 Dose Pack 0    fluticasone propionate (FLONASE) 50 mcg/actuation nasal spray INSTILL 2 SPRAYS INTO EACH NOSTRIL ONCE DAILY 1 Bottle 4    omalizumab (XOLAIR) 150 mg solr 300 mg by SubCUTAneous route every thirty (30) days. 300 mg 0    fluticasone propion-salmeterol (ADVAIR DISKUS) 250-50 mcg/dose diskus inhaler Take 1 Puff by inhalation every twelve (12) hours. 3 Inhaler 3    topiramate (TOPAMAX) 25 mg tablet Take 25 mg nightly x two weeks then take 50 mg nightly 60 Tab 2    ondansetron hcl (ZOFRAN) 4 mg tablet Take 1 Tab by mouth every eight (8) hours as needed for Nausea. 15 Tab 3    rizatriptan (MAXALT) 10 mg tablet One tab at HA onset May repeat in 2 hours if needed. Max two tabs in 24 hours 12 Tab 5    albuterol (PROVENTIL HFA, VENTOLIN HFA, PROAIR HFA) 90 mcg/actuation inhaler Take 2 Puffs by inhalation every four (4) hours as needed for Wheezing. 1 Inhaler 4    albuterol-ipratropium (DUO-NEB) 2.5 mg-0.5 mg/3 ml nebu INHALE CONTENTS OF 1 VIAL VIA NEBULIZER EVERY 4 HOURS AS NEEDED FOR SHORTNESS OF BREATH/WHEEZING.  180 mL 5    ALLEGRA-D 24 HOUR 180-240 mg per tablet TAKE 1 TABLET BY MOUTH DAILY 15 Tab 0       Past History     Past Medical History:  Past Medical History:   Diagnosis Date    Anxiety attack     Asthma     Asthmatic bronchitis     Catamenial disorder     Hernia     Migraine     Migraines     Mild asthma     Psychiatric disorder     anxiety     Torticollis        Past Surgical History:  Past Surgical History:   Procedure Laterality Date     DELIVERY ONLY      HX BACK SURGERY      HX  SECTION  2009       Family History:  Family History   Problem Relation Age of Onset    Heart Disease Other         grandmothers    Asthma Brother     Lung Disease Paternal Aunt         cronic bronchitis    Asthma Paternal Grandmother     High Cholesterol Mother        Social History:  Social History     Tobacco Use    Smoking status: Never Smoker    Smokeless tobacco: Never Used   Substance Use Topics    Alcohol use: No    Drug use: No       Allergies: Allergies   Allergen Reactions    Aspirin Anaphylaxis    Ibuprofen Anaphylaxis    Pcn [Penicillins] Itching    Claritin [Loratadine] Anxiety and Cough    Phenergan [Promethazine] Anxiety and Swelling    Seafood Swelling    Shellfish Containing Products Hives         Review of Systems   Review of Systems   Constitutional: Positive for chills. HENT: Positive for congestion and rhinorrhea. Negative for sore throat. Respiratory: Positive for cough. Gastrointestinal: Negative. Musculoskeletal: Negative. Skin: Negative. Neurological: Negative for headaches. All Other Systems Negative  Physical Exam     Vitals:    09/27/19 1053 09/27/19 1104   BP: 120/83    Pulse: 97    Resp: 18    Temp: 98.9 °F (37.2 °C)    SpO2: 100% 100%   Weight: 63.5 kg (140 lb)    Height: 5' 5\" (1.651 m)      Physical Exam   Constitutional: She appears well-developed and well-nourished. HENT:   Head: Normocephalic and atraumatic. Right Ear: External ear normal.   Left Ear: External ear normal.   Nose: Mucosal edema and rhinorrhea present. No sinus tenderness or nasal septal hematoma. Mouth/Throat: Oropharynx is clear and moist.   Eyes: Pupils are equal, round, and reactive to light. Conjunctivae and EOM are normal.   Neck: Normal range of motion. Neck supple. Cardiovascular: Normal rate, regular rhythm, normal heart sounds and intact distal pulses. Pulmonary/Chest: Effort normal and breath sounds normal.   Abdominal: Soft. Bowel sounds are normal.   Musculoskeletal: Normal range of motion. Neurological: She is alert. No cranial nerve deficit. Coordination normal.   Skin: Skin is warm and dry. Psychiatric: She has a normal mood and affect. Her behavior is normal.   Nursing note and vitals reviewed. Diagnostic Study Results     Labs -   No results found for this or any previous visit (from the past 12 hour(s)).     Radiologic Studies -   XR CHEST PA LAT (Results Pending)     CT Results  (Last 48 hours)    None        CXR Results  (Last 48 hours)    None            Medical Decision Making   I am the first provider for this patient. I reviewed the vital signs, available nursing notes, past medical history, past surgical history, family history and social history. Vital Signs-Reviewed the patient's vital signs. Records Reviewed: Nursing Notes and Old Medical Records    Procedures:  Procedures    Provider Notes (Medical Decision Making):   Patient's chest x-ray did not demonstrate a pneumonia. Since she does have a history of asthma and is exposed to secondhand smoke I feel that she should be covered with antibiotics for the bronchitis. I will send her home with a Medrol Dosepak Zithromax. In addition she says she gets yeast infections from antibiotic so I will give her a dose of Diflucan. She will follow-up with her primary care doctor in approximately 2 to 3 days. Also I sent her home with some Robitussin-DM to take during the day and Robitussin-AC she can take at night to sleep since she said the cough is keeping her up at night. MED RECONCILIATION:  No current facility-administered medications for this encounter. Current Outpatient Medications   Medication Sig    azithromycin (ZITHROMAX Z-GALE) 250 mg tablet Take two tablets today then one tablet daily  Indications: bronchitis    fluconazole (DIFLUCAN) 150 mg tablet Take 1 Tab by mouth now for 1 dose. FDA advises cautious prescribing of oral fluconazole in pregnancy.  guaiFENesin-codeine (ROBITUSSIN AC) 100-10 mg/5 mL solution Take 5 mL by mouth three (3) times daily as needed for Cough for up to 3 days. Max Daily Amount: 15 mL.  dextromethorphan-guaiFENesin (ROBITUSSIN-DM)  mg/5 mL syrup Take 10 mL by mouth every six (6) hours as needed for Cough. SIG:  As prescribed during the daytime.     methylPREDNISolone (MEDROL, GALE,) 4 mg tablet Per dose pack instructions    fluticasone propionate (FLONASE) 50 mcg/actuation nasal spray INSTILL 2 SPRAYS INTO EACH NOSTRIL ONCE DAILY    omalizumab (XOLAIR) 150 mg solr 300 mg by SubCUTAneous route every thirty (30) days.  fluticasone propion-salmeterol (ADVAIR DISKUS) 250-50 mcg/dose diskus inhaler Take 1 Puff by inhalation every twelve (12) hours.  topiramate (TOPAMAX) 25 mg tablet Take 25 mg nightly x two weeks then take 50 mg nightly    ondansetron hcl (ZOFRAN) 4 mg tablet Take 1 Tab by mouth every eight (8) hours as needed for Nausea.  rizatriptan (MAXALT) 10 mg tablet One tab at HA onset May repeat in 2 hours if needed. Max two tabs in 24 hours    albuterol (PROVENTIL HFA, VENTOLIN HFA, PROAIR HFA) 90 mcg/actuation inhaler Take 2 Puffs by inhalation every four (4) hours as needed for Wheezing.  albuterol-ipratropium (DUO-NEB) 2.5 mg-0.5 mg/3 ml nebu INHALE CONTENTS OF 1 VIAL VIA NEBULIZER EVERY 4 HOURS AS NEEDED FOR SHORTNESS OF BREATH/WHEEZING.  ALLEGRA-D 24 HOUR 180-240 mg per tablet TAKE 1 TABLET BY MOUTH DAILY       Disposition:  Home    DISCHARGE NOTE:     Pt has been reexamined. Patient has no new complaints, changes, or physical findings. Care plan outlined and precautions discussed. Results of xray were reviewed with the patient. All medications were reviewed with the patient; will d/c home with zithromax, medrol and Robitussin. All of pt's questions and concerns were addressed. Patient was instructed and agrees to follow up with PCM, as well as to return to the ED upon further deterioration. Patient is ready to go home.     Follow-up Information     Follow up With Specialties Details Why Contact [facility] EMERGENCY DEPT Emergency Medicine  If symptoms worsen [address] [facility]  [phone]    Vish Chaidez MD Family Practice In 3 days  [address] [facility]  [phone]            Current Discharge Medication List      START taking these medications Details   azithromycin (ZITHROMAX Z-GALE) 250 mg tablet Take two tablets today then one tablet daily  Indications: bronchitis  Qty: 6 Tab, Refills: 0      fluconazole (DIFLUCAN) 150 mg tablet Take 1 Tab by mouth now for 1 dose. FDA advises cautious prescribing of oral fluconazole in pregnancy. Qty: 1 Tab, Refills: 0      guaiFENesin-codeine (ROBITUSSIN AC) 100-10 mg/5 mL solution Take 5 mL by mouth three (3) times daily as needed for Cough for up to 3 days. Max Daily Amount: 15 mL. Qty: 45 mL, Refills: 0    Comments: Take at night for cough. Associated Diagnoses: Acute bronchitis, unspecified organism      dextromethorphan-guaiFENesin (ROBITUSSIN-DM)  mg/5 mL syrup Take 10 mL by mouth every six (6) hours as needed for Cough. SIG:  As prescribed during the daytime. Qty: 240 mL, Refills: 0      methylPREDNISolone (MEDROL, GALE,) 4 mg tablet Per dose pack instructions  Qty: 1 Dose Pack, Refills: 0         CONTINUE these medications which have NOT CHANGED    Details   fluticasone propionate (FLONASE) 50 mcg/actuation nasal spray INSTILL 2 SPRAYS INTO EACH NOSTRIL ONCE DAILY  Qty: 1 Bottle, Refills: 4      omalizumab (XOLAIR) 150 mg solr 300 mg by SubCUTAneous route every thirty (30) days. Qty: 300 mg, Refills: 0      fluticasone propion-salmeterol (ADVAIR DISKUS) 250-50 mcg/dose diskus inhaler Take 1 Puff by inhalation every twelve (12) hours. Qty: 3 Inhaler, Refills: 3      topiramate (TOPAMAX) 25 mg tablet Take 25 mg nightly x two weeks then take 50 mg nightly  Qty: 60 Tab, Refills: 2      ondansetron hcl (ZOFRAN) 4 mg tablet Take 1 Tab by mouth every eight (8) hours as needed for Nausea. Qty: 15 Tab, Refills: 3      rizatriptan (MAXALT) 10 mg tablet One tab at HA onset May repeat in 2 hours if needed.  Max two tabs in 24 hours  Qty: 12 Tab, Refills: 5      albuterol (PROVENTIL HFA, VENTOLIN HFA, PROAIR HFA) 90 mcg/actuation inhaler Take 2 Puffs by inhalation every four (4) hours as needed for Wheezing. Qty: 1 Inhaler, Refills: 4      albuterol-ipratropium (DUO-NEB) 2.5 mg-0.5 mg/3 ml nebu INHALE CONTENTS OF 1 VIAL VIA NEBULIZER EVERY 4 HOURS AS NEEDED FOR SHORTNESS OF BREATH/WHEEZING. Qty: 180 mL, Refills: 5      ALLEGRA-D 24 HOUR 180-240 mg per tablet TAKE 1 TABLET BY MOUTH DAILY  Qty: 15 Tab, Refills: 0                 Diagnosis     Clinical Impression:   1.  Acute bronchitis, unspecified organism

## 2019-09-27 NOTE — ED NOTES
Patient armband removed and shreddedI have reviewed discharge instructions with the patient. The patient verbalized understanding.  rx x 4 given and 1 sent

## 2019-10-14 ENCOUNTER — OFFICE VISIT (OUTPATIENT)
Dept: PULMONOLOGY | Age: 44
End: 2019-10-14

## 2019-10-14 VITALS
WEIGHT: 147 LBS | OXYGEN SATURATION: 99 % | RESPIRATION RATE: 19 BRPM | HEIGHT: 65 IN | BODY MASS INDEX: 24.49 KG/M2 | SYSTOLIC BLOOD PRESSURE: 100 MMHG | TEMPERATURE: 98.1 F | DIASTOLIC BLOOD PRESSURE: 60 MMHG | HEART RATE: 89 BPM

## 2019-10-14 DIAGNOSIS — Z51.81 VISIT FOR MONITORING XOLAIR THERAPY: ICD-10-CM

## 2019-10-14 DIAGNOSIS — J30.9 ALLERGIC RHINITIS, UNSPECIFIED SEASONALITY, UNSPECIFIED TRIGGER: ICD-10-CM

## 2019-10-14 DIAGNOSIS — Z79.899 VISIT FOR MONITORING XOLAIR THERAPY: ICD-10-CM

## 2019-10-14 DIAGNOSIS — J45.909 UNCOMPLICATED ASTHMA, UNSPECIFIED ASTHMA SEVERITY, UNSPECIFIED WHETHER PERSISTENT: Primary | ICD-10-CM

## 2019-10-14 NOTE — PROGRESS NOTES
Chief Complaint   Patient presents with    Asthma     Lab done 2/12, CXR doen 9/27    Injection     Xolair     Betina Coburn is here for Xolair injection. Patient did not have any reaction to last injection. Patient states the Xolair is helping her Asthma. Injected subcutaneous : 150 mg given in Left upper arm SQ,  150 mg given in Right upper arm SQ. Lot Number: [id number]  Exp Date: 10/2022  Total Xolair Dose: 300 mg every 4 weeks  Epipen is with patient. Patient observed for 15 minutes. No reaction at injection site. Patient given Xolair reaction sheet.

## 2019-10-14 NOTE — PROGRESS NOTES
Odessa Brochure presents today for   Chief Complaint   Patient presents with    Asthma     Lab done 2/12, CXR doen 9/27    Injection     Xolair       Is someone accompanying this pt? Daughter    Is the patient using any DME equipment during [address]? No     -DME Company n/a     Depression Screening:  3 most recent PHQ Screens 10/14/2019   Little interest or pleasure in doing things Not at all   Feeling down, depressed, irritable, or hopeless Not at all   Total Score PHQ 2 0       Learning Assessment:  Learning Assessment 1/20/2014   PRIMARY LEARNER Patient   PRIMARY LANGUAGE ENGLISH   LEARNER PREFERENCE PRIMARY READING     LISTENING     DEMONSTRATION   ANSWERED BY patient, Thai Bookbindsilvia     bsps   RELATIONSHIP SELF       Abuse Screening:  No flowsheet data found. Fall Risk  No flowsheet data found. Coordination of Care:  1. Have you been to the ER, urgent care clinic since your last visit? Hospitalized since your last visit? Yes; Name: Eli Akhtar 89, [facility]    2. Have you seen or consulted any other health care providers outside of the [address] [place] [address] since your last visit? Include any pap smears or colon screening.  No

## 2019-10-14 NOTE — PROGRESS NOTES
HISTORY OF PRESENT ILLNESS  Tre Dodd is a 45 y.o. female following up for Asthma. Follow up for asthma previously requiring Xolair for control. Pt noted dramatic improvement in symptoms during her last pregnancy, lasting even after delivery and even more so when she left a stress filled work environment. Pt had increasing exacerbations starting October 2018 with persistent SOB and wheezing despite a course of steroids and ?antibiotics. Xolair was restarted after her visit in Feb 2019 with note of elevated IgE and since then control has improved dramatically. Her only exacerbation was related to a viral URI a few weeks ago and resolved with steroids and antibiotics given by her PCP. CXR done then was normal.  She has no chest pain, fever or productive cough. No hospital admissions for Asthma. Asthma   The history is provided by the patient and parent. This is a chronic problem. Episode onset: years. The problem occurs rarely. The problem has been gradually improving. Pertinent negatives include no chest pain, no abdominal pain and no headaches. Shortness of breath: resolved. The symptoms are aggravated by stress and exertion (pollen). The symptoms are relieved by medications. Treatments tried: Xolair, Singulair and Symbicort. The treatment provided significant relief. Review of Systems   Constitutional: Negative for chills, diaphoresis, fever, malaise/fatigue and weight loss. Weight gain   HENT: Negative for congestion, ear discharge, ear pain, hearing loss, nosebleeds, sinus pain, sore throat and tinnitus. Eyes: Negative for blurred vision, double vision, photophobia, pain, discharge and redness. Respiratory: Negative for cough, hemoptysis, sputum production and stridor. Shortness of breath: resolved. Wheezing: rare     Cardiovascular: Negative for chest pain, palpitations, orthopnea, claudication, leg swelling and PND.    Gastrointestinal: Negative for abdominal pain, blood in stool, constipation, diarrhea, heartburn, melena, nausea and vomiting. Genitourinary: Negative for dysuria, flank pain, frequency, hematuria and urgency. Musculoskeletal: Negative for back pain, falls, joint pain, myalgias and neck pain. Skin: Negative for itching and rash. Neurological: Negative for dizziness, tingling, tremors, sensory change, speech change, focal weakness, seizures, loss of consciousness, weakness and headaches. Endo/Heme/Allergies: Negative for environmental allergies and polydipsia. Does not bruise/bleed easily. Psychiatric/Behavioral: Negative for depression, hallucinations, memory loss, substance abuse and suicidal ideas. The patient is nervous/anxious. The patient does not have insomnia. Past Medical History:   Diagnosis Date    Anxiety attack     Asthma     Asthmatic bronchitis     Catamenial disorder     Hernia     Migraine     Migraines     Mild asthma     Psychiatric disorder     anxiety     Torticollis      Current Outpatient Medications on File Prior to Visit   Medication Sig Dispense Refill    fluticasone propionate (FLONASE) 50 mcg/actuation nasal spray INSTILL 2 SPRAYS INTO EACH NOSTRIL ONCE DAILY 1 Bottle 4    fluticasone propion-salmeterol (ADVAIR DISKUS) 250-50 mcg/dose diskus inhaler Take 1 Puff by inhalation every twelve (12) hours. 3 Inhaler 3    topiramate (TOPAMAX) 25 mg tablet Take 25 mg nightly x two weeks then take 50 mg nightly 60 Tab 2    ondansetron hcl (ZOFRAN) 4 mg tablet Take 1 Tab by mouth every eight (8) hours as needed for Nausea. 15 Tab 3    rizatriptan (MAXALT) 10 mg tablet One tab at HA onset May repeat in 2 hours if needed. Max two tabs in 24 hours 12 Tab 5    albuterol (PROVENTIL HFA, VENTOLIN HFA, PROAIR HFA) 90 mcg/actuation inhaler Take 2 Puffs by inhalation every four (4) hours as needed for Wheezing.  1 Inhaler 4    albuterol-ipratropium (DUO-NEB) 2.5 mg-0.5 mg/3 ml nebu INHALE CONTENTS OF 1 VIAL VIA NEBULIZER EVERY 4 HOURS AS NEEDED FOR SHORTNESS OF BREATH/WHEEZING. 180 mL 5    ALLEGRA-D 24 HOUR 180-240 mg per tablet TAKE 1 TABLET BY MOUTH DAILY 15 Tab 0    dextromethorphan-guaiFENesin (ROBITUSSIN-DM)  mg/5 mL syrup Take 10 mL by mouth every six (6) hours as needed for Cough. SIG:  As prescribed during the daytime. 240 mL 0     No current facility-administered medications on file prior to visit.       Allergies   Allergen Reactions    Aspirin Anaphylaxis    Ibuprofen Anaphylaxis    Pcn [Penicillins] Itching    Claritin [Loratadine] Anxiety and Cough    Phenergan [Promethazine] Anxiety and Swelling    Seafood Swelling    Shellfish Containing Products Hives     Social History     Socioeconomic History    Marital status:      Spouse name: Not on file    Number of children: Not on file    Years of education: Not on file    Highest education level: Not on file   Occupational History    Not on file   Social Needs    Financial resource strain: Not on file    Food insecurity:     Worry: Not on file     Inability: Not on file    Transportation needs:     Medical: Not on file     Non-medical: Not on file   Tobacco Use    Smoking status: Never Smoker    Smokeless tobacco: Never Used   Substance and Sexual Activity    Alcohol use: No    Drug use: No    Sexual activity: Yes     Partners: Male   Lifestyle    Physical activity:     Days per week: Not on file     Minutes per session: Not on file    Stress: Not on file   Relationships    Social connections:     Talks on phone: Not on file     Gets together: Not on file     Attends Restorationist service: Not on file     Active member of club or organization: Not on file     Attends meetings of clubs or organizations: Not on file     Relationship status: Not on file    Intimate partner violence:     Fear of current or ex partner: Not on file     Emotionally abused: Not on file     Physically abused: Not on file     Forced sexual activity: Not on file   Other Topics Concern  Not on file   Social History Narrative    Not on file     Blood pressure 100/60, pulse 89, temperature 98.1 °F (36.7 °C), temperature source Oral, resp. rate 19, height 5' 5\" (1.651 m), weight 66.7 kg (147 lb), last menstrual period 10/05/2019, SpO2 99 %, not currently breastfeeding. Physical Exam   Constitutional: She is oriented to person, place, and time. She appears well-developed and well-nourished. No distress. HENT:   Head: Normocephalic and atraumatic. Nose: Nose normal.   Mouth/Throat: No oropharyngeal exudate. Eyes: Pupils are equal, round, and reactive to light. Conjunctivae and EOM are normal. Right eye exhibits no discharge. Left eye exhibits no discharge. No scleral icterus. Neck: No JVD present. No tracheal deviation present. No thyromegaly present. Cardiovascular: Normal rate, regular rhythm, normal heart sounds and intact distal pulses. Exam reveals no gallop and no friction rub. No murmur heard. Pulmonary/Chest: Effort normal and breath sounds normal. No stridor. No respiratory distress. She has no wheezes. She has no rales. She exhibits no tenderness. Abdominal: Soft. She exhibits no mass. There is no tenderness. There is no rebound. Musculoskeletal: She exhibits no edema, tenderness or deformity. Lymphadenopathy:     She has no cervical adenopathy. Neurological: She is alert and oriented to person, place, and time. Coordination normal.   Skin: Skin is warm and dry. No rash noted. She is not diaphoretic. No erythema. No pallor. Psychiatric: She has a normal mood and affect. Her behavior is normal. Judgment and thought content normal.     Spirometry: mild obstruction , improved from study done 6/14/2012  ASSESSMENT and PLAN  Encounter Diagnoses   Name Primary?     Uncomplicated asthma, unspecified asthma severity, unspecified whether persistent Yes    Visit for monitoring Xolair therapy     Allergic rhinitis, unspecified seasonality, unspecified trigger      Pt to continue Symbicort and rescue Albuterol. see orders for details. Continued benefit with Xolair, no reactions seen. Spirometry reviewed with pt and mother. Counseled on trigger avoidance.   RTC 6 months  Pt to return next week for flu vaccination

## 2019-10-14 NOTE — PROGRESS NOTES
Verbal Order with read back per Jn Mixon MD  For PFT smart panel. AMB POC PFT complete w/ bronchodilator  AMB POC PFT complete w/o bronchodilator    Dr. rBadley Jalloh MD will co-sign the orders.

## 2019-10-17 NOTE — TELEPHONE ENCOUNTER
Per pt, [facility] called her and told her the Advair needs a prior auth. Please call [facility] to get prior auth.

## 2019-10-17 NOTE — TELEPHONE ENCOUNTER
Called pt's insurance after calling pharmacy. They now cover Fluticasone salmeterol powder which is generic for Advair or Wixela. Pharmacy ran it though and is covered at 0 co-pay.  Pt advised

## 2019-10-18 ENCOUNTER — OFFICE VISIT (OUTPATIENT)
Dept: NEUROLOGY | Age: 44
End: 2019-10-18

## 2019-10-18 VITALS
HEART RATE: 91 BPM | OXYGEN SATURATION: 98 % | HEIGHT: 65 IN | BODY MASS INDEX: 24.49 KG/M2 | RESPIRATION RATE: 16 BRPM | DIASTOLIC BLOOD PRESSURE: 70 MMHG | TEMPERATURE: 98.6 F | WEIGHT: 147 LBS | SYSTOLIC BLOOD PRESSURE: 102 MMHG

## 2019-10-18 DIAGNOSIS — G43.839 INTRACTABLE MENSTRUAL MIGRAINE WITHOUT STATUS MIGRAINOSUS: Primary | ICD-10-CM

## 2019-10-18 RX ORDER — TOPIRAMATE 50 MG/1
50 TABLET, FILM COATED ORAL
Qty: 90 TAB | Refills: 1 | Status: SHIPPED | OUTPATIENT
Start: 2019-10-18 | End: 2019-12-16 | Stop reason: SDUPTHER

## 2019-10-18 NOTE — PROGRESS NOTES
Samreen Eduardo presents today for   Chief Complaint   Patient presents with    Follow-up    Migraine       Is someone accompanying this pt? No    Is the patient using any DME equipment during OV? No    Depression Screening:  3 most recent PHQ Screens 10/14/2019   Little interest or pleasure in doing things Not at all   Feeling down, depressed, irritable, or hopeless Not at all   Total Score PHQ 2 0       Learning Assessment:  Learning Assessment 1/20/2014   PRIMARY LEARNER Patient   PRIMARY LANGUAGE ENGLISH   LEARNER PREFERENCE PRIMARY READING     LISTENING     DEMONSTRATION   ANSWERED BY patient, Elma Gupta     bsps   RELATIONSHIP SELF       Abuse Screening:  No flowsheet data found. Coordination of Care:  1. Have you been to the ER, urgent care clinic since your last visit? Hospitalized since your last visit? No    2. Have you seen or consulted any other health care providers outside of the [address] [place] [address] since your last visit? Include any pap smears or colon screening.  No

## 2019-10-18 NOTE — PROGRESS NOTES
[facility]  [address], [address], [place], [address]  [address] Dustin Lopes, [address]  Office:  [phone]  Fax: [phone]  Chief Complaint   Patient presents with    Follow-up    Migraine     This is a 43-year-old female who presents for follow-up migraines. Last seen in office in August.  In the interim endorses headaches as well controlled. She has her period this week and has a slight headache today but much better since being started on the Topamax. Taking 50 mg nightly. Maxalt as needed. Zofran for nausea. Some occasional dizziness on position changes. This does not persist. Denies weakness or visual disturbance. Denies numbness or tingling. Denies seeing the room spin. Mentions some minor hair loss over the past two months. No other concerns at this time. Past Medical History:   Diagnosis Date    Anxiety attack     Asthma     Asthmatic bronchitis     Catamenial disorder     Hernia     Migraine     Migraines     Mild asthma     Psychiatric disorder     anxiety     Torticollis        Past Surgical History:   Procedure Laterality Date     DELIVERY ONLY      HX BACK SURGERY      HX  SECTION         Current Outpatient Medications   Medication Sig Dispense Refill    topiramate (TOPAMAX) 50 mg tablet Take 1 Tab by mouth nightly. 90 Tab 1    omalizumab (XOLAIR) 150 mg solr 300 mg by SubCUTAneous route every thirty (30) days. 300 mg 0    fluticasone propionate (FLONASE) 50 mcg/actuation nasal spray INSTILL 2 SPRAYS INTO EACH NOSTRIL ONCE DAILY 1 Bottle 4    fluticasone propion-salmeterol (ADVAIR DISKUS) 250-50 mcg/dose diskus inhaler Take 1 Puff by inhalation every twelve (12) hours. 3 Inhaler 3    ondansetron hcl (ZOFRAN) 4 mg tablet Take 1 Tab by mouth every eight (8) hours as needed for Nausea. 15 Tab 3    rizatriptan (MAXALT) 10 mg tablet One tab at HA onset May repeat in 2 hours if needed.  Max two tabs in 24 hours 12 Tab 5    albuterol (PROVENTIL HFA, VENTOLIN HFA, PROAIR HFA) 90 mcg/actuation inhaler Take 2 Puffs by inhalation every four (4) hours as needed for Wheezing. 1 Inhaler 4    albuterol-ipratropium (DUO-NEB) 2.5 mg-0.5 mg/3 ml nebu INHALE CONTENTS OF 1 VIAL VIA NEBULIZER EVERY 4 HOURS AS NEEDED FOR SHORTNESS OF BREATH/WHEEZING. 180 mL 5    ALLEGRA-D 24 HOUR 180-240 mg per tablet TAKE 1 TABLET BY MOUTH DAILY 15 Tab 0    dextromethorphan-guaiFENesin (ROBITUSSIN-DM)  mg/5 mL syrup Take 10 mL by mouth every six (6) hours as needed for Cough. SIG:  As prescribed during the daytime. 240 mL 0        Allergies   Allergen Reactions    Aspirin Anaphylaxis    Ibuprofen Anaphylaxis    Pcn [Penicillins] Itching    Claritin [Loratadine] Anxiety and Cough    Phenergan [Promethazine] Anxiety and Swelling    Seafood Swelling    Shellfish Containing Products Hives       Social History     Tobacco Use    Smoking status: Never Smoker    Smokeless tobacco: Never Used   Substance Use Topics    Alcohol use: No    Drug use: No       Family History   Problem Relation Age of Onset    Heart Disease Other         grandmothers    Asthma Brother     Lung Disease Paternal Aunt         cronic bronchitis    Asthma Paternal Grandmother     High Cholesterol Mother        Review of Systems  GENERAL: Denies fever or fatigue  CARDIAC: No CP or SOB  PULMONARY: No cough of SOB  MUSCULOSKELETAL: No new joint pain  NEURO: SEE HPI    Examination  Visit Vitals  /70 (BP 1 Location: Left arm, BP Patient Position: Sitting)   Pulse 91   Temp 98.6 °F (37 °C) (Oral)   Resp 16   Ht 5' 5\" (1.651 m)   Wt 66.7 kg (147 lb)   LMP 10/05/2019 (LMP Unknown)   SpO2 98%   BMI 24.46 kg/m²       This is a very pleasant 45year old female. She is alert and in NAD. Full fund of knowledge. Speech is clear. Oriented x3, EOM's are full, PERRL, no facial asymmetries. Freely moves the upper and lower extremities. No signs of incoordination or ataxia. Steady gait. Impression/Plan  Chantale Howard is a 45 y.o. female whose history and physical are consistent with menstrual migraines. Doing well on Topamax 50 mg nightly. Mentions some minor hair loss over the past few months. She will keep on eye on this. Alopecia is a listed adverse reaction to Topamax. If continues to be a concern we can address this. Maxalt for abortive headache therapy. Zofran as needed for nausea. Regarding dizziness she says this is intermittent and does not persist.  Associated with positional changes. Her blood pressure is 102/70 in office today. Consideration of an orthostatic etiology. If symptoms persist future consideration for work-up if warranted. Refills provided. At this time follow-up in 4 months or sooner as needed. All questions addressed and patient is agreeable with plan of care. Diagnoses and all orders for this visit:    1. Intractable menstrual migraine without status migrainosus    Other orders  -     topiramate (TOPAMAX) 50 mg tablet; Take 1 Tab by mouth nightly. Total time: 30 min   Counseling / coordination time: 25 min   > 50% counseling / coordination?: Yes re: symptoms, management, medications, answering questions, and plan of care. Signed By: Yelena Case NP    This note will not be viewable in [address] PLEASE NOTE:   Portions of this document may have been produced using voice recognition software. Unrecognized errors in transcription may be present.

## 2019-10-21 NOTE — TELEPHONE ENCOUNTER
Requested Prescriptions     Pending Prescriptions Disp Refills    rizatriptan (MAXALT) 10 mg tablet 12 Tab 5     Sig: One tab at HA onset May repeat in 2 hours if needed.  Max two tabs in 24 hours       Last Filled: 8/20/19

## 2019-10-21 NOTE — TELEPHONE ENCOUNTER
Requested Prescriptions     Pending Prescriptions Disp Refills    rizatriptan (MAXALT) 10 mg tablet 12 Tab 5     Sig: One tab at HA onset May repeat in 2 hours if needed.  Max two tabs in 24 hours

## 2019-10-22 RX ORDER — RIZATRIPTAN BENZOATE 10 MG/1
TABLET ORAL
Qty: 12 TAB | Refills: 5 | Status: SHIPPED | OUTPATIENT
Start: 2019-10-22 | End: 2020-04-23 | Stop reason: SDUPTHER

## 2019-11-14 NOTE — PROGRESS NOTES
Order placed for [facility], per Verbal Order from Dimas Alcantar NP on 11/14/2019. Provider is aware of last office visit and follow up. No further action requested from provider.

## 2019-11-15 ENCOUNTER — CLINICAL SUPPORT (OUTPATIENT)
Dept: PULMONOLOGY | Age: 44
End: 2019-11-15

## 2019-11-15 DIAGNOSIS — J45.909 ASTHMA, UNSPECIFIED ASTHMA SEVERITY, UNSPECIFIED WHETHER COMPLICATED, UNSPECIFIED WHETHER PERSISTENT: Primary | ICD-10-CM

## 2019-11-15 NOTE — PATIENT INSTRUCTIONS
Be aware that \"Anaphylaxis\" can occur any time after receiving a Xolair injection   Anaphylaxis presenting a bronchospasm, hypotension (low BP), dizziness, faintness, hives, and/or swelling of the tongue and throat. Anaphylaxis can occur after the first dose, but also has occurred beyond one year after beginning treatment with Xolair. *If such symptoms occur, use your EpiPen immediately and call 911. *     Notify your MD office following any treatment of possible reaction. Epinephrine (Injection)   Epinephrine (oc-s-WTZ-rin)  Treats severe allergic reactions (including anaphylaxis) in an emergency situation. Brand Name(s):Adrenaclick, Adrenalin, Auvi-Q, EpiPen Auto-Injector, EpiPen Jr Auto-Injector, Epipen, Epipen 2-Jose Juan Auto-Injector, Epipen Jr 2-Jose Juan Auto-Injector, NovaPlus Epinephrine, Twinject   There may be other brand names for this medicine. When This Medicine Should Not Be Used:   A severe allergic reaction can be life-threatening, so there is no reason this medicine should not be used. How to Use This Medicine:   Injectable  · Your doctor should teach you how and when to inject this medicine. Each injection kit contains a single-use dose of medicine prescribed for you. · Give yourself a shot right away if you start to have a severe allergic reaction. · Inject this medicine into the muscle on the outside of your thigh only. Never inject this medicine into a vein, into your hand or foot, or into the muscles of your buttocks. · Read and follow the patient instructions that come with this medicine. Talk to your doctor or pharmacist if you have any questions. · This medicine might come with an autoinjector  so you can practice giving the medicine before you have an actual allergic reaction. The autoinjector  is gray (for EpiPen® or EpiPen Jr®) or beige (for [place]) and does not contain any medicine or needle.   · Do not remove the blue safety release (EpiPen® or EpiPen Jr®) or the gray end caps (Adrenaclick®) on the autoinjector until you are ready to use it. Do not put your thumb, fingers, or hand over the orange (EpiPen® or EpiPen Jr®) or red tip (Adrenaclick®). · You may need to use more than one injection if your allergic reaction does not get better after the first shot. Your doctor will give you additional doses if you need more than 2 injections. · You may inject the medicine through your clothing, if you need to. · Some liquid will remain in the autoinjector after the medicine has been injected. This medicine cannot be reused. Give your used autoinjector to your healthcare provider when you seek medical care. · Carry this medicine with you at all times for emergency use in case you have a severe allergic reaction. · Make sure family members or other people you are with know how to inject the medicine in case you are not able to do it yourself. · Check your injection kits regularly to make sure the liquid has not changed color. You should not use the autoinjector if the liquid has changed color, or if there are solids in the liquid. You should not use the autoinjector if the expiration date has passed. · Store the injection kit at room temperature, away from heat, moisture, and direct light. Do not store the medicine in the refrigerator or freezer, or inside a car. · Keep the autoinjector in its case or carrier tube to protect it from damage. This tube is not waterproof. If you accidentally drop it, check for damage or leaks. Drugs and Foods to Avoid:   Ask your doctor or pharmacist before using any other medicine, including over-the-counter medicines, vitamins, and herbal products. · Some foods and medicines can affect how epinephrine works.  Tell your doctor if you are using digoxin, levothyroxine, phentolamine, certain allergy medicines (such as chlorpheniramine, diphenhydramine, tripelennamine), a beta blocker medicine (such as propranolol), a heart rhythm medicine, a diuretic (water pill), an MAO inhibitor (MAOI), medicine for depression, or ergot medicines. Warnings While Using This Medicine:   · Tell your doctor if you are pregnant or breastfeeding, or if you have asthma, diabetes, heart disease, heart rhythm problems, high blood pressure, an overactive thyroid, or Parkinson disease. · A severe allergic reaction is a medical emergency. Go to an emergency room as soon as possible, even if you feel better after you use this medicine. · Do not inject this medicine into your hands or feet. Go to the emergency room right away if you accidently inject epinephrine into any part of your body other than your thigh. Epinephrine reduces blood flow, and this could damage areas that have small blood vessels, such as hands and feet. · Keep all medicine out of the reach of children. Never share your medicine with anyone. Possible Side Effects While Using This Medicine:   Call your doctor right away if you notice any of these side effects:  · Chest pain  · Fast, pounding, or uneven heartbeat  · Heavy sweating, nausea, vomiting  · Tremors, shakiness  · Trouble breathing  If you notice these less serious side effects, talk with your doctor:   · Feeling anxious, nervous, scared, or weak  · Headache or dizziness  · Pale skin  If you notice other side effects that you think are caused by this medicine, tell your doctor. Call your doctor for medical advice about side effects. You may report side effects to FDA at 1-915-FDA-2667  © 2014 [address] is for End User's use only and may not be sold, redistributed or otherwise used for commercial purposes. The above information is an  only. It is not intended as medical advice for individual conditions or treatments. Talk to your doctor, nurse or pharmacist before following any medical regimen to see if it is safe and effective for you.

## 2019-11-15 NOTE — PROGRESS NOTES
Chief Complaint   Patient presents with   Radua Luz is here for Xolair injection. Patient did not have any reaction to last injection. Patient states the Xolair is helping her Asthma. Injected subcutaneous : 150 mg given in Left upper arm SQ,  150 mg given in Right upper arm SQ. Lot Number: [id number]  Exp Date: 10/2022  Total Xolair Dose: 300 mg every 4 weeks  Epipen is with patient. Patient observed for 15 minutes. No reaction at injection site. Patient given Xolair reaction sheet.

## 2019-12-13 ENCOUNTER — CLINICAL SUPPORT (OUTPATIENT)
Dept: PULMONOLOGY | Age: 44
End: 2019-12-13

## 2019-12-13 DIAGNOSIS — J45.909 ASTHMA, UNSPECIFIED ASTHMA SEVERITY, UNSPECIFIED WHETHER COMPLICATED, UNSPECIFIED WHETHER PERSISTENT: Primary | ICD-10-CM

## 2019-12-13 NOTE — PROGRESS NOTES
Chief Complaint   Patient presents with   Santiago Phillips is here for Xolair injection. Patient did not have any reaction to last injection. Patient states the Xolair is helping her Asthma. Injected subcutaneous : 150 mg given in Left upper arm SQ,  150 mg given in Right upper arm SQ. Lot Number: [id number]  Exp Date: 10/2022  Total Xolair Dose: 300 mg every 4 weeks  Epipen is with patient. Patient observed for 15 minutes. No reaction at injection site. Patient given Xolair reaction sheet.

## 2019-12-13 NOTE — PATIENT INSTRUCTIONS
Be aware that \"Anaphylaxis\" can occur any time after receiving a Xolair injection   Anaphylaxis presenting a bronchospasm, hypotension (low BP), dizziness, faintness, hives, and/or swelling of the tongue and throat. Anaphylaxis can occur after the first dose, but also has occurred beyond one year after beginning treatment with Xolair. *If such symptoms occur, use your EpiPen immediately and call 911. *     Notify your MD office following any treatment of possible reaction. Epinephrine (Injection)   Epinephrine (am-f-BXJ-rin)  Treats severe allergic reactions (including anaphylaxis) in an emergency situation. Brand Name(s):Adrenaclick, Adrenalin, Auvi-Q, EpiPen Auto-Injector, EpiPen Jr Auto-Injector, Epipen, Epipen 2-Jose Juan Auto-Injector, Epipen Jr 2-Jose Juan Auto-Injector, NovaPlus Epinephrine, Twinject   There may be other brand names for this medicine. When This Medicine Should Not Be Used:   A severe allergic reaction can be life-threatening, so there is no reason this medicine should not be used. How to Use This Medicine:   Injectable  · Your doctor should teach you how and when to inject this medicine. Each injection kit contains a single-use dose of medicine prescribed for you. · Give yourself a shot right away if you start to have a severe allergic reaction. · Inject this medicine into the muscle on the outside of your thigh only. Never inject this medicine into a vein, into your hand or foot, or into the muscles of your buttocks. · Read and follow the patient instructions that come with this medicine. Talk to your doctor or pharmacist if you have any questions. · This medicine might come with an autoinjector  so you can practice giving the medicine before you have an actual allergic reaction. The autoinjector  is gray (for EpiPen® or EpiPen Jr®) or beige (for [place]) and does not contain any medicine or needle.   · Do not remove the blue safety release (EpiPen® or EpiPen Jr®) or the gray end caps (Adrenaclick®) on the autoinjector until you are ready to use it. Do not put your thumb, fingers, or hand over the orange (EpiPen® or EpiPen Jr®) or red tip (Adrenaclick®). · You may need to use more than one injection if your allergic reaction does not get better after the first shot. Your doctor will give you additional doses if you need more than 2 injections. · You may inject the medicine through your clothing, if you need to. · Some liquid will remain in the autoinjector after the medicine has been injected. This medicine cannot be reused. Give your used autoinjector to your healthcare provider when you seek medical care. · Carry this medicine with you at all times for emergency use in case you have a severe allergic reaction. · Make sure family members or other people you are with know how to inject the medicine in case you are not able to do it yourself. · Check your injection kits regularly to make sure the liquid has not changed color. You should not use the autoinjector if the liquid has changed color, or if there are solids in the liquid. You should not use the autoinjector if the expiration date has passed. · Store the injection kit at room temperature, away from heat, moisture, and direct light. Do not store the medicine in the refrigerator or freezer, or inside a car. · Keep the autoinjector in its case or carrier tube to protect it from damage. This tube is not waterproof. If you accidentally drop it, check for damage or leaks. Drugs and Foods to Avoid:   Ask your doctor or pharmacist before using any other medicine, including over-the-counter medicines, vitamins, and herbal products. · Some foods and medicines can affect how epinephrine works.  Tell your doctor if you are using digoxin, levothyroxine, phentolamine, certain allergy medicines (such as chlorpheniramine, diphenhydramine, tripelennamine), a beta blocker medicine (such as propranolol), a heart rhythm medicine, a diuretic (water pill), an MAO inhibitor (MAOI), medicine for depression, or ergot medicines. Warnings While Using This Medicine:   · Tell your doctor if you are pregnant or breastfeeding, or if you have asthma, diabetes, heart disease, heart rhythm problems, high blood pressure, an overactive thyroid, or Parkinson disease. · A severe allergic reaction is a medical emergency. Go to an emergency room as soon as possible, even if you feel better after you use this medicine. · Do not inject this medicine into your hands or feet. Go to the emergency room right away if you accidently inject epinephrine into any part of your body other than your thigh. Epinephrine reduces blood flow, and this could damage areas that have small blood vessels, such as hands and feet. · Keep all medicine out of the reach of children. Never share your medicine with anyone. Possible Side Effects While Using This Medicine:   Call your doctor right away if you notice any of these side effects:  · Chest pain  · Fast, pounding, or uneven heartbeat  · Heavy sweating, nausea, vomiting  · Tremors, shakiness  · Trouble breathing  If you notice these less serious side effects, talk with your doctor:   · Feeling anxious, nervous, scared, or weak  · Headache or dizziness  · Pale skin  If you notice other side effects that you think are caused by this medicine, tell your doctor. Call your doctor for medical advice about side effects. You may report side effects to FDA at 2-538-FDA-0777  © 2014 [address] is for End User's use only and may not be sold, redistributed or otherwise used for commercial purposes. The above information is an  only. It is not intended as medical advice for individual conditions or treatments. Talk to your doctor, nurse or pharmacist before following any medical regimen to see if it is safe and effective for you.

## 2019-12-16 RX ORDER — ONDANSETRON 4 MG/1
4 TABLET, FILM COATED ORAL
Qty: 15 TAB | Refills: 3 | Status: SHIPPED | OUTPATIENT
Start: 2019-12-16 | End: 2020-02-24

## 2019-12-16 NOTE — TELEPHONE ENCOUNTER
Requested Prescriptions     Pending Prescriptions Disp Refills    topiramate (TOPAMAX) 50 mg tablet 90 Tab 1     Sig: Take 1 Tab by mouth nightly.      Faxed request scanned to chart

## 2019-12-17 RX ORDER — TOPIRAMATE 50 MG/1
50 TABLET, FILM COATED ORAL
Qty: 90 TAB | Refills: 1 | Status: SHIPPED | OUTPATIENT
Start: 2019-12-17 | End: 2020-04-23 | Stop reason: SDUPTHER

## 2019-12-17 NOTE — TELEPHONE ENCOUNTER
Attempted to contact patient regarding Rx's refill no answer. Left message to return call to office.

## 2019-12-26 ENCOUNTER — TELEPHONE (OUTPATIENT)
Dept: PULMONOLOGY | Age: 44
End: 2019-12-26

## 2019-12-26 NOTE — TELEPHONE ENCOUNTER
Pt started with wheezing this am. Used her Albuterol inhaler but has not used the nebulizer. Pt to use the nebulizer if sxs of wheezing returns.  If sxs persist pt to call for appt

## 2020-01-10 ENCOUNTER — CLINICAL SUPPORT (OUTPATIENT)
Dept: PULMONOLOGY | Age: 45
End: 2020-01-10

## 2020-01-10 DIAGNOSIS — J45.909 ASTHMA, UNSPECIFIED ASTHMA SEVERITY, UNSPECIFIED WHETHER COMPLICATED, UNSPECIFIED WHETHER PERSISTENT: Primary | ICD-10-CM

## 2020-01-10 NOTE — PROGRESS NOTES
Chief Complaint   Patient presents with   Telma Bautista is here for Xolair injection. Patient did not have any reaction to last injection. Patient states the Xolair is helping her Asthma. Injected subcutaneous : 150 mg given in Left upper arm SQ,  150 mg given in Right upper arm SQ. Lot Number: [id number]  Exp Date: 10/2022  Total Xolair Dose: 300 mg every 4 weeks  Epipen is with patient. Patient observed for 15 minutes. No reaction at injection site. Patient given Xolair reaction sheet.

## 2020-01-10 NOTE — PATIENT INSTRUCTIONS
Be aware that \"Anaphylaxis\" can occur any time after receiving a Xolair injection   Anaphylaxis presenting a bronchospasm, hypotension (low BP), dizziness, faintness, hives, and/or swelling of the tongue and throat. Anaphylaxis can occur after the first dose, but also has occurred beyond one year after beginning treatment with Xolair. *If such symptoms occur, use your EpiPen immediately and call 911. *     Notify your MD office following any treatment of possible reaction. Epinephrine (Injection)   Epinephrine ([id number])  Treats severe allergic reactions (including anaphylaxis) in an emergency situation. Brand Name(s):Adrenaclick, Adrenalin, Auvi-Q, EpiPen Auto-Injector, EpiPen Jr Auto-Injector, Epipen, Epipen 2-Jose Juan Auto-Injector, Epipen Jr 2-Jose Juan Auto-Injector, NovaPlus Epinephrine, Twinject   There may be other brand names for this medicine. When This Medicine Should Not Be Used:   A severe allergic reaction can be life-threatening, so there is no reason this medicine should not be used. How to Use This Medicine:   Injectable  · Your doctor should teach you how and when to inject this medicine. Each injection kit contains a single-use dose of medicine prescribed for you. · Give yourself a shot right away if you start to have a severe allergic reaction. · Inject this medicine into the muscle on the outside of your thigh only. Never inject this medicine into a vein, into your hand or foot, or into the muscles of your buttocks. · Read and follow the patient instructions that come with this medicine. Talk to your doctor or pharmacist if you have any questions. · This medicine might come with an autoinjector  so you can practice giving the medicine before you have an actual allergic reaction. The autoinjector  is gray (for EpiPen® or EpiPen Jr®) or beige (for [place]) and does not contain any medicine or needle.   · Do not remove the blue safety release (EpiPen® or EpiPen Jr®) or the gray end caps (Adrenaclick®) on the autoinjector until you are ready to use it. Do not put your thumb, fingers, or hand over the orange (EpiPen® or EpiPen Jr®) or red tip (Adrenaclick®). · You may need to use more than one injection if your allergic reaction does not get better after the first shot. Your doctor will give you additional doses if you need more than 2 injections. · You may inject the medicine through your clothing, if you need to. · Some liquid will remain in the autoinjector after the medicine has been injected. This medicine cannot be reused. Give your used autoinjector to your healthcare provider when you seek medical care. · Carry this medicine with you at all times for emergency use in case you have a severe allergic reaction. · Make sure family members or other people you are with know how to inject the medicine in case you are not able to do it yourself. · Check your injection kits regularly to make sure the liquid has not changed color. You should not use the autoinjector if the liquid has changed color, or if there are solids in the liquid. You should not use the autoinjector if the expiration date has passed. · Store the injection kit at room temperature, away from heat, moisture, and direct light. Do not store the medicine in the refrigerator or freezer, or inside a car. · Keep the autoinjector in its case or carrier tube to protect it from damage. This tube is not waterproof. If you accidentally drop it, check for damage or leaks. Drugs and Foods to Avoid:   Ask your doctor or pharmacist before using any other medicine, including over-the-counter medicines, vitamins, and herbal products. · Some foods and medicines can affect how epinephrine works.  Tell your doctor if you are using digoxin, levothyroxine, phentolamine, certain allergy medicines (such as chlorpheniramine, diphenhydramine, tripelennamine), a beta blocker medicine (such as propranolol), a heart rhythm medicine, a diuretic (water pill), an MAO inhibitor (MAOI), medicine for depression, or ergot medicines. Warnings While Using This Medicine:   · Tell your doctor if you are pregnant or breastfeeding, or if you have asthma, diabetes, heart disease, heart rhythm problems, high blood pressure, an overactive thyroid, or Parkinson disease. · A severe allergic reaction is a medical emergency. Go to an emergency room as soon as possible, even if you feel better after you use this medicine. · Do not inject this medicine into your hands or feet. Go to the emergency room right away if you accidently inject epinephrine into any part of your body other than your thigh. Epinephrine reduces blood flow, and this could damage areas that have small blood vessels, such as hands and feet. · Keep all medicine out of the reach of children. Never share your medicine with anyone. Possible Side Effects While Using This Medicine:   Call your doctor right away if you notice any of these side effects:  · Chest pain  · Fast, pounding, or uneven heartbeat  · Heavy sweating, nausea, vomiting  · Tremors, shakiness  · Trouble breathing  If you notice these less serious side effects, talk with your doctor:   · Feeling anxious, nervous, scared, or weak  · Headache or dizziness  · Pale skin  If you notice other side effects that you think are caused by this medicine, tell your doctor. Call your doctor for medical advice about side effects. You may report side effects to FDA at 9-154-FDA-9886  © 2014 [address] is for End User's use only and may not be sold, redistributed or otherwise used for commercial purposes. The above information is an  only. It is not intended as medical advice for individual conditions or treatments. Talk to your doctor, nurse or pharmacist before following any medical regimen to see if it is safe and effective for you.

## 2020-01-21 ENCOUNTER — HOSPITAL ENCOUNTER (EMERGENCY)
Age: 45
Discharge: HOME | End: 2020-01-21
Attending: EMERGENCY MEDICINE
Payer: MEDICAID

## 2020-01-21 VITALS
SYSTOLIC BLOOD PRESSURE: 114 MMHG | TEMPERATURE: 98.2 F | HEART RATE: 83 BPM | HEIGHT: 65 IN | DIASTOLIC BLOOD PRESSURE: 70 MMHG | BODY MASS INDEX: 22.49 KG/M2 | OXYGEN SATURATION: 100 % | WEIGHT: 135 LBS | RESPIRATION RATE: 16 BRPM

## 2020-01-21 DIAGNOSIS — T16.2XXA FOREIGN BODY OF LEFT EAR, INITIAL ENCOUNTER: ICD-10-CM

## 2020-01-21 DIAGNOSIS — Z20.828 EXPOSURE TO THE FLU: ICD-10-CM

## 2020-01-21 DIAGNOSIS — J11.1 FLU SYNDROME: Primary | ICD-10-CM

## 2020-01-21 LAB
FLUAV AG NPH QL IA: NEGATIVE
FLUBV AG NOSE QL IA: NEGATIVE

## 2020-01-21 PROCEDURE — 87804 INFLUENZA ASSAY W/OPTIC: CPT

## 2020-01-21 PROCEDURE — 99283 EMERGENCY DEPT VISIT LOW MDM: CPT

## 2020-01-21 PROCEDURE — 74011250637 HC RX REV CODE- 250/637: Performed by: EMERGENCY MEDICINE

## 2020-01-21 PROCEDURE — 75810000145 HC RMVL FB EAR W/O GEN ANES

## 2020-01-21 RX ORDER — MONTELUKAST SODIUM 10 MG/1
10 TABLET ORAL DAILY
COMMUNITY
End: 2020-03-10

## 2020-01-21 RX ORDER — OSELTAMIVIR PHOSPHATE 75 MG/1
75 CAPSULE ORAL 2 TIMES DAILY
Qty: 10 CAP | Refills: 0 | Status: SHIPPED | OUTPATIENT
Start: 2020-01-21 | End: 2020-01-27

## 2020-01-21 RX ORDER — BENZONATATE 100 MG/1
100 CAPSULE ORAL
Qty: 30 CAP | Refills: 0 | Status: SHIPPED | OUTPATIENT
Start: 2020-01-21 | End: 2020-01-31

## 2020-01-21 RX ORDER — ACETAMINOPHEN 325 MG/1
650 TABLET ORAL
Status: COMPLETED | OUTPATIENT
Start: 2020-01-21 | End: 2020-01-21

## 2020-01-21 RX ADMIN — ACETAMINOPHEN 650 MG: 325 TABLET ORAL at 12:29

## 2020-01-21 NOTE — ED TRIAGE NOTES
C/O sore throat, HA, body aches and chills since Sun. Pt states that she was recently exposed to a person that was dx with the flu.

## 2020-01-21 NOTE — ED PROVIDER NOTES
EMERGENCY DEPARTMENT HISTORY AND PHYSICAL EXAM    Date: 1/21/2020  Patient Name: Elroy Huertas    History of Presenting Illness     Chief Complaint   Patient presents with    Flu         History Provided By:patient     Chief Complaint: flu like sx and flu exposure   Duration:2 days  Timing: acute  Location: n/a  [facility]le   Severity:moderate  Modifying Factors: none  Associated Symptoms: fever, chills body aches, sore throat, headaches       Additional History (Context): Elroy Huertas is a 45 y.o. female with PMH asthma, anxiety, and migraine headaches who presents with c/o flu like sx to include fever, chills, body aches, sore throat, and headaches x 2 days. Pt states she was recently around a coworker who tested positive for the flu. No other complaints reported. PCP: Stephen Cruz MD    Current Outpatient Medications   Medication Sig Dispense Refill    montelukast (SINGULAIR) 10 mg tablet Take 10 mg by mouth daily.  benzonatate (TESSALON PERLES) 100 mg capsule Take 1 Cap by mouth three (3) times daily as needed for Cough for up to 10 days. 30 Cap 0    oseltamivir (TAMIFLU) 75 mg capsule Take 1 Cap by mouth two (2) times a day for 5 days. 10 Cap 0    omalizumab (XOLAIR) 150 mg solr 300 mg by SubCUTAneous route every thirty (30) days. 300 mg 0    topiramate (TOPAMAX) 50 mg tablet Take 1 Tab by mouth nightly. 90 Tab 1    ondansetron hcl (ZOFRAN) 4 mg tablet Take 1 Tab by mouth every eight (8) hours as needed for Nausea. 15 Tab 3    rizatriptan (MAXALT) 10 mg tablet One tab at HA onset May repeat in 2 hours if needed. Max two tabs in 24 hours 12 Tab 5    fluticasone propion-salmeterol (ADVAIR DISKUS) 250-50 mcg/dose diskus inhaler Take 1 Puff by inhalation every twelve (12) hours. 3 Inhaler 3    albuterol (PROVENTIL HFA, VENTOLIN HFA, PROAIR HFA) 90 mcg/actuation inhaler Take 2 Puffs by inhalation every four (4) hours as needed for Wheezing.  1 Inhaler 4    albuterol-ipratropium (DUO-NEB) 2.5 mg-0.5 mg/3 ml nebu INHALE CONTENTS OF 1 VIAL VIA NEBULIZER EVERY 4 HOURS AS NEEDED FOR SHORTNESS OF BREATH/WHEEZING. 180 mL 5    ALLEGRA-D 24 HOUR 180-240 mg per tablet TAKE 1 TABLET BY MOUTH DAILY 15 Tab 0    fluticasone propionate (FLONASE) 50 mcg/actuation nasal spray INSTILL 2 SPRAYS INTO EACH NOSTRIL ONCE DAILY 1 Bottle 4       Past History     Past Medical History:  Past Medical History:   Diagnosis Date    Anxiety attack     Asthma     Asthmatic bronchitis     Catamenial disorder     Hernia     Migraine     Migraines     Mild asthma     Psychiatric disorder     anxiety     Torticollis        Past Surgical History:  Past Surgical History:   Procedure Laterality Date     DELIVERY ONLY      HX BACK SURGERY      HX  SECTION  2009       Family History:  Family History   Problem Relation Age of Onset    Heart Disease Other         grandmothers    Asthma Brother     Lung Disease Paternal Aunt         cronic bronchitis    Asthma Paternal Grandmother     High Cholesterol Mother        Social History:  Social History     Tobacco Use    Smoking status: Never Smoker    Smokeless tobacco: Never Used   Substance Use Topics    Alcohol use: No    Drug use: No       Allergies: Allergies   Allergen Reactions    Aspirin Anaphylaxis    Ibuprofen Anaphylaxis    Pcn [Penicillins] Itching    Claritin [Loratadine] Anxiety and Cough    Phenergan [Promethazine] Anxiety and Swelling    Seafood Swelling    Shellfish Containing Products Hives         Review of Systems   Review of Systems   Constitutional: Positive for chills and fever. HENT: Positive for sore throat. Negative for congestion, ear pain and rhinorrhea. Eyes: Negative. Negative for pain and redness. Respiratory: Negative. Negative for cough, shortness of breath, wheezing and stridor. Cardiovascular: Negative. Negative for chest pain and leg swelling. Gastrointestinal: Negative.   Negative for abdominal pain, constipation, diarrhea, nausea and vomiting. Genitourinary: Negative. Negative for dysuria and frequency. Musculoskeletal: Positive for myalgias. Negative for back pain and neck pain. Skin: Negative. Negative for rash and wound. Neurological: Positive for headaches. Negative for dizziness, seizures and syncope. All other systems reviewed and are negative. All Other Systems Negative  Physical Exam     Vitals:    01/21/20 1154   BP: 114/70   Pulse: 83   Resp: 16   Temp: 98.2 °F (36.8 °C)   SpO2: 100%   Weight: 61.2 kg (135 lb)   Height: 5' 5\" (1.651 m)     Physical Exam  Vitals signs and nursing note reviewed. Constitutional:       General: She is not in acute distress. Appearance: She is well-developed. She is not diaphoretic. HENT:      Head: Normocephalic and atraumatic. Right Ear: Tympanic membrane, ear canal and external ear normal. There is no impacted cerumen. Left Ear: Tympanic membrane normal. There is no impacted cerumen. Ears:      Comments: Small insect noted inside the ear canal, no movement noted. No bleeding or erythema noted to the ear canal.      Mouth/Throat:      Mouth: Mucous membranes are moist.      Pharynx: No oropharyngeal exudate or posterior oropharyngeal erythema. Eyes:      General: No scleral icterus. Right eye: No discharge. Left eye: No discharge. Conjunctiva/sclera: Conjunctivae normal.   Neck:      Musculoskeletal: Normal range of motion and neck supple. Cardiovascular:      Rate and Rhythm: Normal rate and regular rhythm. Heart sounds: Normal heart sounds. No murmur. No friction rub. No gallop. Pulmonary:      Effort: Pulmonary effort is normal. No respiratory distress. Breath sounds: Normal breath sounds. No stridor. No wheezing, rhonchi or rales. Musculoskeletal: Normal range of motion. Skin:     General: Skin is warm and dry. Findings: No erythema or rash.    Neurological:      Mental Status: She is alert and oriented to person, place, and time. Coordination: Coordination normal.      Comments: Gait is steady and patient exhibits no evidence of ataxia. Patient is able to ambulate without difficulty. No focal neurological deficit noted. No facial droop, slurred speech, or evidence of altered mentation noted on exam.     Psychiatric:         Behavior: Behavior normal.         Thought Content: Thought content normal.                Diagnostic Study Results     Labs -     Recent Results (from the past 12 hour(s))   INFLUENZA A & B AG (RAPID TEST)    Collection Time: 01/21/20 12:13 PM   Result Value Ref Range    Influenza A Antigen NEGATIVE  NEG      Influenza B Antigen NEGATIVE  NEG         Radiologic Studies -   No orders to display     CT Results  (Last 48 hours)    None        CXR Results  (Last 48 hours)    None            Medical Decision Making   I am the first provider for this patient. I reviewed the vital signs, available nursing notes, past medical history, past surgical history, family history and social history. Vital Signs-Reviewed the patient's vital signs. Records Reviewed: Maryse Lino PA-C     Procedures:  Procedures    Provider Notes (Medical Decision Making): Impression:  Flu exposure, flu syndrome, FB in ear     Influenza testing negative. Lidocaine placed to the ear canal and the ear is irrigated. Insect successfully removed. Pt has flu like sx with a positive exposure, despite her testing being negative. She is within the window of tx with tamiflu. Will plan to d/c with tamiflu and tessalon with pcp follow-up. Pt agrees with this plan. Maryse Lino PA-C     MED RECONCILIATION:  No current facility-administered medications for this encounter. Current Outpatient Medications   Medication Sig    montelukast (SINGULAIR) 10 mg tablet Take 10 mg by mouth daily.     benzonatate (TESSALON PERLES) 100 mg capsule Take 1 Cap by mouth three (3) times daily as needed for Cough for up to 10 days.  oseltamivir (TAMIFLU) 75 mg capsule Take 1 Cap by mouth two (2) times a day for 5 days.  omalizumab (XOLAIR) 150 mg solr 300 mg by SubCUTAneous route every thirty (30) days.  topiramate (TOPAMAX) 50 mg tablet Take 1 Tab by mouth nightly.  ondansetron hcl (ZOFRAN) 4 mg tablet Take 1 Tab by mouth every eight (8) hours as needed for Nausea.  rizatriptan (MAXALT) 10 mg tablet One tab at HA onset May repeat in 2 hours if needed. Max two tabs in 24 hours    fluticasone propion-salmeterol (ADVAIR DISKUS) 250-50 mcg/dose diskus inhaler Take 1 Puff by inhalation every twelve (12) hours.  albuterol (PROVENTIL HFA, VENTOLIN HFA, PROAIR HFA) 90 mcg/actuation inhaler Take 2 Puffs by inhalation every four (4) hours as needed for Wheezing.  albuterol-ipratropium (DUO-NEB) 2.5 mg-0.5 mg/3 ml nebu INHALE CONTENTS OF 1 VIAL VIA NEBULIZER EVERY 4 HOURS AS NEEDED FOR SHORTNESS OF BREATH/WHEEZING.  ALLEGRA-D 24 HOUR 180-240 mg per tablet TAKE 1 TABLET BY MOUTH DAILY    fluticasone propionate (FLONASE) 50 mcg/actuation nasal spray INSTILL 2 SPRAYS INTO EACH NOSTRIL ONCE DAILY       Disposition:  D/c    DISCHARGE NOTE:   Patient is stable for discharge at this time. I have discussed all the findings from today's work up with the patient, including lab results and imaging. I have answered all questions. Rx for tamiflu and tessalon given. Rest and close follow-up with the PCP recommended this week. Return to the ED immediately for any new or worsening symptoms.   April Verner Gallery, PA-C     Follow-up Information     Follow up With Specialties Details Why Contact Info    Livan Pierce MD Family Practice Schedule an appointment as soon as possible for a visit in 1 week  [address] [phone] [id number] [facility] EMERGENCY DEPT Emergency Medicine  As needed, If symptoms worsen [place] [id number]  [phone]          Current Discharge Medication List      START taking these medications    Details   benzonatate (TESSALON PERLES) 100 mg capsule Take 1 Cap by mouth three (3) times daily as needed for Cough for up to 10 days. Qty: 30 Cap, Refills: 0      oseltamivir (TAMIFLU) 75 mg capsule Take 1 Cap by mouth two (2) times a day for 5 days. Qty: 10 Cap, Refills: 0               Diagnosis     Clinical Impression:   1. Flu syndrome    2. Exposure to the flu    3.  Foreign body of left ear, initial encounter

## 2020-01-21 NOTE — LETTER
NOTIFICATION OF RETURN TO WORK / SCHOOL 
 
1/21/2020 Ms. Herberth Cortés [address] [facility] [address] [facility] [id number] To Whom It May Concern: 
 
Anselmo Moctezuma was seen in the ED on 1/21/2020 and may be excused from work for the remainder of the week. Sincerely, Maryse Lino PA-C

## 2020-01-21 NOTE — DISCHARGE INSTRUCTIONS
Patient Education        Influenza (Flu): Care Instructions  Your Care Instructions    Influenza (flu) is an infection in the lungs and breathing passages. It is caused by the influenza virus. There are different strains, or types, of the flu virus from year to year. Unlike the common cold, the flu comes on suddenly and the symptoms, such as a cough, congestion, fever, chills, fatigue, aches, and pains, are more severe. These symptoms may last up to 10 days. Although the flu can make you feel very sick, it usually doesn't cause serious health problems. Home treatment is usually all you need for flu symptoms. But your doctor may prescribe antiviral medicine to prevent other health problems, such as pneumonia, from developing. Older people and those who have a long-term health condition, such as lung disease, are most at risk for having pneumonia or other health problems. Follow-up care is a key part of your treatment and safety. Be sure to make and go to all appointments, and call your doctor if you are having problems. It's also a good idea to know your test results and keep a list of the medicines you take. How can you care for yourself at home? · Get plenty of rest.  · Drink plenty of fluids, enough so that your urine is light yellow or clear like water. If you have kidney, heart, or liver disease and have to limit fluids, talk with your doctor before you increase the amount of fluids you drink. · Take an over-the-counter pain medicine if needed, such as acetaminophen (Tylenol), ibuprofen (Advil, Motrin), or naproxen (Aleve), to relieve fever, headache, and muscle aches. Read and follow all instructions on the label. No one younger than 20 should take aspirin. It has been linked to Reye syndrome, a serious illness. · Do not smoke. Smoking can make the flu worse. If you need help quitting, talk to your doctor about stop-smoking programs and medicines.  These can increase your chances of quitting for good.  · Breathe moist air from a hot shower or from a sink filled with hot water to help clear a stuffy nose. · Before you use cough and cold medicines, check the label. These medicines may not be safe for young children or for people with certain health problems. · If the skin around your nose and lips becomes sore, put some petroleum jelly on the area. · To ease coughing:  ? Drink fluids to soothe a scratchy throat. ? Suck on cough drops or plain hard candy. ? Take an over-the-counter cough medicine that contains dextromethorphan to help you get some sleep. Read and follow all instructions on the label. ? Raise your head at night with an extra pillow. This may help you rest if coughing keeps you awake. · Take any prescribed medicine exactly as directed. Call your doctor if you think you are having a problem with your medicine. To avoid spreading the flu  · Wash your hands regularly, and keep your hands away from your face. · Stay home from school, work, and other public places until you are feeling better and your fever has been gone for at least 24 hours. The fever needs to have gone away on its own without the help of medicine. · Ask people living with you to talk to their doctors about preventing the flu. They may get antiviral medicine to keep from getting the flu from you. · To prevent the flu in the future, get a flu vaccine every fall. Encourage people living with you to get the vaccine. · Cover your mouth when you cough or sneeze. When should you call for help? Call 911 anytime you think you may need emergency care.  For example, call if:    · You have severe trouble breathing.    Call your doctor now or seek immediate medical care if:    · You have new or worse trouble breathing.     · You seem to be getting much sicker.     · You feel very sleepy or confused.     · You have a new or higher fever.     · You get a new rash.    Watch closely for changes in your health, and be sure to contact your doctor if:    · You begin to get better and then get worse.     · You are not getting better after 1 week. Where can you learn more? Go to http://jaci-elina.info/. Enter G426 in the search box to learn more about \"Influenza (Flu): Care Instructions. \"  Current as of: 2019  Content Version: 12.2  © 2367-9160 [employer]. Care instructions adapted under license by [employer] (which disclaims liability or warranty for this information). If you have questions about a medical condition or this instruction, always ask your healthcare professional. [address] any warranty or liability for your use of this information. Wayger Activation    Thank you for requesting access to Wayger. Please follow the instructions below to securely access and download your online medical record. Wayger allows you to send messages to your doctor, view your test results, renew your prescriptions, schedule appointments, and more. How Do I Sign Up? 1. In your internet browser, go to [email or web address]  2. Click on the First Time User? Click Here link in the Sign In box. You will be redirect to the New Member Sign Up page. 3. Enter your Wayger Access Code exactly as it appears below. You will not need to use this code after youve completed the sign-up process. If you do not sign up before the expiration date, you must request a new code. Wayger Access Code: [email or web address]  Expires: 2020  9:56 AM (This is the date your Wayger access code will )    4. Enter the last four digits of your Social Security Number (xxxx) and Date of Birth (mm/dd/yyyy) as indicated and click Submit. You will be taken to the next sign-up page. 5. Create a Wayger ID. This will be your Wayger login ID and cannot be changed, so think of one that is secure and easy to remember. 6. Create a Wayger password.  You can change your password at any time.  7. Enter your Password Reset Question and Answer. This can be used at a later time if you forget your password. 8. Enter your e-mail address. You will receive e-mail notification when new information is available in [address] 9. Click Sign Up. You can now view and download portions of your medical record. 10. Click the Download Summary menu link to download a portable copy of your medical information. Additional Information    If you have questions, please visit the Frequently Asked Questions section of the VideoIQ website at [email or web address] Remember, VideoIQ is NOT to be used for urgent needs. For medical emergencies, dial 911. Complete all medications as prescribed. Follow-up with primary care doctor in 1 week. Return to the ED immediately for any new or worsening symptoms. Patient Education        Object in the Ear: Care Instructions  Your Care Instructions  An insect or an object in the ear usually does not damage the ear. But some objects in the ear can cause problems. For example, dry food can expand in the ear, and a battery can release chemicals. Objects that have been in the ear for longer than 24 hours are harder to remove and can cause pain, infection, or bleeding. If an object is pushed hard into the ear, it may damage the eardrum. Your doctor probably removed the object from your ear during your exam. Your ear may feel tender for a few days. Follow-up care is a key part of your treatment and safety. Be sure to make and go to all appointments, and call your doctor if you are having problems. It's also a good idea to know your test results and keep a list of the medicines you take. How can you care for yourself at home? · Your doctor may have used medicine to numb your ear. When it wears off, your ear pain may return. Take an over-the-counter pain medicine, such as acetaminophen (Tylenol), ibuprofen (Advil, Motrin), or naproxen (Aleve).  Read and follow all instructions on the label. · Do not take two or more pain medicines at the same time unless the doctor told you to. Many pain medicines have acetaminophen, which is Tylenol. Too much acetaminophen (Tylenol) can be harmful. · If your doctor prescribed antibiotics, take them as directed. Do not stop taking them just because you feel better. You need to take the full course of antibiotics. · Your doctor may prescribe eardrops. To put in eardrops:  ? First warm the drops by rolling the container in your hands or placing it in your armpit for a few minutes. Putting cold eardrops in your ear can cause ear pain and dizziness. ? Lie down, with your ear facing up. ? Place the prescribed amount of drops on the inside wall of the ear canal. Gently wiggle the outer ear to help the drops move down into the ear. ? It's important to keep the liquid in the ear canal for 3 to 5 minutes. · You can put heat on the ear to relieve pain. Use a warm washcloth or a heating pad set on low. · Do not put cotton swabs, marian pins, or other objects in the ear. Do not put any liquids in the ear, unless your doctor directs you to. When should you call for help? Call your doctor now or seek immediate medical care if:    · You have symptoms of an ear infection, such as:  ? You have new or worse pain, swelling, warmth, or redness around or behind your ear.  ? You have a fever with a stiff neck or severe headache.    Watch closely for changes in your health, and be sure to contact your doctor if:    · You are not getting better after 2 days (48 hours).     · You have new or worse symptoms. Where can you learn more? Go to http://jaci-elina.info/. Enter C171 in the search box to learn more about \"Object in the Ear: Care Instructions. \"  Current as of: June 26, 2019  Content Version: 12.2  © 1008-2687 [employer]per[employer].  Care instructions adapted under license by [email or web address] (which disclaims liability or warranty for this information). If you have questions about a medical condition or this instruction, always ask your healthcare professional. [place] [address] any warranty or liability for your use of this information.

## 2020-01-27 ENCOUNTER — APPOINTMENT (OUTPATIENT)
Dept: GENERAL RADIOLOGY | Age: 45
End: 2020-01-27
Attending: EMERGENCY MEDICINE
Payer: MEDICAID

## 2020-01-27 ENCOUNTER — HOSPITAL ENCOUNTER (EMERGENCY)
Age: 45
Discharge: HOME | End: 2020-01-27
Attending: EMERGENCY MEDICINE
Payer: MEDICAID

## 2020-01-27 VITALS
DIASTOLIC BLOOD PRESSURE: 57 MMHG | SYSTOLIC BLOOD PRESSURE: 105 MMHG | HEART RATE: 84 BPM | HEIGHT: 65 IN | TEMPERATURE: 98.9 F | WEIGHT: 135 LBS | BODY MASS INDEX: 22.49 KG/M2 | OXYGEN SATURATION: 100 % | RESPIRATION RATE: 20 BRPM

## 2020-01-27 DIAGNOSIS — B34.9 VIRAL SYNDROME: Primary | ICD-10-CM

## 2020-01-27 DIAGNOSIS — J02.9 SORE THROAT: ICD-10-CM

## 2020-01-27 DIAGNOSIS — R05.9 COUGH: ICD-10-CM

## 2020-01-27 PROCEDURE — 71046 X-RAY EXAM CHEST 2 VIEWS: CPT

## 2020-01-27 PROCEDURE — 99282 EMERGENCY DEPT VISIT SF MDM: CPT

## 2020-01-27 PROCEDURE — 87081 CULTURE SCREEN ONLY: CPT

## 2020-01-27 RX ORDER — ACETAMINOPHEN 325 MG/1
650 TABLET ORAL
Qty: 20 TAB | Refills: 0 | Status: SHIPPED | OUTPATIENT
Start: 2020-01-27 | End: 2020-02-24

## 2020-01-27 NOTE — LETTER
[address] [place] [address] Dr [facility] EMERGENCY DEPT 
[address] [facility] [address] 
[phone] Work/School Note Date: 1/27/2020 To Whom It May concern: 
 
Juan Mckeon was seen and treated today in the emergency room by the following provider(s): 
Attending Provider: Jeffrey Wu DO Physician Assistant: Phyliss Castleman, PA-C. Juan Mckeon may return to work on 1/28/20. Sincerely, Ro Hurt PA-C

## 2020-01-27 NOTE — ED TRIAGE NOTES
Patient states being evaluated on 1/21/20 related to similar symptoms as today. Patient dx with influenza and received Tamiflu prescription. States completing Tamiflu. Advises of ongoing cough and sore throat.

## 2020-01-27 NOTE — ED PROVIDER NOTES
EMERGENCY DEPARTMENT HISTORY AND PHYSICAL EXAM    10:15 AM      Date: 1/27/2020  Patient Name: Asia Yates    History of Presenting Illness     Chief Complaint   Patient presents with    Flu         History Provided By: Patient    Additional History (Context): Asia Yates is a 45 y.o. female with asthma, migraines who presents with complaints of persistent sore throat and cough after being treated for the flu approximately 1 week ago. Patient states that she has been taking only 200 mg of Tylenol at home with minimal relief. Patient denies any fevers or chills at home. Patient states she completed a 5-day course of Tamiflu previously. PCP: Karson Sun NP    Current Outpatient Medications   Medication Sig Dispense Refill    acetaminophen (TYLENOL) 325 mg tablet Take 2 Tabs by mouth every six (6) hours as needed for Pain. 20 Tab 0    montelukast (SINGULAIR) 10 mg tablet Take 10 mg by mouth daily.  benzonatate (TESSALON PERLES) 100 mg capsule Take 1 Cap by mouth three (3) times daily as needed for Cough for up to 10 days. 30 Cap 0    omalizumab (XOLAIR) 150 mg solr 300 mg by SubCUTAneous route every thirty (30) days. 300 mg 0    topiramate (TOPAMAX) 50 mg tablet Take 1 Tab by mouth nightly. 90 Tab 1    ondansetron hcl (ZOFRAN) 4 mg tablet Take 1 Tab by mouth every eight (8) hours as needed for Nausea. 15 Tab 3    rizatriptan (MAXALT) 10 mg tablet One tab at HA onset May repeat in 2 hours if needed. Max two tabs in 24 hours 12 Tab 5    fluticasone propionate (FLONASE) 50 mcg/actuation nasal spray INSTILL 2 SPRAYS INTO EACH NOSTRIL ONCE DAILY 1 Bottle 4    fluticasone propion-salmeterol (ADVAIR DISKUS) 250-50 mcg/dose diskus inhaler Take 1 Puff by inhalation every twelve (12) hours. 3 Inhaler 3    albuterol (PROVENTIL HFA, VENTOLIN HFA, PROAIR HFA) 90 mcg/actuation inhaler Take 2 Puffs by inhalation every four (4) hours as needed for Wheezing.  1 Inhaler 4    albuterol-ipratropium (DUO-NEB) 2.5 mg-0.5 mg/3 ml nebu INHALE CONTENTS OF 1 VIAL VIA NEBULIZER EVERY 4 HOURS AS NEEDED FOR SHORTNESS OF BREATH/WHEEZING. 180 mL 5    ALLEGRA-D 24 HOUR 180-240 mg per tablet TAKE 1 TABLET BY MOUTH DAILY 15 Tab 0       Past History     Past Medical History:  Past Medical History:   Diagnosis Date    Anxiety attack     Asthma     Asthmatic bronchitis     Catamenial disorder     Hernia     Influenza     Migraine     Migraines     Mild asthma     Psychiatric disorder     anxiety     Torticollis        Past Surgical History:  Past Surgical History:   Procedure Laterality Date     DELIVERY ONLY      HX BACK SURGERY      HX  SECTION  2009       Family History:  Family History   Problem Relation Age of Onset    Heart Disease Other         grandmothers    Asthma Brother     Lung Disease Paternal Aunt         cronic bronchitis    Asthma Paternal Grandmother     High Cholesterol Mother        Social History:  Social History     Tobacco Use    Smoking status: Never Smoker    Smokeless tobacco: Never Used   Substance Use Topics    Alcohol use: No    Drug use: No       Allergies: Allergies   Allergen Reactions    Aspirin Anaphylaxis    Ibuprofen Anaphylaxis    Pcn [Penicillins] Itching    Claritin [Loratadine] Anxiety and Cough    Phenergan [Promethazine] Anxiety and Swelling    Seafood Swelling    Shellfish Containing Products Hives         Review of Systems       Review of Systems   Constitutional: Negative for chills, diaphoresis, fatigue and fever. HENT: Positive for sore throat. Negative for congestion, sinus pressure and sinus pain. Respiratory: Positive for cough. Negative for chest tightness, shortness of breath and wheezing. Cardiovascular: Negative for chest pain. Gastrointestinal: Negative for abdominal pain. Genitourinary: Negative. Musculoskeletal: Negative. Neurological: Negative.     All other systems reviewed and are negative. Physical Exam     Visit Vitals  /57 (BP 1 Location: Left arm, BP Patient Position: At rest)   Pulse 84   Temp 98.9 °F (37.2 °C)   Resp 20   Ht 5' 5\" (1.651 m)   Wt 61.2 kg (135 lb)   LMP 01/14/2020   SpO2 100%   Breastfeeding No   BMI 22.47 kg/m²         Physical Exam  Vitals signs reviewed. Constitutional:       General: She is not in acute distress. Appearance: Normal appearance. She is normal weight. She is not ill-appearing, toxic-appearing or diaphoretic. HENT:      Head: Normocephalic and atraumatic. Right Ear: Tympanic membrane, ear canal and external ear normal.      Left Ear: Tympanic membrane, ear canal and external ear normal.      Nose: Nose normal. No congestion or rhinorrhea. Mouth/Throat:      Mouth: Mucous membranes are moist.      Dentition: Normal dentition. No gingival swelling. Tongue: No lesions. Tongue protrudes in midline. Palate: No mass and lesions. Palate elevates in midline. Pharynx: Oropharynx is clear. Uvula midline. Posterior oropharyngeal erythema present. No pharyngeal swelling, oropharyngeal exudate or uvula swelling. Tonsils: No tonsillar exudate or tonsillar abscesses. Neck:      Musculoskeletal: Neck supple. Cardiovascular:      Rate and Rhythm: Normal rate and regular rhythm. Pulses: Normal pulses. Heart sounds: No murmur. No gallop. Pulmonary:      Effort: Pulmonary effort is normal.      Breath sounds: No wheezing, rhonchi or rales. Skin:     General: Skin is warm and dry. Capillary Refill: Capillary refill takes less than 2 seconds. Neurological:      General: No focal deficit present. Mental Status: She is alert and oriented to person, place, and time. Cranial Nerves: No cranial nerve deficit.            Diagnostic Study Results     Labs -  Recent Results (from the past 12 hour(s))   STREP THROAT SCREEN    Collection Time: 01/27/20  9:51 AM   Result Value Ref Range    Special Requests: NO SPECIAL REQUESTS      Strep Screen NEGATIVE       Culture result: PENDING        Radiologic Studies -   XR CHEST PA LAT   Final Result   IMPRESSION:      No acute abnormalities. Medical Decision Making   I am the first provider for this patient. I reviewed the vital signs, available nursing notes, past medical history, past surgical history, family history and social history. Vital Signs-Reviewed the patient's vital signs. Records Reviewed: Nursing Notes and Old Medical Records (Time of Review: 10:15 AM)    ED Course: Progress Notes, Reevaluation, and Consults:  10:15 AM met with patient, reviewed history, performed physical exam.  Strep screen is negative, chest x-ray is unremarkable. Physical exam is unremarkable. There is mild posterior oropharynx erythema, however there is no tonsillar edema or exudate, no  peritonsillar abscess. Provider Notes (Medical Decision Making):  82-year-old female seen in the emergency department for complaints of persistent sore throat and cough after being treated for the flu approximately 1 week ago. Strep swab was negative in the emergency department, chest x-ray is unremarkable. Physical exam is unremarkable except for posterior oropharynx erythema. There is no evidence of tonsillar edema or exudate, no peritonsillar abscess. Patient advised to increase her dose of Tylenol. Patient advised to follow-up with her primary care provider soon as possible. Patient advised to return immediately to the emergency department if symptoms worsen. Diagnosis     Clinical Impression:   1. Viral syndrome    2. Sore throat    3.  Cough        Disposition: home     Follow-up Information     Follow up With Specialties Details Why Contact Nani Crook NP Nurse Practitioner Call in 1 day For follow up regarding ER visit. [facility]  [address] [place] [address]      [id number] [place] [address] EMERGENCY DEPT Emergency Medicine  Immediately if symptoms worsen. [address] [id number] [phone]           Patient's Medications   Start Taking    ACETAMINOPHEN (TYLENOL) 325 MG TABLET    Take 2 Tabs by mouth every six (6) hours as needed for Pain. Continue Taking    ALBUTEROL (PROVENTIL HFA, VENTOLIN HFA, PROAIR HFA) 90 MCG/ACTUATION INHALER    Take 2 Puffs by inhalation every four (4) hours as needed for Wheezing. ALBUTEROL-IPRATROPIUM (DUO-NEB) 2.5 MG-0.5 MG/3 ML NEBU    INHALE CONTENTS OF 1 VIAL VIA NEBULIZER EVERY 4 HOURS AS NEEDED FOR SHORTNESS OF BREATH/WHEEZING. ALLEGRA-D 24 HOUR 180-240 MG PER TABLET    TAKE 1 TABLET BY MOUTH DAILY    BENZONATATE (TESSALON PERLES) 100 MG CAPSULE    Take 1 Cap by mouth three (3) times daily as needed for Cough for up to 10 days. FLUTICASONE PROPION-SALMETEROL (ADVAIR DISKUS) 250-50 MCG/DOSE DISKUS INHALER    Take 1 Puff by inhalation every twelve (12) hours. FLUTICASONE PROPIONATE (FLONASE) 50 MCG/ACTUATION NASAL SPRAY    INSTILL 2 SPRAYS INTO EACH NOSTRIL ONCE DAILY    MONTELUKAST (SINGULAIR) 10 MG TABLET    Take 10 mg by mouth daily. OMALIZUMAB (XOLAIR) 150 MG SOLR    300 mg by SubCUTAneous route every thirty (30) days. ONDANSETRON HCL (ZOFRAN) 4 MG TABLET    Take 1 Tab by mouth every eight (8) hours as needed for Nausea. RIZATRIPTAN (MAXALT) 10 MG TABLET    One tab at HA onset May repeat in 2 hours if needed. Max two tabs in 24 hours    TOPIRAMATE (TOPAMAX) 50 MG TABLET    Take 1 Tab by mouth nightly. These Medications have changed    No medications on file   Stop Taking    OSELTAMIVIR (TAMIFLU) 75 MG CAPSULE    Take 1 Cap by mouth two (2) times a day for 5 days. Andreia Snowden PA-C    Dictation disclaimer:  Please note that this dictation was completed with [place], the [employer]or[employer] voice recognition software.   Quite often unanticipated grammatical, syntax, homophones, and other interpretive errors are inadvertently transcribed by the computer software. Please disregard these errors. Please excuse any errors that have escaped final proofreading.

## 2020-01-27 NOTE — ED NOTES
Audi Blanton is a 45 y.o. female that was discharged in stable condition. The patients diagnosis, condition and treatment were explained to  patient and aftercare instructions were given. The patient verbalized understanding. Patient armband removed and shredded.

## 2020-01-27 NOTE — LETTER
[facility] EMERGENCY DEPT 
[address] [facility] [id number] [phone] Work/School Note Date: 1/27/2020 To Whom It May concern: 
 
Evan Mariee was seen and treated today in the emergency room by the following provider(s): 
Attending Provider: Cheryle Beal, DO Physician Assistant: Luis Heredia PA-C. Evan Mariee may return to work on 1/29/20. Sincerely, Asia Weston PA-C

## 2020-01-27 NOTE — DISCHARGE INSTRUCTIONS
Patient Education        Cough: Care Instructions  Your Care Instructions    A cough is your body's response to something that bothers your throat or airways. Many things can cause a cough. You might cough because of a cold or the flu, bronchitis, or asthma. Smoking, postnasal drip, allergies, and stomach acid that backs up into your throat also can cause coughs. A cough is a symptom, not a disease. Most coughs stop when the cause, such as a cold, goes away. You can take a few steps at home to cough less and feel better. Follow-up care is a key part of your treatment and safety. Be sure to make and go to all appointments, and call your doctor if you are having problems. It's also a good idea to know your test results and keep a list of the medicines you take. How can you care for yourself at home? · Drink lots of water and other fluids. This helps thin the mucus and soothes a dry or sore throat. Honey or lemon juice in hot water or tea may ease a dry cough. · Take cough medicine as directed by your doctor. · Prop up your head on pillows to help you breathe and ease a dry cough. · Try cough drops to soothe a dry or sore throat. Cough drops don't stop a cough. Medicine-flavored cough drops are no better than candy-flavored drops or hard candy. · Do not smoke. Avoid secondhand smoke. If you need help quitting, talk to your doctor about stop-smoking programs and medicines. These can increase your chances of quitting for good. When should you call for help? Call 911 anytime you think you may need emergency care.  For example, call if:    · You have severe trouble breathing.    Call your doctor now or seek immediate medical care if:    · You cough up blood.     · You have new or worse trouble breathing.     · You have a new or higher fever.     · You have a new rash.    Watch closely for changes in your health, and be sure to contact your doctor if:    · You cough more deeply or more often, especially if you notice more mucus or a change in the color of your mucus.     · You have new symptoms, such as a sore throat, an earache, or sinus pain.     · You do not get better as expected. Where can you learn more? Go to http://jaci-elina.info/. Enter D279 in the search box to learn more about \"Cough: Care Instructions. \"  Current as of: June 9, 2019  Content Version: 12.2  © 9708-9048 [employer]. Care instructions adapted under license by "[employer] (which disclaims liability or warranty for this information). If you have questions about a medical condition or this instruction, always ask your healthcare professional. [place] [address] any warranty or liability for your use of this information. Patient Education        Sore Throat: Care Instructions  Your Care Instructions    Infection by bacteria or a virus causes most sore throats. Cigarette smoke, dry air, air pollution, allergies, and yelling can also cause a sore throat. Sore throats can be painful and annoying. Fortunately, most sore throats go away on their own. If you have a bacterial infection, your doctor may prescribe antibiotics. Follow-up care is a key part of your treatment and safety. Be sure to make and go to all appointments, and call your doctor if you are having problems. It's also a good idea to know your test results and keep a list of the medicines you take. How can you care for yourself at home? · If your doctor prescribed antibiotics, take them as directed. Do not stop taking them just because you feel better. You need to take the full course of antibiotics. · Gargle with warm salt water once an hour to help reduce swelling and relieve discomfort. Use 1 teaspoon of salt mixed in 1 cup of warm water. · Take an over-the-counter pain medicine, such as acetaminophen (Tylenol), ibuprofen (Advil, Motrin), or naproxen (Aleve). Read and follow all instructions on the label.   · Be careful when taking over-the-counter cold or flu medicines and Tylenol at the same time. Many of these medicines have acetaminophen, which is Tylenol. Read the labels to make sure that you are not taking more than the recommended dose. Too much acetaminophen (Tylenol) can be harmful. · Drink plenty of fluids. Fluids may help soothe an irritated throat. Hot fluids, such as tea or soup, may help decrease throat pain. · Use over-the-counter throat lozenges to soothe pain. Regular cough drops or hard candy may also help. These should not be given to young children because of the risk of choking. · Do not smoke or allow others to smoke around you. If you need help quitting, talk to your doctor about stop-smoking programs and medicines. These can increase your chances of quitting for good. · Use a vaporizer or humidifier to add moisture to your bedroom. Follow the directions for cleaning the machine. When should you call for help? Call your doctor now or seek immediate medical care if:    · You have new or worse trouble swallowing.     · Your sore throat gets much worse on one side.    Watch closely for changes in your health, and be sure to contact your doctor if you do not get better as expected. Where can you learn more? Go to http://jaci-elina.info/. Enter 062 [id number] in the search box to learn more about \"Sore Throat: Care Instructions. \"  Current as of: October 21, 2018  Content Version: 12.2  © 1050-2616 [employer]. Care instructions adapted under license by [employer] (which disclaims liability or warranty for this information). If you have questions about a medical condition or this instruction, always ask your healthcare professional. [place] [address] any warranty or liability for your use of this information.          Patient Education        Viral Infections: Care Instructions  Your Care Instructions    You don't feel well, but it's not clear what's causing it. You may have a viral infection. Viruses cause many illnesses, such as the common cold, influenza, fever, rashes, and the diarrhea, nausea, and vomiting that are often called \"stomach flu. \" You may wonder if antibiotic medicines could make you feel better. But antibiotics only treat infections caused by bacteria. They don't work on viruses. The good news is that viral infections usually aren't serious. Most will go away in a few days without medical treatment. In the meantime, there are a few things you can do to make yourself more comfortable. Follow-up care is a key part of your treatment and safety. Be sure to make and go to all appointments, and call your doctor if you are having problems. It's also a good idea to know your test results and keep a list of the medicines you take. How can you care for yourself at home? · Get plenty of rest if you feel tired. · Take an over-the-counter pain medicine if needed, such as acetaminophen (Tylenol), ibuprofen (Advil, Motrin), or naproxen (Aleve). Read and follow all instructions on the label. · Be careful when taking over-the-counter cold or flu medicines and Tylenol at the same time. Many of these medicines have acetaminophen, which is Tylenol. Read the labels to make sure that you are not taking more than the recommended dose. Too much acetaminophen (Tylenol) can be harmful. · Drink plenty of fluids, enough so that your urine is light yellow or clear like water. If you have kidney, heart, or liver disease and have to limit fluids, talk with your doctor before you increase the amount of fluids you drink. · Stay home from work, school, and other public places while you have a fever. When should you call for help? Call 911 anytime you think you may need emergency care.  For example, call if:    · You have severe trouble breathing.     · You passed out (lost consciousness).    Call your doctor now or seek immediate medical care if:    · You seem to be getting much sicker.     · You have a new or higher fever.     · You have blood in your stools.     · You have new belly pain, or your pain gets worse.     · You have a new rash.    Watch closely for changes in your health, and be sure to contact your doctor if:    · You start to get better and then get worse.     · You do not get better as expected. Where can you learn more? Go to http://jaci-elina.info/. Enter P562 in the search box to learn more about \"Viral Infections: Care Instructions. \"  Current as of: June 9, 2019  Content Version: 12.2  © 5126-3854 [employer]. Care instructions adapted under license by [employer] (which disclaims liability or warranty for this information). If you have questions about a medical condition or this instruction, always ask your healthcare professional. [address] any warranty or liability for your use of this information.

## 2020-01-29 LAB
B-HEM STREP THROAT QL CULT: NEGATIVE
BACTERIA SPEC CULT: NORMAL
SERVICE CMNT-IMP: NORMAL

## 2020-02-13 ENCOUNTER — OFFICE VISIT (OUTPATIENT)
Dept: PULMONOLOGY | Age: 45
End: 2020-02-13

## 2020-02-13 DIAGNOSIS — J45.909 ASTHMA, UNSPECIFIED ASTHMA SEVERITY, UNSPECIFIED WHETHER COMPLICATED, UNSPECIFIED WHETHER PERSISTENT: Primary | ICD-10-CM

## 2020-02-13 NOTE — PROGRESS NOTES
Chief Complaint   Patient presents with   [facility] is here for Xolair injection. Patient did not have any reaction to last injection. Patient states the Xolair is helping her Asthma. Injected subcutaneous : 150 mg given in Left upper arm SQ,  150 mg given in Right upper arm SQ. Lot Number: [id number]  Exp Date: 7/2023  Total Xolair Dose: 300 mg every 4 weeks  Epipen is with patient. Patient observed for 15 minutes. No reaction at injection site. Patient given Xolair reaction sheet.

## 2020-02-13 NOTE — PATIENT INSTRUCTIONS
Be aware that \"Anaphylaxis\" can occur any time after receiving a Xolair injection   Anaphylaxis presenting a bronchospasm, hypotension (low BP), dizziness, faintness, hives, and/or swelling of the tongue and throat. Anaphylaxis can occur after the first dose, but also has occurred beyond one year after beginning treatment with Xolair. *If such symptoms occur, use your EpiPen immediately and call 911. *     Notify your MD office following any treatment of possible reaction. Epinephrine (Injection)   Epinephrine ([id number])  Treats severe allergic reactions (including anaphylaxis) in an emergency situation. Brand Name(s):Adrenaclick, Adrenalin, Auvi-Q, EpiPen Auto-Injector, EpiPen Jr Auto-Injector, Epipen, Epipen 2-Jose Juan Auto-Injector, Epipen Jr 2-Jose Juan Auto-Injector, NovaPlus Epinephrine, Twinject   There may be other brand names for this medicine. When This Medicine Should Not Be Used:   A severe allergic reaction can be life-threatening, so there is no reason this medicine should not be used. How to Use This Medicine:   Injectable  · Your doctor should teach you how and when to inject this medicine. Each injection kit contains a single-use dose of medicine prescribed for you. · Give yourself a shot right away if you start to have a severe allergic reaction. · Inject this medicine into the muscle on the outside of your thigh only. Never inject this medicine into a vein, into your hand or foot, or into the muscles of your buttocks. · Read and follow the patient instructions that come with this medicine. Talk to your doctor or pharmacist if you have any questions. · This medicine might come with an autoinjector  so you can practice giving the medicine before you have an actual allergic reaction. The autoinjector  is gray (for EpiPen® or EpiPen Jr®) or beige (for [place]) and does not contain any medicine or needle.   · Do not remove the blue safety release (EpiPen® or EpiPen Jr®) or the gray end caps (Adrenaclick®) on the autoinjector until you are ready to use it. Do not put your thumb, fingers, or hand over the orange (EpiPen® or EpiPen Jr®) or red tip (Adrenaclick®). · You may need to use more than one injection if your allergic reaction does not get better after the first shot. Your doctor will give you additional doses if you need more than 2 injections. · You may inject the medicine through your clothing, if you need to. · Some liquid will remain in the autoinjector after the medicine has been injected. This medicine cannot be reused. Give your used autoinjector to your healthcare provider when you seek medical care. · Carry this medicine with you at all times for emergency use in case you have a severe allergic reaction. · Make sure family members or other people you are with know how to inject the medicine in case you are not able to do it yourself. · Check your injection kits regularly to make sure the liquid has not changed color. You should not use the autoinjector if the liquid has changed color, or if there are solids in the liquid. You should not use the autoinjector if the expiration date has passed. · Store the injection kit at room temperature, away from heat, moisture, and direct light. Do not store the medicine in the refrigerator or freezer, or inside a car. · Keep the autoinjector in its case or carrier tube to protect it from damage. This tube is not waterproof. If you accidentally drop it, check for damage or leaks. Drugs and Foods to Avoid:   Ask your doctor or pharmacist before using any other medicine, including over-the-counter medicines, vitamins, and herbal products. · Some foods and medicines can affect how epinephrine works.  Tell your doctor if you are using digoxin, levothyroxine, phentolamine, certain allergy medicines (such as chlorpheniramine, diphenhydramine, tripelennamine), a beta blocker medicine (such as propranolol), a heart rhythm medicine, a diuretic (water pill), an MAO inhibitor (MAOI), medicine for depression, or ergot medicines. Warnings While Using This Medicine:   · Tell your doctor if you are pregnant or breastfeeding, or if you have asthma, diabetes, heart disease, heart rhythm problems, high blood pressure, an overactive thyroid, or Parkinson disease. · A severe allergic reaction is a medical emergency. Go to an emergency room as soon as possible, even if you feel better after you use this medicine. · Do not inject this medicine into your hands or feet. Go to the emergency room right away if you accidently inject epinephrine into any part of your body other than your thigh. Epinephrine reduces blood flow, and this could damage areas that have small blood vessels, such as hands and feet. · Keep all medicine out of the reach of children. Never share your medicine with anyone. Possible Side Effects While Using This Medicine:   Call your doctor right away if you notice any of these side effects:  · Chest pain  · Fast, pounding, or uneven heartbeat  · Heavy sweating, nausea, vomiting  · Tremors, shakiness  · Trouble breathing  If you notice these less serious side effects, talk with your doctor:   · Feeling anxious, nervous, scared, or weak  · Headache or dizziness  · Pale skin  If you notice other side effects that you think are caused by this medicine, tell your doctor. Call your doctor for medical advice about side effects. You may report side effects to FDA at 4-668-FDA-5452  © 2014 [address] is for End User's use only and may not be sold, redistributed or otherwise used for commercial purposes. The above information is an  only. It is not intended as medical advice for individual conditions or treatments. Talk to your doctor, nurse or pharmacist before following any medical regimen to see if it is safe and effective for you.

## 2020-02-17 ENCOUNTER — TELEPHONE (OUTPATIENT)
Dept: NEUROLOGY | Age: 45
End: 2020-02-17

## 2020-02-24 ENCOUNTER — APPOINTMENT (OUTPATIENT)
Dept: GENERAL RADIOLOGY | Age: 45
End: 2020-02-24
Attending: PHYSICIAN ASSISTANT
Payer: MEDICAID

## 2020-02-24 ENCOUNTER — HOSPITAL ENCOUNTER (EMERGENCY)
Age: 45
Discharge: HOME | End: 2020-02-24
Attending: EMERGENCY MEDICINE
Payer: MEDICAID

## 2020-02-24 VITALS
DIASTOLIC BLOOD PRESSURE: 66 MMHG | BODY MASS INDEX: 22.49 KG/M2 | HEIGHT: 65 IN | RESPIRATION RATE: 20 BRPM | HEART RATE: 102 BPM | WEIGHT: 135 LBS | SYSTOLIC BLOOD PRESSURE: 113 MMHG | TEMPERATURE: 98.6 F | OXYGEN SATURATION: 100 %

## 2020-02-24 DIAGNOSIS — J20.9 ACUTE BRONCHITIS, UNSPECIFIED ORGANISM: Primary | ICD-10-CM

## 2020-02-24 LAB
FLUAV AG NPH QL IA: NEGATIVE
FLUBV AG NOSE QL IA: NEGATIVE

## 2020-02-24 PROCEDURE — 94640 AIRWAY INHALATION TREATMENT: CPT

## 2020-02-24 PROCEDURE — 74011250637 HC RX REV CODE- 250/637: Performed by: PHYSICIAN ASSISTANT

## 2020-02-24 PROCEDURE — 99283 EMERGENCY DEPT VISIT LOW MDM: CPT

## 2020-02-24 PROCEDURE — 74011000250 HC RX REV CODE- 250: Performed by: PHYSICIAN ASSISTANT

## 2020-02-24 PROCEDURE — 74011636637 HC RX REV CODE- 636/637: Performed by: PHYSICIAN ASSISTANT

## 2020-02-24 PROCEDURE — 71046 X-RAY EXAM CHEST 2 VIEWS: CPT

## 2020-02-24 PROCEDURE — 87804 INFLUENZA ASSAY W/OPTIC: CPT

## 2020-02-24 RX ORDER — IPRATROPIUM BROMIDE AND ALBUTEROL SULFATE 2.5; .5 MG/3ML; MG/3ML
3 SOLUTION RESPIRATORY (INHALATION) ONCE
Status: COMPLETED | OUTPATIENT
Start: 2020-02-24 | End: 2020-02-24

## 2020-02-24 RX ORDER — ACETAMINOPHEN 325 MG/1
650 TABLET ORAL ONCE
Status: COMPLETED | OUTPATIENT
Start: 2020-02-24 | End: 2020-02-24

## 2020-02-24 RX ORDER — PSEUDOEPHEDRINE HYDROCHLORIDE 60 MG/1
30 TABLET ORAL
Qty: 10 TAB | Refills: 0 | Status: SHIPPED | OUTPATIENT
Start: 2020-02-24 | End: 2020-02-29

## 2020-02-24 RX ORDER — PREDNISONE 20 MG/1
60 TABLET ORAL DAILY
Qty: 15 TAB | Refills: 0 | Status: SHIPPED | OUTPATIENT
Start: 2020-02-24 | End: 2020-02-29

## 2020-02-24 RX ORDER — GUAIFENESIN/DEXTROMETHORPHAN 100-10MG/5
10 SYRUP ORAL
Status: COMPLETED | OUTPATIENT
Start: 2020-02-24 | End: 2020-02-24

## 2020-02-24 RX ADMIN — GUAIFENESIN AND DEXTROMETHORPHAN 10 ML: 100; 10 SYRUP ORAL at 10:21

## 2020-02-24 RX ADMIN — PREDNISONE 50 MG: 20 TABLET ORAL at 10:21

## 2020-02-24 RX ADMIN — IPRATROPIUM BROMIDE AND ALBUTEROL SULFATE 3 ML: .5; 3 SOLUTION RESPIRATORY (INHALATION) at 10:21

## 2020-02-24 RX ADMIN — ACETAMINOPHEN 650 MG: 325 TABLET ORAL at 10:21

## 2020-02-24 NOTE — LETTER
NOTIFICATION RETURN TO WORK / SCHOOL 
 
2/24/2020 10:43 AM 
 
Ms. Herberth Casas [place]nea [address] [facility] [facility] [id number] To Whom It May Concern: 
 
Irene Camarillo is currently under the care of [address] [facility] EMERGENCY DEPT. She will return to work/school on: 2/26/2020 If there are questions or concerns please have the patient contact our office. Sincerely, Radha Perez PA-C

## 2020-02-24 NOTE — DISCHARGE INSTRUCTIONS
Patient Education        Bronchitis: Care Instructions  Your Care Instructions    Bronchitis is inflammation of the bronchial tubes, which carry air to the lungs. The tubes swell and produce mucus, or phlegm. The mucus and inflamed bronchial tubes make you cough. You may have trouble breathing. Most cases of bronchitis are caused by viruses like those that cause colds. Antibiotics usually do not help and they may be harmful. Bronchitis usually develops rapidly and lasts about 2 to 3 weeks in otherwise healthy people. Follow-up care is a key part of your treatment and safety. Be sure to make and go to all appointments, and call your doctor if you are having problems. It's also a good idea to know your test results and keep a list of the medicines you take. How can you care for yourself at home? · Take all medicines exactly as prescribed. Call your doctor if you think you are having a problem with your medicine. · Get some extra rest.  · Take an over-the-counter pain medicine, such as acetaminophen (Tylenol), ibuprofen (Advil, Motrin), or naproxen (Aleve) to reduce fever and relieve body aches. Read and follow all instructions on the label. · Do not take two or more pain medicines at the same time unless the doctor told you to. Many pain medicines have acetaminophen, which is Tylenol. Too much acetaminophen (Tylenol) can be harmful. · Take an over-the-counter cough medicine that contains dextromethorphan to help quiet a dry, hacking cough so that you can sleep. Avoid cough medicines that have more than one active ingredient. Read and follow all instructions on the label. · Breathe moist air from a humidifier, hot shower, or sink filled with hot water. The heat and moisture will thin mucus so you can cough it out. · Do not smoke. Smoking can make bronchitis worse. If you need help quitting, talk to your doctor about stop-smoking programs and medicines.  These can increase your chances of quitting for good.  When should you call for help? Call 911 anytime you think you may need emergency care. For example, call if:    · You have severe trouble breathing.    Call your doctor now or seek immediate medical care if:    · You have new or worse trouble breathing.     · You cough up dark brown or bloody mucus (sputum).     · You have a new or higher fever.     · You have a new rash.    Watch closely for changes in your health, and be sure to contact your doctor if:    · You cough more deeply or more often, especially if you notice more mucus or a change in the color of your mucus.     · You are not getting better as expected. Where can you learn more? Go to http://jaci-elina.info/. Enter H333 in the search box to learn more about \"Bronchitis: Care Instructions. \"  Current as of: June 9, 2019  Content Version: 12.2  © 6730-0445 [employer]. Care instructions adapted under license by [employer] (which disclaims liability or warranty for this information). If you have questions about a medical condition or this instruction, always ask your healthcare professional. [address] any warranty or liability for your use of this information.

## 2020-02-24 NOTE — ED PROVIDER NOTES
[place] [address] EMERGENCY DEPT          Ms. Pedro Mane is 51-year-old female with past medical history of asthma, asthmatic bronchitis and influenza who presents with a cough since Friday. She said the cough is clear with some tinges of yellow sputum. In addition she has had some congestion and headache. She is also has a history of migraines and says she is had a migraine headache for the past few days as well. She has used her nebulizer at home and said is not been helpful and she still continues to cough. She does have a history of asthma but only takes albuterol inhaler as needed and is not on any steroid inhalers. She denies any fever, chills, chest pain or shortness of breath. Her last albuterol treatment was 2 hours ago. Nursing nurses regarding the HPI and triage nursing notes were reviewed. No current facility-administered medications for this encounter. Current Outpatient Medications   Medication Sig    dextromethorphan-guaiFENesin (ROBITUSSIN-DM)  mg/5 mL syrup Take 10 mL by mouth every six (6) hours as needed for Cough. SIG:  As prescribed during the daytime.  predniSONE (DELTASONE) 20 mg tablet Take 60 mg by mouth daily for 5 days. With Breakfast    pseudoephedrine (SUDAFED) 60 mg tablet Take 0.5 Tabs by mouth every six (6) hours as needed for Congestion for up to 5 days.  omalizumab (XOLAIR) 150 mg solr 300 mg by SubCUTAneous route every thirty (30) days. Prefilled syringes    montelukast (SINGULAIR) 10 mg tablet Take 10 mg by mouth daily.  topiramate (TOPAMAX) 50 mg tablet Take 1 Tab by mouth nightly.  rizatriptan (MAXALT) 10 mg tablet One tab at HA onset May repeat in 2 hours if needed. Max two tabs in 24 hours    fluticasone propionate (FLONASE) 50 mcg/actuation nasal spray INSTILL 2 SPRAYS INTO EACH NOSTRIL ONCE DAILY    fluticasone propion-salmeterol (ADVAIR DISKUS) 250-50 mcg/dose diskus inhaler Take 1 Puff by inhalation every twelve (12) hours.     albuterol (PROVENTIL HFA, VENTOLIN HFA, PROAIR HFA) 90 mcg/actuation inhaler Take 2 Puffs by inhalation every four (4) hours as needed for Wheezing.  albuterol-ipratropium (DUO-NEB) 2.5 mg-0.5 mg/3 ml nebu INHALE CONTENTS OF 1 VIAL VIA NEBULIZER EVERY 4 HOURS AS NEEDED FOR SHORTNESS OF BREATH/WHEEZING.        Past Medical History:   Diagnosis Date    Anxiety attack     Asthma     Asthmatic bronchitis     Catamenial disorder     Hernia     Influenza     Migraine     Migraines     Mild asthma     Psychiatric disorder     anxiety     Torticollis        Past Surgical History:   Procedure Laterality Date     DELIVERY ONLY      HX BACK SURGERY      HX  SECTION         Family History   Problem Relation Age of Onset    Heart Disease Other         grandmothers    Asthma Brother     Lung Disease Paternal Aunt         cronic bronchitis    Asthma Paternal Grandmother     High Cholesterol Mother        Social History     Socioeconomic History    Marital status:      Spouse name: Not on file    Number of children: Not on file    Years of education: Not on file    Highest education level: Not on file   Occupational History    Not on file   Social Needs    Financial resource strain: Not on file    Food insecurity:     Worry: Not on file     Inability: Not on file    Transportation needs:     Medical: Not on file     Non-medical: Not on file   Tobacco Use    Smoking status: Never Smoker    Smokeless tobacco: Never Used   Substance and Sexual Activity    Alcohol use: No    Drug use: No    Sexual activity: Yes     Partners: Male   Lifestyle    Physical activity:     Days per week: Not on file     Minutes per session: Not on file    Stress: Not on file   Relationships    Social connections:     Talks on phone: Not on file     Gets together: Not on file     Attends Alevism service: Not on file     Active member of club or organization: Not on file     Attends meetings of clubs or organizations: Not on file     Relationship status: Not on file    Intimate partner violence:     Fear of current or ex partner: Not on file     Emotionally abused: Not on file     Physically abused: Not on file     Forced sexual activity: Not on file   Other Topics Concern    Not on file   Social History Narrative    Not on file       Allergies   Allergen Reactions    Aspirin Anaphylaxis    Ibuprofen Anaphylaxis    Pcn [Penicillins] Itching    Claritin [Loratadine] Anxiety and Cough    Phenergan [Promethazine] Anxiety and Swelling    Seafood Swelling    Shellfish Containing Products Hives       Patient's primary care provider (as noted in EPIC):  Andriy Salgado NP    Review of Systems   Constitutional: Negative. HENT: Positive for congestion. Respiratory: Positive for cough. Negative for chest tightness and shortness of breath. Cardiovascular: Negative. Gastrointestinal: Negative. Genitourinary: Negative. Musculoskeletal: Positive for myalgias. Skin: Negative. Neurological: Positive for headaches. Visit Vitals  /66 (BP 1 Location: Left arm, BP Patient Position: At rest)   Pulse (!) 102   Temp 98.6 °F (37 °C)   Resp 20   Ht 5' 5\" (1.651 m)   Wt 61.2 kg (135 lb)   SpO2 100%   BMI 22.47 kg/m²       PHYSICAL EXAM:    CONSTITUTIONAL:  Alert, in no apparent distress;  well developed;  well nourished. HEAD:  Normocephalic, atraumatic. EYES:  EOMI. Non-icteric sclera. Normal conjunctiva. ENTM:  Nose:  Rhinorrhea. Throat:  no erythema or exudate, mucous membranes moist.  NECK:  No JVD. Supple    RESPIRATORY:  Mild expiratory wheeze with good air movement. CARDIOVASCULAR:  Regular rate and rhythm. No murmurs, rubs, or gallops. GI:  Normal bowel sounds, abdomen soft and non-tender. No rebound or guarding. BACK:  Non-tender. UPPER EXT:  Normal inspection. LOWER EXT:  No edema, no calf tenderness. Distal pulses intact.   NEURO:  Moves all four extremities, and grossly normal motor exam.  SKIN:  No rashes;  Normal for age. PSYCH:  Alert and normal affect. DIFFERENTIAL DIAGNOSES/ MEDICAL DECISION MAKING:  Acute asthma exacerbation, acute bronchitis, pneumonia, upper respiratory infection, pulmonary embolism, bronchospasm, various cardiac etiologies verus numerous other etiologies versus combination of the above. Abnormal lab results from this emergency department encounter:  [address] (RAPID TEST)       Lab values for this patient within approximately the last 12 hours:  Recent Results (from the past 12 hour(s))   INFLUENZA A & B AG (RAPID TEST)    Collection Time: 02/24/20 10:26 AM   Result Value Ref Range    Influenza A Antigen NEGATIVE  NEG      Influenza B Antigen NEGATIVE  NEG         Radiologist and cardiologist interpretations if available at time of this note:  Xr Chest Pa Lat    Result Date: 2/24/2020  HISTORY: Cough. EXAM: Chest. TECHNIQUE: Two views of the chest were obtained. COMPARISON: 1/27/2020. FINDINGS: There is no pneumothorax, pneumonia or pleural effusions. Heart and mediastinal structures are unremarkable. . Visualized bony thorax and soft tissues are within normal limits. IMPRESSION: 1. No acute cardiopulmonary process. No change. Medication(s) ordered for patient during this emergency visit encounter:  Medications   acetaminophen (TYLENOL) tablet 650 mg (650 mg Oral Given 2/24/20 1021)   guaiFENesin-dextromethorphan (ROBITUSSIN DM) 100-10 mg/5 mL syrup 10 mL (10 mL Oral Given 2/24/20 1021)   albuterol-ipratropium (DUO-NEB) 2.5 MG-0.5 MG/3 ML (3 mL Nebulization Given 2/24/20 1021)   predniSONE (DELTASONE) tablet 50 mg (50 mg Oral Given 2/24/20 1021)       IMPRESSION AND MEDICAL DECISION MAKING:  Based upon the patient's presentation with noted HPI and PE, along with the work up done in the emergency department, I believe that the patient is having acute bronchitis. DIAGNOSIS:  1.  Acute bronchitis. SPECIFIC PATIENT INSTRUCTIONS FROM THE PHYSICIAN WHO TREATED YOU IN THE ER TODAY:  1. Return if any concerns or worsening of condition(s)  2. Use the albuterol inhaler as prescribed for wheezing and/or cough. 4.  Robitussin as prescribed for cough. 5.  FOLLOW UP APPOINTMENT:  Your primary doctor in 1-2 days. Patient is improved, resting quietly and comfortably. The patient will be discharged home. The patient was reassured that these symptoms do not appear to represent a serious or life threatening condition at this time. Warning signs of worsening condition were discussed and understood by the patient. Based on patient's age, coexisting illness, exam, and the results of this ED evaluation, the decision to treat as an outpatient was made. Based on the information available at time of discharge, acute pathology requiring immediate intervention was deemed relative unlikely. While it is impossible to completely exclude the possibility of underlying serious disease or worsening of condition, I feel the relative likelihood is extremely low. I discussed this uncertainty with the patient, who understood ED evaluation and treatment and felt comfortable with the outpatient treatment plan. All questions regarding care, test results, and follow up were answered. The patient is stable and appropriate to discharge. They understand that they should return to the emergency department for any new or worsening symptoms. I stressed the importance of follow up for repeat assessment and possibly further evaluation/treatment. Dictation disclaimer:  Please note that this dictation was completed with [employer], the [employer] voice recognition software. Quite often unanticipated grammatical, syntax, homophones, and other interpretive errors are inadvertently transcribed by the computer software. Please disregard these errors. Please excuse any errors that have escaped final proofreading.      Coding Diagnoses     Clinical Impression:   1.  Acute bronchitis, unspecified organism        Disposition     Disposition:  Home    Nara Barry PA-C.

## 2020-02-24 NOTE — ED TRIAGE NOTES
Patient c/o cough and chest congestion since Friday. She states using nebulizer treatments without relief.

## 2020-02-25 ENCOUNTER — OFFICE VISIT (OUTPATIENT)
Dept: PULMONOLOGY | Age: 45
End: 2020-02-25

## 2020-02-25 VITALS
OXYGEN SATURATION: 100 % | DIASTOLIC BLOOD PRESSURE: 78 MMHG | HEIGHT: 65 IN | BODY MASS INDEX: 22.63 KG/M2 | RESPIRATION RATE: 20 BRPM | TEMPERATURE: 97.7 F | HEART RATE: 112 BPM | SYSTOLIC BLOOD PRESSURE: 138 MMHG | WEIGHT: 135.8 LBS

## 2020-02-25 DIAGNOSIS — J32.9 RHINOSINUSITIS: ICD-10-CM

## 2020-02-25 DIAGNOSIS — J45.41 MODERATE PERSISTENT ASTHMA WITH EXACERBATION: Primary | ICD-10-CM

## 2020-02-25 DIAGNOSIS — J31.0 RHINOSINUSITIS: ICD-10-CM

## 2020-02-25 DIAGNOSIS — J20.9 ACUTE BRONCHITIS, UNSPECIFIED ORGANISM: ICD-10-CM

## 2020-02-25 RX ORDER — LEVOFLOXACIN 500 MG/1
500 TABLET, FILM COATED ORAL DAILY
Qty: 7 TAB | Refills: 0 | Status: SHIPPED | OUTPATIENT
Start: 2020-02-25 | End: 2020-02-25 | Stop reason: SDUPTHER

## 2020-02-25 RX ORDER — IPRATROPIUM BROMIDE AND ALBUTEROL SULFATE 2.5; .5 MG/3ML; MG/3ML
3 SOLUTION RESPIRATORY (INHALATION)
Qty: 1 NEBULE | Refills: 0 | Status: SHIPPED | COMMUNITY
Start: 2020-02-25 | End: 2020-02-25

## 2020-02-25 NOTE — PROGRESS NOTES
HISTORY OF PRESENT ILLNESS  Debi Armendariz is a 45 y.o. female following up after an ED visit. Follow up for asthma on Xolair for control. Pt noted dramatic improvement in symptoms during her last pregnancy, lasting even after delivery and even more so when she left a stress filled work environment. Pt had increasing exacerbations starting October 2018 with persistent SOB and wheezing despite a course of steroids and ?antibiotics. Xolair was restarted after her visit in Feb 2019 with note of elevated IgE and since then control has improved dramatically. She had very few and mild exacerbations until 2 days ago when she started with cough, SOB, wheezing preceded by sinus pain and congestion. She denies fever but has occasional diaphoresis. She was seen in the ED 2/24 where a CXR did not show any acute infiltrates and she was treated with Duoneb and Prednisone taper. No chest pain. 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 Cough   The history is provided by the patient. This is a new problem. The current episode started 2 days ago. The problem occurs constantly. The problem has not changed since onset. Associated symptoms include shortness of breath. Pertinent negatives include no chest pain, no abdominal pain and no headaches. Nothing aggravates the symptoms. Nothing relieves the symptoms. Treatments tried: Prednisone and Pseudoephedrine. The treatment provided no relief. Allergic Rhinitis   The history is provided by the patient. This is a recurrent problem. The problem occurs hourly. The problem has not changed since onset. Associated symptoms include shortness of breath. Pertinent negatives include no chest pain, no abdominal pain and no headaches. Review of Systems   Constitutional: Negative for chills, diaphoresis, fever, malaise/fatigue and weight loss. Weight gain   HENT: Positive for congestion and sinus pain.  Negative for ear discharge, ear pain, hearing loss, nosebleeds, sore throat and tinnitus. Eyes: Negative for blurred vision, double vision, photophobia, pain, discharge and redness. Respiratory: Positive for cough and shortness of breath. Negative for hemoptysis, sputum production and stridor. Wheezing: rare     Cardiovascular: Negative for chest pain, palpitations, orthopnea, claudication, leg swelling and PND. Gastrointestinal: Negative for abdominal pain, blood in stool, constipation, diarrhea, heartburn, melena, nausea and vomiting. Genitourinary: Negative for dysuria, flank pain, frequency, hematuria and urgency. Musculoskeletal: Negative for back pain, falls, joint pain, myalgias and neck pain. Skin: Negative for itching and rash. Neurological: Negative for dizziness, tingling, tremors, sensory change, speech change, focal weakness, seizures, loss of consciousness, weakness and headaches. Endo/Heme/Allergies: Negative for environmental allergies and polydipsia. Does not bruise/bleed easily. Psychiatric/Behavioral: Negative for depression, hallucinations, memory loss, substance abuse and suicidal ideas. The patient is nervous/anxious. The patient does not have insomnia. Past Medical History:   Diagnosis Date    Anxiety attack     Asthma     Asthmatic bronchitis     Bronchitis     Catamenial disorder     Hernia     Influenza     Migraine     Migraines     Mild asthma     Psychiatric disorder     anxiety     Torticollis      Current Outpatient Medications on File Prior to Visit   Medication Sig Dispense Refill    dextromethorphan-guaiFENesin (ROBITUSSIN-DM)  mg/5 mL syrup Take 10 mL by mouth every six (6) hours as needed for Cough. SIG:  As prescribed during the daytime. 240 mL 0    predniSONE (DELTASONE) 20 mg tablet Take 60 mg by mouth daily for 5 days. With Breakfast 15 Tab 0    pseudoephedrine (SUDAFED) 60 mg tablet Take 0.5 Tabs by mouth every six (6) hours as needed for Congestion for up to 5 days.  10 Tab 0    omalizumab Bret Palma) 150 mg solr 300 mg by SubCUTAneous route every thirty (30) days. Prefilled syringes 2 Each 11    montelukast (SINGULAIR) 10 mg tablet Take 10 mg by mouth daily.  topiramate (TOPAMAX) 50 mg tablet Take 1 Tab by mouth nightly. 90 Tab 1    rizatriptan (MAXALT) 10 mg tablet One tab at HA onset May repeat in 2 hours if needed. Max two tabs in 24 hours 12 Tab 5    fluticasone propionate (FLONASE) 50 mcg/actuation nasal spray INSTILL 2 SPRAYS INTO EACH NOSTRIL ONCE DAILY 1 Bottle 4    fluticasone propion-salmeterol (ADVAIR DISKUS) 250-50 mcg/dose diskus inhaler Take 1 Puff by inhalation every twelve (12) hours. 3 Inhaler 3    albuterol (PROVENTIL HFA, VENTOLIN HFA, PROAIR HFA) 90 mcg/actuation inhaler Take 2 Puffs by inhalation every four (4) hours as needed for Wheezing. 1 Inhaler 4    albuterol-ipratropium (DUO-NEB) 2.5 mg-0.5 mg/3 ml nebu INHALE CONTENTS OF 1 VIAL VIA NEBULIZER EVERY 4 HOURS AS NEEDED FOR SHORTNESS OF BREATH/WHEEZING. 180 mL 5     No current facility-administered medications on file prior to visit.       Allergies   Allergen Reactions    Aspirin Anaphylaxis    Ibuprofen Anaphylaxis    Pcn [Penicillins] Itching    Claritin [Loratadine] Anxiety and Cough    Phenergan [Promethazine] Anxiety and Swelling    Seafood Swelling    Shellfish Containing Products Hives     Social History     Socioeconomic History    Marital status:      Spouse name: Not on file    Number of children: Not on file    Years of education: Not on file    Highest education level: Not on file   Occupational History    Not on file   Social Needs    Financial resource strain: Not on file    Food insecurity:     Worry: Not on file     Inability: Not on file    Transportation needs:     Medical: Not on file     Non-medical: Not on file   Tobacco Use    Smoking status: Never Smoker    Smokeless tobacco: Never Used   Substance and Sexual Activity    Alcohol use: No    Drug use: No    Sexual activity: Yes Partners: Male   Lifestyle    Physical activity:     Days per week: Not on file     Minutes per session: Not on file    Stress: Not on file   Relationships    Social connections:     Talks on phone: Not on file     Gets together: Not on file     Attends Mormon service: Not on file     Active member of club or organization: Not on file     Attends meetings of clubs or organizations: Not on file     Relationship status: Not on file    Intimate partner violence:     Fear of current or ex partner: Not on file     Emotionally abused: Not on file     Physically abused: Not on file     Forced sexual activity: Not on file   Other Topics Concern    Not on file   Social History Narrative    Not on file     Blood pressure 138/78, pulse (!) 112, temperature 97.7 °F (36.5 °C), temperature source Oral, resp. rate 20, height 5' 5\" (1.651 m), weight 61.6 kg (135 lb 12.8 oz), last menstrual period 02/06/2020, SpO2 100 %. Physical Exam   Constitutional: She is oriented to person, place, and time. She appears well-developed and well-nourished. No distress. HENT:   Head: Normocephalic and atraumatic. Nose: Nose normal.   Mouth/Throat: No oropharyngeal exudate. Eyes: Pupils are equal, round, and reactive to light. Conjunctivae and EOM are normal. Right eye exhibits no discharge. Left eye exhibits no discharge. No scleral icterus. Neck: No JVD present. No tracheal deviation present. No thyromegaly present. Cardiovascular: Normal rate, regular rhythm, normal heart sounds and intact distal pulses. Exam reveals no gallop and no friction rub. No murmur heard. Pulmonary/Chest: Effort normal and breath sounds normal. No stridor. No respiratory distress. Wheezes: faint upper lung fields. She has no rales. She exhibits no tenderness. Abdominal: Soft. She exhibits no mass. There is no abdominal tenderness. There is no rebound. Musculoskeletal:         General: No tenderness, deformity or edema.    Lymphadenopathy: She has no cervical adenopathy. Neurological: She is alert and oriented to person, place, and time. Coordination normal.   Skin: Skin is warm and dry. No rash noted. She is not diaphoretic. No erythema. No pallor. Psychiatric: She has a normal mood and affect. Her behavior is normal. Judgment and thought content normal.     Spirometry: mild obstruction , improved from study done 6/14/2012  XR Results (most recent):  Results from [place] encounter on 02/24/20   XR CHEST PA LAT    Narrative HISTORY: Cough. EXAM: Chest.    TECHNIQUE: Two views of the chest were obtained. COMPARISON: 1/27/2020. FINDINGS: There is no pneumothorax, pneumonia or pleural effusions. Heart and  mediastinal structures are unremarkable. . Visualized bony thorax and soft  tissues are within normal limits. Impression  IMPRESSION:    1. No acute cardiopulmonary process. No change. ASSESSMENT and PLAN  Encounter Diagnoses   Name Primary?  Moderate persistent asthma with exacerbation Yes    Acute bronchitis, unspecified organism     Rhinosinusitis      Pt with exacerbation likely triggered by URI or acute bronchitis. SSx also consistent with acute sinusitis. Pt to continue Prednisone Rxed by ED. Will add Duoneb today and Rx Levaquin  Reviewed CXR with pt. Resume Xolair after exacerbation.   RTC 6 weeks

## 2020-02-25 NOTE — PROGRESS NOTES
Debi Armendariz presents today for   Chief Complaint   Patient presents with    Cough     per patient request-was seen in ER 2/24/2020; coughing up thick yellow secretion    Asthma     last seen 10/14/2019    Allergic Rhinitis    Results     CXR 2/24/2020       Is someone accompanying this pt? No    Is the patient using any DME equipment during OV? No    -DME Company N/A    Depression Screening:  3 most recent PHQ Screens 2/25/2020   Little interest or pleasure in doing things Not at all   Feeling down, depressed, irritable, or hopeless Not at all   Total Score PHQ 2 0       Learning Assessment:  Learning Assessment 2/25/2020   PRIMARY LEARNER Patient   PRIMARY LANGUAGE ENGLISH   LEARNER PREFERENCE PRIMARY READING     LISTENING     DEMONSTRATION   ANSWERED BY Patient     -   RELATIONSHIP SELF       Abuse Screening:  No flowsheet data found. Fall Risk  No flowsheet data found. Coordination of Care:  1. Have you been to the ER, urgent care clinic since your last visit? Hospitalized since your last visit? Yes; Where: [facility] ED, When: 1/21/2020-flu,& foreign body of left ear, 1/27/2020-viral syndrome, cough & sore throat & 2/24/2020-bronchitis    2. Have you seen or consulted any other health care providers outside of the [address] [place] [address] since your last visit? Include any pap smears or colon screening.  No

## 2020-02-26 ENCOUNTER — TELEPHONE (OUTPATIENT)
Dept: PULMONOLOGY | Age: 45
End: 2020-02-26

## 2020-02-26 NOTE — TELEPHONE ENCOUNTER
Pt to take the antibiotic and be aware of side effects including tendon pain and will stop if they occur

## 2020-02-26 NOTE — TELEPHONE ENCOUNTER
Pt states pharmacy advised medication Levaquin will make her heart race, wants to make sure it's ok for her to take. Please advise [phone].

## 2020-03-02 ENCOUNTER — TELEPHONE (OUTPATIENT)
Dept: PULMONOLOGY | Age: 45
End: 2020-03-02

## 2020-03-02 NOTE — TELEPHONE ENCOUNTER
Pt states she has 2 more days of antibiotics. Work note was to return today which she did but they sent her home due to still congested and coughing. Pt works for pediatric office. They want her to get another work note when she can return to work.

## 2020-03-05 ENCOUNTER — TELEPHONE (OUTPATIENT)
Dept: PULMONOLOGY | Age: 45
End: 2020-03-05

## 2020-03-05 RX ORDER — FLUCONAZOLE 200 MG/1
200 TABLET ORAL DAILY
Qty: 7 TAB | Refills: 0 | Status: SHIPPED | OUTPATIENT
Start: 2020-03-05 | End: 2020-03-12

## 2020-03-05 NOTE — TELEPHONE ENCOUNTER
Pt states she's finished with her antibiotics and is still coughing up yellow mucous causing her to take nebulizer treatments for SOB. Please advise [phone].

## 2020-03-05 NOTE — TELEPHONE ENCOUNTER
Pt states she is still congested but feels better.  Now has yeast infection from the antibiotics and would like rx

## 2020-03-10 RX ORDER — MONTELUKAST SODIUM 10 MG/1
TABLET ORAL
Qty: 90 TAB | Refills: 1 | Status: SHIPPED | OUTPATIENT
Start: 2020-03-10 | End: 2020-09-07

## 2020-03-11 RX ORDER — FLUTICASONE PROPIONATE 50 MCG
SPRAY, SUSPENSION (ML) NASAL
Qty: 1 BOTTLE | Refills: 4 | Status: SHIPPED | OUTPATIENT
Start: 2020-03-11 | End: 2020-08-18 | Stop reason: SDUPTHER

## 2020-03-13 ENCOUNTER — CLINICAL SUPPORT (OUTPATIENT)
Dept: PULMONOLOGY | Age: 45
End: 2020-03-13

## 2020-03-13 DIAGNOSIS — J45.41 MODERATE PERSISTENT ASTHMA WITH EXACERBATION: Primary | ICD-10-CM

## 2020-03-13 NOTE — PATIENT INSTRUCTIONS
Be aware that \"Anaphylaxis\" can occur any time after receiving a Xolair injection   Anaphylaxis presenting a bronchospasm, hypotension (low BP), dizziness, faintness, hives, and/or swelling of the tongue and throat. Anaphylaxis can occur after the first dose, but also has occurred beyond one year after beginning treatment with Xolair. *If such symptoms occur, use your EpiPen immediately and call 911. *     Notify your MD office following any treatment of possible reaction. Epinephrine (Injection)   Epinephrine ([id number]-KWM-rin)  Treats severe allergic reactions (including anaphylaxis) in an emergency situation. Brand Name(s):Adrenaclick, Adrenalin, Auvi-Q, EpiPen Auto-Injector, EpiPen Jr Auto-Injector, Epipen, Epipen 2-Jose Juan Auto-Injector, Epipen Jr 2-Jose Juan Auto-Injector, NovaPlus Epinephrine, Twinject   There may be other brand names for this medicine. When This Medicine Should Not Be Used:   A severe allergic reaction can be life-threatening, so there is no reason this medicine should not be used. How to Use This Medicine:   Injectable  · Your doctor should teach you how and when to inject this medicine. Each injection kit contains a single-use dose of medicine prescribed for you. · Give yourself a shot right away if you start to have a severe allergic reaction. · Inject this medicine into the muscle on the outside of your thigh only. Never inject this medicine into a vein, into your hand or foot, or into the muscles of your buttocks. · Read and follow the patient instructions that come with this medicine. Talk to your doctor or pharmacist if you have any questions. · This medicine might come with an autoinjector  so you can practice giving the medicine before you have an actual allergic reaction. The autoinjector  is gray (for EpiPen® or EpiPen Jr®) or beige (for [place]) and does not contain any medicine or needle.   · Do not remove the blue safety release (EpiPen® or EpiPen Jr®) or the gray end caps (Adrenaclick®) on the autoinjector until you are ready to use it. Do not put your thumb, fingers, or hand over the orange (EpiPen® or EpiPen Jr®) or red tip (Adrenaclick®). · You may need to use more than one injection if your allergic reaction does not get better after the first shot. Your doctor will give you additional doses if you need more than 2 injections. · You may inject the medicine through your clothing, if you need to. · Some liquid will remain in the autoinjector after the medicine has been injected. This medicine cannot be reused. Give your used autoinjector to your healthcare provider when you seek medical care. · Carry this medicine with you at all times for emergency use in case you have a severe allergic reaction. · Make sure family members or other people you are with know how to inject the medicine in case you are not able to do it yourself. · Check your injection kits regularly to make sure the liquid has not changed color. You should not use the autoinjector if the liquid has changed color, or if there are solids in the liquid. You should not use the autoinjector if the expiration date has passed. · Store the injection kit at room temperature, away from heat, moisture, and direct light. Do not store the medicine in the refrigerator or freezer, or inside a car. · Keep the autoinjector in its case or carrier tube to protect it from damage. This tube is not waterproof. If you accidentally drop it, check for damage or leaks. Drugs and Foods to Avoid:   Ask your doctor or pharmacist before using any other medicine, including over-the-counter medicines, vitamins, and herbal products. · Some foods and medicines can affect how epinephrine works.  Tell your doctor if you are using digoxin, levothyroxine, phentolamine, certain allergy medicines (such as chlorpheniramine, diphenhydramine, tripelennamine), a beta blocker medicine (such as propranolol), a heart rhythm medicine, a diuretic (water pill), an MAO inhibitor (MAOI), medicine for depression, or ergot medicines. Warnings While Using This Medicine:   · Tell your doctor if you are pregnant or breastfeeding, or if you have asthma, diabetes, heart disease, heart rhythm problems, high blood pressure, an overactive thyroid, or Parkinson disease. · A severe allergic reaction is a medical emergency. Go to an emergency room as soon as possible, even if you feel better after you use this medicine. · Do not inject this medicine into your hands or feet. Go to the emergency room right away if you accidently inject epinephrine into any part of your body other than your thigh. Epinephrine reduces blood flow, and this could damage areas that have small blood vessels, such as hands and feet. · Keep all medicine out of the reach of children. Never share your medicine with anyone. Possible Side Effects While Using This Medicine:   Call your doctor right away if you notice any of these side effects:  · Chest pain  · Fast, pounding, or uneven heartbeat  · Heavy sweating, nausea, vomiting  · Tremors, shakiness  · Trouble breathing  If you notice these less serious side effects, talk with your doctor:   · Feeling anxious, nervous, scared, or weak  · Headache or dizziness  · Pale skin  If you notice other side effects that you think are caused by this medicine, tell your doctor. Call your doctor for medical advice about side effects. You may report side effects to FDA at 9-942-FDA-4175  © 2014 [address] is for End User's use only and may not be sold, redistributed or otherwise used for commercial purposes. The above information is an  only. It is not intended as medical advice for individual conditions or treatments. Talk to your doctor, nurse or pharmacist before following any medical regimen to see if it is safe and effective for you.

## 2020-03-13 NOTE — PROGRESS NOTES
Chief Complaint   Patient presents with   Cassandria Reasons is here for Xolair injection. Patient did not have any reaction to last injection. Patient states the Xolair is helping her Asthma. Injected subcutaneous : 150 mg given in Left upper arm SQ,  150 mg given in Right upper arm SQ. Lot Number: [id number]  Exp Date: 12/2020  Total Xolair Dose: 300 mg every 4 weeks  Epipen is with patient. Patient observed for 15 minutes. No reaction at injection site. Patient given Xolair reaction sheet.

## 2020-03-19 ENCOUNTER — TELEPHONE (OUTPATIENT)
Dept: PULMONOLOGY | Age: 45
End: 2020-03-19

## 2020-03-23 ENCOUNTER — TELEPHONE (OUTPATIENT)
Dept: PULMONOLOGY | Age: 45
End: 2020-03-23

## 2020-03-23 RX ORDER — PREDNISONE 10 MG/1
TABLET ORAL
Qty: 18 TAB | Refills: 0 | Status: SHIPPED | OUTPATIENT
Start: 2020-03-23 | End: 2020-05-21 | Stop reason: SDUPTHER

## 2020-03-23 NOTE — TELEPHONE ENCOUNTER
Pt stated Saturday with wheezing, tightness in chest, coughing up yellowish sputum. Used nebulizer treatment all weekend. Went to work today but is leaving now. [employer].    Pt advised if she becomes more sob on ride home to go to ER or urgent care

## 2020-03-23 NOTE — TELEPHONE ENCOUNTER
Pt states she has been taking nebulizer treatments all week for her cough, producing yellow mucous. Also using Mucinex and had to use her inhaler this morning. Please advise [phone].

## 2020-03-24 ENCOUNTER — TELEPHONE (OUTPATIENT)
Dept: PULMONOLOGY | Age: 45
End: 2020-03-24

## 2020-03-24 NOTE — TELEPHONE ENCOUNTER
Pt had called yesterday for prednisone rx for exacerbation.  Work requirers her to go back tomorrow with a work note

## 2020-03-25 ENCOUNTER — TELEPHONE (OUTPATIENT)
Dept: PULMONOLOGY | Age: 45
End: 2020-03-25

## 2020-03-25 NOTE — TELEPHONE ENCOUNTER
Pt c/o cough with congestion. Yellowish sputum now x 2 day. Unable to go back to work today. Will need new note for this week.

## 2020-03-26 NOTE — TELEPHONE ENCOUNTER
Pt advised per verbal order from Dr. Josh Gavin no antibiotic is needed at this time. She will give a work note for the week 3/23-3/27/2020.  Pt to  tomorrow

## 2020-03-27 ENCOUNTER — APPOINTMENT (OUTPATIENT)
Dept: GENERAL RADIOLOGY | Age: 45
End: 2020-03-27
Attending: EMERGENCY MEDICINE
Payer: MEDICAID

## 2020-03-27 ENCOUNTER — HOSPITAL ENCOUNTER (EMERGENCY)
Age: 45
Discharge: HOME | End: 2020-03-27
Attending: EMERGENCY MEDICINE
Payer: MEDICAID

## 2020-03-27 VITALS
HEIGHT: 65 IN | SYSTOLIC BLOOD PRESSURE: 125 MMHG | RESPIRATION RATE: 19 BRPM | BODY MASS INDEX: 22.49 KG/M2 | DIASTOLIC BLOOD PRESSURE: 71 MMHG | OXYGEN SATURATION: 98 % | HEART RATE: 88 BPM | WEIGHT: 135 LBS | TEMPERATURE: 98.5 F

## 2020-03-27 DIAGNOSIS — R05.3 PERSISTENT COUGH: ICD-10-CM

## 2020-03-27 DIAGNOSIS — J20.9 ACUTE BRONCHITIS, UNSPECIFIED ORGANISM: Primary | ICD-10-CM

## 2020-03-27 PROCEDURE — 71046 X-RAY EXAM CHEST 2 VIEWS: CPT

## 2020-03-27 PROCEDURE — 99282 EMERGENCY DEPT VISIT SF MDM: CPT

## 2020-03-27 RX ORDER — CODEINE PHOSPHATE AND GUAIFENESIN 10; 100 MG/5ML; MG/5ML
5 SOLUTION ORAL
Qty: 60 ML | Refills: 0 | Status: SHIPPED | OUTPATIENT
Start: 2020-03-27 | End: 2020-04-01

## 2020-03-27 RX ORDER — AZITHROMYCIN 250 MG/1
TABLET, FILM COATED ORAL
Qty: 6 TAB | Refills: 0 | Status: SHIPPED | OUTPATIENT
Start: 2020-03-27 | End: 2020-04-01

## 2020-03-27 NOTE — DISCHARGE INSTRUCTIONS
Patient Education       Interim Guidance for Preventing the Spread of Coronavirus Disease 2019 (COVID-19) in Homes and Residential Communities  Prevention steps for:  People with confirmed or suspected COVID-19 (including persons under investigation) who do not need to be hospitalized  and  People with confirmed COVID-19 who were hospitalized and determined to be medically stable to go home  Your healthcare provider and public health staff will evaluate whether you can be cared for at home. If it is determined that you do not need to be hospitalized and can be isolated at home, you will be monitored by staff from your local or state health department. You should follow the prevention steps below until a healthcare provider or local or state health department says you can return to your normal activities. Stay home except to get medical care  You should restrict activities outside your home, except for getting medical care. Do not go to work, school, or public areas. Avoid using public transportation, ride-sharing, or taxis. Separate yourself from other people and animals in your home  People: As much as possible, you should stay in a specific room and away from other people in your home. Also, you should use a separate bathroom, if available. Animals: You should restrict contact with pets and other animals while you are sick with COVID-19, just like you would around other people. Although there have not been reports of pets or other animals becoming sick with COVID-19, it is still recommended that people sick with COVID-19 limit contact with animals until more information is known about the virus. When possible, have another member of your household care for your animals while you are sick. If you are sick with COVID-19, avoid contact with your pet, including petting, snuggling, being kissed or licked, and sharing food.  If you must care for your pet or be around animals while you are sick, wash your hands before and after you interact with pets and wear a facemask. Call ahead before visiting your doctor  If you have a medical appointment, call the healthcare provider and tell them that you have or may have COVID-19. This will help the healthcare provider's office take steps to keep other people from getting infected or exposed. Wear a facemask  You should wear a facemask when you are around other people (e.g., sharing a room or vehicle) or pets and before you enter a healthcare provider's office. If you are not able to wear a facemask (for example, because it causes trouble breathing), then people who live with you should not stay in the same room with you, or they should wear a facemask if they enter your room. Cover your coughs and sneezes  Cover your mouth and nose with a tissue when you cough or sneeze. Throw used tissues in a lined trash can; immediately wash your hands with soap and water for at least 20 seconds or clean your hands with an alcohol-based hand  that contains 60 to 95% alcohol, covering all surfaces of your hands and rubbing them together until they feel dry. Soap and water should be used preferentially if hands are visibly dirty. Clean your hands often  Wash your hands often with soap and water for at least 20 seconds or clean your hands with an alcohol-based hand  that contains 60 to 95% alcohol, covering all surfaces of your hands and rubbing them together until they feel dry. Soap and water should be used preferentially if hands are visibly dirty. Avoid touching your eyes, nose, and mouth with unwashed hands. Avoid sharing personal household items  You should not share dishes, drinking glasses, cups, eating utensils, towels, or bedding with other people or pets in your home. After using these items, they should be washed thoroughly with soap and water.   Clean all high-touch surfaces everyday  High touch surfaces include counters, tabletops, doorknobs, bathroom fixtures, toilets, phones, keyboards, tablets, and bedside tables. Also, clean any surfaces that may have blood, stool, or body fluids on them. Use a household cleaning spray or wipe, according to the label instructions. Labels contain instructions for safe and effective use of the cleaning product including precautions you should take when applying the product, such as wearing gloves and making sure you have good ventilation during use of the product. Monitor your symptoms  Seek prompt medical attention if your illness is worsening (e.g., difficulty breathing). Before seeking care, call your healthcare provider and tell them that you have, or are being evaluated for, COVID-19. Put on a facemask before you enter the facility. These steps will help the healthcare provider's office to keep other people in the office or waiting room from getting infected or exposed. Ask your healthcare provider to call the local or state health department. Persons who are placed under active monitoring or facilitated self-monitoring should follow instructions provided by their local health department or occupational health professionals, as appropriate. If you have a medical emergency and need to call 911, notify the dispatch personnel that you have, or are being evaluated for COVID-19. If possible, put on a facemask before emergency medical services arrive. Discontinuing home isolation  Patients with confirmed COVID-19 should remain under home isolation precautions until the risk of secondary transmission to others is thought to be low. The decision to discontinue home isolation precautions should be made on a case-by-case basis, in consultation with healthcare providers and state and local health departments.   Recommended precautions for household members, intimate partners, and caregivers in a nonhealthcare setting of  A patient with symptomatic laboratory-confirmed COVID-19  or  A patient under investigation  Household members, intimate partners, and caregivers in a nonhealthcare setting may have close contact2 with a person with symptomatic, laboratory-confirmed COVID-19 or a person under investigation. Close contacts should monitor their health; they should call their healthcare provider right away if they develop symptoms suggestive of COVID-19 (e.g., fever, cough, shortness of breath)   Close contacts should also follow these recommendations:   Make sure that you understand and can help the patient follow their healthcare provider's instructions for medication(s) and care. You should help the patient with basic needs in the home and provide support for getting groceries, prescriptions, and other personal needs.  Monitor the patient's symptoms. If the patient is getting sicker, call his or her healthcare provider and tell them that the patient has laboratory-confirmed COVID-19. This will help the healthcare provider's office take steps to keep other people in the office or waiting room from getting infected. Ask the healthcare provider to call the local or [facility] health department for additional guidance. If the patient has a medical emergency and you need to call 911, notify the dispatch personnel that the patient has, or is being evaluated for COVID-19.   Household members should stay in another room or be  from the patient as much as possible. Household members should use a separate bedroom and bathroom, if available.  Prohibit visitors who do not have an essential need to be in the home.  Household members should care for any pets in the home. Do not handle pets or other animals while sick.  Make sure that shared spaces in the home have good air flow, such as by an air conditioner or an opened window, weather permitting.  Perform hand hygiene frequently.  Wash your hands often with soap and water for at least 20 seconds or use an alcohol-based hand  that contains 60 to 95% alcohol, covering all surfaces of your hands and rubbing them together until they feel dry. Soap and water should be used preferentially if hands are visibly dirty.  Avoid touching your eyes, nose, and mouth with unwashed hands.  You and the patient should wear a facemask if you are in the same room.  Wear a disposable facemask and gloves when you touch or have contact with the patient's blood, stool, or body fluids, such as saliva, sputum, nasal mucus, vomit, urine. o Throw out disposable facemasks and gloves after using them. Do not reuse. o When removing personal protective equipment, first remove and dispose of gloves. Then, immediately clean your hands with soap and water or alcohol-based hand . Next, remove and dispose of facemask, and immediately clean your hands again with soap and water or alcohol-based hand .  Avoid sharing household items with the patient. You should not share dishes, drinking glasses, cups, eating utensils, towels, bedding, or other items. After the patient uses these items, you should wash them thoroughly (see below [employer]).  Clean all high-touch surfaces, such as counters, tabletops, doorknobs, bathroom fixtures, toilets, phones, keyboards, tablets, and bedside tables, every day. Also, clean any surfaces that may have blood, stool, or body fluids on them.  o Use a household cleaning spray or wipe, according to the label instructions. Labels contain instructions for safe and effective use of the cleaning product including precautions you should take when applying the product, such as wearing gloves and making sure you have good ventilation during use of the product. [address] thoroughly.  o Immediately remove and wash clothes or bedding that have blood, stool, or body fluids on them.  o Wear disposable gloves while handling soiled items and keep soiled items away from your body.  Clean your hands (with soap and water or an alcohol-based hand ) immediately after removing your gloves. o Read and follow directions on labels of laundry or clothing items and detergent. In general, using a normal laundry detergent according to washing machine instructions and dry thoroughly using the warmest temperatures recommended on the clothing label.  Place all used disposable gloves, facemasks, and other contaminated items in a lined container before disposing of them with other household waste. Clean your hands (with soap and water or an alcohol-based hand ) immediately after handling these items. Soap and water should be used preferentially if hands are visibly dirty.  Discuss any additional questions with your state or local health department or healthcare provider. Footnotes  2Close contact is defined as--  a) being within approximately 6 feet (2 meters) of a COVID-19 case for a prolonged period of time; close contact can occur while caring for, living with, visiting, or sharing a health care waiting area or room with a COVID-19 case  - or -  b) having direct contact with infectious secretions of a COVID-19 case (e.g., being coughed on). Page last reviewed: February 18, 2020   Content source: [facility] for Immunization and Respiratory Diseases ([facility]), Division of Viral Diseases  Understanding Coronavirus Disease 2019 (COVID-19)  The information for the Coronavirus Disease 2019 (COVID-19) is rapidly evolving. To access the most current information place the following URL in your browser to access the CDC site. [employer]     This information is not intended as a substitute for professional medical care. Always follow your health care provider's instructions. Do home isolation as we discussed. If you were prescribed any medication take as directed. Follow up with your primary care physician or with specialist as directed.  Return to the emergency room with any new or worsening conditions. Bronchitis: Care Instructions  Your Care Instructions    Bronchitis is inflammation of the bronchial tubes, which carry air to the lungs. The tubes swell and produce mucus, or phlegm. The mucus and inflamed bronchial tubes make you cough. You may have trouble breathing. Most cases of bronchitis are caused by viruses like those that cause colds. Antibiotics usually do not help and they may be harmful. Bronchitis usually develops rapidly and lasts about 2 to 3 weeks in otherwise healthy people. Follow-up care is a key part of your treatment and safety. Be sure to make and go to all appointments, and call your doctor if you are having problems. It's also a good idea to know your test results and keep a list of the medicines you take. How can you care for yourself at home? · Take all medicines exactly as prescribed. Call your doctor if you think you are having a problem with your medicine. · Get some extra rest.  · Take an over-the-counter pain medicine, such as acetaminophen (Tylenol), ibuprofen (Advil, Motrin), or naproxen (Aleve) to reduce fever and relieve body aches. Read and follow all instructions on the label. · Do not take two or more pain medicines at the same time unless the doctor told you to. Many pain medicines have acetaminophen, which is Tylenol. Too much acetaminophen (Tylenol) can be harmful. · Take an over-the-counter cough medicine that contains dextromethorphan to help quiet a dry, hacking cough so that you can sleep. Avoid cough medicines that have more than one active ingredient. Read and follow all instructions on the label. · Breathe moist air from a humidifier, hot shower, or sink filled with hot water. The heat and moisture will thin mucus so you can cough it out. · Do not smoke. Smoking can make bronchitis worse. If you need help quitting, talk to your doctor about stop-smoking programs and medicines. These can increase your chances of quitting for good.   When should you call for help? Call 911 anytime you think you may need emergency care. For example, call if:    · You have severe trouble breathing.    Call your doctor now or seek immediate medical care if:    · You have new or worse trouble breathing.     · You cough up dark brown or bloody mucus (sputum).     · You have a new or higher fever.     · You have a new rash.    Watch closely for changes in your health, and be sure to contact your doctor if:    · You cough more deeply or more often, especially if you notice more mucus or a change in the color of your mucus.     · You are not getting better as expected. Where can you learn more? Go to http://jaci-elina.info/  Enter H333 in the search box to learn more about \"Bronchitis: Care Instructions. \"  Current as of: June 9, 2019Content Version: 12.4  © 9030-1028 [employer]. Care instructions adapted under license by [employer] (which disclaims liability or warranty for this information). If you have questions about a medical condition or this instruction, always ask your healthcare professional. [address] any warranty or liability for your use of this information.

## 2020-03-27 NOTE — LETTER
NOTIFICATION RETURN TO WORK / SCHOOL 
 
3/27/2020 9:47 AM 
 
Ms. Herberth Driscoll [address] [facility] [id number] To Whom It May Concern: 
 
Corey Garcia is currently under the care of [id number] [facility] EMERGENCY DEPT. She will return to work/school on: 3/31/2020 If there are questions or concerns please have the patient contact our office. Sincerely, 
 
 
Dr. Escamilla Nip

## 2020-03-27 NOTE — ED NOTES
Nish Corley is a 45 y.o. female that was discharged in stable condition. The patients diagnosis, condition and treatment were explained to  patient and aftercare instructions were given. The patient verbalized understanding. Patient armband removed and shredded.

## 2020-03-27 NOTE — ED PROVIDER NOTES
EMERGENCY DEPARTMENT HISTORY AND PHYSICAL EXAM    8:39 AM      Date: 3/27/2020  Patient Name: Esdras Leal    History of Presenting Illness     Chief Complaint   Patient presents with    Cough         History Provided By: Patient    Additional History (Context): Esdras Leal is a 45 y.o. female with Past medical history of asthma, bronchitis, influenza, anxiety who presents with chief complaint of persistent productive cough for the past week. She reports that her pulmonologist Dr. Cecile Thomas has her on Mucinex and prednisone, but the symptoms are not improving. She also reports that she is using her nebulizers at home with little improvement. She denies any fever, shortness of breath, nausea, vomiting, diarrhea, sore throat, sick contacts, recent travel, and no exposure to anyone positive for Covid-19 or suspected of Covid-19 during knowledge. No other complaints. PCP: Anabel Pérez NP        Past History     Past Medical History:  Past Medical History:   Diagnosis Date    Anxiety attack     Asthma     Asthmatic bronchitis     Bronchitis     Catamenial disorder     Hernia     Influenza     Migraine     Migraines     Mild asthma     Psychiatric disorder     anxiety     Torticollis        Past Surgical History:  Past Surgical History:   Procedure Laterality Date     DELIVERY ONLY      HX BACK SURGERY      HX  SECTION  2009       Family History:  Family History   Problem Relation Age of Onset    Heart Disease Other         grandmothers    Asthma Brother     Lung Disease Paternal Aunt         cronic bronchitis    Asthma Paternal Grandmother     High Cholesterol Mother        Social History:  Social History     Tobacco Use    Smoking status: Never Smoker    Smokeless tobacco: Never Used   Substance Use Topics    Alcohol use: No    Drug use: No       Allergies:   Allergies   Allergen Reactions    Aspirin Anaphylaxis    Ibuprofen Anaphylaxis    Pcn [Penicillins] Itching    Claritin [Loratadine] Anxiety and Cough    Phenergan [Promethazine] Anxiety and Swelling    Seafood Swelling    Shellfish Containing Products Hives         Review of Systems       Review of Systems   Constitutional: Negative for chills and fever. HENT: Positive for congestion. Negative for rhinorrhea, sore throat and trouble swallowing. Respiratory: Positive for cough. Negative for shortness of breath and wheezing. Cardiovascular: Negative for chest pain. Gastrointestinal: Negative for abdominal pain, nausea and vomiting. Genitourinary: Negative. Musculoskeletal: Negative for arthralgias and neck stiffness. Skin: Negative for pallor and rash. Neurological: Negative for dizziness, weakness and headaches. Hematological: Does not bruise/bleed easily. Psychiatric/Behavioral: Negative for confusion and dysphoric mood. All other systems reviewed and are negative. Physical Exam     Visit Vitals  /71 (BP 1 Location: Left arm, BP Patient Position: At rest)   Pulse 88   Temp 98.5 °F (36.9 °C)   Resp 19   Ht 5' 5\" (1.651 m)   Wt 61.2 kg (135 lb)   LMP 03/06/2020   SpO2 98%   BMI 22.47 kg/m²         Physical Exam  Vitals signs and nursing note reviewed. Constitutional:       General: She is not in acute distress. Appearance: She is well-developed. She is not ill-appearing, toxic-appearing or diaphoretic. HENT:      Head: Normocephalic and atraumatic. Nose: Nose normal.      Mouth/Throat:      Mouth: Mucous membranes are moist.   Eyes:      General: No scleral icterus. Conjunctiva/sclera: Conjunctivae normal.      Pupils: Pupils are equal, round, and reactive to light. Neck:      Musculoskeletal: Normal range of motion and neck supple. Cardiovascular:      Rate and Rhythm: Normal rate. Heart sounds: Normal heart sounds. Comments: Capillary refill < 3 seconds  Pulmonary:      Effort: Pulmonary effort is normal. No respiratory distress.       Breath sounds: Normal breath sounds. No stridor. No wheezing or rhonchi. Abdominal:      General: Bowel sounds are normal. There is no distension. Palpations: Abdomen is soft. Tenderness: There is no abdominal tenderness. Musculoskeletal: Normal range of motion. Lymphadenopathy:      Cervical: No cervical adenopathy. Skin:     General: Skin is warm and dry. Neurological:      Mental Status: She is alert and oriented to person, place, and time. Cranial Nerves: No cranial nerve deficit. Psychiatric:         Thought Content: Thought content normal.           Diagnostic Study Results     Labs -  No results found for this or any previous visit (from the past 12 hour(s)). Radiologic Studies -   XR CHEST PA LAT   Final Result   IMPRESSION:      No acute pulmonary disease. Medical Decision Making   I am the first provider for this patient. I reviewed the vital signs, available nursing notes, past medical history, past surgical history, family history and social history. Vital Signs-Reviewed the patient's vital signs. Pulse Oximetry Analysis -  98 on room air (Interpretation) normal      Records Reviewed: Nursing Notes and Old Medical Records (Time of Review: 8:39 AM)    Provider Notes (Medical Decision Making): DDX: Acute bronchitis, pneumonia, bronchospasm, asthma, URI, low suspicion for Covid-19    Get chest x-ray    MDM    Medications - No data to display        ED Course: Progress Notes, Reevaluation, and Consults:  Nothing acute on chest x-ray    In light of persistent cough not improving with current therapy, will put her on a Z-Jose Juan and change cough medicine to Robitussin-AC. We will have patient do home isolate. Follow-up with PCP and pulmonologist for further instruction    I have reassessed the patient. I have discussed the workup, results and plan with the patient and patient is in agreement. Patient will be prescribed for, Robitussin-AC.   Patient was discharge in stable condition. Patient was given outpatient follow up. Patient is to return to emergency department if any new or worsening condition. Diagnosis     Clinical Impression:   1. Acute bronchitis, unspecified organism    2. Persistent cough        Disposition: Discharged    Follow-up Information     Follow up With Specialties Details Why Contact Nani Zuñiga NP Nurse Practitioner Call in 3 days For follow-up; also contact PCP for further instruction [address] [facility] MD Pulmonary Disease, Urgent Care, Internal Medicine Call in 3 days For follow-up and further instruction [address] [facility]  [phone]             Patient's Medications   Start Taking    AZITHROMYCIN (ZITHROMAX Z-GALE) 250 MG TABLET    Take as written    GUAIFENESIN-CODEINE (ROBITUSSIN AC) 100-10 MG/5 ML SOLUTION    Take 5 mL by mouth three (3) times daily as needed for Cough or Congestion for up to 5 days. Max Daily Amount: 15 mL. Continue Taking    ALBUTEROL (PROVENTIL HFA, VENTOLIN HFA, PROAIR HFA) 90 MCG/ACTUATION INHALER    Take 2 Puffs by inhalation every four (4) hours as needed for Wheezing. ALBUTEROL-IPRATROPIUM (DUO-NEB) 2.5 MG-0.5 MG/3 ML NEBU    INHALE CONTENTS OF 1 VIAL VIA NEBULIZER EVERY 4 HOURS AS NEEDED FOR SHORTNESS OF BREATH/WHEEZING. FLUTICASONE PROPION-SALMETEROL (ADVAIR DISKUS) 250-50 MCG/DOSE DISKUS INHALER    Take 1 Puff by inhalation every twelve (12) hours. FLUTICASONE PROPIONATE (FLONASE) 50 MCG/ACTUATION NASAL SPRAY    USE 2 SPRAYS INTO EACH NOSTRIL ONCE DAILY    MONTELUKAST (SINGULAIR) 10 MG TABLET    TAKE 1 TABLET BY MOUTH EVERY DAY    OMALIZUMAB (XOLAIR) 150 MG SOLR    300 mg by SubCUTAneous route every thirty (30) days.     PREDNISONE (DELTASONE) 10 MG TABLET    30 mg po dailyx 3 days 20 mg po daily x 3 days 10 mg po daily x3 days    RIZATRIPTAN (MAXALT) 10 MG TABLET    One tab at HA onset May repeat in 2 hours if needed. Max two tabs in 24 hours    TOPIRAMATE (TOPAMAX) 50 MG TABLET    Take 1 Tab by mouth nightly. These Medications have changed    No medications on file   Stop Taking    DEXTROMETHORPHAN-GUAIFENESIN (ROBITUSSIN-DM)  MG/5 ML SYRUP    Take 10 mL by mouth every six (6) hours as needed for Cough. SIG:  As prescribed during the daytime. DO Milana Singh medical dictation software was used for portions of this report. Unintended transcription errors may occur. My signature above authenticates this document and my orders, the final    diagnosis (es), discharge prescription (s), and instructions in the Epic    record.

## 2020-03-28 ENCOUNTER — PATIENT OUTREACH (OUTPATIENT)
Dept: CASE MANAGEMENT | Age: 45
End: 2020-03-28

## 2020-03-28 NOTE — PROGRESS NOTES
COVID-19 Screening Initial Follow-up Note    Patient contacted regarding COVID-19 diagnosis. Care Transition Nurse/ Ambulatory Care Manager contacted the patient by telephone to perform post discharge assessment. Verified name and  with patient as identifiers. Provided introduction to self, and explanation of the CTN/ACM role, and reason for call due to risk factors for infection and/or exposure to COVID-19. Symptoms reviewed with patient who verbalized the following symptoms: shortness of breath       Due to no new or worsening symptoms encounter was not routed to provider for escalation. Patient has following risk factors of:  asthma. CTN/ACM reviewed discharge instructions, medical action plan and red flags such as increased shortness of breath, increasing fever and signs of decompensation with patient who verbalized understanding. Discussed exposure protocols and quarantine with CDC Guidelines What to do if you are sick with coronavirus disease 2019 Patient who was given an opportunity for questions and concerns. The patient agrees to contact the [facility] exposure line [phone], [facility] 106  [phone] and PCP office for questions related to their healthcare. CTN/ACM provided contact information for future reference. Reviewed and educated patient on any new and changed medications related to discharge diagnosis     Plan for follow-up call in 3-5 days based on severity of symptoms and risk factors    Brian Savage.  Nathalie LUCASN, RN  Care Management  [phone]

## 2020-04-01 ENCOUNTER — TELEPHONE (OUTPATIENT)
Dept: PULMONOLOGY | Age: 45
End: 2020-04-01

## 2020-04-01 NOTE — TELEPHONE ENCOUNTER
[place] [facility] [place][phone] STILL HAVING SOB AND PERSISTENT COUGH. PLEASE CHECK WITH DR Josselin Bryant AND CALL HER BACK.

## 2020-04-01 NOTE — TELEPHONE ENCOUNTER
Pt called back. She is still c/o cough. Pt went to ER on Friday 3/27 and was tx by Dr. Kriss Camarillo with antibiotic and Ra FUENTES Pt on last day of Prednisone. Pt has appt on 4/7 and advised we are dong vitrual visit if she wants to do that instead of coming to office.

## 2020-04-07 ENCOUNTER — VIRTUAL VISIT (OUTPATIENT)
Dept: PULMONOLOGY | Age: 45
End: 2020-04-07

## 2020-04-07 DIAGNOSIS — R05.3 PERSISTENT COUGH: Primary | ICD-10-CM

## 2020-04-07 DIAGNOSIS — F41.9 ANXIETY DISORDER, UNSPECIFIED TYPE: ICD-10-CM

## 2020-04-07 DIAGNOSIS — J45.51 ALLERGIC ASTHMA, SEVERE PERSISTENT, WITH ACUTE EXACERBATION: ICD-10-CM

## 2020-04-07 RX ORDER — IPRATROPIUM BROMIDE AND ALBUTEROL SULFATE 2.5; .5 MG/3ML; MG/3ML
SOLUTION RESPIRATORY (INHALATION)
Qty: 180 ML | Refills: 5 | Status: SHIPPED | OUTPATIENT
Start: 2020-04-07 | End: 2020-07-09 | Stop reason: SDUPTHER

## 2020-04-07 NOTE — PROGRESS NOTES
Consent: Albert Craven, who was seen by synchronous (real-time) audio-video technology, and/or her healthcare decision maker, is aware that this patient-initiated, Telehealth encounter on 4/7/2020 is a billable service, with coverage as determined by her insurance carrier. She is aware that she may receive a bill and has provided verbal consent to proceed: Yes. Pt with persistent Asthma on Xolair. She noted better control with Xolair until 2 months prior to this visit when she noted a persistent cough with scant phlegm, increased SOB and occasional wheezing. No fever or chills or chest pain. Pt had 2 courses of antibiotics and steroid tapers with only temporary relief. Pt works at [employer] and is exposed to sick children. Pt was seen in ED last week for cough and SOB, treated for Asthma exacerbation and discharged home on a Prednisone taper. Relief was only partial.    Assessment & Plan:   Diagnoses and all orders for this visit:    1. Persistent cough  -     RESPIRATORY PANEL,PCR,NASOPHARYNGEAL; Future    2. Allergic asthma, severe persistent, with acute exacerbation    3. Anxiety disorder, unspecified type    Other orders  -     albuterol-ipratropium (DUO-NEB) 2.5 mg-0.5 mg/3 ml nebu; INHALE CONTENTS OF 1 VIAL VIA NEBULIZER EVERY 4 HOURS AS NEEDED FOR SHORTNESS OF BREATH/WHEEZING. RVP ordered, if negative will send for COVID testing and CT without contrast.  Pt is at high risk due to underlying chronic lung disease during this COVID pandemic. She has been advised to stay home for the duration of this pandemic. Letter written to that effect and addressed to Occupational Health at [employer]. Pt to continue current medications, refills sent. RTC 6 months with spirometry            I spent at least 25 minutes with this established patient, and >50% of the time was spent counseling and/or coordinating care regarding workup for persistent cough and care for Asthma  712  Subjective:   Albert Craven is a 45 y.o. female who was seen for ED Follow-up      Prior to Admission medications    Medication Sig Start Date End Date Taking? Authorizing Provider   albuterol-ipratropium (DUO-NEB) 2.5 mg-0.5 mg/3 ml nebu INHALE CONTENTS OF 1 VIAL VIA NEBULIZER EVERY 4 HOURS AS NEEDED FOR SHORTNESS OF BREATH/WHEEZING. 4/7/20  Yes Carolina Ayala MD   omalizumab Leroy Vega) 150 mg solr 300 mg by SubCUTAneous route every thirty (30) days. 3/13/20  Yes Carolina Ayala MD   fluticasone propionate (FLONASE) 50 mcg/actuation nasal spray USE 2 SPRAYS INTO EACH NOSTRIL ONCE DAILY 3/11/20  Yes Ursula VELASCO NP   montelukast (SINGULAIR) 10 mg tablet TAKE 1 TABLET BY MOUTH EVERY DAY 3/10/20  Yes Carolina Ayala MD   topiramate (TOPAMAX) 50 mg tablet Take 1 Tab by mouth nightly. 12/17/19  Yes Cristiana Valentin NP   rizatriptan (MAXALT) 10 mg tablet One tab at HA onset May repeat in 2 hours if needed. Max two tabs in 24 hours 10/22/19  Yes Floresita Valentin NP   fluticasone propion-salmeterol (ADVAIR DISKUS) 250-50 mcg/dose diskus inhaler Take 1 Puff by inhalation every twelve (12) hours. 9/13/19  Yes Ursula VELASCO NP   albuterol (PROVENTIL HFA, VENTOLIN HFA, PROAIR HFA) 90 mcg/actuation inhaler Take 2 Puffs by inhalation every four (4) hours as needed for Wheezing. 3/19/19  Yes Carolina Ayala MD   predniSONE (DELTASONE) 10 mg tablet 30 mg po dailyx 3 days 20 mg po daily x 3 days 10 mg po daily x3 days 3/23/20   Carolina Ayala MD     Allergies   Allergen Reactions    Aspirin Anaphylaxis    Ibuprofen Anaphylaxis    Pcn [Penicillins] Itching    Claritin [Loratadine] Anxiety and Cough    Phenergan [Promethazine] Anxiety and Swelling    Seafood Swelling    Shellfish Containing Products Hives       Current Outpatient Medications   Medication Sig Dispense Refill    albuterol-ipratropium (DUO-NEB) 2.5 mg-0.5 mg/3 ml nebu INHALE CONTENTS OF 1 VIAL VIA NEBULIZER EVERY 4 HOURS AS NEEDED FOR SHORTNESS OF BREATH/WHEEZING.  180 mL 5  omalizumab (XOLAIR) 150 mg solr 300 mg by SubCUTAneous route every thirty (30) days. 1 Each 0    fluticasone propionate (FLONASE) 50 mcg/actuation nasal spray USE 2 SPRAYS INTO EACH NOSTRIL ONCE DAILY 1 Bottle 4    montelukast (SINGULAIR) 10 mg tablet TAKE 1 TABLET BY MOUTH EVERY DAY 90 Tab 1    topiramate (TOPAMAX) 50 mg tablet Take 1 Tab by mouth nightly. 90 Tab 1    rizatriptan (MAXALT) 10 mg tablet One tab at HA onset May repeat in 2 hours if needed. Max two tabs in 24 hours 12 Tab 5    fluticasone propion-salmeterol (ADVAIR DISKUS) 250-50 mcg/dose diskus inhaler Take 1 Puff by inhalation every twelve (12) hours. 3 Inhaler 3    albuterol (PROVENTIL HFA, VENTOLIN HFA, PROAIR HFA) 90 mcg/actuation inhaler Take 2 Puffs by inhalation every four (4) hours as needed for Wheezing.  1 Inhaler 4    predniSONE (DELTASONE) 10 mg tablet 30 mg po dailyx 3 days 20 mg po daily x 3 days 10 mg po daily x3 days 18 Tab 0     Allergies   Allergen Reactions    Aspirin Anaphylaxis    Ibuprofen Anaphylaxis    Pcn [Penicillins] Itching    Claritin [Loratadine] Anxiety and Cough    Phenergan [Promethazine] Anxiety and Swelling    Seafood Swelling    Shellfish Containing Products Hives     Past Medical History:   Diagnosis Date    Anxiety attack     Asthma     Asthmatic bronchitis     Bronchitis     Catamenial disorder     Hernia     Influenza     Migraine     Migraines     Mild asthma     Psychiatric disorder     anxiety     Torticollis      Past Surgical History:   Procedure Laterality Date     DELIVERY ONLY      HX BACK SURGERY      HX  SECTION  2009     Family History   Problem Relation Age of Onset    Heart Disease Other         grandmothers    Asthma Brother     Lung Disease Paternal Aunt         cronic bronchitis    Asthma Paternal Grandmother     High Cholesterol Mother      Social History     Tobacco Use    Smoking status: Never Smoker    Smokeless tobacco: Never Used Substance Use Topics    Alcohol use: No       Review of Systems   Constitutional: Negative for chills, fever and weight loss. HENT: Negative for congestion, ear pain, hearing loss and tinnitus. Eyes: Negative for blurred vision, double vision, photophobia, pain and discharge. Respiratory: Positive for cough. Negative for hemoptysis. Sputum production: scant. Shortness of breath: occasional. Wheezing: occasional.    Cardiovascular: Negative for chest pain, palpitations, orthopnea, claudication, leg swelling and PND. Gastrointestinal: Negative for abdominal pain, heartburn, nausea and vomiting. Genitourinary: Negative for dysuria, hematuria and urgency. Musculoskeletal: Negative for back pain, falls, joint pain, myalgias and neck pain. Skin: Negative for itching and rash. Neurological: Negative for sensory change, speech change and headaches. Endo/Heme/Allergies: Negative for environmental allergies and polydipsia. Does not bruise/bleed easily. Psychiatric/Behavioral: Negative for depression, memory loss, substance abuse and suicidal ideas. The patient is nervous/anxious. Objective:   Vital Signs: (As obtained by patient/caregiver at home)  There were no vitals taken for this visit.      [INSTRUCTIONS:  \"[x]\" Indicates a positive item  \"[]\" Indicates a negative item  -- DELETE ALL ITEMS NOT EXAMINED]    Constitutional: [x] Appears well-developed and well-nourished [x] No apparent distress      [] Abnormal -     Mental status: [x] Alert and awake  [x] Oriented to person/place/time [x] Able to follow commands    [] Abnormal -     Eyes:   EOM    [x]  Normal    [] Abnormal -   Sclera  [x]  Normal    [] Abnormal -          Discharge [x]  None visible   [] Abnormal -     HENT: [x] Normocephalic, atraumatic  [] Abnormal -   [x] Mouth/Throat: Mucous membranes are moist    External Ears [x] Normal  [] Abnormal -    Neck: [x] No visualized mass [] Abnormal -     Pulmonary/Chest: [x] Respiratory effort normal   [x] No visualized signs of difficulty breathing or respiratory distress        [] Abnormal -      Musculoskeletal:   [] Normal gait with no signs of ataxia         [x] Normal range of motion of neck        [] Abnormal -     Neurological:        [x] No Facial Asymmetry (Cranial nerve 7 motor function) (limited exam due to video visit)          [x] No gaze palsy        [] Abnormal -          Skin:        [x] No significant exanthematous lesions or discoloration noted on facial skin         [] Abnormal -            Psychiatric:       [x] Normal Affect [] Abnormal -        [x] No Hallucinations    Other pertinent observable physical exam findings:-        We discussed the expected course, resolution and complications of the diagnosis(es) in detail. Medication risks, benefits, costs, interactions, and alternatives were discussed as indicated. I advised her to contact the office if her condition worsens, changes or fails to improve as anticipated. She expressed understanding with the diagnosis(es) and plan. Chuy Colón is a 45 y.o. female being evaluated by a video visit encounter for concerns as above. A caregiver was present when appropriate. Due to this being a TeleHealth encounter (During [facility] public health emergency), evaluation of the following organ systems was limited: Vitals/Constitutional/EENT/Resp/CV/GI//MS/Neuro/Skin/Heme-Lymph-Imm. Pursuant to the emergency declaration under the [facility], 1135 waiver authority and the [employer] Act, this Virtual  Visit was conducted, with patient's (and/or legal guardian's) consent, to reduce the patient's risk of exposure to COVID-19 and provide necessary medical care. Services were provided through a video synchronous discussion virtually to substitute for in-person clinic visit.    Patient and provider were located at their individual homes.        Siva Silva MD

## 2020-04-08 ENCOUNTER — HOSPITAL ENCOUNTER (EMERGENCY)
Age: 45
Discharge: HOME | End: 2020-04-08
Attending: EMERGENCY MEDICINE
Payer: MEDICAID

## 2020-04-08 ENCOUNTER — TELEPHONE (OUTPATIENT)
Dept: PULMONOLOGY | Age: 45
End: 2020-04-08

## 2020-04-08 ENCOUNTER — DOCUMENTATION ONLY (OUTPATIENT)
Dept: SURGERY | Age: 45
End: 2020-04-08

## 2020-04-08 VITALS
DIASTOLIC BLOOD PRESSURE: 78 MMHG | HEIGHT: 65 IN | SYSTOLIC BLOOD PRESSURE: 124 MMHG | BODY MASS INDEX: 22.49 KG/M2 | HEART RATE: 100 BPM | WEIGHT: 135 LBS | OXYGEN SATURATION: 100 % | RESPIRATION RATE: 16 BRPM | TEMPERATURE: 98.3 F

## 2020-04-08 DIAGNOSIS — J06.9 ACUTE URI: Primary | ICD-10-CM

## 2020-04-08 PROCEDURE — 99282 EMERGENCY DEPT VISIT SF MDM: CPT

## 2020-04-08 NOTE — TELEPHONE ENCOUNTER
Per pt she went to [facility] to get the PCR done. They advised she would have to go to [facility] ER to get that done. She went there and they asked us to call them re the test.   I called and spoke with Beth the head of the [facility] ER and she states to get that test the patient will need to be seen in the ER and they do not do a PCR panel unless admitted. The ER doctor will probably do a COVID 23 with the patients sxs and she does work in a doctors office.

## 2020-04-08 NOTE — DISCHARGE INSTRUCTIONS
Patient Education   Learning About Coronavirus [phone])  Coronavirus [phone]): Overview  What is coronavirus (SWCXO-58)? The coronavirus disease (COVID-19) is caused by a virus. It is an illness that was first found in [place], [place], in December 2019. It has since spread worldwide. The virus can cause fever, cough, and trouble breathing. In severe cases, it can cause pneumonia and make it hard to breathe without help. It can cause death. Coronaviruses are a large group of viruses. They cause the common cold. They also cause more serious illnesses like Middle East respiratory syndrome (MERS) and severe acute respiratory syndrome (SARS). COVID-19 is caused by a novel coronavirus. That means it's a new type that has not been seen in people before. This virus spreads person-to-person through droplets from coughing and sneezing. It can also spread when you are close to someone who is infected. And it can spread when you touch something that has the virus on it, such as a doorknob or a tabletop. What can you do to protect yourself from coronavirus (COVID-19)? The best way to protect yourself from getting sick is to:  · Avoid areas where there is an outbreak. · Avoid contact with people who may be infected. · Wash your hands often with soap or alcohol-based hand sanitizers. · Avoid crowds and try to stay at least 6 feet away from other people. · Wash your hands often, especially after you cough or sneeze. Use soap and water, and scrub for at least 20 seconds. If soap and water aren't available, use an alcohol-based hand . · Avoid touching your mouth, nose, and eyes. What can you do to avoid spreading the virus to others? To help avoid spreading the virus to others:  · Cover your mouth with a tissue when you cough or sneeze. Then throw the tissue in the trash. · Use a disinfectant to clean things that you touch often. · Stay home if you are sick or have been exposed to the virus.  Don't go to school, work, or public areas. And don't use public transportation. · If you are sick:  ? Leave your home only if you need to get medical care. But call the doctor's office first so they know you're coming. And wear a face mask, if you have one.  ? If you have a face mask, wear it whenever you're around other people. It can help stop the spread of the virus when you cough or sneeze. ? Clean and disinfect your home every day. Use household  and disinfectant wipes or sprays. Take special care to clean things that you grab with your hands. These include doorknobs, remote controls, phones, and handles on your refrigerator and microwave. And don't forget countertops, tabletops, bathrooms, and computer keyboards. When to call for help  Call 911 anytime you think you may need emergency care. For example, call if:  · You have severe trouble breathing. (You can't talk at all.)  · You have constant chest pain or pressure. · You are severely dizzy or lightheaded. · You are confused or can't think clearly. · Your face and lips have a blue color. · You pass out (lose consciousness) or are very hard to wake up. Call your doctor now if you develop symptoms such as:  · Shortness of breath. · Fever. · Cough. If you need to get care, call ahead to the doctor's office for instructions before you go. Make sure you wear a face mask, if you have one, to prevent exposing other people to the virus. Where can you get the latest information? The following health organizations are tracking and studying this virus. Their websites contain the most up-to-date information. [employer] also learn what to do if you think you may have been exposed to the virus. · U.S. Centers for Disease Control and Prevention (CDC): The CDC provides updated news about the disease and travel advice. The website also tells you how to prevent the spread of infection.  www.cdc.gov  · World Health Organization [facility]): WHO offers information about the virus outbreaks. WHO also has travel advice. www.who.int  Current as of: April 1, 2020               Content Version: 12.4  © 2006-2020 Healthwise, Incorporated. Care instructions adapted under license by your healthcare professional. If you have questions about a medical condition or this instruction, always ask your healthcare professional. [address] any warranty or liability for your use of this information.

## 2020-04-08 NOTE — TELEPHONE ENCOUNTER
Pt wants Dr. Aurelia Jernigan to know that [facility] sent her to ER to get her labs done, but she would like to know exactly what the lab is for. Please call [phone].

## 2020-04-08 NOTE — PROGRESS NOTES
Liaison for COVID Testing    Received call from provider Dr. Haley Roche regarding requested outreach to [address] [place] [address]) given concern for coronavirus infection. Online [facility] request form completed at [phone] and received email approval at [phone] from [facility] ([place], [place]) that [facility] does meet criteria for priority testing through [address] [place] [address]. In accordance with these recommendations coronavirus testing sent to [address] [place] [address] results under send out test emergent disease panel and associated reference number for test completion is [id number].      EMELY Murrell-BC

## 2020-04-08 NOTE — ED PROVIDER NOTES
HPI patient complains of a dry cough chest congestion for over 6 weeks. She denies any fever chills nausea vomiting. She says her cough is been about the same for the past 6 weeks and describes it as \"occasional\". She is been taking over-the-counter decongestant medications and says this is been helping her nasal congestion as well. She has undergone several courses of antibiotics for treatment of bronchitis as she has been seen by her PCP a couple of times as well. Yesterday she was seen by her pulmonologist and they referred her to the emergency room for Covid 19 screening due to the fact that she works at the [employer].  At the present time the patient is resting comfortably states her symptoms are occasional cough with nasal congestion, and she denies any recent history of fever. She states the cough is nonproductive. No other complaints given at this time.     Past Medical History:   Diagnosis Date    Anxiety attack     Asthma     Asthmatic bronchitis     Bronchitis     Catamenial disorder     Hernia     Influenza     Migraine     Migraines     Mild asthma     Psychiatric disorder     anxiety     Torticollis        Past Surgical History:   Procedure Laterality Date     DELIVERY ONLY      HX BACK SURGERY      HX  SECTION           Family History:   Problem Relation Age of Onset    Heart Disease Other         grandmothers    Asthma Brother     Lung Disease Paternal Aunt         cronic bronchitis    Asthma Paternal Grandmother     High Cholesterol Mother        Social History     Socioeconomic History    Marital status:      Spouse name: Not on file    Number of children: Not on file    Years of education: Not on file    Highest education level: Not on file   Occupational History    Not on file   Social Needs    Financial resource strain: Not on file    Food insecurity     Worry: Not on file     Inability: Not on file   [email or web address] needs     Medical: Not on file     Non-medical: Not on file   Tobacco Use    Smoking status: Never Smoker    Smokeless tobacco: Never Used   Substance and Sexual Activity    Alcohol use: No    Drug use: No    Sexual activity: Yes     Partners: Male   Lifestyle    Physical activity     Days per week: Not on file     Minutes per session: Not on file    Stress: Not on file   Relationships    Social connections     Talks on phone: Not on file     Gets together: Not on file     Attends Sabianism service: Not on file     Active member of club or organization: Not on file     Attends meetings of clubs or organizations: Not on file     Relationship status: Not on file    Intimate partner violence     Fear of current or ex partner: Not on file     Emotionally abused: Not on file     Physically abused: Not on file     Forced sexual activity: Not on file   Other Topics Concern    Not on file   Social History Narrative    Not on file         ALLERGIES: Aspirin; Ibuprofen; Pcn [penicillins]; Claritin [loratadine]; Phenergan [promethazine]; Seafood; and Shellfish containing products    Review of Systems   Constitutional: Negative. HENT: Positive for congestion. Eyes: Negative. Respiratory: Positive for cough. Cardiovascular: Negative. Gastrointestinal: Negative. Genitourinary: Negative. Musculoskeletal: Negative. Skin: Negative. Neurological: Negative. Psychiatric/Behavioral: Negative. Vitals:    04/08/20 1230   BP: 124/78   Pulse: 100   Resp: 16   Temp: 98.3 °F (36.8 °C)   SpO2: 100%   Weight: 61.2 kg (135 lb)   Height: 5' 5\" (1.651 m)            Physical Exam  Vitals signs and nursing note reviewed. Constitutional:       Appearance: She is well-developed. HENT:      Head: Normocephalic and atraumatic. Eyes:      Conjunctiva/sclera: Conjunctivae normal.      Pupils: Pupils are equal, round, and reactive to light.    Neck:      Musculoskeletal: Normal range of motion and neck supple. Cardiovascular:      Rate and Rhythm: Normal rate and regular rhythm. Pulmonary:      Effort: Pulmonary effort is normal.      Breath sounds: Normal breath sounds. Abdominal:      Palpations: Abdomen is soft. Musculoskeletal: Normal range of motion. Skin:     General: Skin is warm and dry. Neurological:      Mental Status: She is alert and oriented to person, place, and time. 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 She will be sent home from the emergency department to await further instructions. Patient understands and agrees with this plan.   Pati Hinojosa MD 12:52 PM

## 2020-04-09 ENCOUNTER — PATIENT OUTREACH (OUTPATIENT)
Dept: CASE MANAGEMENT | Age: 45
End: 2020-04-09

## 2020-04-09 NOTE — PROGRESS NOTES
COVID-19 Screening Initial Follow-up Note    Patient contacted regarding [id number] exposure. Care Transition Nurse/ Ambulatory Care Manager contacted the patient by telephone to perform post discharge assessment. Verified name and  with patient as identifiers. Provided introduction to self, and explanation of the CTN/ACM role, and reason for call due to risk factors for infection and/or exposure to COVID-19. Symptoms reviewed with patient who verbalized the following symptoms: cough       Due to no new or worsening symptoms encounter was not routed to provider for escalation. Patient has following risk factors of: asthma. CTN/ACM reviewed discharge instructions, medical action plan and red flags such as increased shortness of breath, increasing fever and signs of decompensation with patient who verbalized understanding. Discussed exposure protocols and quarantine with CDC Guidelines What to do if you are sick with coronavirus disease 2019 Patient who was given an opportunity for questions and concerns. The patient agrees to contact the [facility] exposure line [phone], [facility]  ([phone] and PCP office for questions related to their healthcare. CTN/ACM provided contact information for future reference. Reviewed and educated patient on any new and changed medications related to discharge diagnosis. Patient/family/caregiver given information for [employer] and agrees to enroll yes  Patient's preferred e-mail:  [email or web address]  Patient's preferred phone number: [phone]  Based on Loop alert triggers, patient will be contacted by nurse care manager for worsening symptoms.     Plan for follow-up call in 5-7 days based on severity of symptoms and risk factors

## 2020-04-10 LAB — EMERGENT DISEASE PANEL, EDPR: NOT DETECTED

## 2020-04-14 ENCOUNTER — TELEPHONE (OUTPATIENT)
Dept: PULMONOLOGY | Age: 45
End: 2020-04-14

## 2020-04-14 NOTE — TELEPHONE ENCOUNTER
Pt called stating that she is coughing and has been using mucinex for over a week. Can she still come in and get her xolair?  Please call [phone]

## 2020-04-14 NOTE — TELEPHONE ENCOUNTER
Pt states she does not have a cough and was tested negative for the Covid 19. Needs to schedule xolair.  Pt to come tomorrow

## 2020-04-15 ENCOUNTER — CLINICAL SUPPORT (OUTPATIENT)
Dept: PULMONOLOGY | Age: 45
End: 2020-04-15

## 2020-04-15 VITALS — TEMPERATURE: 98.9 F

## 2020-04-15 DIAGNOSIS — J45.909 ASTHMA, UNSPECIFIED ASTHMA SEVERITY, UNSPECIFIED WHETHER COMPLICATED, UNSPECIFIED WHETHER PERSISTENT: ICD-10-CM

## 2020-04-15 DIAGNOSIS — J45.51 ALLERGIC ASTHMA, SEVERE PERSISTENT, WITH ACUTE EXACERBATION: Primary | ICD-10-CM

## 2020-04-15 NOTE — PATIENT INSTRUCTIONS
Be aware that \"Anaphylaxis\" can occur any time after receiving a Xolair injection Anaphylaxis presenting a bronchospasm, hypotension (low BP), dizziness, faintness, hives, and/or swelling of the tongue and throat. Anaphylaxis can occur after the first dose, but also has occurred beyond one year after beginning treatment with Xolair. *If such symptoms occur, use your EpiPen immediately and call 911. * Notify your MD office following any treatment of possible reaction. Epinephrine (Injection) Epinephrine (lm-p-QNH-rin) Treats severe allergic reactions (including anaphylaxis) in an emergency situation. Brand Name(s):Adrenaclick, Adrenalin, Auvi-Q, EpiPen Auto-Injector, EpiPen Jr Auto-Injector, Epipen, Epipen 2-Jose Juan Auto-Injector, Epipen Jr 2-Jose Juan Auto-Injector, NovaPlus Epinephrine, Twinject There may be other brand names for this medicine. When This Medicine Should Not Be Used: A severe allergic reaction can be life-threatening, so there is no reason this medicine should not be used. How to Use This Medicine:  
Injectable · Your doctor should teach you how and when to inject this medicine. Each injection kit contains a single-use dose of medicine prescribed for you. · Give yourself a shot right away if you start to have a severe allergic reaction. · Inject this medicine into the muscle on the outside of your thigh only. Never inject this medicine into a vein, into your hand or foot, or into the muscles of your buttocks. · Read and follow the patient instructions that come with this medicine. Talk to your doctor or pharmacist if you have any questions. · This medicine might come with an autoinjector  so you can practice giving the medicine before you have an actual allergic reaction. The autoinjector  is gray (for EpiPen® or EpiPen Jr®) or beige (for [place]) and does not contain any medicine or needle.  
· Do not remove the blue safety release (EpiPen® or EpiPen Jr®) or the gray end caps (Adrenaclick®) on the autoinjector until you are ready to use it. Do not put your thumb, fingers, or hand over the orange (EpiPen® or EpiPen Jr®) or red tip (Adrenaclick®). · You may need to use more than one injection if your allergic reaction does not get better after the first shot. Your doctor will give you additional doses if you need more than 2 injections. · You may inject the medicine through your clothing, if you need to. · Some liquid will remain in the autoinjector after the medicine has been injected. This medicine cannot be reused. Give your used autoinjector to your healthcare provider when you seek medical care. · Carry this medicine with you at all times for emergency use in case you have a severe allergic reaction. · Make sure family members or other people you are with know how to inject the medicine in case you are not able to do it yourself. · Check your injection kits regularly to make sure the liquid has not changed color. You should not use the autoinjector if the liquid has changed color, or if there are solids in the liquid. You should not use the autoinjector if the expiration date has passed. · Store the injection kit at room temperature, away from heat, moisture, and direct light. Do not store the medicine in the refrigerator or freezer, or inside a car. · Keep the autoinjector in its case or carrier tube to protect it from damage. This tube is not waterproof. If you accidentally drop it, check for damage or leaks. Drugs and Foods to Avoid: Ask your doctor or pharmacist before using any other medicine, including over-the-counter medicines, vitamins, and herbal products. · Some foods and medicines can affect how epinephrine works.  Tell your doctor if you are using digoxin, levothyroxine, phentolamine, certain allergy medicines (such as chlorpheniramine, diphenhydramine, tripelennamine), a beta blocker medicine (such as propranolol), a heart rhythm medicine, a diuretic (water pill), an MAO inhibitor (MAOI), medicine for depression, or ergot medicines. Warnings While Using This Medicine: · Tell your doctor if you are pregnant or breastfeeding, or if you have asthma, diabetes, heart disease, heart rhythm problems, high blood pressure, an overactive thyroid, or Parkinson disease. · A severe allergic reaction is a medical emergency. Go to an emergency room as soon as possible, even if you feel better after you use this medicine. · Do not inject this medicine into your hands or feet. Go to the emergency room right away if you accidently inject epinephrine into any part of your body other than your thigh. Epinephrine reduces blood flow, and this could damage areas that have small blood vessels, such as hands and feet. · Keep all medicine out of the reach of children. Never share your medicine with anyone. Possible Side Effects While Using This Medicine:  
Call your doctor right away if you notice any of these side effects: 
· Chest pain · Fast, pounding, or uneven heartbeat · Heavy sweating, nausea, vomiting · Tremors, shakiness · Trouble breathing If you notice these less serious side effects, talk with your doctor: · Feeling anxious, nervous, scared, or weak · Headache or dizziness · Pale skin If you notice other side effects that you think are caused by this medicine, tell your doctor. Call your doctor for medical advice about side effects. You may report side effects to FDA at 6-394-FDA-1240 © 2014 [address] is for End User's use only and may not be sold, redistributed or otherwise used for commercial purposes. The above information is an  only. It is not intended as medical advice for individual conditions or treatments. Talk to your doctor, nurse or pharmacist before following any medical regimen to see if it is safe and effective for you.

## 2020-04-16 ENCOUNTER — TELEPHONE (OUTPATIENT)
Dept: PULMONOLOGY | Age: 45
End: 2020-04-16

## 2020-04-16 ENCOUNTER — PATIENT OUTREACH (OUTPATIENT)
Dept: CASE MANAGEMENT | Age: 45
End: 2020-04-16

## 2020-04-16 NOTE — PROGRESS NOTES
COVID-19 Screening Follow-up Note    Patient contacted regarding COVID-19 risk and screening. Care Transition Nurse/ Ambulatory Care Manager contacted the patient by telephone to perform follow-up assessment. Verified name and  with patient as identifiers. Patient has following risk factors of: asthma      Symptoms reviewed with patient who verbalized the following symptoms: no new/worsening symptoms       Due to no new or worsening symptoms encounter was not routed to provider for escalation. Education provided regarding infection prevention, and signs and symptoms of COVID-19 and when to seek medical attention with patient who verbalized understanding. Discussed exposure protocols and quarantine from [address] you at higher risk for severe illness  and given an opportunity for questions and concerns. The patient agrees to contact the COVID-19 hotline [phone] or PCP office for questions related to their healthcare. CTN/ACM provided contact information for future reference. From CDC: Are you at higher risk for severe illness?  Wash your hands often.  Avoid close contact (6 feet, which is about two arm lengths) with people who are sick.  Put distance between yourself and other people if COVID-19 is spreading in your community.  Clean and disinfect frequently touched surfaces.  Avoid all cruise travel and non-essential air travel.  Call your healthcare professional if you have concerns about COVID-19 and your underlying condition or if you are sick.     For more information on steps you can take to protect yourself, see CDC's How to [place] for follow-up call in 5-7 days based on severity of symptoms and risk factors

## 2020-04-16 NOTE — TELEPHONE ENCOUNTER
Pt wanted to know if Dr. Haylee Gonzalez was still going to order a CT for her. Please call [phone].

## 2020-04-21 NOTE — TELEPHONE ENCOUNTER
Pt CXAMYL([phone]). Pt states that allergies are getting worse and Advair 250 not helping. She wants to know if Dr Stephany Bence will increase to Advair 500. Please check and call her back.

## 2020-04-21 NOTE — TELEPHONE ENCOUNTER
Spoke with pt. She state she is having allergies. Taking Singluar and Flonase nasal spray. Feels her anxiety is causing her to have SOB. She sees Dr. Ivan Tai PCP for this.  She has a call to her to address the anxiety issues

## 2020-04-23 ENCOUNTER — PATIENT OUTREACH (OUTPATIENT)
Dept: CASE MANAGEMENT | Age: 45
End: 2020-04-23

## 2020-04-23 ENCOUNTER — VIRTUAL VISIT (OUTPATIENT)
Dept: NEUROLOGY | Age: 45
End: 2020-04-23

## 2020-04-23 VITALS — WEIGHT: 135 LBS | BODY MASS INDEX: 22.49 KG/M2 | HEIGHT: 65 IN

## 2020-04-23 DIAGNOSIS — G43.001 MIGRAINE WITHOUT AURA AND WITH STATUS MIGRAINOSUS, NOT INTRACTABLE: Primary | ICD-10-CM

## 2020-04-23 RX ORDER — TOPIRAMATE 50 MG/1
50 TABLET, FILM COATED ORAL
Qty: 90 TAB | Refills: 3 | Status: SHIPPED | OUTPATIENT
Start: 2020-04-23 | End: 2020-09-01 | Stop reason: SDUPTHER

## 2020-04-23 RX ORDER — RIZATRIPTAN BENZOATE 10 MG/1
TABLET ORAL
Qty: 12 TAB | Refills: 11 | Status: SHIPPED | OUTPATIENT
Start: 2020-04-23 | End: 2020-05-21 | Stop reason: SDUPTHER

## 2020-04-23 RX ORDER — FEXOFENADINE HCL AND PSEUDOEPHEDRINE HCI 60; 120 MG/1; MG/1
1 TABLET, EXTENDED RELEASE ORAL EVERY 12 HOURS
COMMUNITY

## 2020-04-23 RX ORDER — LORAZEPAM 0.5 MG/1
0.5 TABLET ORAL
COMMUNITY
Start: 2020-04-22

## 2020-04-23 NOTE — PROGRESS NOTES
Ji Gallo is a 45 y.o. female on virtual visit today for follow-up on migraines. Patient will use mobile [phone] for today's visit.

## 2020-04-23 NOTE — PROGRESS NOTES
Consent: Joey Andrews, who was seen by synchronous (real-time) audio-video technology, and/or her healthcare decision maker, is aware that this patient-initiated, Telehealth encounter on 4/23/2020 is a billable service, with coverage as determined by her insurance carrier. She is aware that she may receive a bill and has provided verbal consent to proceed: Yes. Assessment & Plan:   Diagnoses and all orders for this visit:    1. Migraine without aura and with status migrainosus, not intractable    Other orders  -     topiramate (TOPAMAX) 50 mg tablet; Take 1 Tab by mouth nightly. -     rizatriptan (MAXALT) 10 mg tablet; One tab at HA onset May repeat in 2 hours if needed. Max two tabs in 24 hours          Doing well on Topamax 50 mg nightly. Using Maxalt for breakthrough headaches. Having two a month. We discussed using Maxalt at migraine onset and deferring Tylenol. Denies routine use of OTC analgesics. Refills provided. As long as headaches remain controlled follow-up in 1 year or sooner as needed. All questions addressed the patient is agreeable with plan of care. Follow-up and Dispositions    · Return in about 1 year (around 4/23/2021). I spent at least 25 minutes with this established patient, and >50% of the time was spent counseling and/or coordinating care regarding Symptoms, management, dosing, medication, answering questions, and plan of care. 712  Subjective:   Joey Andrews is a 45 y.o. female who was seen for Migraine    Patient seen for follow up migraines. Last seen in office in October. The visit today is complete using video technology while the patient is at home. In the interim endorses she is doing quite well. Having two migraine days a month. Headache can last all day. Denies focal deficits. Maintained on Topamax 50 mg nightly and using Maxalt for abortive headache therapy. Sometimes she will try Tylenol before the Maxalt. Denies routine use.  Overall happy with headache control. No other concerns at this time. Prior to Admission medications    Medication Sig Start Date End Date Taking? Authorizing Provider   fexofenadine-pseudoephedrine (Allegra-D 12 Hour)  mg Tb12 Take 1 Tab by mouth every twelve (12) hours. Yes Provider, Historical   topiramate (TOPAMAX) 50 mg tablet Take 1 Tab by mouth nightly. 4/23/20  Yes Tyler Valentin NP   rizatriptan (MAXALT) 10 mg tablet One tab at HA onset May repeat in 2 hours if needed. Max two tabs in 24 hours 4/23/20  Yes Tyler Valentin NP   omalizumab [facility]) 150 mg solr 300 mg by SubCUTAneous route every thirty (30) days. 4/15/20  Yes Melita Perez MD   albuterol-ipratropium (DUO-NEB) 2.5 mg-0.5 mg/3 ml nebu INHALE CONTENTS OF 1 VIAL VIA NEBULIZER EVERY 4 HOURS AS NEEDED FOR SHORTNESS OF BREATH/WHEEZING. 4/7/20  Yes Kerry Cox MD   fluticasone propionate (FLONASE) 50 mcg/actuation nasal spray USE 2 SPRAYS INTO EACH NOSTRIL ONCE DAILY 3/11/20  Yes Victor M VELASCO NP   montelukast (SINGULAIR) 10 mg tablet TAKE 1 TABLET BY MOUTH EVERY DAY 3/10/20  Yes Kerry Cox MD   fluticasone propion-salmeterol (ADVAIR DISKUS) 250-50 mcg/dose diskus inhaler Take 1 Puff by inhalation every twelve (12) hours. 9/13/19  Yes Victor M VELASCO NP   albuterol (PROVENTIL HFA, VENTOLIN HFA, PROAIR HFA) 90 mcg/actuation inhaler Take 2 Puffs by inhalation every four (4) hours as needed for Wheezing. 3/19/19  Yes Kerry Cox MD   LORazepam (ATIVAN) 0.5 mg tablet  4/22/20   Provider, Historical   predniSONE (DELTASONE) 10 mg tablet 30 mg po dailyx 3 days 20 mg po daily x 3 days 10 mg po daily x3 days 3/23/20   Kerry Cox MD   topiramate (TOPAMAX) 50 mg tablet Take 1 Tab by mouth nightly. 12/17/19 4/23/20  Eddie García NP   rizatriptan (MAXALT) 10 mg tablet One tab at HA onset May repeat in 2 hours if needed.  Max two tabs in 24 hours 10/22/19 4/23/20  Eddie García NP     Allergies   Allergen Reactions    Aspirin Anaphylaxis    Ibuprofen Anaphylaxis    Pcn [Penicillins] Itching    Claritin [Loratadine] Anxiety and Cough    Phenergan [Promethazine] Anxiety and Swelling    Seafood Swelling    Shellfish Containing Products Hives       Patient Active Problem List   Diagnosis Code    Asthma J45.909    Pregnancy Z34.90    Migraine without aura and without status migrainosus, not intractable G43.009    Asthma exacerbation J45.901     Patient Active Problem List    Diagnosis Date Noted    Asthma exacerbation 05/15/2018    Migraine without aura and without status migrainosus, not intractable 02/27/2017    Pregnancy 02/04/2016    Asthma      Current Outpatient Medications   Medication Sig Dispense Refill    fexofenadine-pseudoephedrine (Allegra-D 12 Hour)  mg Tb12 Take 1 Tab by mouth every twelve (12) hours.  topiramate (TOPAMAX) 50 mg tablet Take 1 Tab by mouth nightly. 90 Tab 3    rizatriptan (MAXALT) 10 mg tablet One tab at HA onset May repeat in 2 hours if needed. Max two tabs in 24 hours 12 Tab 11    omalizumab (XOLAIR) 150 mg solr 300 mg by SubCUTAneous route every thirty (30) days. 1 Each 0    albuterol-ipratropium (DUO-NEB) 2.5 mg-0.5 mg/3 ml nebu INHALE CONTENTS OF 1 VIAL VIA NEBULIZER EVERY 4 HOURS AS NEEDED FOR SHORTNESS OF BREATH/WHEEZING. 180 mL 5    fluticasone propionate (FLONASE) 50 mcg/actuation nasal spray USE 2 SPRAYS INTO EACH NOSTRIL ONCE DAILY 1 Bottle 4    montelukast (SINGULAIR) 10 mg tablet TAKE 1 TABLET BY MOUTH EVERY DAY 90 Tab 1    fluticasone propion-salmeterol (ADVAIR DISKUS) 250-50 mcg/dose diskus inhaler Take 1 Puff by inhalation every twelve (12) hours. 3 Inhaler 3    albuterol (PROVENTIL HFA, VENTOLIN HFA, PROAIR HFA) 90 mcg/actuation inhaler Take 2 Puffs by inhalation every four (4) hours as needed for Wheezing.  1 Inhaler 4    LORazepam (ATIVAN) 0.5 mg tablet       predniSONE (DELTASONE) 10 mg tablet 30 mg po dailyx 3 days 20 mg po daily x 3 days 10 mg po daily x3 days 18 Tab 0     Allergies   Allergen Reactions    Aspirin Anaphylaxis    Ibuprofen Anaphylaxis    Pcn [Penicillins] Itching    Claritin [Loratadine] Anxiety and Cough    Phenergan [Promethazine] Anxiety and Swelling    Seafood Swelling    Shellfish Containing Products Hives     Past Medical History:   Diagnosis Date    Anxiety attack     Asthma     Asthmatic bronchitis     Bronchitis     Catamenial disorder     Hernia     Influenza     Migraine     Migraines     Mild asthma     Psychiatric disorder     anxiety     Torticollis      Past Surgical History:   Procedure Laterality Date     DELIVERY ONLY      HX BACK SURGERY      HX  SECTION  2009     Family History   Problem Relation Age of Onset    Heart Disease Other         grandmothers    Asthma Brother     Lung Disease Paternal Aunt         cronic bronchitis    Asthma Paternal Grandmother     High Cholesterol Mother      Social History     Tobacco Use    Smoking status: Never Smoker    Smokeless tobacco: Never Used   Substance Use Topics    Alcohol use: No       Review of Systems  GENERAL: Denies fever or fatigue  CARDIAC: No CP or SOB  PULMONARY: No cough of SOB  MUSCULOSKELETAL: No new joint pain  NEURO: SEE HPI      Objective:     Visit Vitals  Ht 5' 5\" (1.651 m)   Wt 61.2 kg (135 lb)   LMP 2020 (Exact Date)   BMI 22.47 kg/m²      General: alert, cooperative, no distress   Mental  status: normal mood, behavior, speech, dress, motor activity, and thought processes, able to follow commands   HENT: NCAT   Neck: no visualized mass   Resp: no respiratory distress   Neuro: no gross deficits   Skin: no discoloration or lesions of concern on visible areas   Psychiatric: normal affect, consistent with stated mood, no evidence of hallucinations     Additional exam findings: This is a very pleasant 72-year-old female. She is alert and in no apparent distress. Full fund of knowledge.   Speech is clear.  No facial asymmetry. Extraocular movements intact. Equal shoulder shrug. Finger-nose-finger intact. Gait deferred. We discussed the expected course, resolution and complications of the diagnosis(es) in detail. Medication risks, benefits, costs, interactions, and alternatives were discussed as indicated. I advised her to contact the office if her condition worsens, changes or fails to improve as anticipated. She expressed understanding with the diagnosis(es) and plan. Miri Damon is a 45 y.o. female being evaluated by a video visit encounter for concerns as above. A caregiver was present when appropriate. Due to this being a TeleHealth encounter (During [facility] public health emergency), evaluation of the following organ systems was limited: Vitals/Constitutional/EENT/Resp/CV/GI//MS/Neuro/Skin/Heme-Lymph-Imm. Pursuant to the emergency declaration under the [facility], [facility] waiver authority and the [employer] Act, this Virtual  Visit was conducted, with patient's (and/or legal guardian's) consent, to reduce the patient's risk of exposure to COVID-19 and provide necessary medical care. Services were provided through a video synchronous discussion virtually to substitute for in-person clinic visit. Patient and provider were located at their individual homes. Chel Cronin NP  This note will not be viewable in [address]

## 2020-04-23 NOTE — PROGRESS NOTES
Patient resolved from Transition of Care episode on 4/23/20  Patient/family has been provided the following resources and education related to COVID-19:                         Signs, symptoms and red flags related to COVID-19            CDC exposure and quarantine guidelines            [employer] exposure contact - [phone]            Contact for their local Department of Health                 Patient currently reports that the following symptoms have improved:  no worsening symptoms. Patient states she is \"doing fine. \"    No further outreach scheduled with this CTN/ACM. Episode of Care resolved. Patient has this CTN/ACM contact information if future needs arise.

## 2020-04-24 ENCOUNTER — TELEPHONE (OUTPATIENT)
Dept: PULMONOLOGY | Age: 45
End: 2020-04-24

## 2020-04-24 NOTE — TELEPHONE ENCOUNTER
Pt states she is having a very slight yellow sputum. No other sxs. No sob, congestion or cough.  Pt to monitor

## 2020-04-24 NOTE — TELEPHONE ENCOUNTER
Pt [facility]([phone]). Wants to let Lonniene Winter know that she is starting to cough up yellow phlegm. Please call her back.

## 2020-04-27 ENCOUNTER — TELEPHONE (OUTPATIENT)
Dept: PULMONOLOGY | Age: 45
End: 2020-04-27

## 2020-05-13 ENCOUNTER — CLINICAL SUPPORT (OUTPATIENT)
Dept: PULMONOLOGY | Age: 45
End: 2020-05-13

## 2020-05-13 VITALS — TEMPERATURE: 98.4 F

## 2020-05-13 DIAGNOSIS — J45.909 ASTHMA, UNSPECIFIED ASTHMA SEVERITY, UNSPECIFIED WHETHER COMPLICATED, UNSPECIFIED WHETHER PERSISTENT: Primary | ICD-10-CM

## 2020-05-13 NOTE — PROGRESS NOTES
Chief Complaint   Patient presents with   Jayne Mu is here for Xolair injection. Patient did not have any reaction to last injection. Patient states the Xolair is helping her Asthma. Injected subcutaneous : 150 mg given in Left upper arm SQ,  150 mg given in Right upper arm SQ. Lot Number: [id number]  Exp Date: 2/2021  Total Xolair Dose: 300 mg every 4 weeks  Epipen is with patient. Patient observed for 15 minutes. No reaction at injection site. Patient given Xolair reaction sheet.

## 2020-05-13 NOTE — PATIENT INSTRUCTIONS
Be aware that \"Anaphylaxis\" can occur any time after receiving a Xolair injection Anaphylaxis presenting a bronchospasm, hypotension (low BP), dizziness, faintness, hives, and/or swelling of the tongue and throat. Anaphylaxis can occur after the first dose, but also has occurred beyond one year after beginning treatment with Xolair. *If such symptoms occur, use your EpiPen immediately and call 911. * Notify your MD office following any treatment of possible reaction. Epinephrine (Injection) Epinephrine (ms-e-HHE-rin) Treats severe allergic reactions (including anaphylaxis) in an emergency situation. Brand Name(s):Adrenaclick, Adrenalin, Auvi-Q, EpiPen Auto-Injector, EpiPen Jr Auto-Injector, Epipen, Epipen 2-Jose Juan Auto-Injector, Epipen Jr 2-Jose Juan Auto-Injector, NovaPlus Epinephrine, Twinject There may be other brand names for this medicine. When This Medicine Should Not Be Used: A severe allergic reaction can be life-threatening, so there is no reason this medicine should not be used. How to Use This Medicine:  
Injectable · Your doctor should teach you how and when to inject this medicine. Each injection kit contains a single-use dose of medicine prescribed for you. · Give yourself a shot right away if you start to have a severe allergic reaction. · Inject this medicine into the muscle on the outside of your thigh only. Never inject this medicine into a vein, into your hand or foot, or into the muscles of your buttocks. · Read and follow the patient instructions that come with this medicine. Talk to your doctor or pharmacist if you have any questions. · This medicine might come with an autoinjector  so you can practice giving the medicine before you have an actual allergic reaction. The autoinjector  is gray (for EpiPen® or EpiPen Jr®) or beige (for [place]) and does not contain any medicine or needle.  
· Do not remove the blue safety release (EpiPen® or EpiPen Jr®) or the gray end caps (Adrenaclick®) on the autoinjector until you are ready to use it. Do not put your thumb, fingers, or hand over the orange (EpiPen® or EpiPen Jr®) or red tip (Adrenaclick®). · You may need to use more than one injection if your allergic reaction does not get better after the first shot. Your doctor will give you additional doses if you need more than 2 injections. · You may inject the medicine through your clothing, if you need to. · Some liquid will remain in the autoinjector after the medicine has been injected. This medicine cannot be reused. Give your used autoinjector to your healthcare provider when you seek medical care. · Carry this medicine with you at all times for emergency use in case you have a severe allergic reaction. · Make sure family members or other people you are with know how to inject the medicine in case you are not able to do it yourself. · Check your injection kits regularly to make sure the liquid has not changed color. You should not use the autoinjector if the liquid has changed color, or if there are solids in the liquid. You should not use the autoinjector if the expiration date has passed. · Store the injection kit at room temperature, away from heat, moisture, and direct light. Do not store the medicine in the refrigerator or freezer, or inside a car. · Keep the autoinjector in its case or carrier tube to protect it from damage. This tube is not waterproof. If you accidentally drop it, check for damage or leaks. Drugs and Foods to Avoid: Ask your doctor or pharmacist before using any other medicine, including over-the-counter medicines, vitamins, and herbal products. · Some foods and medicines can affect how epinephrine works.  Tell your doctor if you are using digoxin, levothyroxine, phentolamine, certain allergy medicines (such as chlorpheniramine, diphenhydramine, tripelennamine), a beta blocker medicine (such as propranolol), a heart rhythm medicine, a diuretic (water pill), an MAO inhibitor (MAOI), medicine for depression, or ergot medicines. Warnings While Using This Medicine: · Tell your doctor if you are pregnant or breastfeeding, or if you have asthma, diabetes, heart disease, heart rhythm problems, high blood pressure, an overactive thyroid, or Parkinson disease. · A severe allergic reaction is a medical emergency. Go to an emergency room as soon as possible, even if you feel better after you use this medicine. · Do not inject this medicine into your hands or feet. Go to the emergency room right away if you accidently inject epinephrine into any part of your body other than your thigh. Epinephrine reduces blood flow, and this could damage areas that have small blood vessels, such as hands and feet. · Keep all medicine out of the reach of children. Never share your medicine with anyone. Possible Side Effects While Using This Medicine:  
Call your doctor right away if you notice any of these side effects: 
· Chest pain · Fast, pounding, or uneven heartbeat · Heavy sweating, nausea, vomiting · Tremors, shakiness · Trouble breathing If you notice these less serious side effects, talk with your doctor: · Feeling anxious, nervous, scared, or weak · Headache or dizziness · Pale skin If you notice other side effects that you think are caused by this medicine, tell your doctor. Call your doctor for medical advice about side effects. You may report side effects to FDA at 3-522-FDA-0885 © 2014 [address] is for End User's use only and may not be sold, redistributed or otherwise used for commercial purposes. The above information is an  only. It is not intended as medical advice for individual conditions or treatments. Talk to your doctor, nurse or pharmacist before following any medical regimen to see if it is safe and effective for you.

## 2020-05-18 ENCOUNTER — TELEPHONE (OUTPATIENT)
Dept: PULMONOLOGY | Age: 45
End: 2020-05-18

## 2020-05-18 NOTE — TELEPHONE ENCOUNTER
Spoke with patient. She states she has congestion still. Light yellow sputum. No fever. Wants to do virtual visit with Dr. Leonor Boss. Appt given for Thursday. Pt advsied if sxs worsen to go to urgent care.  Pt verbalizes understanding

## 2020-05-18 NOTE — TELEPHONE ENCOUNTER
Per pt she is still coughing up mucus and the mucinex has not helped at all. She thinks she needs an antibiotic or something. Please call [phone].

## 2020-05-21 ENCOUNTER — VIRTUAL VISIT (OUTPATIENT)
Dept: PULMONOLOGY | Age: 45
End: 2020-05-21

## 2020-05-21 DIAGNOSIS — R05.8 PRODUCTIVE COUGH: ICD-10-CM

## 2020-05-21 DIAGNOSIS — J45.40 MODERATE PERSISTENT ASTHMA, UNSPECIFIED WHETHER COMPLICATED: Primary | ICD-10-CM

## 2020-05-21 DIAGNOSIS — J30.9 ALLERGIC RHINITIS, UNSPECIFIED SEASONALITY, UNSPECIFIED TRIGGER: ICD-10-CM

## 2020-05-21 DIAGNOSIS — F41.9 ANXIETY DISORDER, UNSPECIFIED TYPE: ICD-10-CM

## 2020-05-21 RX ORDER — PREDNISONE 10 MG/1
TABLET ORAL
Qty: 30 TAB | Refills: 0 | Status: SHIPPED | OUTPATIENT
Start: 2020-05-21 | End: 2020-07-28 | Stop reason: SDUPTHER

## 2020-05-21 RX ORDER — FLUTICASONE PROPIONATE AND SALMETEROL 500; 50 UG/1; UG/1
1 POWDER RESPIRATORY (INHALATION) 2 TIMES DAILY
Qty: 1 INHALER | Refills: 3 | Status: SHIPPED | OUTPATIENT
Start: 2020-05-21 | End: 2020-07-09 | Stop reason: SDUPTHER

## 2020-05-21 NOTE — PROGRESS NOTES
Quentin Sosa is a 45 y.o. female who was seen by synchronous (real-time) audio-video technology on 5/21/2020. Consent: Quentin Sosa, who was seen by synchronous (real-time) audio-video technology, and/or her healthcare decision maker, is aware that this patient-initiated, Telehealth encounter on 5/21/2020 is a billable service, with coverage as determined by her insurance carrier. She is aware that she may receive a bill and has provided verbal consent to proceed: Yes. Assessment & Plan:   Diagnoses and all orders for this visit:    1. Moderate persistent asthma, unspecified whether complicated    2. Anxiety disorder, unspecified type    3. Allergic rhinitis, unspecified seasonality, unspecified trigger    4. Productive cough  -     CULTURE, RESPIRATORY/SPUTUM/BRONCH W GRAM STAIN; Future    Other orders  -     predniSONE (DELTASONE) 10 mg tablet; 40 mg po x 3 days 30 mg po dailyx 3 days 20 mg po daily x 3 days 10 mg po daily x3 days      Pt with asthma exacerbation, possible acute bronchitis, likely viral.  Prednisone taper Rxed and sent. Counseled pt on risks outweighing benefits of antibiotic therapy donovan with no proven bacterial infection. Will send for sputum GS/CS. If cough persists will consider imaging. RTC 4 weeks. Corrected Advair Rx to 500 as previously discussed. I spent at least 23 minutes on this visit with this established patient. ([id number])    Subjective:   Quentin Sosa is a 45 y.o. female who was seen for Cough with sputum  Pt has a history of Asthma which is controlled on Advair and Xolair. She was seen in the ED in February for cough with yellow phlegm and treated for Asthma exacerbation with Prednisone with good response. She now complains of recurrence of cough with yellowish phlegm which is tenacious and difficult to expectorate. Pt then gets frustrated which then triggers an anxiety attack. She denies fever, chest pain or hemoptysis.  Pt has occasional shortness of breath with her anxiety attack. Prior to Admission medications    Medication Sig Start Date End Date Taking? Authorizing Provider   omalizumab Korisharona Liang) 150 mg solr 300 mg by SubCUTAneous route every thirty (30) days. 5/13/20  Yes Alden Albarado MD   LORazepam (ATIVAN) 0.5 mg tablet  4/22/20  Yes Provider, Historical   fexofenadine-pseudoephedrine (Allegra-D 12 Hour)  mg Tb12 Take 1 Tab by mouth every twelve (12) hours. Yes Provider, Historical   topiramate (TOPAMAX) 50 mg tablet Take 1 Tab by mouth nightly. 4/23/20  Yes Tatiana Valentin NP   rizatriptan (MAXALT) 10 mg tablet One tab at HA onset May repeat in 2 hours if needed. Max two tabs in 24 hours 4/23/20  Yes Floresita Valentin NP   albuterol-ipratropium (DUO-NEB) 2.5 mg-0.5 mg/3 ml nebu INHALE CONTENTS OF 1 VIAL VIA NEBULIZER EVERY 4 HOURS AS NEEDED FOR SHORTNESS OF BREATH/WHEEZING. 4/7/20  Yes Melvin Leyva MD   fluticasone propionate (FLONASE) 50 mcg/actuation nasal spray USE 2 SPRAYS INTO EACH NOSTRIL ONCE DAILY 3/11/20  Yes Aniceto VELASCO NP   montelukast (SINGULAIR) 10 mg tablet TAKE 1 TABLET BY MOUTH EVERY DAY 3/10/20  Yes Melvin Leyva MD   fluticasone propion-salmeterol (ADVAIR DISKUS) 250-50 mcg/dose diskus inhaler Take 1 Puff by inhalation every twelve (12) hours. 9/13/19  Yes Aniceto VELASCO NP   albuterol (PROVENTIL HFA, VENTOLIN HFA, PROAIR HFA) 90 mcg/actuation inhaler Take 2 Puffs by inhalation every four (4) hours as needed for Wheezing.  3/19/19  Yes Melvin Leyva MD   predniSONE (DELTASONE) 10 mg tablet 30 mg po dailyx 3 days 20 mg po daily x 3 days 10 mg po daily x3 days 3/23/20   Melvin Leyva MD     Allergies   Allergen Reactions    Aspirin Anaphylaxis    Ibuprofen Anaphylaxis    Pcn [Penicillins] Itching    Claritin [Loratadine] Anxiety and Cough    Phenergan [Promethazine] Anxiety and Swelling    Seafood Swelling    Shellfish Containing Products Hives       Past Medical History:   Diagnosis Date  Anxiety attack     Asthma     Asthmatic bronchitis     Bronchitis     Catamenial disorder     Hernia     Influenza     Migraine     Migraines     Mild asthma     Psychiatric disorder     anxiety     Torticollis      Past Surgical History:   Procedure Laterality Date     DELIVERY ONLY      HX BACK SURGERY      HX  SECTION  2009     Family History   Problem Relation Age of Onset    Heart Disease Other         grandmothers    Asthma Brother     Lung Disease Paternal Aunt         cronic bronchitis    Asthma Paternal Grandmother     High Cholesterol Mother      Social History     Tobacco Use    Smoking status: Never Smoker    Smokeless tobacco: Never Used   Substance Use Topics    Alcohol use: No       ROS    Objective:   Vital Signs: (As obtained by patient/caregiver at home)  There were no vitals taken for this visit.      [INSTRUCTIONS:  \"[x]\" Indicates a positive item  \"[]\" Indicates a negative item  -- DELETE ALL ITEMS NOT EXAMINED]    Constitutional: [x] Appears well-developed and well-nourished [x] No apparent distress      [] Abnormal -     Mental status: [x] Alert and awake  [x] Oriented to person/place/time [x] Able to follow commands    [] Abnormal -     Eyes:   EOM    [x]  Normal    [] Abnormal -   Sclera  [x]  Normal    [] Abnormal -          Discharge [x]  None visible   [] Abnormal -     HENT: [x] Normocephalic, atraumatic  [] Abnormal -   [x] Mouth/Throat: Mucous membranes are moist    External Ears [x] Normal  [] Abnormal -    Neck: [x] No visualized mass [] Abnormal -     Pulmonary/Chest: [x] Respiratory effort normal   [x] No visualized signs of difficulty breathing or respiratory distress        [] Abnormal -      Musculoskeletal:   [] Normal gait with no signs of ataxia         [x] Normal range of motion of neck        [] Abnormal -     Neurological:        [x] No Facial Asymmetry (Cranial nerve 7 motor function) (limited exam due to video visit)          [x] No gaze palsy        [] Abnormal -          Skin:        [x] No significant exanthematous lesions or discoloration noted on facial skin         [] Abnormal -            Psychiatric:       [] Normal Affect [x] Abnormal - appears anxious     [x] No Hallucinations    Other pertinent observable physical exam findings:-        We discussed the expected course, resolution and complications of the diagnosis(es) in detail. Medication risks, benefits, costs, interactions, and alternatives were discussed as indicated. I advised her to contact the office if her condition worsens, changes or fails to improve as anticipated. She expressed understanding with the diagnosis(es) and plan. Jack Maxwell is a 45 y.o. female who was evaluated by a video visit encounter for concerns as above. Patient identification was verified prior to start of the visit. A caregiver was present when appropriate. Due to this being a TeleHealth encounter (During [place] [address] public [facility] emergency), evaluation of the following organ systems was limited: Vitals/Constitutional/EENT/Resp/CV/GI//MS/Neuro/Skin/Heme-Lymph-Imm. Pursuant to the emergency declaration under the [facility], [facility] waiver authority and the [employer] Act, this Virtual  Visit was conducted, with patient's (and/or legal guardian's) consent, to reduce the patient's risk of exposure to COVID-19 and provide necessary medical care. Services were provided through a video synchronous discussion virtually to substitute for in-person clinic visit. Patient and provider were located at their individual homes.       Vilma Smith MD

## 2020-05-21 NOTE — TELEPHONE ENCOUNTER
Requested Prescriptions     Pending Prescriptions Disp Refills    rizatriptan (MAXALT) 10 mg tablet 12 Tab 11     Sig: One tab at HA onset May repeat in 2 hours if needed.  Max two tabs in 24 hours

## 2020-05-22 ENCOUNTER — TELEPHONE (OUTPATIENT)
Dept: PULMONOLOGY | Age: 45
End: 2020-05-22

## 2020-05-22 ENCOUNTER — HOSPITAL ENCOUNTER (OUTPATIENT)
Dept: LAB | Age: 45
Discharge: HOME | End: 2020-05-22

## 2020-05-22 LAB — SENTARA SPECIMEN COL,SENBCF: NORMAL

## 2020-05-22 PROCEDURE — 99001 SPECIMEN HANDLING PT-LAB: CPT

## 2020-05-22 RX ORDER — RIZATRIPTAN BENZOATE 10 MG/1
TABLET ORAL
Qty: 12 TAB | Refills: 11 | Status: SHIPPED | OUTPATIENT
Start: 2020-05-22 | End: 2020-09-01 | Stop reason: ALTCHOICE

## 2020-05-26 LAB
RESULT: ABNORMAL
RESULT: ABNORMAL

## 2020-05-27 ENCOUNTER — TELEPHONE (OUTPATIENT)
Dept: PULMONOLOGY | Age: 45
End: 2020-05-27

## 2020-05-27 RX ORDER — ALBUTEROL SULFATE 90 UG/1
2 AEROSOL, METERED RESPIRATORY (INHALATION)
Qty: 1 INHALER | Refills: 5 | Status: SHIPPED | OUTPATIENT
Start: 2020-05-27 | End: 2020-07-09 | Stop reason: SDUPTHER

## 2020-06-10 ENCOUNTER — CLINICAL SUPPORT (OUTPATIENT)
Dept: PULMONOLOGY | Age: 45
End: 2020-06-10

## 2020-06-10 VITALS — TEMPERATURE: 98.3 F

## 2020-06-10 DIAGNOSIS — J45.40 MODERATE PERSISTENT ASTHMA, UNSPECIFIED WHETHER COMPLICATED: Primary | ICD-10-CM

## 2020-06-10 NOTE — PROGRESS NOTES
Chief Complaint   Patient presents with   Glorya Gilford is here for Xolair injection. Patient did not have any reaction to last injection. Patient states the Xolair is helping her Asthma. Injected subcutaneous : 150 mg given in Left upper arm SQ,  150 mg given in Right upper arm SQ. Lot Number: [id number]  Exp Date: 3/2021  Total Xolair Dose: 300 mg every 4 weeks  Epipen is with patient. Patient observed for 15 minutes. No reaction at injection site. Patient given Xolair reaction sheet.

## 2020-06-10 NOTE — PATIENT INSTRUCTIONS
Be aware that \"Anaphylaxis\" can occur any time after receiving a Xolair injection Anaphylaxis presenting a bronchospasm, hypotension (low BP), dizziness, faintness, hives, and/or swelling of the tongue and throat. Anaphylaxis can occur after the first dose, but also has occurred beyond one year after beginning treatment with Xolair. *If such symptoms occur, use your EpiPen immediately and call 911. * Notify your MD office following any treatment of possible reaction. Epinephrine (Injection) Epinephrine ([id number]) Treats severe allergic reactions (including anaphylaxis) in an emergency situation. Brand Name(s):Adrenaclick, Adrenalin, Auvi-Q, EpiPen Auto-Injector, EpiPen Jr Auto-Injector, Epipen, Epipen 2-Jose Juan Auto-Injector, Epipen Jr 2-Jose Juan Auto-Injector, NovaPlus Epinephrine, Twinject There may be other brand names for this medicine. When This Medicine Should Not Be Used: A severe allergic reaction can be life-threatening, so there is no reason this medicine should not be used. How to Use This Medicine:  
Injectable · Your doctor should teach you how and when to inject this medicine. Each injection kit contains a single-use dose of medicine prescribed for you. · Give yourself a shot right away if you start to have a severe allergic reaction. · Inject this medicine into the muscle on the outside of your thigh only. Never inject this medicine into a vein, into your hand or foot, or into the muscles of your buttocks. · Read and follow the patient instructions that come with this medicine. Talk to your doctor or pharmacist if you have any questions. · This medicine might come with an autoinjector  so you can practice giving the medicine before you have an actual allergic reaction. The autoinjector  is gray (for EpiPen® or EpiPen Jr®) or beige (for [place]) and does not contain any medicine or needle.  
· Do not remove the blue safety release (EpiPen® or EpiPen Jr®) or the gray end caps (Adrenaclick®) on the autoinjector until you are ready to use it. Do not put your thumb, fingers, or hand over the orange (EpiPen® or EpiPen Jr®) or red tip (Adrenaclick®). · You may need to use more than one injection if your allergic reaction does not get better after the first shot. Your doctor will give you additional doses if you need more than 2 injections. · You may inject the medicine through your clothing, if you need to. · Some liquid will remain in the autoinjector after the medicine has been injected. This medicine cannot be reused. Give your used autoinjector to your healthcare provider when you seek medical care. · Carry this medicine with you at all times for emergency use in case you have a severe allergic reaction. · Make sure family members or other people you are with know how to inject the medicine in case you are not able to do it yourself. · Check your injection kits regularly to make sure the liquid has not changed color. You should not use the autoinjector if the liquid has changed color, or if there are solids in the liquid. You should not use the autoinjector if the expiration date has passed. · Store the injection kit at room temperature, away from heat, moisture, and direct light. Do not store the medicine in the refrigerator or freezer, or inside a car. · Keep the autoinjector in its case or carrier tube to protect it from damage. This tube is not waterproof. If you accidentally drop it, check for damage or leaks. Drugs and Foods to Avoid: Ask your doctor or pharmacist before using any other medicine, including over-the-counter medicines, vitamins, and herbal products. · Some foods and medicines can affect how epinephrine works.  Tell your doctor if you are using digoxin, levothyroxine, phentolamine, certain allergy medicines (such as chlorpheniramine, diphenhydramine, tripelennamine), a beta blocker medicine (such as propranolol), a heart rhythm medicine, a diuretic (water pill), an MAO inhibitor (MAOI), medicine for depression, or ergot medicines. Warnings While Using This Medicine: · Tell your doctor if you are pregnant or breastfeeding, or if you have asthma, diabetes, heart disease, heart rhythm problems, high blood pressure, an overactive thyroid, or Parkinson disease. · A severe allergic reaction is a medical emergency. Go to an emergency room as soon as possible, even if you feel better after you use this medicine. · Do not inject this medicine into your hands or feet. Go to the emergency room right away if you accidently inject epinephrine into any part of your body other than your thigh. Epinephrine reduces blood flow, and this could damage areas that have small blood vessels, such as hands and feet. · Keep all medicine out of the reach of children. Never share your medicine with anyone. Possible Side Effects While Using This Medicine:  
Call your doctor right away if you notice any of these side effects: 
· Chest pain · Fast, pounding, or uneven heartbeat · Heavy sweating, nausea, vomiting · Tremors, shakiness · Trouble breathing If you notice these less serious side effects, talk with your doctor: · Feeling anxious, nervous, scared, or weak · Headache or dizziness · Pale skin If you notice other side effects that you think are caused by this medicine, tell your doctor. Call your doctor for medical advice about side effects. You may report side effects to FDA at 1-957-FDA-1136 © 2014 [address] is for End User's use only and may not be sold, redistributed or otherwise used for commercial purposes. The above information is an  only. It is not intended as medical advice for individual conditions or treatments. Talk to your doctor, nurse or pharmacist before following any medical regimen to see if it is safe and effective for you.

## 2020-07-07 ENCOUNTER — TELEPHONE (OUTPATIENT)
Dept: PULMONOLOGY | Age: 45
End: 2020-07-07

## 2020-07-08 ENCOUNTER — CLINICAL SUPPORT (OUTPATIENT)
Dept: PULMONOLOGY | Age: 45
End: 2020-07-08

## 2020-07-08 VITALS — TEMPERATURE: 98.2 F

## 2020-07-08 DIAGNOSIS — J45.40 MODERATE PERSISTENT ASTHMA, UNSPECIFIED WHETHER COMPLICATED: Primary | ICD-10-CM

## 2020-07-08 NOTE — PROGRESS NOTES
Chief Complaint   [facility]   [facility]. Patient did not have any reaction to last injection. Patient states the Xolair is helping her Asthma. Injected subcutaneous : 150 mg given in Left upper arm SQ,  150 mg given in Right upper arm SQ. Lot Number: [id number]  Exp Date: 2/2021  Total Xolair Dose: 300 mg every 4 weeks  Epipen is with patient. Patient observed for 15 minutes. No reaction at injection site. Patient given Xolair reaction sheet.

## 2020-07-09 ENCOUNTER — OFFICE VISIT (OUTPATIENT)
Dept: PULMONOLOGY | Age: 45
End: 2020-07-09

## 2020-07-09 VITALS
TEMPERATURE: 98.5 F | HEIGHT: 65 IN | SYSTOLIC BLOOD PRESSURE: 125 MMHG | BODY MASS INDEX: 21.76 KG/M2 | WEIGHT: 130.6 LBS | OXYGEN SATURATION: 99 % | DIASTOLIC BLOOD PRESSURE: 79 MMHG | HEART RATE: 97 BPM | RESPIRATION RATE: 16 BRPM

## 2020-07-09 DIAGNOSIS — F41.9 ANXIETY DISORDER, UNSPECIFIED TYPE: ICD-10-CM

## 2020-07-09 DIAGNOSIS — B37.0 CANDIDA, ORAL: ICD-10-CM

## 2020-07-09 DIAGNOSIS — J45.41 MODERATE PERSISTENT ASTHMA WITH EXACERBATION: Primary | ICD-10-CM

## 2020-07-09 RX ORDER — ALBUTEROL SULFATE 90 UG/1
2 AEROSOL, METERED RESPIRATORY (INHALATION)
Qty: 1 INHALER | Refills: 5 | Status: SHIPPED | OUTPATIENT
Start: 2020-07-09 | End: 2021-11-09 | Stop reason: SDUPTHER

## 2020-07-09 RX ORDER — IPRATROPIUM BROMIDE AND ALBUTEROL SULFATE 2.5; .5 MG/3ML; MG/3ML
SOLUTION RESPIRATORY (INHALATION)
Qty: 180 ML | Refills: 5 | Status: SHIPPED | OUTPATIENT
Start: 2020-07-09 | End: 2021-05-17 | Stop reason: SDUPTHER

## 2020-07-09 RX ORDER — FLUTICASONE PROPIONATE AND SALMETEROL 500; 50 UG/1; UG/1
1 POWDER RESPIRATORY (INHALATION) 2 TIMES DAILY
Qty: 1 INHALER | Refills: 5 | Status: SHIPPED | OUTPATIENT
Start: 2020-07-09 | End: 2021-03-15 | Stop reason: SDUPTHER

## 2020-07-09 RX ORDER — CLOTRIMAZOLE 10 MG/1
10 LOZENGE ORAL; TOPICAL
Qty: 50 TAB | Refills: 0 | Status: SHIPPED | OUTPATIENT
Start: 2020-07-09 | End: 2020-07-19

## 2020-07-09 NOTE — PROGRESS NOTES
HISTORY OF PRESENT ILLNESS  Samreen Eduardo is a 39 y.o. female following up after an ED visit. Follow up for asthma on Xolair for control. Pt had increasing exacerbations starting October 2018 with persistent SOB and wheezing despite a course of steroids and ?antibiotics. Xolair was restarted after her visit in Feb 2019 with note of elevated IgE and since then control has improved dramatically. She had very few and mild exacerbations until 2 days ago when she started with cough, SOB, wheezing preceded by sinus pain and congestion. She denies fever but has occasional diaphoresis. She was seen in the ED 2/24 where a CXR did not show any acute infiltrates and she was treated with Duoneb and Prednisone taper. She had a virtual visit prior to this and complained of a productive cough without fever. GS/CS done showed normal veronica and possible Candida, pt complained of odynophagia and oral yeast.  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 Since her last visit, complaints have resolved and pt has started back on Xolair. No new complaints are registered. Cough   The history is provided by the patient. This is a new problem. The current episode started 2 days ago. The problem has been resolved. Pertinent negatives include no chest pain, no abdominal pain, no headaches and no shortness of breath. Nothing aggravates the symptoms. Nothing relieves the symptoms. Review of Systems   Constitutional: Negative for chills, diaphoresis, fever, malaise/fatigue and weight loss. Weight gain   HENT: Negative for ear discharge, ear pain, hearing loss, nosebleeds, sinus pain, sore throat and tinnitus. Congestion: resolved. Eyes: Negative for blurred vision, double vision, photophobia, pain, discharge and redness. Respiratory: Negative for hemoptysis, sputum production, shortness of breath, wheezing and stridor. Cough: resolved.     Cardiovascular: Negative for chest pain, palpitations, orthopnea, claudication, leg swelling and PND. Gastrointestinal: Negative for abdominal pain, blood in stool, constipation, diarrhea, heartburn, melena, nausea and vomiting. Genitourinary: Negative for dysuria, flank pain, frequency, hematuria and urgency. Musculoskeletal: Negative for back pain, falls, joint pain, myalgias and neck pain. Skin: Negative for itching and rash. Neurological: Negative for dizziness, tingling, tremors, sensory change, speech change, focal weakness, seizures, loss of consciousness, weakness and headaches. Endo/Heme/Allergies: Negative for environmental allergies and polydipsia. Does not bruise/bleed easily. Psychiatric/Behavioral: Negative for depression, hallucinations, memory loss, substance abuse and suicidal ideas. The patient is nervous/anxious. The patient does not have insomnia. Past Medical History:   Diagnosis Date    Anxiety attack     Asthma     Asthmatic bronchitis     Bronchitis     Catamenial disorder     Hernia     Influenza     Migraine     Migraines     Mild asthma     Psychiatric disorder     anxiety     Torticollis      Current Outpatient Medications on File Prior to Visit   Medication Sig Dispense Refill    omalizumab (XOLAIR) 150 mg solr 300 mg by SubCUTAneous route every thirty (30) days. 1 Each 0    rizatriptan (MAXALT) 10 mg tablet One tab at HA onset May repeat in 2 hours if needed. Max two tabs in 24 hours 12 Tab 11    LORazepam (ATIVAN) 0.5 mg tablet       fexofenadine-pseudoephedrine (Allegra-D 12 Hour)  mg Tb12 Take 1 Tab by mouth every twelve (12) hours.       fluticasone propionate (FLONASE) 50 mcg/actuation nasal spray USE 2 SPRAYS INTO EACH NOSTRIL ONCE DAILY 1 Bottle 4    montelukast (SINGULAIR) 10 mg tablet TAKE 1 TABLET BY MOUTH EVERY DAY 90 Tab 1    predniSONE (DELTASONE) 10 mg tablet 40 mg po x 3 days 30 mg po dailyx 3 days 20 mg po daily x 3 days 10 mg po daily x3 days 30 Tab 0    topiramate (TOPAMAX) 50 mg tablet Take 1 Tab by mouth nightly. 90 Tab 3     No current facility-administered medications on file prior to visit. Allergies   Allergen Reactions    Aspirin Anaphylaxis    Ibuprofen Anaphylaxis    Pcn [Penicillins] Itching    Claritin [Loratadine] Anxiety and Cough    Phenergan [Promethazine] Anxiety and Swelling    Seafood Swelling    Shellfish Containing Products Hives     Social History     Socioeconomic History    Marital status:      Spouse name: Not on file    Number of children: Not on file    Years of education: Not on file    Highest education level: Not on file   Occupational History    Not on file   Social Needs    Financial resource strain: Not on file    Food insecurity     Worry: Not on file     Inability: Not on file    Transportation needs     Medical: Not on file     Non-medical: Not on file   Tobacco Use    Smoking status: Never Smoker    Smokeless tobacco: Never Used   Substance and Sexual Activity    Alcohol use: No    Drug use: No    Sexual activity: Yes     Partners: Male   Lifestyle    Physical activity     Days per week: Not on file     Minutes per session: Not on file    Stress: Not on file   Relationships    Social connections     Talks on phone: Not on file     Gets together: Not on file     Attends Quaker service: Not on file     Active member of club or organization: Not on file     Attends meetings of clubs or organizations: Not on file     Relationship status: Not on file    Intimate partner violence     Fear of current or ex partner: Not on file     Emotionally abused: Not on file     Physically abused: Not on file     Forced sexual activity: Not on file   Other Topics Concern    Not on file   Social History Narrative    Not on file     Blood pressure 125/79, pulse 97, temperature 98.5 °F (36.9 °C), temperature source Oral, resp. rate 16, height 5' 5\" (1.651 m), weight 59.2 kg (130 lb 9.6 oz), SpO2 99 %.     Physical Exam   Constitutional: She is oriented to person, place, and time. She appears well-developed and well-nourished. No distress. HENT:   Head: Normocephalic and atraumatic. Nose: Nose normal.   Mouth/Throat: No oropharyngeal exudate. Eyes: Pupils are equal, round, and reactive to light. Conjunctivae and EOM are normal. Right eye exhibits no discharge. Left eye exhibits no discharge. No scleral icterus. Neck: No JVD present. No tracheal deviation present. No thyromegaly present. Cardiovascular: Normal rate, regular rhythm, normal heart sounds and intact distal pulses. Exam reveals no gallop and no friction rub. No murmur heard. Pulmonary/Chest: Effort normal and breath sounds normal. No stridor. No respiratory distress. Wheezes: faint upper lung fields. She has no rales. She exhibits no tenderness. Abdominal: Soft. She exhibits no mass. There is no abdominal tenderness. There is no rebound. Musculoskeletal:         General: No tenderness, deformity or edema. Lymphadenopathy:     She has no cervical adenopathy. Neurological: She is alert and oriented to person, place, and time. Coordination normal.   Skin: Skin is warm and dry. No rash noted. She is not diaphoretic. No erythema. No pallor. Psychiatric: She has a normal mood and affect. Her behavior is normal. Judgment and thought content normal.     Spirometry: mild obstruction , improved from study done 6/14/2012  XR Results (most recent):  Results from [place] encounter on 03/27/20   XR CHEST PA LAT    Narrative TWO VIEW CHEST RADIOGRAPH     CPT CODE: 72452    INDICATION: Asthma, cough, congestion for one week. COMPARISON: 2/24/2020    FINDINGS:     The cardiomediastinal silhouette is within normal limits. The pulmonary  vascular markings are unremarkable. There is no evidence of focal  consolidation, pleural effusion or pneumothorax. Evaluation of the osseous  structures demonstrates no focal abnormality.       Impression IMPRESSION:    No acute pulmonary disease. ASSESSMENT and PLAN  Encounter Diagnoses   Name Primary?  Moderate persistent asthma with exacerbation Yes    Anxiety disorder, unspecified type     Candida, oral      Pt with exacerbation likely triggered by URI or acute bronchitis, resolved with therapy. Start Mycelex minh, pt instructed, Rx sent. Continue  Xolair as scheduled. RTC 6 months  Flu vaccine in the fall  Return to work note for August  Refills sent.

## 2020-07-09 NOTE — PROGRESS NOTES
Javier Marie presents today for   Chief Complaint   Patient presents with    Results      Sputum Specimens       Is someone accompanying this pt? No    Is the patient using any DME equipment during OV? No    -DME Company None    Depression Screening:  3 most recent PHQ Screens 2/25/2020   Little interest or pleasure in doing things Not at all   Feeling down, depressed, irritable, or hopeless Not at all   Total Score PHQ 2 0       Learning Assessment:  Learning Assessment 2/25/2020   PRIMARY LEARNER Patient   PRIMARY LANGUAGE ENGLISH   LEARNER PREFERENCE PRIMARY READING     LISTENING     DEMONSTRATION   ANSWERED BY Patient     -   RELATIONSHIP SELF       Abuse Screening:  No flowsheet data found. Fall Risk  No flowsheet data found. Coordination of Care:  1. Have you been to the ER, urgent care clinic since your last visit? Hospitalized since your last visit? No    2. Have you seen or consulted any other health care providers outside of the [address] [place] [address] since your last visit? Include any pap smears or colon screening.  No

## 2020-07-09 NOTE — LETTER
July 9, 2020 Att:  Occupational Health at [address] [facility] Daughters To Whom It May Concern: 
 
Rakel Khan is currently under the care of [facility]. She can return to work on 8/1/2020. If there are questions or concerns please contact our office at [phone]. Sincerely, Kevin Wallace M.D.

## 2020-07-15 ENCOUNTER — TELEPHONE (OUTPATIENT)
Dept: PULMONOLOGY | Age: 45
End: 2020-07-15

## 2020-07-15 NOTE — TELEPHONE ENCOUNTER
Bernadette Anaya was seen on July 9, 2020. Dr. Snell  wrote her a note putting her out of work until august 1st. Ms. Chavez Orn called today stating that she needs a new letter but it needs to be dated at a present date. I informed her that Dr. Snell  would not be in the office until the week of the 20th and she agreed. Please call [phone] when it is ready. Thank you.

## 2020-07-20 ENCOUNTER — TELEPHONE (OUTPATIENT)
Dept: NEUROLOGY | Age: 45
End: 2020-07-20

## 2020-07-28 ENCOUNTER — TELEPHONE (OUTPATIENT)
Dept: PULMONOLOGY | Age: 45
End: 2020-07-28

## 2020-07-28 RX ORDER — PREDNISONE 10 MG/1
TABLET ORAL
Qty: 18 TAB | Refills: 0 | Status: SHIPPED | OUTPATIENT
Start: 2020-07-28 | End: 2020-09-15 | Stop reason: ALTCHOICE

## 2020-07-28 NOTE — TELEPHONE ENCOUNTER
Pt states she started with cough and thick yellow mucus this am. No fever. Used nebulizer and is taking Musinex.  Feels she needs something else

## 2020-07-28 NOTE — TELEPHONE ENCOUNTER
Pt states she is starting to cough up yellow mucus. She took nebulizer treatment and taking mucinex, but needs something else. Please call [phone].

## 2020-08-07 ENCOUNTER — CLINICAL SUPPORT (OUTPATIENT)
Dept: PULMONOLOGY | Age: 45
End: 2020-08-07

## 2020-08-07 VITALS — TEMPERATURE: 98.5 F

## 2020-08-07 DIAGNOSIS — J45.50 SEVERE PERSISTENT ASTHMA WITHOUT COMPLICATION: Primary | ICD-10-CM

## 2020-08-07 NOTE — PROGRESS NOTES
Drake Erickson is here for Xolair injection. Injected subcutaneous : 150 mg SC in right upper arm. 150 mg SC in left upper arm. Lot Number: [id number] 2  Exp Date: April 2021x 2   Total Xolair Dose: 300 mg  Epipen is with patient. Yes  Patient observed for 15 minutes. No reaction at injection site.

## 2020-08-18 RX ORDER — FLUTICASONE PROPIONATE 50 MCG
SPRAY, SUSPENSION (ML) NASAL
Qty: 1 BOTTLE | Refills: 5 | Status: SHIPPED | OUTPATIENT
Start: 2020-08-18 | End: 2021-03-15 | Stop reason: SDUPTHER

## 2020-09-01 ENCOUNTER — VIRTUAL VISIT (OUTPATIENT)
Dept: NEUROLOGY | Age: 45
End: 2020-09-01

## 2020-09-01 DIAGNOSIS — G43.001 MIGRAINE WITHOUT AURA AND WITH STATUS MIGRAINOSUS, NOT INTRACTABLE: Primary | ICD-10-CM

## 2020-09-01 RX ORDER — TOPIRAMATE 100 MG/1
100 TABLET, FILM COATED ORAL
Qty: 90 TAB | Refills: 0 | Status: SHIPPED | OUTPATIENT
Start: 2020-09-01 | End: 2020-11-04

## 2020-09-01 RX ORDER — ELETRIPTAN HYDROBROMIDE 20 MG/1
TABLET, FILM COATED ORAL
Qty: 12 TAB | Refills: 3 | Status: SHIPPED | OUTPATIENT
Start: 2020-09-01 | End: 2020-11-04

## 2020-09-01 NOTE — PROGRESS NOTES
Rommel Benton is a 39 y.o. female on virtual visit today for follow-up on migraines. 1. Have you been to the ER, urgent care clinic since your last visit? Hospitalized since your last visit? No    2. Have you seen or consulted any other health care providers outside of the [address] [place] [address] since your last visit? Include any pap smears or colon screening. No     Mobile number for today's visit will be [phone].

## 2020-09-01 NOTE — PROGRESS NOTES
Caro Mae is a 39 y.o. female who was seen by synchronous (real-time) audio-video technology on 9/1/2020 for Migraine        Assessment & Plan:   Diagnoses and all orders for this visit:    1. Migraine without aura and with status migrainosus, not intractable    Other orders  -     topiramate (TOPAMAX) 100 mg tablet; Take 1 Tab by mouth nightly. Indications: migraine prevention  -     eletriptan (RELPAX) 20 mg tablet; One tab at headache onset. may repeat in 2 hours if necessary. Max two tabs in 24 hours       Increase Topamax to 100 mg nighty. Try Relpax over Maxalt. Track headaches. Follow up in 8 weeks for med check. All questions addressed and patient is agreeable with plan of care. I spent at least 25 minutes on this visit with this established patient. 712  Subjective: Follow up migraines. Last seen in April. In the interim endorses migraine started a week ago. She will watch TV at 9 pm and she will have a shooting pain in the R temple. She tried the Maxalt and need to repeat dose 2 hours later. Headache will come back. Occurring nighty. Before a week ago headaches were decently controlled. She is not sure if this is secondary to stress. She said headaches can be menstrual related as well. Maintained on Topamax 50 mg nightly. Tolerating this well. Maxalt as needed, but has not been providing good headache relief. Denies focal deficits. No other concerns at this time. Prior to Admission medications    Medication Sig Start Date End Date Taking? Authorizing Provider   topiramate (TOPAMAX) 100 mg tablet Take 1 Tab by mouth nightly. Indications: migraine prevention 9/1/20  Yes Curtis Valentin Alert, NP   eletriptan (RELPAX) 20 mg tablet One tab at headache onset. may repeat in 2 hours if necessary.  Max two tabs in 24 hours 9/1/20  Yes Curtis Valentin, NP   fluticasone propionate (FLONASE) 50 mcg/actuation nasal spray USE 2 SPRAYS INTO EACH NOSTRIL ONCE DAILY 8/18/20  Yes Daysi Brar MD albuterol (PROVENTIL HFA, VENTOLIN HFA, PROAIR HFA) 90 mcg/actuation inhaler Take 2 Puffs by inhalation every four (4) hours as needed for Wheezing. 7/9/20  Yes Annette Horvath MD   fluticasone propion-salmeteroL (Advair Diskus) 500-50 mcg/dose diskus inhaler Take 1 Puff by inhalation two (2) times a day. 7/9/20  Yes Annette Horvath MD   albuterol-ipratropium (DUO-NEB) 2.5 mg-0.5 mg/3 ml nebu INHALE CONTENTS OF 1 VIAL VIA NEBULIZER EVERY 4 HOURS AS NEEDED FOR SHORTNESS OF BREATH/WHEEZING. 7/9/20  Yes Annette Horvath MD   LORazepam (ATIVAN) 0.5 mg tablet  4/22/20  Yes Provider, Historical   fexofenadine-pseudoephedrine (Allegra-D 12 Hour)  mg Tb12 Take 1 Tab by mouth every twelve (12) hours. Yes Provider, Historical   montelukast (SINGULAIR) 10 mg tablet TAKE 1 TABLET BY MOUTH EVERY DAY 3/10/20  Yes Annette Horvath MD   predniSONE (DELTASONE) 10 mg tablet 30 mg po dailyx 3 days 20 mg po daily x 3 days 10 mg po daily x3 days 7/28/20   Annette Horvath MD   rizatriptan (MAXALT) 10 mg tablet One tab at HA onset May repeat in 2 hours if needed. Max two tabs in 24 hours 5/22/20 9/1/20  Xu Valentin, CAL   topiramate (TOPAMAX) 50 mg tablet Take 1 Tab by mouth nightly. 4/23/20 9/1/20  Samantha Kilgore NP     Patient Active Problem List   Diagnosis Code    Asthma J45.909    Pregnancy Z34.90    Migraine without aura and without status migrainosus, not intractable G43.009    Asthma exacerbation J45.901     Patient Active Problem List    Diagnosis Date Noted    Asthma exacerbation 05/15/2018    Migraine without aura and without status migrainosus, not intractable 02/27/2017    Pregnancy 02/04/2016    Asthma      Current Outpatient Medications   Medication Sig Dispense Refill    topiramate (TOPAMAX) 100 mg tablet Take 1 Tab by mouth nightly. Indications: migraine prevention 90 Tab 0    eletriptan (RELPAX) 20 mg tablet One tab at headache onset. may repeat in 2 hours if necessary.  Max two tabs in 24 hours 12 Tab 3    fluticasone propionate (FLONASE) 50 mcg/actuation nasal spray USE 2 SPRAYS INTO EACH NOSTRIL ONCE DAILY 1 Bottle 5    albuterol (PROVENTIL HFA, VENTOLIN HFA, PROAIR HFA) 90 mcg/actuation inhaler Take 2 Puffs by inhalation every four (4) hours as needed for Wheezing. 1 Inhaler 5    fluticasone propion-salmeteroL (Advair Diskus) 500-50 mcg/dose diskus inhaler Take 1 Puff by inhalation two (2) times a day. 1 Inhaler 5    albuterol-ipratropium (DUO-NEB) 2.5 mg-0.5 mg/3 ml nebu INHALE CONTENTS OF 1 VIAL VIA NEBULIZER EVERY 4 HOURS AS NEEDED FOR SHORTNESS OF BREATH/WHEEZING. 180 mL 5    LORazepam (ATIVAN) 0.5 mg tablet       fexofenadine-pseudoephedrine (Allegra-D 12 Hour)  mg Tb12 Take 1 Tab by mouth every twelve (12) hours.       montelukast (SINGULAIR) 10 mg tablet TAKE 1 TABLET BY MOUTH EVERY DAY 90 Tab 1    predniSONE (DELTASONE) 10 mg tablet 30 mg po dailyx 3 days 20 mg po daily x 3 days 10 mg po daily x3 days 18 Tab 0     Allergies   Allergen Reactions    Aspirin Anaphylaxis    Ibuprofen Anaphylaxis    Pcn [Penicillins] Itching    Claritin [Loratadine] Anxiety and Cough    Phenergan [Promethazine] Anxiety and Swelling    Seafood Swelling    Shellfish Containing Products Hives     Past Medical History:   Diagnosis Date    Anxiety attack     Asthma     Asthmatic bronchitis     Bronchitis     Catamenial disorder     Hernia     Influenza     Migraine     Migraines     Mild asthma     Psychiatric disorder     anxiety     Torticollis      Past Surgical History:   Procedure Laterality Date     DELIVERY ONLY      HX BACK SURGERY      HX  SECTION  2009     Family History   Problem Relation Age of Onset    Heart Disease Other         grandmothers    Asthma Brother     Lung Disease Paternal Aunt         cronic bronchitis    Asthma Paternal Grandmother     High Cholesterol Mother      Social History     Tobacco Use    Smoking status: Never Smoker  Smokeless tobacco: Never Used   Substance Use Topics    Alcohol use: No       Review of Systems  GENERAL: Denies fever or fatigue  CARDIAC: No CP or SOB  PULMONARY: No cough of SOB  MUSCULOSKELETAL: No new joint pain  NEURO: SEE HPI      Objective:     Patient-Reported Vitals 9/1/2020   Patient-Reported Weight 135lb   Patient-Reported LMP 8/30/2020      General: alert, cooperative, no distress   Mental  status: normal mood, behavior, speech, dress, motor activity, and thought processes, able to follow commands   HENT: NCAT   Neck: no visualized mass   Resp: no respiratory distress   Neuro: no gross deficits   Skin: no discoloration or lesions of concern on visible areas   Psychiatric: normal affect, consistent with stated mood, no evidence of hallucinations     Additional exam findings: This is a very pleasant 39year old female. She is alert and in NAD. Full fund of knowledge. Speech is clear. No facial asymmetry. No signs of ataxia or incoordination. We discussed the expected course, resolution and complications of the diagnosis(es) in detail. Medication risks, benefits, costs, interactions, and alternatives were discussed as indicated. I advised her to contact the office if her condition worsens, changes or fails to improve as anticipated. She expressed understanding with the diagnosis(es) and plan. Krista Guerra, who was evaluated through a patient-initiated, synchronous (real-time) audio-video encounter, and/or her healthcare decision maker, is aware that it is a billable service, with coverage as determined by her insurance carrier. She provided verbal consent to proceed: Yes, and patient identification was verified. It was conducted pursuant to the emergency declaration under the [address] [place] [address], [address] [place] [address] authority and the [employer] and [employer] Act. A caregiver was present when appropriate.  Ability to conduct physical exam was limited. I was at home. The patient was at home. Sara Sauceda NP  This note will not be viewable in [address]

## 2020-09-02 ENCOUNTER — TELEPHONE (OUTPATIENT)
Dept: NEUROLOGY | Age: 45
End: 2020-09-02

## 2020-09-04 ENCOUNTER — CLINICAL SUPPORT (OUTPATIENT)
Dept: PULMONOLOGY | Age: 45
End: 2020-09-04

## 2020-09-04 DIAGNOSIS — J45.50 SEVERE PERSISTENT ASTHMA WITHOUT COMPLICATION: Primary | ICD-10-CM

## 2020-09-04 RX ORDER — OMALIZUMAB 202.5 MG/1.4ML
300 INJECTION, SOLUTION SUBCUTANEOUS
Qty: 300 MG | Refills: 0 | Status: SHIPPED | COMMUNITY
Start: 2020-09-04 | End: 2020-10-02 | Stop reason: SDUPTHER

## 2020-09-04 NOTE — PROGRESS NOTES
Joseline Noonan is here for Xolair injection. She denies problems following the past injections. Injected subcutaneous : 150 mg SC in right upper arm. 150 mg SC in left upper arm. Lot Number: [id number] x 2  Exp Date: Janeth 2021 x 2  Total Xolair Dose: 300 mg  Epipen is with patient. Yes  Patient observed for 15 minutes. No reaction at injection site.  No c/os upon discharge

## 2020-09-04 NOTE — TELEPHONE ENCOUNTER
Spoke with patient verified name and , informed patient will take a few days for insurance decision upon request for authorization.

## 2020-09-07 RX ORDER — MONTELUKAST SODIUM 10 MG/1
TABLET ORAL
Qty: 90 TAB | Refills: 1 | Status: SHIPPED | OUTPATIENT
Start: 2020-09-07 | End: 2021-03-03 | Stop reason: SDUPTHER

## 2020-09-14 ENCOUNTER — TELEPHONE (OUTPATIENT)
Dept: PULMONOLOGY | Age: 45
End: 2020-09-14

## 2020-09-14 NOTE — TELEPHONE ENCOUNTER
Patient started with cough and congestion on Saturday. Patient coughing up yellow thick mucus. Patient has tried Musinex and the nebulizer but not helping much.  No fever

## 2020-09-14 NOTE — TELEPHONE ENCOUNTER
Pt states since Saturday 9/12 she has coughing a lot with yellow mucous. Has been using Mucinex and nebulizer. Please advise [phone].

## 2020-09-15 RX ORDER — AZITHROMYCIN 250 MG/1
TABLET, FILM COATED ORAL
Qty: 6 TAB | Refills: 0 | Status: SHIPPED | OUTPATIENT
Start: 2020-09-15 | End: 2020-09-17

## 2020-09-15 RX ORDER — PREDNISONE 10 MG/1
TABLET ORAL
Qty: 30 TAB | Refills: 0 | Status: SHIPPED | OUTPATIENT
Start: 2020-09-15 | End: 2020-11-04

## 2020-09-17 ENCOUNTER — TELEPHONE (OUTPATIENT)
Dept: PULMONOLOGY | Age: 45
End: 2020-09-17

## 2020-09-17 RX ORDER — FLUCONAZOLE 200 MG/1
200 TABLET ORAL DAILY
Qty: 7 TAB | Refills: 0 | Status: SHIPPED | OUTPATIENT
Start: 2020-09-17 | End: 2020-09-24

## 2020-09-17 NOTE — TELEPHONE ENCOUNTER
Pt states antibiotic prescribed gives her a yeast infection, requesting medication. Please advise [phone].

## 2020-10-01 ENCOUNTER — TELEPHONE (OUTPATIENT)
Dept: NEUROLOGY | Age: 45
End: 2020-10-01

## 2020-10-02 ENCOUNTER — CLINICAL SUPPORT (OUTPATIENT)
Dept: PULMONOLOGY | Age: 45
End: 2020-10-02
Payer: MEDICAID

## 2020-10-02 VITALS — TEMPERATURE: 98.1 F

## 2020-10-02 DIAGNOSIS — J45.50 SEVERE PERSISTENT ASTHMA WITHOUT COMPLICATION: Primary | ICD-10-CM

## 2020-10-02 PROCEDURE — 96401 CHEMO ANTI-NEOPL SQ/IM: CPT | Performed by: INTERNAL MEDICINE

## 2020-10-02 NOTE — PROGRESS NOTES
Chief Complaint   Patient presents with   Syd Banks is here for Xolair injection. Patient did not have any reaction to last injection. Patient states the Xolair is helping her Asthma. Injected subcutaneous : 150 mg given in Left upper arm SQ,  150 mg given in Right upper arm SQ. Lot Number: [id number]  Exp Date: 4/2021  Total Xolair Dose: 300 mg every 4 weeks  Epipen is with patient. Patient observed for 15 minutes. No reaction at injection site. Patient given Xolair reaction sheet.

## 2020-10-02 NOTE — PATIENT INSTRUCTIONS
Be aware that \"Anaphylaxis\" can occur any time after receiving a Xolair injection Anaphylaxis presenting a bronchospasm, hypotension (low BP), dizziness, faintness, hives, and/or swelling of the tongue and throat. Anaphylaxis can occur after the first dose, but also has occurred beyond one year after beginning treatment with Xolair. *If such symptoms occur, use your EpiPen immediately and call 911. * Notify your MD office following any treatment of possible reaction. Epinephrine (Injection) Epinephrine (gr-p-RAD-rin) Treats severe allergic reactions (including anaphylaxis) in an emergency situation. Brand Name(s):Adrenaclick, Adrenalin, Auvi-Q, EpiPen Auto-Injector, EpiPen Jr Auto-Injector, Epipen, Epipen 2-Jose Juan Auto-Injector, Epipen Jr 2-Jose Juan Auto-Injector, NovaPlus Epinephrine, Twinject There may be other brand names for this medicine. When This Medicine Should Not Be Used: A severe allergic reaction can be life-threatening, so there is no reason this medicine should not be used. How to Use This Medicine:  
Injectable · Your doctor should teach you how and when to inject this medicine. Each injection kit contains a single-use dose of medicine prescribed for you. · Give yourself a shot right away if you start to have a severe allergic reaction. · Inject this medicine into the muscle on the outside of your thigh only. Never inject this medicine into a vein, into your hand or foot, or into the muscles of your buttocks. · Read and follow the patient instructions that come with this medicine. Talk to your doctor or pharmacist if you have any questions. · This medicine might come with an autoinjector  so you can practice giving the medicine before you have an actual allergic reaction. The autoinjector  is gray (for EpiPen® or EpiPen Jr®) or beige (for [place]) and does not contain any medicine or needle.  
· Do not remove the blue safety release (EpiPen® or EpiPen Jr®) or the gray end caps (Adrenaclick®) on the autoinjector until you are ready to use it. Do not put your thumb, fingers, or hand over the orange (EpiPen® or EpiPen Jr®) or red tip (Adrenaclick®). · You may need to use more than one injection if your allergic reaction does not get better after the first shot. Your doctor will give you additional doses if you need more than 2 injections. · You may inject the medicine through your clothing, if you need to. · Some liquid will remain in the autoinjector after the medicine has been injected. This medicine cannot be reused. Give your used autoinjector to your healthcare provider when you seek medical care. · Carry this medicine with you at all times for emergency use in case you have a severe allergic reaction. · Make sure family members or other people you are with know how to inject the medicine in case you are not able to do it yourself. · Check your injection kits regularly to make sure the liquid has not changed color. You should not use the autoinjector if the liquid has changed color, or if there are solids in the liquid. You should not use the autoinjector if the expiration date has passed. · Store the injection kit at room temperature, away from heat, moisture, and direct light. Do not store the medicine in the refrigerator or freezer, or inside a car. · Keep the autoinjector in its case or carrier tube to protect it from damage. This tube is not waterproof. If you accidentally drop it, check for damage or leaks. Drugs and Foods to Avoid: Ask your doctor or pharmacist before using any other medicine, including over-the-counter medicines, vitamins, and herbal products. · Some foods and medicines can affect how epinephrine works.  Tell your doctor if you are using digoxin, levothyroxine, phentolamine, certain allergy medicines (such as chlorpheniramine, diphenhydramine, tripelennamine), a beta blocker medicine (such as propranolol), a heart rhythm medicine, a diuretic (water pill), an MAO inhibitor (MAOI), medicine for depression, or ergot medicines. Warnings While Using This Medicine: · Tell your doctor if you are pregnant or breastfeeding, or if you have asthma, diabetes, heart disease, heart rhythm problems, high blood pressure, an overactive thyroid, or Parkinson disease. · A severe allergic reaction is a medical emergency. Go to an emergency room as soon as possible, even if you feel better after you use this medicine. · Do not inject this medicine into your hands or feet. Go to the emergency room right away if you accidently inject epinephrine into any part of your body other than your thigh. Epinephrine reduces blood flow, and this could damage areas that have small blood vessels, such as hands and feet. · Keep all medicine out of the reach of children. Never share your medicine with anyone. Possible Side Effects While Using This Medicine:  
Call your doctor right away if you notice any of these side effects: 
· Chest pain · Fast, pounding, or uneven heartbeat · Heavy sweating, nausea, vomiting · Tremors, shakiness · Trouble breathing If you notice these less serious side effects, talk with your doctor: · Feeling anxious, nervous, scared, or weak · Headache or dizziness · Pale skin If you notice other side effects that you think are caused by this medicine, tell your doctor. Call your doctor for medical advice about side effects. You may report side effects to FDA at 8-412-FDA-4234 © 2014 [address] is for End User's use only and may not be sold, redistributed or otherwise used for commercial purposes. The above information is an  only. It is not intended as medical advice for individual conditions or treatments. Talk to your doctor, nurse or pharmacist before following any medical regimen to see if it is safe and effective for you.

## 2020-10-05 ENCOUNTER — CLINICAL SUPPORT (OUTPATIENT)
Dept: PULMONOLOGY | Age: 45
End: 2020-10-05
Payer: MEDICAID

## 2020-10-05 VITALS — TEMPERATURE: 98 F

## 2020-10-05 DIAGNOSIS — Z23 NEEDS FLU SHOT: Primary | ICD-10-CM

## 2020-10-05 PROCEDURE — 90686 IIV4 VACC NO PRSV 0.5 ML IM: CPT | Performed by: INTERNAL MEDICINE

## 2020-10-05 PROCEDURE — 90471 IMMUNIZATION ADMIN: CPT | Performed by: INTERNAL MEDICINE

## 2020-10-05 NOTE — PROGRESS NOTES
Chief Complaint   Patient presents with    Injection     Infulenza vaccine     After obtaining informed consent, the immunization is given by [employer] [facility]. Influenza vaccine given in the right deltoid IM. Influenza information sheet given to patient.

## 2020-10-05 NOTE — PATIENT INSTRUCTIONS
Vaccine Information Statement Influenza (Flu) Vaccine (Inactivated or Recombinant): What You Need to Know Many Vaccine Information Statements are available in [place]ian and other languages. See www.immunize.org/vis Hojas de información sobre vacunas están disponibles en español y en muchos otros idiomas. Visite www.immunize.org/vis 1. Why get vaccinated? Influenza vaccine can prevent influenza (flu). Flu is a contagious disease that spreads around the [facility] every year, usually between October and May. Anyone can get the flu, but it is more dangerous for some people. Infants and young children, people 72years of age and older, pregnant women, and people with certain health conditions or a weakened immune system are at greatest risk of flu complications. Pneumonia, bronchitis, sinus infections and ear infections are examples of flu-related complications. If you have a medical condition, such as heart disease, cancer or diabetes, flu can make it worse. Flu can cause fever and chills, sore throat, muscle aches, fatigue, cough, headache, and runny or stuffy nose. Some people may have vomiting and diarrhea, though this is more common in children than adults. Each year thousands of people in the [facility] die from flu, and many more are hospitalized. Flu vaccine prevents millions of illnesses and flu-related visits to the doctor each year. 2. Influenza vaccines CDC recommends everyone 10months of age and older get vaccinated every flu season. Children 6 months through 6years of age may need 2 doses during a single flu season. Everyone else needs only 1 dose each flu season. It takes about 2 weeks for protection to develop after vaccination. There are many flu viruses, and they are always changing. Each year a new flu vaccine is made to protect against three or four viruses that are likely to cause disease in the upcoming flu season.  Even when the vaccine doesnt exactly match these viruses, it may still provide some protection. Influenza vaccine does not cause flu. Influenza vaccine may be given at the same time as other vaccines. 3. Talk with your health care provider Tell your vaccine provider if the person getting the vaccine: 
 Has had an allergic reaction after a previous dose of influenza vaccine, or has any severe, life-threatening allergies.  Has ever had Guillain-Barré Syndrome (also called GBS). In some cases, your health care provider may decide to postpone influenza vaccination to a future visit. People with minor illnesses, such as a cold, may be vaccinated. People who are moderately or severely ill should usually wait until they recover before getting influenza vaccine. Your health care provider can give you more information. 4. Risks of a reaction  Soreness, redness, and swelling where shot is given, fever, muscle aches, and headache can happen after influenza vaccine.  There may be a very small increased risk of Guillain-Barré Syndrome (GBS) after inactivated influenza vaccine (the flu shot). Colette Jaramillo children who get the flu shot along with pneumococcal vaccine (PCV13), and/or DTaP vaccine at the same time might be slightly more likely to have a seizure caused by fever. Tell your health care provider if a child who is getting flu vaccine has ever had a seizure. People sometimes faint after medical procedures, including vaccination. Tell your provider if you feel dizzy or have vision changes or ringing in the ears. As with any medicine, there is a very remote chance of a vaccine causing a severe allergic reaction, other serious injury, or death. 5. What if there is a serious problem? An allergic reaction could occur after the vaccinated person leaves the clinic.  If you see signs of a severe allergic reaction (hives, swelling of the face and throat, difficulty breathing, a fast heartbeat, dizziness, or weakness), call 9-1-1 and get the person to the nearest hospital. 
 
For other signs that concern you, call your health care provider. Adverse reactions should be reported to the Vaccine Adverse Event Reporting System (VAERS). Your health care provider will usually file this report, or you can do it yourself. Visit the VAERS website at www.vaers. hhs.gov or call 4-811.201.7255. VAERS is only for reporting reactions, and VAERS staff do not give medical advice. 6. The National Vaccine Injury Compensation Program 
 
The [facility] Vaccine Injury Compensation Program (VICP) is a federal program that was created to compensate people who may have been injured by certain vaccines. Visit the VICP website at www.[facility].gov/vaccinecompensation or call 8-786.996.1322 to learn about the program and about filing a claim. There is a time limit to file a claim for compensation. 7. How can I learn more?  Ask your health care provider.  Call your local or state health department.  Contact the Centers for Disease Control and Prevention (CDC): 
- Call 4-946.806.7927 (1-800-CDC-INFO) or 
- Visit CDCs influenza website at www.cdc.gov/flu Vaccine Information Statement (Interim) Inactivated Influenza Vaccine 8/15/2019 
42 CRISTINA Abreu [id number] Department of Health and [employer] Centers for Disease Control and Prevention Office Use Only

## 2020-10-05 NOTE — ED NOTES
I have reviewed discharge instructions with the patient. The patient verbalized understanding. Previous message forwarded to Dr George Callahan

## 2020-10-21 ENCOUNTER — TELEPHONE (OUTPATIENT)
Dept: PULMONOLOGY | Age: 45
End: 2020-10-21

## 2020-10-21 NOTE — TELEPHONE ENCOUNTER
Patient with cold sxs x 3 days. Coughing up thick yellowist sputum. No fever or SOB. Patient advised to use Musinex to loosen secretions and Delsym if coughing. Also Sinus rinse.  Pt agrees with plan and will call back if sxs worsen

## 2020-10-21 NOTE — TELEPHONE ENCOUNTER
Pt states she's coughing up yellow mucous for the past 3 days. Is taking muscinex. Please advise [phone].

## 2020-10-30 ENCOUNTER — CLINICAL SUPPORT (OUTPATIENT)
Dept: PULMONOLOGY | Age: 45
End: 2020-10-30
Payer: MEDICAID

## 2020-10-30 DIAGNOSIS — J45.50 SEVERE PERSISTENT ASTHMA WITHOUT COMPLICATION: Primary | ICD-10-CM

## 2020-10-30 PROCEDURE — 96401 CHEMO ANTI-NEOPL SQ/IM: CPT | Performed by: INTERNAL MEDICINE

## 2020-10-30 NOTE — PROGRESS NOTES
Chief Complaint   Patient presents with   Samantha Leader is here for Xolair injection. Patient did not have any reaction to last injection. Patient states the Xolair is helping her Asthma. Injected subcutaneous : 150 mg given in Left upper arm SQ,  150 mg given in Right upper arm SQ. Lot Number: [id number]  Exp Date: 6/2021  Total Xolair Dose: 300 mg every 4 weeks  Epipen is with patient. Patient observed for 15 minutes. No reaction at injection site. Patient given Xolair reaction sheet.

## 2020-11-04 ENCOUNTER — VIRTUAL VISIT (OUTPATIENT)
Dept: NEUROLOGY | Age: 45
End: 2020-11-04
Payer: MEDICAID

## 2020-11-04 DIAGNOSIS — G43.839 INTRACTABLE MENSTRUAL MIGRAINE WITHOUT STATUS MIGRAINOSUS: ICD-10-CM

## 2020-11-04 DIAGNOSIS — G43.001 MIGRAINE WITHOUT AURA AND WITH STATUS MIGRAINOSUS, NOT INTRACTABLE: Primary | ICD-10-CM

## 2020-11-04 PROCEDURE — 99214 OFFICE O/P EST MOD 30 MIN: CPT | Performed by: NURSE PRACTITIONER

## 2020-11-04 RX ORDER — TOPIRAMATE 25 MG/1
TABLET ORAL
Qty: 90 TAB | Refills: 3 | Status: SHIPPED | OUTPATIENT
Start: 2020-11-04 | End: 2021-03-17 | Stop reason: SDUPTHER

## 2020-11-04 RX ORDER — RIZATRIPTAN BENZOATE 10 MG/1
TABLET ORAL
Qty: 12 TAB | Refills: 3 | Status: SHIPPED | OUTPATIENT
Start: 2020-11-04 | End: 2021-03-17 | Stop reason: DRUGHIGH

## 2020-11-04 NOTE — PROGRESS NOTES
Audi Blanton is a 39 y.o. female who was seen by synchronous (real-time) audio-video technology on 11/4/2020 for Follow-up (Migraine)        Assessment & Plan:   Diagnoses and all orders for this visit:    1. Migraine without aura and with status migrainosus, not intractable  -     rizatriptan (MAXALT) 10 mg tablet; May repeat in 2 hours if needed. Max 2 doses in 24 hours. -     topiramate (TOPAMAX) 25 mg tablet; Take 3 tabs by mouth nightly. Can lower to 2 tabs by mouth nightly after 1 week. 2. Intractable menstrual migraine without status migrainosus  -     rizatriptan (MAXALT) 10 mg tablet; May repeat in 2 hours if needed. Max 2 doses in 24 hours. -     topiramate (TOPAMAX) 25 mg tablet; Take 3 tabs by mouth nightly. Can lower to 2 tabs by mouth nightly after 1 week. This is a 39year old female who presents in follow-up for menstrual related migraines. She reports doing well on Topamax and she would prefer to decrease back to 50 mg nightly. Denies side effects on the medication. Rizatriptan has been helpful for breakthrough. We discussed lowering the Topamax and she may find 75 mg to be a good level for her so she can decrease to that and continue there or lower further to 50 mg. Discussed she can take the extra 25 mg pill in the morning but she would prefer to continue taking the medication at night. Will refill rizatriptan for breakthrough which seems to be working, but if needed we can consider a prophylactic medication to start a few days prior to expected headache, like frovatriptan, but we will monitor for now. Follow up in 3 months. I spent at least 25 minutes on this visit with this established patient. Subjective:     Audi Blanton presents in follow-up for migraines. She was last seen here on 9/1/2020 by Darrius Nagy NP. At that time it was noted that her migraines started a week prior. She was getting shooting pain in the right temple.   She tried the Maxalt and had to repeat the dose 2 hours later. The headache will come back. It was occurring nightly. Prior to a week before that it was noted that the headaches were decently controlled. Stress may have been involved. The headaches can be menstrual related. She was taking Topamax 50 mg nightly. Topamax was increased to 100 mg nightly at that time. Relpax was to be tried as opposed to Maxalt. Past notes indicates she has tried sumatriptan, Fioricet, was on Elavil as preventative, was taking Zofran for nausea, tried frovatriptan to take at migraine onset which didn't help, propanolol was discussed but avoided because of severe asthma, and verapamil was discussed but avoided due to potential side effects. She has chronic migraines that are menstrual related but not menstrual exclusive. She presents today in follow-up via virtual video visit. She reports doing well. She does not feel like she needs the Topamax at 100 mg. She would like to reduce back to 50 mg daily. She denies side effects on the medication but believes the 50 mg works better. She continues to get the migraines prior to menses and they headaches can last 3-4 days. They can occur other times as well. She was not able to get a refill of the Maxalt because she was told by the pharmacy that it was too early. The Maxalt has been working well so she did not get the eletriptan. She sometimes has the take the second dose of Maxalt. Sometimes she runs out of them by the end of the month. Denies changes. Prior to Admission medications    Medication Sig Start Date End Date Taking? Authorizing Provider   omalizumab Katt Mendez) 150 mg solr 300 mg by SubCUTAneous route every thirty (30) days.  10/30/20  Yes Quincy Lewis MD   predniSONE (DELTASONE) 10 mg tablet Take 40mg x 3 day, then 30mg x 3 days, then 20mg x 3 days then 10mg x 3 days 9/15/20  Yes Quincy Lewis MD   montelukast (SINGULAIR) 10 mg tablet TAKE 1 TABLET BY MOUTH EVERY DAY 9/7/20  Yes Jose Yoo MD   topiramate (TOPAMAX) 100 mg tablet Take 1 Tab by mouth nightly. Indications: migraine prevention 9/1/20  Yes Dyana Valentin NP   eletriptan (RELPAX) 20 mg tablet One tab at headache onset. may repeat in 2 hours if necessary. Max two tabs in 24 hours 9/1/20  Yes Floresita Valentin NP   fluticasone propionate (FLONASE) 50 mcg/actuation nasal spray USE 2 SPRAYS INTO EACH NOSTRIL ONCE DAILY 8/18/20  Yes Jose Yoo MD   albuterol (PROVENTIL HFA, VENTOLIN HFA, PROAIR HFA) 90 mcg/actuation inhaler Take 2 Puffs by inhalation every four (4) hours as needed for Wheezing. 7/9/20  Yes Jose Yoo MD   fluticasone propion-salmeteroL (Advair Diskus) 500-50 mcg/dose diskus inhaler Take 1 Puff by inhalation two (2) times a day. 7/9/20  Yes Jose Yoo MD   albuterol-ipratropium (DUO-NEB) 2.5 mg-0.5 mg/3 ml nebu INHALE CONTENTS OF 1 VIAL VIA NEBULIZER EVERY 4 HOURS AS NEEDED FOR SHORTNESS OF BREATH/WHEEZING. 7/9/20  Yes Jose Yoo MD   LORazepam (ATIVAN) 0.5 mg tablet  4/22/20  Yes Provider, Historical   fexofenadine-pseudoephedrine (Allegra-D 12 Hour)  mg Tb12 Take 1 Tab by mouth every twelve (12) hours. Yes Provider, Historical     Patient Active Problem List   Diagnosis Code    Asthma J45.909    Pregnancy Z34.90    Migraine without aura and without status migrainosus, not intractable G43.009    Asthma exacerbation J45.901     Current Outpatient Medications   Medication Sig Dispense Refill    rizatriptan (MAXALT) 10 mg tablet May repeat in 2 hours if needed. Max 2 doses in 24 hours. 12 Tab 3    topiramate (TOPAMAX) 25 mg tablet Take 3 tabs by mouth nightly. Can lower to 2 tabs by mouth nightly after 1 week. 90 Tab 3    omalizumab (XOLAIR) 150 mg solr 300 mg by SubCUTAneous route every thirty (30) days.  1 Each 0    montelukast (SINGULAIR) 10 mg tablet TAKE 1 TABLET BY MOUTH EVERY DAY 90 Tab 1    fluticasone propionate (FLONASE) 50 mcg/actuation nasal spray USE 2 SPRAYS INTO EACH NOSTRIL ONCE DAILY 1 Bottle 5    albuterol (PROVENTIL HFA, VENTOLIN HFA, PROAIR HFA) 90 mcg/actuation inhaler Take 2 Puffs by inhalation every four (4) hours as needed for Wheezing. 1 Inhaler 5    fluticasone propion-salmeteroL (Advair Diskus) 500-50 mcg/dose diskus inhaler Take 1 Puff by inhalation two (2) times a day. 1 Inhaler 5    albuterol-ipratropium (DUO-NEB) 2.5 mg-0.5 mg/3 ml nebu INHALE CONTENTS OF 1 VIAL VIA NEBULIZER EVERY 4 HOURS AS NEEDED FOR SHORTNESS OF BREATH/WHEEZING. 180 mL 5    LORazepam (ATIVAN) 0.5 mg tablet       fexofenadine-pseudoephedrine (Allegra-D 12 Hour)  mg Tb12 Take 1 Tab by mouth every twelve (12) hours.        Allergies   Allergen Reactions    Aspirin Anaphylaxis    Ibuprofen Anaphylaxis    Pcn [Penicillins] Itching    Claritin [Loratadine] Anxiety and Cough    Phenergan [Promethazine] Anxiety and Swelling    Seafood Swelling    Shellfish Containing Products Hives     Past Medical History:   Diagnosis Date    Anxiety attack     Asthma     Asthmatic bronchitis     Bronchitis     Catamenial disorder     Hernia     Influenza     Migraine     Migraines     Mild asthma     Psychiatric disorder     anxiety     Torticollis      Past Surgical History:   Procedure Laterality Date     DELIVERY ONLY      HX BACK SURGERY      HX  SECTION  2009     Family History   Problem Relation Age of Onset    Heart Disease Other         grandmothers    Asthma Brother     Lung Disease Paternal Aunt         cronic bronchitis    Asthma Paternal Grandmother     High Cholesterol Mother      Social History     Tobacco Use    Smoking status: Never Smoker    Smokeless tobacco: Never Used   Substance Use Topics    Alcohol use: No       ROS  GENERAL: Denies fever or fatigue  CARDIAC: No CP or SOB  PULMONARY: No cough or SOB  MUSCULOSKELETAL: No new joint pain  NEURO: SEE HPI    Objective:     Patient-Reported Vitals 2020 Patient-Reported Weight 135.0   Patient-Reported LMP -        Constitutional: [x] Appears well-developed and well-nourished [x] No apparent distress      [] Abnormal -     Mental status: [x] Alert and awake  [x] Oriented to person/place/time [x] Able to follow commands    [] Abnormal -     Eyes:   EOM    [x]  Normal    [] Abnormal -   Sclera  [x]  Normal    [] Abnormal -          Discharge [x]  None visible   [] Abnormal -     HENT: [x] Normocephalic, atraumatic  [] Abnormal -   [] Mouth/Throat: Mucous membranes are moist    External Ears [x] Normal  [] Abnormal -    Neck: [x] No visualized mass [] Abnormal -     Pulmonary/Chest: [x] Respiratory effort normal   [x] No visualized signs of difficulty breathing or respiratory distress        [] Abnormal -      Musculoskeletal:   [x] Normal gait with no signs of ataxia         [x] Normal range of motion of neck        [] Abnormal -     Neurological:        [x] No Facial Asymmetry (Cranial nerve 7 motor function) (limited exam due to video visit). No nystagmus seen. Moves all extremities antigravity. No apparent dysmetria of the bilateral upper extremities. Gait appears normal.           [x] No gaze palsy        [] Abnormal -          Skin:        [x] No significant exanthematous lesions or discoloration noted on facial skin         [] Abnormal -            Psychiatric:       [x] Normal Affect [] Abnormal -        [x] No Hallucinations    Other pertinent observable physical exam findings:-        We discussed the expected course, resolution and complications of the diagnosis(es) in detail. Medication risks, benefits, costs, interactions, and alternatives were discussed as indicated. I advised her to contact the office if her condition worsens, changes or fails to improve as anticipated. She expressed understanding with the diagnosis(es) and plan.        Bela Bacon, who was evaluated through a patient-initiated, synchronous (real-time) audio-video encounter, and/or her healthcare decision maker, is aware that it is a billable service, with coverage as determined by her insurance carrier. She provided verbal consent to proceed: Yes, and patient identification was verified. It was conducted pursuant to the emergency declaration under the [address] [place] [address], [address] [place] [address] authority and the [employer] General Act. A caregiver was present when appropriate. Ability to conduct physical exam was limited. I was at home. The patient was at home.       Lew Dhillon, CAL

## 2020-11-04 NOTE — PROGRESS NOTES
Kamini Webster is a 39 y.o. female who will be using a cell phone for today's VV. 1. Have you been to the ER, urgent care clinic since your last visit? Hospitalized since your last visit? No    2. Have you seen or consulted any other health care providers outside of the [address] [place] [address] since your last visit? Include any pap smears or colon screening.  No     This will be the cell phone number the patient will be using [phone]

## 2020-11-23 ENCOUNTER — TELEPHONE (OUTPATIENT)
Dept: PULMONOLOGY | Age: 45
End: 2020-11-23

## 2020-11-23 RX ORDER — AZITHROMYCIN 250 MG/1
TABLET, FILM COATED ORAL
Qty: 6 TAB | Refills: 0 | Status: SHIPPED | OUTPATIENT
Start: 2020-11-23 | End: 2021-03-20

## 2020-11-23 RX ORDER — PREDNISONE 10 MG/1
TABLET ORAL
Qty: 30 TAB | Refills: 0 | Status: SHIPPED | OUTPATIENT
Start: 2020-11-23 | End: 2021-03-20

## 2020-11-23 NOTE — TELEPHONE ENCOUNTER
Patient started 3 days ago with cough and chest congestion. Coughing up yellowish thick sputum. SOB. No fever.

## 2020-11-23 NOTE — TELEPHONE ENCOUNTER
Pt requesting to speak w/ nurse. Coughing up yellow mucous, taking mucionex and nebulizer treatments. Please advise [phone].

## 2020-11-24 ENCOUNTER — TELEPHONE (OUTPATIENT)
Dept: PULMONOLOGY | Age: 45
End: 2020-11-24

## 2020-11-24 RX ORDER — FLUCONAZOLE 200 MG/1
200 TABLET ORAL DAILY
Qty: 7 TAB | Refills: 0 | Status: SHIPPED | OUTPATIENT
Start: 2020-11-24 | End: 2020-12-01

## 2020-11-24 NOTE — TELEPHONE ENCOUNTER
Dr. Ronnie Dolan put pt on antibiotic and it is now causing her a yeast infection. She asked to have something called in to the [facility] on [address].  Please call [phone]

## 2020-11-27 ENCOUNTER — CLINICAL SUPPORT (OUTPATIENT)
Dept: PULMONOLOGY | Age: 45
End: 2020-11-27
Payer: MEDICAID

## 2020-11-27 DIAGNOSIS — J45.50 SEVERE PERSISTENT ASTHMA WITHOUT COMPLICATION: Primary | ICD-10-CM

## 2020-11-27 PROCEDURE — 96401 CHEMO ANTI-NEOPL SQ/IM: CPT | Performed by: INTERNAL MEDICINE

## 2020-11-27 NOTE — PATIENT INSTRUCTIONS
Be aware that \"Anaphylaxis\" can occur any time after receiving a Xolair injection Anaphylaxis presenting a bronchospasm, hypotension (low BP), dizziness, faintness, hives, and/or swelling of the tongue and throat. Anaphylaxis can occur after the first dose, but also has occurred beyond one year after beginning treatment with Xolair. *If such symptoms occur, use your EpiPen immediately and call 911. * Notify your MD office following any treatment of possible reaction. Epinephrine (Injection) Epinephrine (jy-v-AXI-rin) Treats severe allergic reactions (including anaphylaxis) in an emergency situation. Brand Name(s):Adrenaclick, Adrenalin, Auvi-Q, EpiPen Auto-Injector, EpiPen Jr Auto-Injector, Epipen, Epipen 2-Jose Juan Auto-Injector, Epipen Jr 2-Jose Juan Auto-Injector, NovaPlus Epinephrine, Twinject There may be other brand names for this medicine. When This Medicine Should Not Be Used: A severe allergic reaction can be life-threatening, so there is no reason this medicine should not be used. How to Use This Medicine:  
Injectable · Your doctor should teach you how and when to inject this medicine. Each injection kit contains a single-use dose of medicine prescribed for you. · Give yourself a shot right away if you start to have a severe allergic reaction. · Inject this medicine into the muscle on the outside of your thigh only. Never inject this medicine into a vein, into your hand or foot, or into the muscles of your buttocks. · Read and follow the patient instructions that come with this medicine. Talk to your doctor or pharmacist if you have any questions. · This medicine might come with an autoinjector  so you can practice giving the medicine before you have an actual allergic reaction. The autoinjector  is gray (for EpiPen® or EpiPen Jr®) or beige (for [place]) and does not contain any medicine or needle.  
· Do not remove the blue safety release (EpiPen® or EpiPen Jr®) or the gray end caps (Adrenaclick®) on the autoinjector until you are ready to use it. Do not put your thumb, fingers, or hand over the orange (EpiPen® or EpiPen Jr®) or red tip (Adrenaclick®). · You may need to use more than one injection if your allergic reaction does not get better after the first shot. Your doctor will give you additional doses if you need more than 2 injections. · You may inject the medicine through your clothing, if you need to. · Some liquid will remain in the autoinjector after the medicine has been injected. This medicine cannot be reused. Give your used autoinjector to your healthcare provider when you seek medical care. · Carry this medicine with you at all times for emergency use in case you have a severe allergic reaction. · Make sure family members or other people you are with know how to inject the medicine in case you are not able to do it yourself. · Check your injection kits regularly to make sure the liquid has not changed color. You should not use the autoinjector if the liquid has changed color, or if there are solids in the liquid. You should not use the autoinjector if the expiration date has passed. · Store the injection kit at room temperature, away from heat, moisture, and direct light. Do not store the medicine in the refrigerator or freezer, or inside a car. · Keep the autoinjector in its case or carrier tube to protect it from damage. This tube is not waterproof. If you accidentally drop it, check for damage or leaks. Drugs and Foods to Avoid: Ask your doctor or pharmacist before using any other medicine, including over-the-counter medicines, vitamins, and herbal products. · Some foods and medicines can affect how epinephrine works.  Tell your doctor if you are using digoxin, levothyroxine, phentolamine, certain allergy medicines (such as chlorpheniramine, diphenhydramine, tripelennamine), a beta blocker medicine (such as propranolol), a heart rhythm medicine, a diuretic (water pill), an MAO inhibitor (MAOI), medicine for depression, or ergot medicines. Warnings While Using This Medicine: · Tell your doctor if you are pregnant or breastfeeding, or if you have asthma, diabetes, heart disease, heart rhythm problems, high blood pressure, an overactive thyroid, or Parkinson disease. · A severe allergic reaction is a medical emergency. Go to an emergency room as soon as possible, even if you feel better after you use this medicine. · Do not inject this medicine into your hands or feet. Go to the emergency room right away if you accidently inject epinephrine into any part of your body other than your thigh. Epinephrine reduces blood flow, and this could damage areas that have small blood vessels, such as hands and feet. · Keep all medicine out of the reach of children. Never share your medicine with anyone. Possible Side Effects While Using This Medicine:  
Call your doctor right away if you notice any of these side effects: 
· Chest pain · Fast, pounding, or uneven heartbeat · Heavy sweating, nausea, vomiting · Tremors, shakiness · Trouble breathing If you notice these less serious side effects, talk with your doctor: · Feeling anxious, nervous, scared, or weak · Headache or dizziness · Pale skin If you notice other side effects that you think are caused by this medicine, tell your doctor. Call your doctor for medical advice about side effects. You may report side effects to FDA at 6-837-FDA-8473 © 2014 [address] is for End User's use only and may not be sold, redistributed or otherwise used for commercial purposes. The above information is an  only. It is not intended as medical advice for individual conditions or treatments. Talk to your doctor, nurse or pharmacist before following any medical regimen to see if it is safe and effective for you.

## 2020-11-27 NOTE — PROGRESS NOTES
Venita Sheikh is here for Xolair injection. No c/os related to past injections. Injected subcutaneous :150 mg in right upper arm. 150 mg in left upper arm. Lot Number: [id number] x 2 syringes  Exp Date: July 2021 x 2 syringes  Total Xolair Dose: 300 mg every 30 days  Epipen is with patient. Yes  Patient observed for 15 minutes. No reaction at injection site. Pt. Has no c/os upon discharge.

## 2020-12-24 ENCOUNTER — CLINICAL SUPPORT (OUTPATIENT)
Dept: PULMONOLOGY | Age: 45
End: 2020-12-24
Payer: MEDICAID

## 2020-12-24 DIAGNOSIS — J45.50 SEVERE PERSISTENT ASTHMA WITHOUT COMPLICATION: Primary | ICD-10-CM

## 2020-12-24 PROCEDURE — 96401 CHEMO ANTI-NEOPL SQ/IM: CPT | Performed by: INTERNAL MEDICINE

## 2020-12-24 NOTE — PROGRESS NOTES
Chief Complaint   Patient presents with   Rahel Villavicencio is here for Xolair injection. Patient did not have any reaction to last injection. Patient states the Xolair is helping her Asthma. Injected subcutaneous : 150 mg given in Left upper arm SQ,  150 mg given in Right upper arm SQ. Lot Number: [id number]  Exp Date: 6/2021  Total Xolair Dose: 300 mg every 4 weeks  Epipen is with patient. Patient observed for 15 minutes. No reaction at injection site. Patient given Xolair reaction sheet.

## 2021-01-09 ENCOUNTER — HOSPITAL ENCOUNTER (EMERGENCY)
Age: 46
Discharge: HOME | End: 2021-01-09
Attending: EMERGENCY MEDICINE
Payer: MEDICAID

## 2021-01-09 VITALS
WEIGHT: 135 LBS | OXYGEN SATURATION: 100 % | BODY MASS INDEX: 22.49 KG/M2 | HEART RATE: 103 BPM | DIASTOLIC BLOOD PRESSURE: 64 MMHG | SYSTOLIC BLOOD PRESSURE: 113 MMHG | HEIGHT: 65 IN | RESPIRATION RATE: 20 BRPM | TEMPERATURE: 98.7 F

## 2021-01-09 DIAGNOSIS — T30.4 CHEMICAL BURN: Primary | ICD-10-CM

## 2021-01-09 PROCEDURE — 74011250636 HC RX REV CODE- 250/636: Performed by: EMERGENCY MEDICINE

## 2021-01-09 PROCEDURE — 96372 THER/PROPH/DIAG INJ SC/IM: CPT

## 2021-01-09 PROCEDURE — 74011250637 HC RX REV CODE- 250/637: Performed by: EMERGENCY MEDICINE

## 2021-01-09 PROCEDURE — 99283 EMERGENCY DEPT VISIT LOW MDM: CPT

## 2021-01-09 RX ORDER — HYDROXYZINE 25 MG/1
TABLET, FILM COATED ORAL
Qty: 30 TAB | Refills: 0 | OUTPATIENT
Start: 2021-01-09 | End: 2021-03-22

## 2021-01-09 RX ORDER — FAMOTIDINE 20 MG/1
20 TABLET, FILM COATED ORAL
Status: COMPLETED | OUTPATIENT
Start: 2021-01-09 | End: 2021-01-09

## 2021-01-09 RX ORDER — METHYLPREDNISOLONE 4 MG/1
TABLET ORAL
Qty: 1 DOSE PACK | Refills: 1 | Status: SHIPPED | OUTPATIENT
Start: 2021-01-09 | End: 2021-03-20

## 2021-01-09 RX ORDER — HYDROXYZINE 50 MG/ML
25 INJECTION, SOLUTION INTRAMUSCULAR
Status: COMPLETED | OUTPATIENT
Start: 2021-01-09 | End: 2021-01-09

## 2021-01-09 RX ORDER — DEXAMETHASONE SODIUM PHOSPHATE 4 MG/ML
4 INJECTION, SOLUTION INTRA-ARTICULAR; INTRALESIONAL; INTRAMUSCULAR; INTRAVENOUS; SOFT TISSUE
Status: DISCONTINUED | OUTPATIENT
Start: 2021-01-09 | End: 2021-01-09

## 2021-01-09 RX ORDER — DEXAMETHASONE SODIUM PHOSPHATE 4 MG/ML
4 INJECTION, SOLUTION INTRA-ARTICULAR; INTRALESIONAL; INTRAMUSCULAR; INTRAVENOUS; SOFT TISSUE
Status: COMPLETED | OUTPATIENT
Start: 2021-01-09 | End: 2021-01-09

## 2021-01-09 RX ADMIN — FAMOTIDINE 20 MG: 20 TABLET ORAL at 21:16

## 2021-01-09 RX ADMIN — HYDROXYZINE HYDROCHLORIDE 25 MG: 50 INJECTION, SOLUTION INTRAMUSCULAR at 21:16

## 2021-01-09 RX ADMIN — DEXAMETHASONE SODIUM PHOSPHATE 4 MG: 4 INJECTION, SOLUTION INTRAMUSCULAR; INTRAVENOUS at 21:17

## 2021-01-10 NOTE — ED NOTES
9:54 PM  01/09/21     Discharge instructions given to Allen Jones (name) with verbalization of understanding. Patient accompanied by self. Patient discharged with the following prescriptions hydroxyzine & methylprednisolone. Patient discharged to home (destination).       Kenia Garcia

## 2021-01-10 NOTE — ED TRIAGE NOTES
Pt c/o burning, itching, and rash to head and neck after getting hair colored and relaxed yesterday. Pt reports no difficulty breathing or swallowing; reports taking benadryl approx. 12pm today & applying hydrocortisone cream to head.

## 2021-01-10 NOTE — ED PROVIDER NOTES
HPI Pt c/o burning, itching, and rash to head and neck after getting hair colored and relaxed yesterday. She denies having  difficulty breathing, SOB or difficuliy swallowing. She reports taking benadryl approx. 12pm today & applying hydrocortisone cream to head with no relief.     Past Medical History:   Diagnosis Date    Anxiety attack     Asthma     Asthmatic bronchitis     Bronchitis     Catamenial disorder     Hernia     Influenza     Migraine     Migraines     Mild asthma     Psychiatric disorder     anxiety     Torticollis        Past Surgical History:   Procedure Laterality Date    HX BACK SURGERY      HX  SECTION  2009    MA  DELIVERY ONLY           Family History:   Problem Relation Age of Onset    Heart Disease Other         grandmothers    Asthma Brother     Lung Disease Paternal Aunt         cronic bronchitis    Asthma Paternal Grandmother     High Cholesterol Mother        Social History     Socioeconomic History    Marital status:      Spouse name: Not on file    Number of children: Not on file    Years of education: Not on file    Highest education level: Not on file   Occupational History    Not on file   Social Needs    Financial resource strain: Not on file    Food insecurity     Worry: Not on file     Inability: Not on file    Transportation needs     Medical: Not on file     Non-medical: Not on file   Tobacco Use    Smoking status: Never Smoker    Smokeless tobacco: Never Used   Substance and Sexual Activity    Alcohol use: No    Drug use: No    Sexual activity: Yes     Partners: Male   Lifestyle    Physical activity     Days per week: Not on file     Minutes per session: Not on file    Stress: Not on file   Relationships    Social connections     Talks on phone: Not on file     Gets together: Not on file     Attends Confucianism service: Not on file     Active member of club or organization: Not on file     Attends meetings of clubs or organizations: Not on file     Relationship status: Not on file    Intimate partner violence     Fear of current or ex partner: Not on file     Emotionally abused: Not on file     Physically abused: Not on file     Forced sexual activity: Not on file   Other Topics Concern    Not on file   Social History Narrative    Not on file         ALLERGIES: Aspirin, Ibuprofen, Pcn [penicillins], Claritin [loratadine], Phenergan [promethazine], Seafood, and Shellfish containing products    Review of Systems   Constitutional: Negative. HENT: Negative. Eyes: Negative. Respiratory: Negative. Cardiovascular: Negative. Gastrointestinal: Negative. Endocrine: Negative. Genitourinary: Negative. Musculoskeletal: Negative. Skin: Negative. (+) burning sensation of scalp   Allergic/Immunologic: Negative. Neurological: Negative. Hematological: Negative. Psychiatric/Behavioral: Negative. All other systems reviewed and are negative. Vitals:    01/09/21 2046   BP: 113/64   Pulse: (!) 103   Resp: 20   Temp: 98.7 °F (37.1 °C)   SpO2: 100%   Weight: 61.2 kg (135 lb)   Height: 5' 5\" (1.651 m)            Physical Exam  Vitals signs and nursing note reviewed. Constitutional:       General: She is not in acute distress. Appearance: She is well-developed. She is not diaphoretic. HENT:      Head: Normocephalic. Comments: (+) multiple areas of erythema on the scalp. No bullae. Right Ear: External ear normal.      Left Ear: External ear normal.      Mouth/Throat:      Pharynx: No oropharyngeal exudate. Eyes:      General: No scleral icterus. Right eye: No discharge. Left eye: No discharge. Conjunctiva/sclera: Conjunctivae normal.      Pupils: Pupils are equal, round, and reactive to light. Neck:      Musculoskeletal: Normal range of motion and neck supple. Thyroid: No thyromegaly. Vascular: No JVD. Trachea: No tracheal deviation. Cardiovascular:      Rate and Rhythm: Normal rate and regular rhythm. Heart sounds: Normal heart sounds. No murmur. No friction rub. No gallop. Pulmonary:      Effort: Pulmonary effort is normal. No respiratory distress. Breath sounds: Normal breath sounds. No stridor. No wheezing or rales. Chest:      Chest wall: No tenderness. Abdominal:      General: Bowel sounds are normal. There is no distension. Palpations: Abdomen is soft. There is no mass. Tenderness: There is no abdominal tenderness. There is no guarding or rebound. Musculoskeletal: Normal range of motion. General: No tenderness. Lymphadenopathy:      Cervical: No cervical adenopathy. Skin:     General: Skin is warm and dry. Coloration: Skin is not pale. Findings: No erythema or rash. Neurological:      Mental Status: She is alert and oriented to person, place, and time. Cranial Nerves: No cranial nerve deficit. Motor: No abnormal muscle tone. Coordination: Coordination normal.      Deep Tendon Reflexes: Reflexes normal.          MDM       Procedures    Dx: chemical burn    Disp: D/C  Home. Medrol dose pack, atarax. F/U PCP or return to ER prn. Dictation disclaimer:  Please note that this dictation was completed with [employer], the [employer] voice recognition software. Quite often unanticipated grammatical, syntax, homophones, and other interpretive errors are inadvertently transcribed by the computer software. Please disregard these errors. Please excuse any errors that have escaped final proofreading.

## 2021-01-21 ENCOUNTER — CLINICAL SUPPORT (OUTPATIENT)
Dept: PULMONOLOGY | Age: 46
End: 2021-01-21
Payer: MEDICAID

## 2021-01-21 DIAGNOSIS — J45.50 SEVERE PERSISTENT ASTHMA WITHOUT COMPLICATION: Primary | ICD-10-CM

## 2021-01-21 PROCEDURE — 96372 THER/PROPH/DIAG INJ SC/IM: CPT | Performed by: INTERNAL MEDICINE

## 2021-01-28 ENCOUNTER — TELEPHONE (OUTPATIENT)
Dept: PULMONOLOGY | Age: 46
End: 2021-01-28

## 2021-02-08 ENCOUNTER — TELEPHONE (OUTPATIENT)
Dept: PULMONOLOGY | Age: 46
End: 2021-02-08

## 2021-02-08 DIAGNOSIS — J45.50 SEVERE PERSISTENT ASTHMA WITHOUT COMPLICATION: Primary | ICD-10-CM

## 2021-02-08 RX ORDER — OMALIZUMAB 150 MG/ML
300 INJECTION, SOLUTION SUBCUTANEOUS
Qty: 2 ML | Refills: 11 | Status: CANCELLED | OUTPATIENT
Start: 2021-02-08

## 2021-02-08 NOTE — TELEPHONE ENCOUNTER
Chace Keating from [place] [id number] called requesting new order for [employer]. Pt schd for next injection 2/19/21.  Please fax order to [phone]

## 2021-02-10 RX ORDER — OMALIZUMAB 150 MG/ML
INJECTION, SOLUTION SUBCUTANEOUS
Qty: 300 ML | Refills: 5 | Status: SHIPPED | OUTPATIENT
Start: 2021-02-10 | End: 2021-09-10

## 2021-02-19 ENCOUNTER — CLINICAL SUPPORT (OUTPATIENT)
Dept: PULMONOLOGY | Age: 46
End: 2021-02-19
Payer: MEDICAID

## 2021-02-19 VITALS — TEMPERATURE: 98.6 F

## 2021-02-19 DIAGNOSIS — J45.50 SEVERE PERSISTENT ASTHMA WITHOUT COMPLICATION: Primary | ICD-10-CM

## 2021-02-19 PROCEDURE — 96372 THER/PROPH/DIAG INJ SC/IM: CPT | Performed by: INTERNAL MEDICINE

## 2021-02-19 NOTE — PROGRESS NOTES
Chief Complaint   Patient presents with   Antwan Court is here for Xolair injection. Patient did not have any reaction to last injection. Patient states the Xolair is helping her Asthma. Injected subcutaneous : 150 mg given in Left upper arm SQ,  150 mg given in Right upper arm SQ. Lot Number: [id number]  Exp Date: 8/2021  Total Xolair Dose: 300 mg every 4 weeks  Epipen is with patient. Patient observed for 15 minutes. No reaction at injection site. Patient given Xolair reaction sheet.

## 2021-02-26 ENCOUNTER — HOSPITAL ENCOUNTER (EMERGENCY)
Age: 46
Discharge: HOME | End: 2021-02-26
Attending: EMERGENCY MEDICINE
Payer: MEDICAID

## 2021-02-26 VITALS
HEIGHT: 65 IN | HEART RATE: 101 BPM | RESPIRATION RATE: 18 BRPM | WEIGHT: 135 LBS | SYSTOLIC BLOOD PRESSURE: 111 MMHG | DIASTOLIC BLOOD PRESSURE: 70 MMHG | OXYGEN SATURATION: 99 % | TEMPERATURE: 99.1 F | BODY MASS INDEX: 22.49 KG/M2

## 2021-02-26 DIAGNOSIS — K02.9 DENTAL CARIES: Primary | ICD-10-CM

## 2021-02-26 DIAGNOSIS — R50.9 FEVER, UNSPECIFIED FEVER CAUSE: ICD-10-CM

## 2021-02-26 PROCEDURE — 99283 EMERGENCY DEPT VISIT LOW MDM: CPT

## 2021-02-26 PROCEDURE — 74011250637 HC RX REV CODE- 250/637: Performed by: EMERGENCY MEDICINE

## 2021-02-26 RX ORDER — ACETAMINOPHEN 325 MG/1
650 TABLET ORAL
Status: COMPLETED | OUTPATIENT
Start: 2021-02-26 | End: 2021-02-26

## 2021-02-26 RX ORDER — ONDANSETRON 4 MG/1
4 TABLET, ORALLY DISINTEGRATING ORAL
Qty: 14 TAB | Refills: 0 | Status: SHIPPED | OUTPATIENT
Start: 2021-02-26 | End: 2021-03-17 | Stop reason: SDUPTHER

## 2021-02-26 RX ORDER — HYDROCODONE BITARTRATE AND ACETAMINOPHEN 5; 325 MG/1; MG/1
1 TABLET ORAL
Qty: 12 TAB | Refills: 0 | Status: SHIPPED | OUTPATIENT
Start: 2021-02-26 | End: 2021-03-05

## 2021-02-26 RX ORDER — CLINDAMYCIN HYDROCHLORIDE 150 MG/1
300 CAPSULE ORAL
Status: COMPLETED | OUTPATIENT
Start: 2021-02-26 | End: 2021-02-26

## 2021-02-26 RX ORDER — CLINDAMYCIN HYDROCHLORIDE 300 MG/1
300 CAPSULE ORAL 2 TIMES DAILY
Qty: 14 CAP | Refills: 0 | Status: SHIPPED | OUTPATIENT
Start: 2021-02-26 | End: 2021-03-05

## 2021-02-26 RX ORDER — ACETAMINOPHEN 325 MG/1
975 TABLET ORAL
Status: DISCONTINUED | OUTPATIENT
Start: 2021-02-26 | End: 2021-02-26

## 2021-02-26 RX ORDER — ONDANSETRON 4 MG/1
4 TABLET, ORALLY DISINTEGRATING ORAL
Status: COMPLETED | OUTPATIENT
Start: 2021-02-26 | End: 2021-02-26

## 2021-02-26 RX ORDER — HYDROCODONE BITARTRATE AND ACETAMINOPHEN 5; 325 MG/1; MG/1
1 TABLET ORAL
Status: COMPLETED | OUTPATIENT
Start: 2021-02-26 | End: 2021-02-26

## 2021-02-26 RX ADMIN — CLINDAMYCIN HYDROCHLORIDE 300 MG: 150 CAPSULE ORAL at 03:10

## 2021-02-26 RX ADMIN — HYDROCODONE BITARTRATE AND ACETAMINOPHEN 1 TABLET: 5; 325 TABLET ORAL at 03:10

## 2021-02-26 RX ADMIN — ACETAMINOPHEN 650 MG: 325 TABLET ORAL at 03:10

## 2021-02-26 RX ADMIN — ONDANSETRON 4 MG: 4 TABLET, ORALLY DISINTEGRATING ORAL at 03:44

## 2021-02-26 NOTE — ED NOTES
Patient up for discharge. Discharge results have been reviewed with patient by the Provider. Armband removed and shredded per policy. Encouraged to voice any concerns, and all concerns addressed. Patient discharged in stable condition with no apparent distress. Current Discharge Medication List      START taking these medications    Details   clindamycin (CLEOCIN) 300 mg capsule Take 1 Cap by mouth two (2) times a day for 7 days. Qty: 14 Cap, Refills: 0      HYDROcodone-acetaminophen (NORCO) 5-325 mg per tablet Take 1 Tab by mouth every six (6) hours as needed for Pain for up to 7 days. Max Daily Amount: 4 Tabs. Qty: 12 Tab, Refills: 0    Associated Diagnoses: Dental caries      ondansetron (Zofran ODT) 4 mg disintegrating tablet Take 1 Tab by mouth every eight (8) hours as needed for Nausea.   Qty: 14 Tab, Refills: 0

## 2021-02-26 NOTE — ED NOTES
Patient states improvement to her pain, but \" a little nauseated\"; Provider informed.  Temp now 99.1

## 2021-02-26 NOTE — ED PROVIDER NOTES
Pt c/o left lower dental pain, started one week ago, gradually worsening. Took tyl for it, last 7-8 hours pta. On diff swallowing, no neck pain or facial pain/swelling. No rash. No injury. No cough or fever noted pta. No chills. Had 2nd covid vaccine earlier today. Mild body aches afterwards. No arm pain or swelling/rash at injection site. No urinary changes. No chance of current pregnancy per pt.             Past Medical History:   Diagnosis Date    Anxiety attack     Asthma     Asthmatic bronchitis     Bronchitis     Catamenial disorder     Hernia     Influenza     Migraine     Migraines     Mild asthma     Psychiatric disorder     anxiety     Torticollis        Past Surgical History:   Procedure Laterality Date    HX BACK SURGERY      HX  SECTION  2009    [place]  DELIVERY ONLY           Family History:   Problem Relation Age of Onset    Heart Disease Other         grandmothers    Asthma Brother     Lung Disease Paternal Aunt         cronic bronchitis    Asthma Paternal Grandmother     High Cholesterol Mother        Social History     Socioeconomic History    Marital status:      Spouse name: Not on file    Number of children: Not on file    Years of education: Not on file    Highest education level: Not on file   Occupational History    Not on file   Social Needs    Financial resource strain: Not on file    Food insecurity     Worry: Not on file     Inability: Not on file    Transportation needs     Medical: Not on file     Non-medical: Not on file   Tobacco Use    Smoking status: Never Smoker    Smokeless tobacco: Never Used   Substance and Sexual Activity    Alcohol use: No    Drug use: No    Sexual activity: Yes     Partners: Male   Lifestyle    Physical activity     Days per week: Not on file     Minutes per session: Not on file    Stress: Not on file   Relationships    Social connections     Talks on phone: Not on file     Gets together: Not on file     Attends Orthodoxy service: Not on file     Active member of club or organization: Not on file     Attends meetings of clubs or organizations: Not on file     Relationship status: Not on file    Intimate partner violence     Fear of current or ex partner: Not on file     Emotionally abused: Not on file     Physically abused: Not on file     Forced sexual activity: Not on file   Other Topics Concern    Not on file   Social History Narrative    Not on file         ALLERGIES: Aspirin, Ibuprofen, Pcn [penicillins], Claritin [loratadine], Phenergan [promethazine], Seafood, and Shellfish containing products    Review of Systems   Constitutional: Negative for fever. HENT: Positive for dental problem. Negative for congestion and trouble swallowing. Respiratory: Negative for cough and shortness of breath. Cardiovascular: Negative for chest pain. Gastrointestinal: Negative for abdominal pain, nausea and vomiting. Musculoskeletal: Negative for back pain. Skin: Negative for rash. Neurological: Negative for light-headedness. All other systems reviewed and are negative. Vitals:    02/26/21 0239 02/26/21 0340 02/26/21 0342   BP: 111/70     Pulse: (!) 101     Resp: 18     Temp: (!) 100.5 °F (38.1 °C) 99.1 °F (37.3 °C)    SpO2: 100%  99%   Weight: 61.2 kg (135 lb)     Height: 5' 5\" (1.651 m)              Physical Exam  Vitals signs and nursing note reviewed. Constitutional:       Appearance: She is well-developed. She is not diaphoretic. HENT:      Head: Normocephalic and atraumatic. Comments: + ttp tooth 18 w mild surrounding gingival erythema/swelling. No drainage/induration/fluctuance. No pharyngeal erythema/swelling. No facial/neck swelling/ttp     Nose: Nose normal.      Mouth/Throat:      Mouth: Mucous membranes are moist.   Eyes:      Pupils: Pupils are equal, round, and reactive to light. Neck:      Musculoskeletal: Normal range of motion.    Cardiovascular:      Rate and Rhythm: Normal rate and regular rhythm. Heart sounds: No murmur. Pulmonary:      Effort: Pulmonary effort is normal.      Breath sounds: No wheezing. Abdominal:      Palpations: Abdomen is soft. Tenderness: There is no abdominal tenderness. Musculoskeletal: Normal range of motion. General: No tenderness. Skin:     General: Skin is dry. Capillary Refill: Capillary refill takes less than 2 seconds. Findings: No rash. Neurological:      Mental Status: She is alert and oriented to person, place, and time. Psychiatric:         Mood and Affect: Mood normal.          MDM       Procedures    Vitals:  Patient Vitals for the past 12 hrs:   Temp Pulse Resp BP SpO2   02/26/21 0342     99 %   02/26/21 0340 99.1 °F (37.3 °C)       02/26/21 0239 (!) 100.5 °F (38.1 °C) (!) 101 18 111/70 100 %         Medications ordered:   Medications   HYDROcodone-acetaminophen (NORCO) 5-325 mg per tablet 1 Tab (1 Tab Oral Given 2/26/21 0310)   acetaminophen (TYLENOL) tablet 650 mg (650 mg Oral Given 2/26/21 0310)   clindamycin (CLEOCIN) capsule 300 mg (300 mg Oral Given 2/26/21 0310)   ondansetron (ZOFRAN ODT) tablet 4 mg (4 mg Oral Given 2/26/21 0344)         Lab findings:  No results found for this or any previous visit (from the past 12 hour(s)). X-Ray, CT or other radiology findings or impressions:  No orders to display       Progress notes, Consult notes or additional Procedure notes:    3:57 AM no s/o abscess/ludwigs/pta/sepsis. Afebrile on recheck, pt w local infection only, and mild, poss post vaccination fever, pt declines further ed eval or testing, improved on recheck, req dc. To dc w detailed ret inst given. Diagnosis:   1. Dental caries    2.  Fever, unspecified fever cause        Disposition: home    Follow-up Information     Follow up With Specialties Details Why Contact Info    [facility] EMERGENCY DEPT Emergency Medicine Go to  As needed [address] [facility]  [phone]    Wanda Seo NP Nurse Practitioner   [address] [place] [address]  Schedule an appointment as soon as possible for a visit in 2 days  MINGO Torres           Patient's Medications   Start Taking    CLINDAMYCIN (CLEOCIN) 300 MG CAPSULE    Take 1 Cap by mouth two (2) times a day for 7 days. HYDROCODONE-ACETAMINOPHEN (NORCO) 5-325 MG PER TABLET    Take 1 Tab by mouth every six (6) hours as needed for Pain for up to 7 days. Max Daily Amount: 4 Tabs. ONDANSETRON (ZOFRAN ODT) 4 MG DISINTEGRATING TABLET    Take 1 Tab by mouth every eight (8) hours as needed for Nausea. Continue Taking    ALBUTEROL (PROVENTIL HFA, VENTOLIN HFA, PROAIR HFA) 90 MCG/ACTUATION INHALER    Take 2 Puffs by inhalation every four (4) hours as needed for Wheezing. ALBUTEROL-IPRATROPIUM (DUO-NEB) 2.5 MG-0.5 MG/3 ML NEBU    INHALE CONTENTS OF 1 VIAL VIA NEBULIZER EVERY 4 HOURS AS NEEDED FOR SHORTNESS OF BREATH/WHEEZING. AZITHROMYCIN (ZITHROMAX) 250 MG TABLET    Take 2 tabs on day one, then 1 tab daily for 4 days    FEXOFENADINE-PSEUDOEPHEDRINE (ALLEGRA-D 12 HOUR)  MG TB12    Take 1 Tab by mouth every twelve (12) hours. FLUTICASONE PROPION-SALMETEROL (ADVAIR DISKUS) 500-50 MCG/DOSE DISKUS INHALER    Take 1 Puff by inhalation two (2) times a day. FLUTICASONE PROPIONATE (FLONASE) 50 MCG/ACTUATION NASAL SPRAY    USE 2 SPRAYS INTO EACH NOSTRIL ONCE DAILY    HYDROXYZINE HCL (ATARAX) 25 MG TABLET    Take 1-2 tablets by mouth every 6 hours as needed. LORAZEPAM (ATIVAN) 0.5 MG TABLET    Take 0.5 mg by mouth every eight (8) hours as needed for Anxiety.     METHYLPREDNISOLONE (MEDROL, GALE,) 4 MG TABLET    Per dose pack instructions    MONTELUKAST (SINGULAIR) 10 MG TABLET    TAKE 1 TABLET BY MOUTH EVERY DAY    OMALIZUMAB (XOLAIR) 150 MG SOLR    300 mg by SubCUTAneous route every thirty (30) days. PREDNISONE (DELTASONE) 10 MG TABLET    40mg x 3 days, 30mg x 3 days, 20mg x 3 days, then 10mg x 3 days    RIZATRIPTAN (MAXALT) 10 MG TABLET    May repeat in 2 hours if needed. Max 2 doses in 24 hours. TOPIRAMATE (TOPAMAX) 25 MG TABLET    Take 3 tabs by mouth nightly. Can lower to 2 tabs by mouth nightly after 1 week. XOLAIR 150 MG/ML SYRG    INJECT 300MG (150MG/1ML X 2 syringe) SUBCUTANEOUSLY EVERY 30 DAYS. MAX 150MG PER INJECTION SITE.    These Medications have changed    No medications on file   Stop Taking    No medications on file

## 2021-02-26 NOTE — ED TRIAGE NOTES
Patient present with c/o dental pain left lower back molar; unable to see dentist until April.  Patient with fever in triage (100.5) Last Tylenol at 8pm.

## 2021-02-26 NOTE — DISCHARGE INSTRUCTIONS
Return for pain, swelling, any difficulty swallowing, fever not resolving with motrin or tylenol, shortness of breath, vomiting, decreased fluid intake, weakness, numbness, dizziness, or any change or concerns.

## 2021-03-01 ENCOUNTER — VIRTUAL VISIT (OUTPATIENT)
Dept: NEUROLOGY | Age: 46
End: 2021-03-01

## 2021-03-01 NOTE — PROGRESS NOTES
Miri Damon is a 39 y.o. female who will be using a cell phone for today's VV    1. Have you been to the ER, urgent care clinic since your last visit? Hospitalized since your last visit? No    2. Have you seen or consulted any other health care providers outside of the [address] [place] [address] since your last visit? Include any pap smears or colon screening.  No       This will be the cell phone number [phone]

## 2021-03-03 ENCOUNTER — TELEPHONE (OUTPATIENT)
Dept: PULMONOLOGY | Age: 46
End: 2021-03-03

## 2021-03-03 RX ORDER — MONTELUKAST SODIUM 10 MG/1
TABLET ORAL
Qty: 90 TAB | Refills: 1 | Status: SHIPPED | OUTPATIENT
Start: 2021-03-03 | End: 2021-12-10 | Stop reason: SDUPTHER

## 2021-03-08 ENCOUNTER — DOCUMENTATION ONLY (OUTPATIENT)
Dept: PULMONOLOGY | Age: 46
End: 2021-03-08

## 2021-03-15 RX ORDER — FLUTICASONE PROPIONATE AND SALMETEROL 500; 50 UG/1; UG/1
1 POWDER RESPIRATORY (INHALATION) 2 TIMES DAILY
Qty: 1 INHALER | Refills: 5 | Status: SHIPPED | OUTPATIENT
Start: 2021-03-15 | End: 2021-05-13 | Stop reason: CLARIF

## 2021-03-15 RX ORDER — FLUTICASONE PROPIONATE 50 MCG
SPRAY, SUSPENSION (ML) NASAL
Qty: 1 BOTTLE | Refills: 5 | Status: SHIPPED | OUTPATIENT
Start: 2021-03-15 | End: 2022-08-19 | Stop reason: SDUPTHER

## 2021-03-15 NOTE — TELEPHONE ENCOUNTER
Pt requesting refill for Advair and Flonase going to [facility] Please advise [phone].

## 2021-03-17 ENCOUNTER — OFFICE VISIT (OUTPATIENT)
Dept: NEUROLOGY | Age: 46
End: 2021-03-17
Payer: MEDICAID

## 2021-03-17 VITALS
TEMPERATURE: 97 F | HEART RATE: 116 BPM | WEIGHT: 128 LBS | SYSTOLIC BLOOD PRESSURE: 90 MMHG | RESPIRATION RATE: 18 BRPM | HEIGHT: 65 IN | OXYGEN SATURATION: 98 % | DIASTOLIC BLOOD PRESSURE: 60 MMHG | BODY MASS INDEX: 21.33 KG/M2

## 2021-03-17 DIAGNOSIS — N92.6 CATAMENIAL DISORDER: ICD-10-CM

## 2021-03-17 DIAGNOSIS — G43.001 MIGRAINE WITHOUT AURA AND WITH STATUS MIGRAINOSUS, NOT INTRACTABLE: Primary | ICD-10-CM

## 2021-03-17 PROCEDURE — 99214 OFFICE O/P EST MOD 30 MIN: CPT | Performed by: NURSE PRACTITIONER

## 2021-03-17 RX ORDER — ONDANSETRON 4 MG/1
4 TABLET, ORALLY DISINTEGRATING ORAL
Qty: 30 TAB | Refills: 1 | Status: SHIPPED | OUTPATIENT
Start: 2021-03-17 | End: 2021-10-01 | Stop reason: SDUPTHER

## 2021-03-17 RX ORDER — TOPIRAMATE 25 MG/1
25 TABLET ORAL 2 TIMES DAILY
Qty: 60 TAB | Refills: 5 | Status: SHIPPED | OUTPATIENT
Start: 2021-03-17 | End: 2021-08-13 | Stop reason: SDUPTHER

## 2021-03-17 RX ORDER — RIZATRIPTAN BENZOATE 10 MG/1
TABLET, ORALLY DISINTEGRATING ORAL
Qty: 12 TAB | Refills: 5 | Status: SHIPPED | OUTPATIENT
Start: 2021-03-17 | End: 2021-09-24 | Stop reason: SDUPTHER

## 2021-03-17 NOTE — PROGRESS NOTES
Guero Montelongo presents today for   Chief Complaint   Patient presents with    Migraine     follow up       Is someone accompanying this pt? no    Is the patient using any DME equipment during [address]? no    Depression Screening:  3 most recent PHQ Screens 2/25/2020   Little interest or pleasure in doing things Not at all   Feeling down, depressed, irritable, or hopeless Not at all   Total Score PHQ 2 0       Learning Assessment:  Learning Assessment 2/25/2020   PRIMARY LEARNER Patient   PRIMARY LANGUAGE ENGLISH   LEARNER PREFERENCE PRIMARY READING     LISTENING     DEMONSTRATION   ANSWERED BY Patient     -   RELATIONSHIP SELF       Abuse Screening:  No flowsheet data found. Fall Risk  No flowsheet data found. Coordination of Care:  1. Have you been to the ER, urgent care clinic since your last visit? Hospitalized since your last visit? Yes, HV    2. Have you seen or consulted any other health care providers outside of the [address] [place] [address] since your last visit? Include any pap smears or colon screening.  no

## 2021-03-17 NOTE — PROGRESS NOTES
[facility]  [address] [facility], [address], [place], [address]  [address] Dustin [place]opes, [address]  Office:  [phone]  Fax: [phone]  Chief Complaint   Patient presents with    Migraine     follow up       This is a 47-year-old female who presents for follow-up migraines. Last seen in office 2020 by CAL Castro. In the interim endorses doing pretty well. Maintained on Topamax 50 mg and Maxalt as needed. She thinks she should go down to the 25 mg of Topamax. Feels migraines are doing better. Uses Maxalt for bad headaches days. Using this 3 times a month. Headaches can occur around her menstrual cycle. She will take this and lie down. She may need to repeat a dose. Some nausea using Zofran. Denies focal deficits. Denies lightheadedness, weakness, or visual disturbance. No other concerns at this time. Past Medical History:   Diagnosis Date    Anxiety attack     Asthma     Asthmatic bronchitis     Bronchitis     Catamenial disorder     Hernia     Influenza     Migraine     Migraines     Mild asthma     Psychiatric disorder     anxiety     Torticollis        Past Surgical History:   Procedure Laterality Date    HX BACK SURGERY      HX  SECTION      [place]  DELIVERY ONLY         Current Outpatient Medications   Medication Sig Dispense Refill    rizatriptan (MAXALT-MLT) 10 mg disintegrating tablet Place one tab under the tongue at headache onset. May repeat x 1 after 2 hours. Max two tabs in 24 hours. 12 Tab 5    ondansetron (Zofran ODT) 4 mg disintegrating tablet Take 1 Tab by mouth every eight (8) hours as needed for Nausea. 30 Tab 1    topiramate (TOPAMAX) 25 mg tablet Take 1 Tab by mouth two (2) times a day. 60 Tab 5    fluticasone propion-salmeteroL (Advair Diskus) 500-50 mcg/dose diskus inhaler Take 1 Puff by inhalation two (2) times a day.  1 Inhaler 5    fluticasone propionate (FLONASE) 50 mcg/actuation nasal spray USE 2 SPRAYS INTO EACH NOSTRIL ONCE DAILY 1 Bottle 5    montelukast (SINGULAIR) 10 mg tablet TAKE 1 TABLET BY MOUTH EVERY DAY 90 Tab 1    Xolair 150 mg/mL syrg INJECT 300MG (150MG/1ML X 2 syringe) SUBCUTANEOUSLY EVERY 30 DAYS. MAX 150MG PER INJECTION SITE. 300 mL 5    albuterol (PROVENTIL HFA, VENTOLIN HFA, PROAIR HFA) 90 mcg/actuation inhaler Take 2 Puffs by inhalation every four (4) hours as needed for Wheezing. 1 Inhaler 5    albuterol-ipratropium (DUO-NEB) 2.5 mg-0.5 mg/3 ml nebu INHALE CONTENTS OF 1 VIAL VIA NEBULIZER EVERY 4 HOURS AS NEEDED FOR SHORTNESS OF BREATH/WHEEZING. 180 mL 5    LORazepam (ATIVAN) 0.5 mg tablet Take 0.5 mg by mouth every eight (8) hours as needed for Anxiety.  fexofenadine-pseudoephedrine (Allegra-D 12 Hour)  mg Tb12 Take 1 Tab by mouth every twelve (12) hours.  methylPREDNISolone (Medrol, Jose Juan,) 4 mg tablet Per dose pack instructions 1 Dose Pack 1    hydrOXYzine HCL (ATARAX) 25 mg tablet Take 1-2 tablets by mouth every 6 hours as needed.  30 Tab 0    azithromycin (ZITHROMAX) 250 mg tablet Take 2 tabs on day one, then 1 tab daily for 4 days 6 Tab 0    predniSONE (DELTASONE) 10 mg tablet 40mg x 3 days, 30mg x 3 days, 20mg x 3 days, then 10mg x 3 days 30 Tab 0     Current Facility-Administered Medications   Medication Dose Route Frequency Provider Last Rate Last Admin    omalizumab (XOLAIR) SQ syringe 300 mg  300 mg SubCUTAneous Q30D aTl Carreon MD   300 mg at 02/19/21 1602    omalizumab (XOLAIR) SQ syringe 300 mg  300 mg SubCUTAneous Q30D Tal Carreon MD   300 mg at 01/21/21 1224    omalizumab (XOLAIR) SQ syringe 300 mg  300 mg SubCUTAneous Q30D Megan WOMACK MD   300 mg at 12/24/20 1445        Allergies   Allergen Reactions    Aspirin Anaphylaxis    Ibuprofen Anaphylaxis    Pcn [Penicillins] Itching    Claritin [Loratadine] Anxiety and Cough    Phenergan [Promethazine] Anxiety and Swelling    Seafood Swelling    Shellfish Containing Products Hives Social History     Tobacco Use    Smoking status: Never Smoker    Smokeless tobacco: Never Used   Substance Use Topics    Alcohol use: No    Drug use: No       Family History   Problem Relation Age of Onset    Heart Disease Other         grandmothers    Asthma Brother     Lung Disease Paternal Aunt         cronic bronchitis    Asthma Paternal Grandmother     High Cholesterol Mother        Review of Systems  GENERAL: Denies fever or fatigue  CARDIAC: No CP or SOB  PULMONARY: No cough of SOB  MUSCULOSKELETAL: No new joint pain  NEURO: SEE HPI    Examination  Visit Vitals  BP 90/60 (BP 1 Location: Left upper arm, BP Patient Position: Sitting, BP Cuff Size: Adult)   Pulse (!) 116   Temp 97 °F (36.1 °C)   Resp 18   Ht 5' 5\" (1.651 m)   Wt 58.1 kg (128 lb)   SpO2 98%   BMI 21.30 kg/m²       This is a very pleasant 26-year-old female. She is alert and in no apparent distress. Full fund of knowledge. Speech is clear. No facial asymmetry. Extraocular movements intact. Tongue midline. No signs of incoordination or ataxia. Steady gait. Able to tandem walk. Impression/Plan  Vinny Villasenor is a 39 y.o. female whose history and physical are consistent with migraine headaches and catamenial disorder. Feels headaches are well controlled. Currently on Topamax 50 mg daily. She can certainly decrease to 25 mg nightly and reassess headache control. Has a flexibility to increase back up to 50 mg as needed. If she does make this increase please call the office. Doing well on Maxalt tablet. Having nausea will prescribe Maxalt melt. Discussed medication, indication, side effects, and dosing. Patient verbalized understanding. Continue to track headaches. Follow-up in 6 months or sooner as needed. All questions addressed and patient is agreeable plan of care. Diagnoses and all orders for this visit:    1. Migraine without aura and with status migrainosus, not intractable    2.  Catamenial disorder    Other orders  -     rizatriptan (MAXALT-MLT) 10 mg disintegrating tablet; Place one tab under the tongue at headache onset. May repeat x 1 after 2 hours. Max two tabs in 24 hours. -     ondansetron (Zofran ODT) 4 mg disintegrating tablet; Take 1 Tab by mouth every eight (8) hours as needed for Nausea. -     topiramate (TOPAMAX) 25 mg tablet; Take 1 Tab by mouth two (2) times a day. Signed By: Carol Ann Aldana, CAL    This note will not be viewable in [address] PLEASE NOTE:   Portions of this document may have been produced using voice recognition software. Unrecognized errors in transcription may be present.

## 2021-03-19 ENCOUNTER — CLINICAL SUPPORT (OUTPATIENT)
Dept: PULMONOLOGY | Age: 46
End: 2021-03-19
Payer: MEDICAID

## 2021-03-19 VITALS — TEMPERATURE: 97.8 F

## 2021-03-19 DIAGNOSIS — J45.50 SEVERE PERSISTENT ASTHMA WITHOUT COMPLICATION: Primary | ICD-10-CM

## 2021-03-19 PROCEDURE — 96372 THER/PROPH/DIAG INJ SC/IM: CPT | Performed by: INTERNAL MEDICINE

## 2021-03-19 NOTE — PROGRESS NOTES
Chief Complaint   Patient presents with   Nettie Castillo is here for Xolair injection. Patient did not have any reaction to last injection. Patient states the Xolair is helping her Asthma. Injected subcutaneous : 150 mg given in Left upper arm SQ,  150 mg given in Right upper arm SQ. Lot Number: [id number]  Exp Date: 8/2021  Total Xolair Dose: 300 mg every 4 weeks  Epipen is with patient. Patient observed for 15 minutes. No reaction at injection site. Patient given Xolair reaction sheet.

## 2021-03-20 ENCOUNTER — HOSPITAL ENCOUNTER (EMERGENCY)
Age: 46
Discharge: HOME | End: 2021-03-20
Attending: EMERGENCY MEDICINE
Payer: MEDICAID

## 2021-03-20 VITALS
SYSTOLIC BLOOD PRESSURE: 104 MMHG | DIASTOLIC BLOOD PRESSURE: 66 MMHG | BODY MASS INDEX: 21.33 KG/M2 | HEART RATE: 94 BPM | RESPIRATION RATE: 20 BRPM | WEIGHT: 128 LBS | HEIGHT: 65 IN | OXYGEN SATURATION: 100 % | TEMPERATURE: 98.3 F

## 2021-03-20 DIAGNOSIS — R68.84 JAW PAIN: Primary | ICD-10-CM

## 2021-03-20 PROCEDURE — 99282 EMERGENCY DEPT VISIT SF MDM: CPT

## 2021-03-20 RX ORDER — CEPHALEXIN 500 MG/1
500 CAPSULE ORAL 2 TIMES DAILY
Qty: 14 CAP | Refills: 0 | Status: SHIPPED | OUTPATIENT
Start: 2021-03-20 | End: 2021-03-27

## 2021-03-20 RX ORDER — NAPROXEN 500 MG/1
TABLET, DELAYED RELEASE ORAL
Qty: 15 TAB | Refills: 0 | OUTPATIENT
Start: 2021-03-20 | End: 2021-03-22

## 2021-03-20 NOTE — ED TRIAGE NOTES
Patient c/o dental pain s/p dental extraction last Wednesday. She states being unable to tolerate clindamycin that was prescribed by dentist.  She states that clindamycin caused her to vomit.

## 2021-03-20 NOTE — ED NOTES
I have reviewed discharge instructions with the patient. The patient verbalized understanding. Current Discharge Medication List      START taking these medications    Details   cephALEXin (Keflex) 500 mg capsule Take 1 Cap by mouth two (2) times a day for 7 days.   Qty: 14 Cap, Refills: 0      naproxen EC (NAPROSYN EC) 500 mg EC tablet Take 1 tablet every 12 hours as needed for jaw pain  Qty: 15 Tab, Refills: 0

## 2021-03-20 NOTE — ED PROVIDER NOTES
HPI she complains of toothache status post tooth extraction 5 days ago. She says she had tooth #18 removed by her dentist.  She says that she was placed on antibiotic but it caused nausea and vomiting she was unable to tolerate it. She says she called her dentist and they suggested she come the emergency department for further evaluation. She complains of continued pain and soreness in the tooth and says that the ibuprofen she was given is not helping her pain either. No further specifics given at this time.     Past Medical History:   Diagnosis Date    Anxiety attack     Asthma     Asthmatic bronchitis     Bronchitis     Catamenial disorder     Hernia     Influenza     Migraine     Migraines     Mild asthma     Psychiatric disorder     anxiety     Torticollis        Past Surgical History:   Procedure Laterality Date    HX BACK SURGERY      HX  SECTION  2009    [place]  DELIVERY ONLY           Family History:   Problem Relation Age of Onset    Heart Disease Other         grandmothers    Asthma Brother     Lung Disease Paternal Aunt         cronic bronchitis    Asthma Paternal Grandmother     High Cholesterol Mother        Social History     Socioeconomic History    Marital status:      Spouse name: Not on file    Number of children: Not on file    Years of education: Not on file    Highest education level: Not on file   Occupational History    Not on file   Social Needs    Financial resource strain: Not on file    Food insecurity     Worry: Not on file     Inability: Not on file    Transportation needs     Medical: Not on file     Non-medical: Not on file   Tobacco Use    Smoking status: Never Smoker    Smokeless tobacco: Never Used   Substance and Sexual Activity    Alcohol use: No    Drug use: No    Sexual activity: Yes     Partners: Male   Lifestyle    Physical activity     Days per week: Not on file     Minutes per session: Not on file    Stress: Not on file   Relationships    Social connections     Talks on phone: Not on file     Gets together: Not on file     Attends Zoroastrianism service: Not on file     Active member of club or organization: Not on file     Attends meetings of clubs or organizations: Not on file     Relationship status: Not on file    Intimate partner violence     Fear of current or ex partner: Not on file     Emotionally abused: Not on file     Physically abused: Not on file     Forced sexual activity: Not on file   Other Topics Concern    Not on file   Social History Narrative    Not on file         ALLERGIES: Aspirin, Ibuprofen, Pcn [penicillins], Claritin [loratadine], Phenergan [promethazine], Seafood, and Shellfish containing products    Review of Systems   Constitutional: Negative. HENT: Positive for dental problem. Respiratory: Negative. Cardiovascular: Negative. Gastrointestinal: Negative. Genitourinary: Negative. Musculoskeletal: Negative. Skin: Negative. Neurological: Negative. Psychiatric/Behavioral: Negative. Vitals:    03/20/21 1113   BP: 104/66   Pulse: 94   Resp: 20   Temp: 98.3 °F (36.8 °C)   SpO2: 100%   Weight: 58.1 kg (128 lb)   Height: 5' 5\" (1.651 m)            Physical Exam  Vitals signs and nursing note reviewed. Constitutional:       Appearance: She is well-developed. HENT:      Head: Normocephalic and atraumatic. Mouth/Throat:      Comments: Tooth #18 is missing status post extraction. Moderate gingival tenderness in the surrounding area. No overt abscess noted  Eyes:      Conjunctiva/sclera: Conjunctivae normal.      Pupils: Pupils are equal, round, and reactive to light. Neck:      Musculoskeletal: Normal range of motion and neck supple. Cardiovascular:      Rate and Rhythm: Normal rate and regular rhythm. Pulmonary:      Effort: Pulmonary effort is normal.      Breath sounds: Normal breath sounds. Skin:     General: Skin is warm and dry.    Neurological: Mental Status: She is alert and oriented to person, place, and time.           MDM       Procedures

## 2021-03-22 ENCOUNTER — HOSPITAL ENCOUNTER (EMERGENCY)
Age: 46
Discharge: HOME | End: 2021-03-22
Attending: EMERGENCY MEDICINE
Payer: MEDICAID

## 2021-03-22 VITALS
BODY MASS INDEX: 21.3 KG/M2 | RESPIRATION RATE: 20 BRPM | WEIGHT: 128 LBS | TEMPERATURE: 98.9 F | OXYGEN SATURATION: 100 % | SYSTOLIC BLOOD PRESSURE: 117 MMHG | DIASTOLIC BLOOD PRESSURE: 78 MMHG | HEART RATE: 94 BPM

## 2021-03-22 DIAGNOSIS — K08.89 PAIN, DENTAL: Primary | ICD-10-CM

## 2021-03-22 PROCEDURE — 99282 EMERGENCY DEPT VISIT SF MDM: CPT

## 2021-03-22 RX ORDER — LIDOCAINE HYDROCHLORIDE 20 MG/ML
SOLUTION OROPHARYNGEAL
Qty: 200 ML | Refills: 0 | Status: SHIPPED | OUTPATIENT
Start: 2021-03-22 | End: 2021-10-16

## 2021-03-22 RX ORDER — LIDOCAINE HYDROCHLORIDE 20 MG/ML
SOLUTION OROPHARYNGEAL
Qty: 200 ML | Refills: 0 | Status: SHIPPED | OUTPATIENT
Start: 2021-03-22 | End: 2021-03-22

## 2021-03-22 RX ORDER — ACETAMINOPHEN 500 MG
1000 TABLET ORAL
Qty: 20 TAB | Refills: 0 | Status: SHIPPED | OUTPATIENT
Start: 2021-03-22

## 2021-03-22 NOTE — ED TRIAGE NOTES
C/O Lt lower dental pain. Tooth pulled last Wednesday. Seen 2 days ago for dental pain. Back for further evaluation and pain.

## 2021-03-22 NOTE — DISCHARGE INSTRUCTIONS
Take medication as prescribed. Follow-up with your dentist within 2 days for reassessment. Bring the results from this visit with you for their review. Return to the ED immediately for any new, worsening, or persistent symptoms, including fever, facial swelling, or any other medical concerns.

## 2021-03-22 NOTE — ED PROVIDER NOTES
EMERGENCY DEPARTMENT HISTORY AND PHYSICAL EXAM    2:06 PM      Date: 3/22/2021  Patient Name: Herberth Chaves    History of Presenting Illness     Chief Complaint   Patient presents with   • Dental Pain         History Provided By: Patient    Additional History (Context): Herberth Chaves is a 45 y.o. female with hx of anxiety, asthma, and other noted PMH who presents with c/o L lower gum pain after tooth extraction 1 week ago. Pt notes she was evaluated in the emergency department on 3/20, has been taking antibiotics as prescribed.  Note she has an allergy to Naprosyn so she has been unable to take that for pain. Denies fever/chills, facial swelling, drooling or stridor, n/v.       PCP: Elisha Gutierres NP    Current Facility-Administered Medications   Medication Dose Route Frequency Provider Last Rate Last Admin   • omalizumab (XOLAIR) SQ syringe 300 mg  300 mg SubCUTAneous Q30D Sola Green DO   300 mg at 03/19/21 1453   • omalizumab (XOLAIR) SQ syringe 300 mg  300 mg SubCUTAneous Q30D Sergio Vega MD   300 mg at 02/19/21 1602   • omalizumab (XOLAIR) SQ syringe 300 mg  300 mg SubCUTAneous Q30D Sergio Vega MD   300 mg at 01/21/21 1224   • omalizumab (XOLAIR) SQ syringe 300 mg  300 mg SubCUTAneous Q30D Margot Villatoro MD   300 mg at 12/24/20 1445     Current Outpatient Medications   Medication Sig Dispense Refill   • acetaminophen (Tylenol Extra Strength) 500 mg tablet Take 2 Tabs by mouth every six (6) hours as needed for Pain. 20 Tab 0   • lidocaine (Lidocaine Viscous) 2 % solution Put 10 mL in mouth and swish and spit out QAC and at bedtime 200 mL 0   • cephALEXin (Keflex) 500 mg capsule Take 1 Cap by mouth two (2) times a day for 7 days. 14 Cap 0   • rizatriptan (MAXALT-MLT) 10 mg disintegrating tablet Place one tab under the tongue at headache onset. May repeat x 1 after 2 hours. Max two tabs in 24 hours. 12 Tab 5   • ondansetron (Zofran ODT) 4 mg disintegrating tablet Take 1 Tab by mouth every  eight (8) hours as needed for Nausea. 30 Tab 1    topiramate (TOPAMAX) 25 mg tablet Take 1 Tab by mouth two (2) times a day. 60 Tab 5    fluticasone propion-salmeteroL (Advair Diskus) 500-50 mcg/dose diskus inhaler Take 1 Puff by inhalation two (2) times a day. 1 Inhaler 5    fluticasone propionate (FLONASE) 50 mcg/actuation nasal spray USE 2 SPRAYS INTO EACH NOSTRIL ONCE DAILY 1 Bottle 5    montelukast (SINGULAIR) 10 mg tablet TAKE 1 TABLET BY MOUTH EVERY DAY 90 Tab 1    Xolair 150 mg/mL syrg INJECT 300MG (150MG/1ML X 2 syringe) SUBCUTANEOUSLY EVERY 30 DAYS. MAX 150MG PER INJECTION SITE. 300 mL 5    albuterol (PROVENTIL HFA, VENTOLIN HFA, PROAIR HFA) 90 mcg/actuation inhaler Take 2 Puffs by inhalation every four (4) hours as needed for Wheezing. 1 Inhaler 5    albuterol-ipratropium (DUO-NEB) 2.5 mg-0.5 mg/3 ml nebu INHALE CONTENTS OF 1 VIAL VIA NEBULIZER EVERY 4 HOURS AS NEEDED FOR SHORTNESS OF BREATH/WHEEZING. 180 mL 5    LORazepam (ATIVAN) 0.5 mg tablet Take 0.5 mg by mouth every eight (8) hours as needed for Anxiety.  fexofenadine-pseudoephedrine (Allegra-D 12 Hour)  mg Tb12 Take 1 Tab by mouth every twelve (12) hours.          Past History     Past Medical History:  Past Medical History:   Diagnosis Date    Anxiety attack     Asthma     Asthmatic bronchitis     Bronchitis     Catamenial disorder     Hernia     Influenza     Migraine     Migraines     Mild asthma     Psychiatric disorder     anxiety     Torticollis        Past Surgical History:  Past Surgical History:   Procedure Laterality Date    HX BACK SURGERY      HX  SECTION  2009    [place]         Family History:  Family History   Problem Relation Age of Onset    Heart Disease Other         grandmothers    Asthma Brother     Lung Disease Paternal Aunt         cronic bronchitis    Asthma Paternal Grandmother     High Cholesterol Mother        Social History:  Social History     Tobacco Use  Smoking status: Never Smoker    Smokeless tobacco: Never Used   Substance Use Topics    Alcohol use: No    Drug use: No       Allergies: Allergies   Allergen Reactions    Aspirin Anaphylaxis    Ibuprofen Anaphylaxis    Pcn [Penicillins] Itching    Claritin [Loratadine] Anxiety and Cough    Phenergan [Promethazine] Anxiety and Swelling    Seafood Swelling    Shellfish Containing Products Hives         Review of Systems       Review of Systems   Constitutional: Negative for chills and fever. HENT: Positive for dental problem. Respiratory: Negative for shortness of breath. Cardiovascular: Negative for chest pain. Gastrointestinal: Negative for abdominal pain, nausea and vomiting. Skin: Negative for rash. Neurological: Negative for weakness. All other systems reviewed and are negative. Physical Exam     Visit Vitals  /78   Pulse 94   Temp 98.9 °F (37.2 °C)   Resp 20   Wt 58.1 kg (128 lb)   LMP 02/28/2021   SpO2 100%   BMI 21.30 kg/m²         Physical Exam  Vitals signs and nursing note reviewed. Constitutional:       General: She is not in acute distress. Appearance: Normal appearance. She is well-developed. She is not ill-appearing, toxic-appearing or diaphoretic. HENT:      Head: Normocephalic and atraumatic. Nose: Nose normal.      Mouth/Throat:      Mouth: Mucous membranes are moist.      Dentition: Dental tenderness (tooth # 18 absent, gingiva TTP without fluctuance, drainage, or bleeding) present. No dental abscesses. Pharynx: Oropharynx is clear. Uvula midline. No oropharyngeal exudate or posterior oropharyngeal erythema. Comments: No drooling or stridor, soft floor of mouth   Neck:      Musculoskeletal: Normal range of motion and neck supple. Cardiovascular:      Rate and Rhythm: Normal rate and regular rhythm. Heart sounds: Normal heart sounds. No murmur. No friction rub. No gallop.     Pulmonary:      Effort: Pulmonary effort is normal. No respiratory distress. Breath sounds: Normal breath sounds. No wheezing or rales. Musculoskeletal: Normal range of motion. Skin:     General: Skin is warm. Findings: No rash. Neurological:      Mental Status: She is alert. Diagnostic Study Results     Labs -  No results found for this or any previous visit (from the past 12 hour(s)). Radiologic Studies -   No orders to display         Medical Decision Making   I am the first provider for this patient. I reviewed the vital signs, available nursing notes, past medical history, past surgical history, family history and social history. Vital Signs-Reviewed the patient's vital signs. Records Reviewed: Nursing Notes and Old Medical Records (Time of Review: 2:06 PM)    ED Course: Progress Notes, Reevaluation, and Consults:  2:06 PM  Reviewed plan with patient. Discussed need for close outpatient follow-up this week for reassessment. Discussed strict return precautions, including fever, facial swelling, or any other medical concerns. Provider Notes (Medical Decision Making): 53-year-old female who presents to the ED due to left lower gum pain after extraction 1 week ago. Afebrile, nontoxic-appearing, looks well. No facial swelling, drooling or stridor. No evidence of dental abscess. Patient currently on antibiotics, will prescribe pain control, recommend close outpatient follow-up for further assessment. Strict return precautions provided. Diagnosis     Clinical Impression:   1. Pain, dental        Disposition: home     Follow-up Information     Follow up With Specialties Details [facility] EMERGENCY DEPT Emergency Medicine  If symptoms worsen [address] [id number] [phone]           Patient's Medications   Start Taking    ACETAMINOPHEN (TYLENOL EXTRA STRENGTH) 500 MG TABLET    Take 2 Tabs by mouth every six (6) hours as needed for Pain.     LIDOCAINE (LIDOCAINE VISCOUS) 2 % SOLUTION    Put 10 mL in mouth and swish and spit out QAC and at bedtime   Continue Taking    ALBUTEROL (PROVENTIL HFA, VENTOLIN HFA, PROAIR HFA) 90 MCG/ACTUATION INHALER    Take 2 Puffs by inhalation every four (4) hours as needed for Wheezing. ALBUTEROL-IPRATROPIUM (DUO-NEB) 2.5 MG-0.5 MG/3 ML NEBU    INHALE CONTENTS OF 1 VIAL VIA NEBULIZER EVERY 4 HOURS AS NEEDED FOR SHORTNESS OF BREATH/WHEEZING. CEPHALEXIN (KEFLEX) 500 MG CAPSULE    Take 1 Cap by mouth two (2) times a day for 7 days. FEXOFENADINE-PSEUDOEPHEDRINE (ALLEGRA-D 12 HOUR)  MG TB12    Take 1 Tab by mouth every twelve (12) hours. FLUTICASONE PROPION-SALMETEROL (ADVAIR DISKUS) 500-50 MCG/DOSE DISKUS INHALER    Take 1 Puff by inhalation two (2) times a day. FLUTICASONE PROPIONATE (FLONASE) 50 MCG/ACTUATION NASAL SPRAY    USE 2 SPRAYS INTO EACH NOSTRIL ONCE DAILY    LORAZEPAM (ATIVAN) 0.5 MG TABLET    Take 0.5 mg by mouth every eight (8) hours as needed for Anxiety. MONTELUKAST (SINGULAIR) 10 MG TABLET    TAKE 1 TABLET BY MOUTH EVERY DAY    ONDANSETRON (ZOFRAN ODT) 4 MG DISINTEGRATING TABLET    Take 1 Tab by mouth every eight (8) hours as needed for Nausea. RIZATRIPTAN (MAXALT-MLT) 10 MG DISINTEGRATING TABLET    Place one tab under the tongue at headache onset. May repeat x 1 after 2 hours. Max two tabs in 24 hours. TOPIRAMATE (TOPAMAX) 25 MG TABLET    Take 1 Tab by mouth two (2) times a day. XOLAIR 150 MG/ML SYRG    INJECT 300MG (150MG/1ML X 2 syringe) SUBCUTANEOUSLY EVERY 30 DAYS. MAX 150MG PER INJECTION SITE. These Medications have changed    No medications on file   Stop Taking    HYDROXYZINE HCL (ATARAX) 25 MG TABLET    Take 1-2 tablets by mouth every 6 hours as needed. NAPROXEN EC (NAPROSYN EC) 500 MG EC TABLET    Take 1 tablet every 12 hours as needed for jaw pain       Dictation disclaimer:  Please note that this dictation was completed with [employer], the computer voice recognition software.   Quite often unanticipated grammatical, syntax, homophones, and other interpretive errors are inadvertently transcribed by the computer software. Please disregard these errors. Please excuse any errors that have escaped final proofreading.

## 2021-03-22 NOTE — ROUTINE PROCESS
Federico Galan is a 39 y.o. female that was discharged in stable. Pt was accompanied by self. Pt is driving. The patients diagnosis, condition and treatment were explained to  patient and aftercare instructions were given. The patient verbalized understanding. Patient armband removed and shredded.

## 2021-04-16 ENCOUNTER — CLINICAL SUPPORT (OUTPATIENT)
Dept: PULMONOLOGY | Age: 46
End: 2021-04-16
Payer: MEDICAID

## 2021-04-16 DIAGNOSIS — J45.50 SEVERE PERSISTENT ASTHMA WITHOUT COMPLICATION: Primary | ICD-10-CM

## 2021-04-16 PROCEDURE — 96372 THER/PROPH/DIAG INJ SC/IM: CPT | Performed by: INTERNAL MEDICINE

## 2021-04-16 NOTE — PROGRESS NOTES
Chief Complaint   Patient presents with   Morgan Pikn is here for Xolair injection. Patient did not have any reaction to last injection. Patient states the Xolair is helping her Asthma. Injected subcutaneous : 150 mg given in Left upper arm SQ,  150 mg given in Right upper arm SQ. Lot Number: [id number]  Exp Date: 8/2021  Total Xolair Dose: 300 mg every 4 weeks  Epipen is with patient. Patient observed for 15 minutes. No reaction at injection site. Patient given Xolair reaction sheet.

## 2021-05-12 ENCOUNTER — TELEPHONE (OUTPATIENT)
Dept: PULMONOLOGY | Age: 46
End: 2021-05-12

## 2021-05-13 ENCOUNTER — CLINICAL SUPPORT (OUTPATIENT)
Dept: PULMONOLOGY | Age: 46
End: 2021-05-13
Payer: MEDICAID

## 2021-05-13 DIAGNOSIS — J45.50 SEVERE PERSISTENT ASTHMA WITHOUT COMPLICATION: Primary | ICD-10-CM

## 2021-05-13 RX ORDER — BUDESONIDE AND FORMOTEROL FUMARATE DIHYDRATE 80; 4.5 UG/1; UG/1
1 AEROSOL RESPIRATORY (INHALATION) 2 TIMES DAILY
Qty: 1 INHALER | Refills: 3 | Status: SHIPPED | OUTPATIENT
Start: 2021-05-13 | End: 2021-07-08

## 2021-05-13 NOTE — PROGRESS NOTES
Chief Complaint   Patient presents with   Dalphine [place] is here for Xolair injection. Patient did not have any reaction to last injection. Patient states the Xolair is helping her Asthma. Injected subcutaneous : 150 mg given in Left upper arm SQ,  150 mg given in Right upper arm SQ. Lot Number: [id number]  Exp Date: 9/2021  Total Xolair Dose: 300 mg every 4 weeks  Epipen is with patient. Patient observed for 15 minutes. No reaction at injection site. Patient given Xolair reaction sheet.

## 2021-05-14 PROCEDURE — 96372 THER/PROPH/DIAG INJ SC/IM: CPT | Performed by: INTERNAL MEDICINE

## 2021-05-17 RX ORDER — IPRATROPIUM BROMIDE AND ALBUTEROL SULFATE 2.5; .5 MG/3ML; MG/3ML
SOLUTION RESPIRATORY (INHALATION)
Qty: 180 ML | Refills: 5 | Status: SHIPPED | OUTPATIENT
Start: 2021-05-17 | End: 2021-10-16

## 2021-05-17 NOTE — TELEPHONE ENCOUNTER
Pt requesting refill on albuterol and iprotropium sol sent to to [facility] on [address].

## 2021-05-18 RX ORDER — IPRATROPIUM BROMIDE AND ALBUTEROL SULFATE 2.5; .5 MG/3ML; MG/3ML
SOLUTION RESPIRATORY (INHALATION)
Qty: 180 ML | Refills: 5 | Status: SHIPPED | OUTPATIENT
Start: 2021-05-18 | End: 2021-11-09 | Stop reason: SDUPTHER

## 2021-06-11 ENCOUNTER — CLINICAL SUPPORT (OUTPATIENT)
Dept: PULMONOLOGY | Age: 46
End: 2021-06-11
Payer: MEDICAID

## 2021-06-11 DIAGNOSIS — J45.50 SEVERE PERSISTENT ASTHMA WITHOUT COMPLICATION: Primary | ICD-10-CM

## 2021-06-11 PROCEDURE — 96372 THER/PROPH/DIAG INJ SC/IM: CPT | Performed by: INTERNAL MEDICINE

## 2021-06-11 NOTE — PROGRESS NOTES
Chief Complaint   Patient presents with    Injection     xolair     Erick Oliveira is here for Xolair injection. Patient did not have any reaction to last injection. Patient states the Xolair is helping her Asthma. Injected subcutaneous : 150 mg given in Left upper arm SQ,  150 mg given in Right upper arm SQ. Lot Number: [id number]  Exp Date: 2/2022  Total Xolair Dose: 300 mg every  4 weeks  Epipen is with patient. Patient observed for 15 minutes. No reaction at injection site. Patient given Xolair reaction sheet.

## 2021-07-01 RX ORDER — BUDESONIDE AND FORMOTEROL FUMARATE DIHYDRATE 160; 4.5 UG/1; UG/1
1 AEROSOL RESPIRATORY (INHALATION) 2 TIMES DAILY
Qty: 1 INHALER | Refills: 3 | Status: SHIPPED | OUTPATIENT
Start: 2021-07-01 | End: 2021-07-08

## 2021-07-01 NOTE — TELEPHONE ENCOUNTER
[phone] Pt is on Symbicort 80/4.5 and pt states she needs to be on a higher dose. Please check and call her back.

## 2021-07-01 NOTE — TELEPHONE ENCOUNTER
Spoke with patient. She states her current dose of Symbicort (80/6.5 mcg) is not enough. She does not feel like its working for her. She is requesting a higher dose.

## 2021-07-02 NOTE — TELEPHONE ENCOUNTER
Called patient, no answer, left message requesting return call. Patient notified Staci Zamora for symbicort 160 has been called in to her pharmacy.

## 2021-07-08 RX ORDER — FLUTICASONE PROPIONATE AND SALMETEROL 500; 50 UG/1; UG/1
1 POWDER RESPIRATORY (INHALATION) 2 TIMES DAILY
Qty: 1 INHALER | Refills: 5 | COMMUNITY
Start: 2021-07-08 | End: 2021-11-09 | Stop reason: SDUPTHER

## 2021-07-08 NOTE — TELEPHONE ENCOUNTER
Per patient the Advair Diskus 500 worked better then the Symbicort. I called the pharmacy and they ran the [address] name brand and it went through. In the past they tried to fill [place] that was not covered. Pt wishes to take the Advair and stop the Symbicort.

## 2021-07-08 NOTE — TELEPHONE ENCOUNTER
Pt stated that her advair was recently changed to symbicort and she feels that it is not helping her. Pt has an appt 7/9 @ 11 for xolair.  Please call [phone]

## 2021-07-09 ENCOUNTER — CLINICAL SUPPORT (OUTPATIENT)
Dept: PULMONOLOGY | Age: 46
End: 2021-07-09
Payer: MEDICAID

## 2021-07-09 DIAGNOSIS — J45.50 SEVERE PERSISTENT ASTHMA WITHOUT COMPLICATION: Primary | ICD-10-CM

## 2021-07-09 PROCEDURE — 96372 THER/PROPH/DIAG INJ SC/IM: CPT | Performed by: INTERNAL MEDICINE

## 2021-07-09 NOTE — PROGRESS NOTES
Chief Complaint   Patient presents with   Buddy Kaufman is here for Xolair injection. Patient did not have any reaction to last injection. Patient states the Xolair is helping her Asthma. Injected subcutaneous : 150 mg given in Left upper arm SQ,  150 mg given in Right upper arm SQ. Lot Number: [id number]  Exp Date: 08/21  Total Xolair Dose: 300 mg every 4 weeks  Epipen is with patient. Patient observed for 15 minutes. No reaction at injection site. Patient given Xolair reaction sheet.

## 2021-08-13 ENCOUNTER — TELEPHONE (OUTPATIENT)
Dept: NEUROLOGY | Age: 46
End: 2021-08-13

## 2021-08-13 ENCOUNTER — CLINICAL SUPPORT (OUTPATIENT)
Dept: PULMONOLOGY | Age: 46
End: 2021-08-13
Payer: MEDICAID

## 2021-08-13 DIAGNOSIS — J45.50 SEVERE PERSISTENT ASTHMA WITHOUT COMPLICATION: Primary | ICD-10-CM

## 2021-08-13 PROCEDURE — 96372 THER/PROPH/DIAG INJ SC/IM: CPT | Performed by: INTERNAL MEDICINE

## 2021-08-13 NOTE — PROGRESS NOTES
Chief Complaint   Patient presents with   Raheem Thorne is here for Xolair injection. Patient did not have any reaction to last injection. Patient states the Xolair is helping her Asthma. Injected subcutaneous : 150 mg given in Left upper arm SQ,  150 mg given in Right upper arm SQ. Lot Number: [id number]  Exp Date: 6/2022  Total Xolair Dose: 300 mg every 4 weeks  Epipen is with patient. Patient observed for 15 minutes. No reaction at injection site. Patient given Xolair reaction sheet.

## 2021-08-13 NOTE — TELEPHONE ENCOUNTER
Patient called and informed me that she needs a refill of her TOPAMAX. She was informed by pharmacy that she was denied because she has no had a follow up appointment. Patient six month follow up is scheduled. Patient wants to know if we can fill the prescription. Please be advised.

## 2021-08-16 RX ORDER — TOPIRAMATE 25 MG/1
25 TABLET ORAL 2 TIMES DAILY
Qty: 60 TABLET | Refills: 5 | Status: SHIPPED | OUTPATIENT
Start: 2021-08-16 | End: 2021-09-24 | Stop reason: SDUPTHER

## 2021-09-07 PROBLEM — L25.9 CONTACT DERMATITIS: Status: ACTIVE | Noted: 2021-09-07

## 2021-09-07 PROBLEM — Z23 ENCOUNTER FOR IMMUNIZATION: Status: ACTIVE | Noted: 2021-09-07

## 2021-09-07 PROBLEM — J06.9 ACUTE UPPER RESPIRATORY INFECTION: Status: ACTIVE | Noted: 2021-09-07

## 2021-09-07 PROBLEM — Z76.0 ENCOUNTER FOR ISSUE OF REPEAT PRESCRIPTION: Status: ACTIVE | Noted: 2021-09-07

## 2021-09-07 PROBLEM — G43.909 MIGRAINE WITHOUT STATUS MIGRAINOSUS, NOT INTRACTABLE: Status: ACTIVE | Noted: 2021-09-07

## 2021-09-07 PROBLEM — J30.9 ATOPIC RHINITIS: Status: ACTIVE | Noted: 2021-09-07

## 2021-09-07 PROBLEM — F41.9 ANXIETY: Status: ACTIVE | Noted: 2021-09-07

## 2021-09-10 ENCOUNTER — CLINICAL SUPPORT (OUTPATIENT)
Dept: PULMONOLOGY | Age: 46
End: 2021-09-10
Payer: MEDICAID

## 2021-09-10 DIAGNOSIS — J45.50 SEVERE PERSISTENT ASTHMA WITHOUT COMPLICATION: Primary | ICD-10-CM

## 2021-09-10 PROCEDURE — 96372 THER/PROPH/DIAG INJ SC/IM: CPT | Performed by: INTERNAL MEDICINE

## 2021-09-10 NOTE — PROGRESS NOTES
Chief Complaint   Patient presents with   Harjinder Mattson is here for Xolair injection. Patient did not have any reaction to last injection. Patient states the Xolair is helping her Asthma. Injected subcutaneous : 150 mg given in Left upper arm SQ,  150 mg given in Right upper arm SQ. Lot Number: [id number]  Exp Date: 8/2022  Total Xolair Dose: 300 mg every 4 weeks  Epipen is with patient. Patient observed for 15 minutes. No reaction at injection site. Patient given Xolair reaction sheet.

## 2021-09-17 ENCOUNTER — TELEPHONE (OUTPATIENT)
Dept: NEUROLOGY | Age: 46
End: 2021-09-17

## 2021-09-17 NOTE — TELEPHONE ENCOUNTER
Patient called in reference to appt that is scheduled on the 24th and wanted to know if it was any way possible CAL Valentin would be able to see her at 12pm on her lunch break she just started a new job and that is the only time she will have to walk away and she stated she really needs this refill.

## 2021-09-24 ENCOUNTER — VIRTUAL VISIT (OUTPATIENT)
Dept: NEUROLOGY | Age: 46
End: 2021-09-24

## 2021-09-24 RX ORDER — TOPIRAMATE 25 MG/1
25 TABLET ORAL 2 TIMES DAILY
Qty: 60 TABLET | Refills: 0 | Status: SHIPPED | OUTPATIENT
Start: 2021-09-24 | End: 2021-10-01 | Stop reason: SDUPTHER

## 2021-09-24 RX ORDER — RIZATRIPTAN BENZOATE 10 MG/1
TABLET, ORALLY DISINTEGRATING ORAL
Qty: 12 TABLET | Refills: 5 | Status: CANCELLED | OUTPATIENT
Start: 2021-09-24

## 2021-09-24 RX ORDER — RIZATRIPTAN BENZOATE 10 MG/1
TABLET, ORALLY DISINTEGRATING ORAL
Qty: 12 TABLET | Refills: 0 | Status: SHIPPED | OUTPATIENT
Start: 2021-09-24 | End: 2021-10-01 | Stop reason: SDUPTHER

## 2021-09-24 RX ORDER — ONDANSETRON 4 MG/1
4 TABLET, ORALLY DISINTEGRATING ORAL
Qty: 30 TABLET | Refills: 1 | Status: CANCELLED | OUTPATIENT
Start: 2021-09-24

## 2021-09-24 RX ORDER — TOPIRAMATE 25 MG/1
25 TABLET ORAL 2 TIMES DAILY
Qty: 60 TABLET | Refills: 5 | Status: CANCELLED | OUTPATIENT
Start: 2021-09-24

## 2021-09-24 NOTE — PROGRESS NOTES
Autumn Calderon presents today for   Chief Complaint   Patient presents with    Migraine       Is someone accompanying this pt? Virtual visit [phone]    Is the patient using any DME equipment during [address]? no    Depression Screening:  3 most recent PHQ Screens 2/25/2020   Little interest or pleasure in doing things Not at all   Feeling down, depressed, irritable, or hopeless Not at all   Total Score PHQ 2 0       Learning Assessment:  Learning Assessment 2/25/2020   PRIMARY LEARNER Patient   PRIMARY LANGUAGE ENGLISH   LEARNER PREFERENCE PRIMARY READING     LISTENING     DEMONSTRATION   ANSWERED BY Patient     -   RELATIONSHIP SELF       Abuse Screening:  No flowsheet data found. Fall Risk  No flowsheet data found. Coordination of Care:  1. Have you been to the ER, urgent care clinic since your last visit? Hospitalized since your last visit? no    2. Have you seen or consulted any other health care providers outside of the [address] [place] [address] since your last visit? Include any pap smears or colon screening.  no

## 2021-09-28 ENCOUNTER — TELEPHONE (OUTPATIENT)
Dept: PULMONOLOGY | Age: 46
End: 2021-09-28

## 2021-09-28 RX ORDER — OMALIZUMAB 150 MG/ML
INJECTION, SOLUTION SUBCUTANEOUS
Qty: 2 EACH | Refills: 10 | Status: SHIPPED | OUTPATIENT
Start: 2021-09-28 | End: 2022-01-17

## 2021-09-28 NOTE — TELEPHONE ENCOUNTER
Pt would like to know if Dr. Stephanie Amezcua thinks that she should get the covid booster. She would also like to know when we will start giving flu shots in our office.  Please advise [phone]

## 2021-09-28 NOTE — TELEPHONE ENCOUNTER
Patient has questions re: COVID booster. She wants to know if Dr. Juanito Xavier recommends her getting one. Patient also requesting a flu shot.

## 2021-09-29 NOTE — TELEPHONE ENCOUNTER
Pt called back. Pt informed that she is not on list for booster. Pt understands. Pt will get flu shot when she comes in for fu w/Dr. Bronson Dubon on 11/19/21.

## 2021-09-29 NOTE — TELEPHONE ENCOUNTER
Jamee Swartz MD  [facility]  Caller: Unspecified (Yesterday, 11:13 AM)  Per CDC guidelines, she is currently not on the booster recommended list.

## 2021-09-29 NOTE — TELEPHONE ENCOUNTER
Called patient, no answer, left message notifying patient she is not currently on the list for recommended booster vaccine, per CDC guidelines. Also requested she contact the office and schedule her flu shot, it is not recommended she get it at the same time as her xolair injection.

## 2021-10-01 ENCOUNTER — VIRTUAL VISIT (OUTPATIENT)
Dept: NEUROLOGY | Age: 46
End: 2021-10-01
Payer: MEDICAID

## 2021-10-01 DIAGNOSIS — G43.001 MIGRAINE WITHOUT AURA AND WITH STATUS MIGRAINOSUS, NOT INTRACTABLE: Primary | ICD-10-CM

## 2021-10-01 PROCEDURE — 99214 OFFICE O/P EST MOD 30 MIN: CPT | Performed by: NURSE PRACTITIONER

## 2021-10-01 RX ORDER — ONDANSETRON 4 MG/1
4 TABLET, ORALLY DISINTEGRATING ORAL
Qty: 30 TABLET | Refills: 5 | Status: SHIPPED | OUTPATIENT
Start: 2021-10-01 | End: 2022-10-18 | Stop reason: SDUPTHER

## 2021-10-01 RX ORDER — TOPIRAMATE 25 MG/1
25 TABLET ORAL 2 TIMES DAILY
Qty: 60 TABLET | Refills: 11 | Status: SHIPPED | OUTPATIENT
Start: 2021-10-01 | End: 2022-10-04 | Stop reason: SDUPTHER

## 2021-10-01 RX ORDER — RIZATRIPTAN BENZOATE 10 MG/1
TABLET, ORALLY DISINTEGRATING ORAL
Qty: 12 TABLET | Refills: 11 | Status: SHIPPED | OUTPATIENT
Start: 2021-10-01 | End: 2022-08-02 | Stop reason: ALTCHOICE

## 2021-10-01 NOTE — PROGRESS NOTES
Arnaldo Sanders is a 42 y.o. female who was seen by synchronous (real-time) audio-video technology on 10/1/2021 for No chief complaint on file. Assessment & Plan:   Diagnoses and all orders for this visit:    1. Migraine without aura and with status migrainosus, not intractable    Other orders  -     ondansetron (Zofran ODT) 4 mg disintegrating tablet; Take 1 Tablet by mouth every eight (8) hours as needed for Nausea. -     topiramate (TOPAMAX) 25 mg tablet; Take 1 Tablet by mouth two (2) times a day. -     rizatriptan (MAXALT-MLT) 10 mg disintegrating tablet; Place one tab under the tongue at headache onset. May repeat x 1 after 2 hours. Max two tabs in 24 hours. Follow-up migraines. Had about 4 headache days last month. Maintained on Topamax 25 mg daily. Has a prescription to take up to 50 mg a day. Refills provided. Has Maxalt for abortive headache therapy. Provided refills of such. We discussed trying different medications such as Ubrelvy or Nurtec in the future. She tells me she does not tolerate taking triptans while she does at work due to drowsiness. She uses Zofran for nausea. Refills provided. She will continue to track headaches and follow-up in 1 year or sooner if need be. I spent at least 30 minutes on this visit with this established patient. 712  Subjective: This is a 42year old female who presents for follow up migraines. Last seen in March. In the interim endorses having about 4 headache days a month. She is on Topamax 25 mg daily and uses Maxalt as needed with positive response. She tells me she will wait to use the Maxalt at home as it can make her sleepy. She is at work now with a slight headache but has not taken anything. Denies OTC medications. Denies focal deficits. Does have nausea with headaches and uses Zofran as needed. Requesting refills. No other concerns at this time. Prior to Admission medications    Medication Sig Start Date End Date Taking? Authorizing Provider   topiramate (TOPAMAX) 25 mg tablet Take 1 Tablet by mouth two (2) times a day. 9/24/21   Juan Valentin NP   rizatriptan (MAXALT-MLT) 10 mg disintegrating tablet Place one tab under the tongue at headache onset. May repeat x 1 after 2 hours. Max two tabs in 24 hours. 9/24/21   Juan Valentin NP   Xolair 150 mg/mL syrg INJECT 300MG (150MG/1ML X 2 syringe) SUBCUTANEOUSLY ONCE EVERY 30 DAYS. MAX 150MG PER INJECTION SITE. 9/28/21   Jocelyne Lincoln MD   fluticasone propion-salmeteroL (ADVAIR/WIXELA) 500-50 mcg/dose diskus inhaler Take 1 Puff by inhalation two (2) times a day. 7/8/21   Jocelyne Lincoln MD   albuterol-ipratropium (DUO-NEB) 2.5 mg-0.5 mg/3 ml nebu INHALE CONTENTS OF 1 VIAL VIA NEBULIZER EVERY 4 HOURS AS NEEDED FOR SHORTNESS OF BREATH/WHEEZING 5/18/21   Jocelyne Lincoln MD   albuterol-ipratropium (DUO-NEB) 2.5 mg-0.5 mg/3 ml nebu INHALE CONTENTS OF 1 VIAL VIA NEBULIZER EVERY 4 HOURS AS NEEDED FOR SHORTNESS OF BREATH/WHEEZING. 5/17/21   Jay Delarosa MD   acetaminophen (Tylenol Extra Strength) 500 mg tablet Take 2 Tabs by mouth every six (6) hours as needed for Pain. 3/22/21   VERONICA Barrera   lidocaine (Lidocaine Viscous) 2 % solution Put 10 mL in mouth and swish and spit out QAC and at bedtime 3/22/21   VERONICA Barrera   ondansetron (Zofran ODT) 4 mg disintegrating tablet Take 1 Tab by mouth every eight (8) hours as needed for Nausea. 3/17/21   Juan Valentin NP   fluticasone propionate (FLONASE) 50 mcg/actuation nasal spray USE 2 SPRAYS INTO EACH NOSTRIL ONCE DAILY 3/15/21   Jocelyne Lincoln MD   montelukast (SINGULAIR) 10 mg tablet TAKE 1 TABLET BY MOUTH EVERY DAY 3/3/21   Jocelyne Lincoln MD   albuterol (PROVENTIL HFA, VENTOLIN HFA, PROAIR HFA) 90 mcg/actuation inhaler Take 2 Puffs by inhalation every four (4) hours as needed for Wheezing.  7/9/20   Jocelyne Lincoln MD   LORazepam (ATIVAN) 0.5 mg tablet Take 0.5 mg by mouth every eight (8) hours as needed for Anxiety. 4/22/20   Provider, Historical   fexofenadine-pseudoephedrine (Allegra-D 12 Hour)  mg Tb12 Take 1 Tab by mouth every twelve (12) hours. Provider, Historical     Patient Active Problem List   Diagnosis Code    Asthma J45.909    Pregnancy Z34.90    Migraine without aura and without status migrainosus, not intractable G43.009    Asthma exacerbation J45. 901    Acute upper respiratory infection J06.9    Anxiety F41.9    Atopic rhinitis J30.9    Contact dermatitis L25.9    Encounter for immunization Z23    Encounter for issue of repeat prescription Z76.0    Migraine without status migrainosus, not intractable G43.909     Patient Active Problem List    Diagnosis Date Noted    Acute upper respiratory infection 09/07/2021    Anxiety 09/07/2021    Atopic rhinitis 09/07/2021    Contact dermatitis 09/07/2021    Encounter for immunization 09/07/2021    Encounter for issue of repeat prescription 09/07/2021    Migraine without status migrainosus, not intractable 09/07/2021    Asthma exacerbation 05/15/2018    Migraine without aura and without status migrainosus, not intractable 02/27/2017    Pregnancy 02/04/2016    Asthma      Current Outpatient Medications   Medication Sig Dispense Refill    ondansetron (Zofran ODT) 4 mg disintegrating tablet Take 1 Tablet by mouth every eight (8) hours as needed for Nausea. 30 Tablet 5    topiramate (TOPAMAX) 25 mg tablet Take 1 Tablet by mouth two (2) times a day. 60 Tablet 11    rizatriptan (MAXALT-MLT) 10 mg disintegrating tablet Place one tab under the tongue at headache onset. May repeat x 1 after 2 hours. Max two tabs in 24 hours. 12 Tablet 11    Xolair 150 mg/mL syrg INJECT 300MG (150MG/1ML X 2 syringe) SUBCUTANEOUSLY ONCE EVERY 30 DAYS. MAX 150MG PER INJECTION SITE. 2 Each 10    fluticasone propion-salmeteroL (ADVAIR/WIXELA) 500-50 mcg/dose diskus inhaler Take 1 Puff by inhalation two (2) times a day.  1 Inhaler 5    albuterol-ipratropium (DUO-NEB) 2.5 mg-0.5 mg/3 ml nebu INHALE CONTENTS OF 1 VIAL VIA NEBULIZER EVERY 4 HOURS AS NEEDED FOR SHORTNESS OF BREATH/WHEEZING 180 mL 5    albuterol-ipratropium (DUO-NEB) 2.5 mg-0.5 mg/3 ml nebu INHALE CONTENTS OF 1 VIAL VIA NEBULIZER EVERY 4 HOURS AS NEEDED FOR SHORTNESS OF BREATH/WHEEZING. 180 mL 5    acetaminophen (Tylenol Extra Strength) 500 mg tablet Take 2 Tabs by mouth every six (6) hours as needed for Pain. 20 Tab 0    lidocaine (Lidocaine Viscous) 2 % solution Put 10 mL in mouth and swish and spit out QAC and at bedtime 200 mL 0    fluticasone propionate (FLONASE) 50 mcg/actuation nasal spray USE 2 SPRAYS INTO EACH NOSTRIL ONCE DAILY 1 Bottle 5    montelukast (SINGULAIR) 10 mg tablet TAKE 1 TABLET BY MOUTH EVERY DAY 90 Tab 1    albuterol (PROVENTIL HFA, VENTOLIN HFA, PROAIR HFA) 90 mcg/actuation inhaler Take 2 Puffs by inhalation every four (4) hours as needed for Wheezing. 1 Inhaler 5    LORazepam (ATIVAN) 0.5 mg tablet Take 0.5 mg by mouth every eight (8) hours as needed for Anxiety.  fexofenadine-pseudoephedrine (Allegra-D 12 Hour)  mg Tb12 Take 1 Tab by mouth every twelve (12) hours.        Current Facility-Administered Medications   Medication Dose Route Frequency Provider Last Rate Last Admin    omalizumab (XOLAIR) SQ syringe 300 mg  300 mg SubCUTAneous Q30D Antelmo Smyth MD   300 mg at 09/10/21 1611    omalizumab (XOLAIR) SQ syringe 300 mg  300 mg SubCUTAneous Q30D Antelmo Smyth MD   300 mg at 08/13/21 1607    omalizumab (XOLAIR) SQ syringe 300 mg  300 mg SubCUTAneous Q30D Rosenda Stratton MD   300 mg at 07/09/21 1551    omalizumab (XOLAIR) SQ syringe 300 mg  300 mg SubCUTAneous Q30D Rosenda Stratton MD   300 mg at 06/11/21 1557    omalizumab (XOLAIR) SQ syringe 300 mg  300 mg SubCUTAneous Q30D Antelmo Smyth MD   300 mg at 05/14/21 1106     Allergies   Allergen Reactions    Aspirin Anaphylaxis, Itching and Swelling    Ibuprofen Anaphylaxis    Pcn [Penicillins] Itching    Shellfish Derived Anaphylaxis and Itching    Claritin [Loratadine] Anxiety and Cough    Phenergan [Promethazine] Anxiety and Swelling    Seafood Swelling    Shellfish Containing Products Hives     Past Medical History:   Diagnosis Date    Anxiety attack     Asthma     Asthmatic bronchitis     Bronchitis     Catamenial disorder     Hernia     Influenza     Migraine     Migraines     Mild asthma     Psychiatric disorder     anxiety     Torticollis      Past Surgical History:   Procedure Laterality Date    HX BACK SURGERY      HX  SECTION  2009    [place]  DELIVERY ONLY       Family History   Problem Relation Age of Onset    Heart Disease Other         grandmothers    Asthma Brother     Lung Disease Paternal Aunt         cronic bronchitis    Asthma Paternal Grandmother     High Cholesterol Mother      Social History     Tobacco Use    Smoking status: Never Smoker    Smokeless tobacco: Never Used   Substance Use Topics    Alcohol use: No       Review of Systems  GENERAL: Denies fever or fatigue  CARDIAC: No CP or SOB  PULMONARY: No cough of SOB  MUSCULOSKELETAL: No new joint pain  NEURO: SEE HPI      Objective:     Patient-Reported Vitals 2020   Patient-Reported Weight 135.0   Patient-Reported LMP -      General: alert, cooperative, no distress   Mental  status: normal mood, behavior, speech, dress, motor activity, and thought processes, able to follow commands   HENT: NCAT   Neck: no visualized mass   Resp: no respiratory distress   Neuro: no gross deficits   Skin: no discoloration or lesions of concern on visible areas   Psychiatric: normal affect, consistent with stated mood, no evidence of hallucinations     Additional exam findings: This is a very pleasant 29-year-old female. She is alert in no apparent distress. Full fund of knowledge. Speech is clear. No facial asymmetry. Extraocular movements intact. No signs of ataxia or incoordination. Steady gait. We discussed the expected course, resolution and complications of the diagnosis(es) in detail. Medication risks, benefits, costs, interactions, and alternatives were discussed as indicated. I advised her to contact the office if her condition worsens, changes or fails to improve as anticipated. She expressed understanding with the diagnosis(es) and plan. Pascale Welch, was evaluated through a synchronous (real-time) audio-video encounter. The patient (or guardian if applicable) is aware that this is a billable service. Verbal consent to proceed has been obtained within the past 12 months. The visit was conducted pursuant to the emergency declaration under the [facility], [address] [place] [address] authority and the [employer] General Act. Patient identification was verified, and a caregiver was present when appropriate. The patient was located in a state where the provider was credentialed to provide care.     Dalton Garzon NP

## 2021-10-01 NOTE — PROGRESS NOTES
Albert Craven is a 42 y.o. female on virtual visit today for follow-up on migraines. 1. Have you been to the ER, urgent care clinic since your last visit? Hospitalized since your last visit? No    2. Have you seen or consulted any other health care providers outside of the [address] [place] [address] since your last visit? Include any pap smears or colon screening. No     Mobile number [phone] will be used for today's visit.

## 2021-10-07 PROBLEM — Z23 ENCOUNTER FOR IMMUNIZATION: Status: RESOLVED | Noted: 2021-09-07 | Resolved: 2021-10-07

## 2021-10-16 ENCOUNTER — HOSPITAL ENCOUNTER (EMERGENCY)
Age: 46
Discharge: HOME | End: 2021-10-16
Attending: EMERGENCY MEDICINE
Payer: MEDICAID

## 2021-10-16 VITALS
OXYGEN SATURATION: 100 % | DIASTOLIC BLOOD PRESSURE: 68 MMHG | HEIGHT: 65 IN | BODY MASS INDEX: 20.99 KG/M2 | RESPIRATION RATE: 16 BRPM | SYSTOLIC BLOOD PRESSURE: 102 MMHG | TEMPERATURE: 98.7 F | HEART RATE: 83 BPM | WEIGHT: 126 LBS

## 2021-10-16 DIAGNOSIS — N39.0 URINARY TRACT INFECTION WITHOUT HEMATURIA, SITE UNSPECIFIED: Primary | ICD-10-CM

## 2021-10-16 LAB
APPEARANCE UR: CLEAR
BACTERIA URNS QL MICRO: ABNORMAL /HPF
BILIRUB UR QL: NEGATIVE
COLOR UR: YELLOW
EPITH CASTS URNS QL MICRO: ABNORMAL /LPF (ref 0–5)
GLUCOSE UR STRIP.AUTO-MCNC: NEGATIVE MG/DL
HCG UR QL: NEGATIVE
HGB UR QL STRIP: ABNORMAL
KETONES UR QL STRIP.AUTO: NEGATIVE MG/DL
LEUKOCYTE ESTERASE UR QL STRIP.AUTO: ABNORMAL
NITRITE UR QL STRIP.AUTO: NEGATIVE
PH UR STRIP: 7.5 [PH] (ref 5–8)
PROT UR STRIP-MCNC: 30 MG/DL
RBC #/AREA URNS HPF: ABNORMAL /HPF (ref 0–5)
SP GR UR REFRACTOMETRY: 1.01 (ref 1–1.03)
UROBILINOGEN UR QL STRIP.AUTO: 0.2 EU/DL (ref 0.2–1)
WBC URNS QL MICRO: ABNORMAL /HPF (ref 0–4)

## 2021-10-16 PROCEDURE — 81025 URINE PREGNANCY TEST: CPT

## 2021-10-16 PROCEDURE — 81001 URINALYSIS AUTO W/SCOPE: CPT

## 2021-10-16 PROCEDURE — 99283 EMERGENCY DEPT VISIT LOW MDM: CPT

## 2021-10-16 RX ORDER — NITROFURANTOIN 25; 75 MG/1; MG/1
100 CAPSULE ORAL 2 TIMES DAILY
Qty: 10 CAPSULE | Refills: 0 | Status: SHIPPED | OUTPATIENT
Start: 2021-10-16 | End: 2021-10-21

## 2021-10-16 NOTE — ED TRIAGE NOTES
Patient c/o dysuria and frequency. She denies concerns for STDs and denies vaginal discharge at present. She voices concerns for UTI.

## 2021-10-16 NOTE — ED PROVIDER NOTES
EMERGENCY DEPARTMENT HISTORY AND PHYSICAL EXAM    10:14 AM  Date: 10/16/2021  Patient Name: Guero Montelongo    History of Presenting Illness       History Provided By:     HPI: Guero Montelongo is a 42 y.o. female with past medical history of asthma, anxiety presents with a urinary burning, frequency that started yesterday. Denies any back pain nausea vomiting fever chills. Denies any vaginal discharge. PCP: Santiago Cook NP    Past History     Past Medical History:  Past Medical History:   Diagnosis Date    Anxiety attack     Asthma     Asthmatic bronchitis     Bronchitis     Catamenial disorder     Hernia     Influenza     Migraine     Migraines     Mild asthma     Psychiatric disorder     anxiety     Torticollis        Past Surgical History:  Past Surgical History:   Procedure Laterality Date    HX BACK SURGERY      HX  SECTION  2009    [place]  DELIVERY ONLY         Family History:  Family History   Problem Relation Age of Onset    Heart Disease Other         grandmothers    Asthma Brother     Lung Disease Paternal Aunt         cronic bronchitis    Asthma Paternal Grandmother     High Cholesterol Mother        Social History:  Social History     Tobacco Use    Smoking status: Never Smoker    Smokeless tobacco: Never Used   Substance Use Topics    Alcohol use: No    Drug use: No       Allergies: Allergies   Allergen Reactions    Aspirin Anaphylaxis, Itching and Swelling    Ibuprofen Anaphylaxis    Pcn [Penicillins] Itching    Shellfish Derived Anaphylaxis and Itching    Claritin [Loratadine] Anxiety and Cough    Phenergan [Promethazine] Anxiety and Swelling    Seafood Swelling    Shellfish Containing Products Hives       Review of Systems   Review of Systems   Constitutional: Negative for activity change, appetite change and chills. HENT: Negative for congestion, ear discharge, ear pain and sore throat. Eyes: Negative for photophobia and pain. Respiratory: Negative for cough and choking. Cardiovascular: Negative for palpitations and leg swelling. Gastrointestinal: Negative for anal bleeding and rectal pain. Endocrine: Negative for polydipsia and polyuria. Genitourinary: Negative for genital sores and urgency. Musculoskeletal: Negative for arthralgias and myalgias. Neurological: Negative for dizziness, seizures and speech difficulty. Psychiatric/Behavioral: Negative for hallucinations, self-injury and suicidal ideas. Physical Exam     Patient Vitals for the past 12 hrs:   Temp Pulse Resp BP SpO2   10/16/21 1002 98.7 °F (37.1 °C) 83 16 102/68 100 %       Physical Exam  Vitals and nursing note reviewed. Constitutional:       Appearance: She is well-developed. HENT:      Head: Normocephalic and atraumatic. Eyes:      General:         Right eye: No discharge. Left eye: No discharge. Cardiovascular:      Rate and Rhythm: Normal rate and regular rhythm. Heart sounds: Normal heart sounds. No murmur heard. Pulmonary:      Effort: Pulmonary effort is normal. No respiratory distress. Breath sounds: Normal breath sounds. No stridor. No wheezing or rales. Chest:      Chest wall: No tenderness. Abdominal:      General: Bowel sounds are normal. There is no distension. Palpations: Abdomen is soft. Tenderness: There is no abdominal tenderness. There is no guarding or rebound. Musculoskeletal:         General: Normal range of motion. Cervical back: Normal range of motion and neck supple. Skin:     General: Skin is warm and dry. Neurological:      Mental Status: She is alert and oriented to person, place, and time. Diagnostic Study Results     Labs -  No results found for this or any previous visit (from the past 12 hour(s)). Radiologic Studies -   No results found.       Medical Decision Making     ED Course: Progress Notes, Reevaluation, and Consults:    10:14 AM Initial assessment performed. The patients presenting problems have been discussed, and they/their family are in agreement with the care plan formulated and outlined with them. I have encouraged them to ask questions as they arise throughout their visit. Provider Notes (Medical Decision Making):   Patient presents with dysuria and frequency  No nausea vomiting abdominal pain  No findings concerning for pyelonephritis  Clinical impression UTI  Patient will be discharged with antibiotics                Vital Signs-Reviewed the patient's vital signs. Reviewed pt's pulse ox reading. Records Reviewed: old medical records  -I am the first provider for this patient.  -I reviewed the vital signs, available nursing notes, past medical history, past surgical history, family history and social history. Current Facility-Administered Medications   Medication Dose Route Frequency Provider Last Rate Last Admin    omalizumab (XOLAIR) SQ syringe 300 mg  300 mg SubCUTAneous Q30D Dre WOMACK MD   300 mg at 09/10/21 1611    omalizumab (XOLAIR) SQ syringe 300 mg  300 mg SubCUTAneous Q30D Federico Colón MD   300 mg at 08/13/21 1607    omalizumab (XOLAIR) SQ syringe 300 mg  300 mg SubCUTAneous Q30D Flossie Hamman, MD   300 mg at 07/09/21 1551    omalizumab (XOLAIR) SQ syringe 300 mg  300 mg SubCUTAneous Q30D Flossie Hamman, MD   300 mg at 06/11/21 1557    omalizumab (XOLAIR) SQ syringe 300 mg  300 mg SubCUTAneous Q30D Dre WOMACK MD   300 mg at 05/14/21 1106     Current Outpatient Medications   Medication Sig Dispense Refill    topiramate (TOPAMAX) 25 mg tablet Take 1 Tablet by mouth two (2) times a day. 60 Tablet 11    rizatriptan (MAXALT-MLT) 10 mg disintegrating tablet Place one tab under the tongue at headache onset. May repeat x 1 after 2 hours. Max two tabs in 24 hours. 12 Tablet 11    Xolair 150 mg/mL syrg INJECT 300MG (150MG/1ML X 2 syringe) SUBCUTANEOUSLY ONCE EVERY 30 DAYS.  MAX 150MG PER INJECTION SITE. 2 Each 10  fluticasone propion-salmeteroL (ADVAIR/WIXELA) 500-50 mcg/dose diskus inhaler Take 1 Puff by inhalation two (2) times a day. 1 Inhaler 5    albuterol-ipratropium (DUO-NEB) 2.5 mg-0.5 mg/3 ml nebu INHALE CONTENTS OF 1 VIAL VIA NEBULIZER EVERY 4 HOURS AS NEEDED FOR SHORTNESS OF BREATH/WHEEZING 180 mL 5    fluticasone propionate (FLONASE) 50 mcg/actuation nasal spray USE 2 SPRAYS INTO EACH NOSTRIL ONCE DAILY 1 Bottle 5    montelukast (SINGULAIR) 10 mg tablet TAKE 1 TABLET BY MOUTH EVERY DAY 90 Tab 1    albuterol (PROVENTIL HFA, VENTOLIN HFA, PROAIR HFA) 90 mcg/actuation inhaler Take 2 Puffs by inhalation every four (4) hours as needed for Wheezing. 1 Inhaler 5    LORazepam (ATIVAN) 0.5 mg tablet Take 0.5 mg by mouth every eight (8) hours as needed for Anxiety.  fexofenadine-pseudoephedrine (Allegra-D 12 Hour)  mg Tb12 Take 1 Tab by mouth every twelve (12) hours.  ondansetron (Zofran ODT) 4 mg disintegrating tablet Take 1 Tablet by mouth every eight (8) hours as needed for Nausea. 30 Tablet 5    acetaminophen (Tylenol Extra Strength) 500 mg tablet Take 2 Tabs by mouth every six (6) hours as needed for Pain. 20 Tab 0        Clinical Impression     Clinical Impression: No diagnosis found. Disposition:    Pt has been reexamined. Patient has no new complaints, changes, or physical findings. Care plan outlined and precautions discussed. Results were reviewed with the patient. All medications were reviewed with the patient; will d/c home with PMD f/u. All of pt's questions and concerns were addressed. Patient was instructed and agrees to follow up with PMD, as well as to return to the ED upon further deterioration. Patient is ready to go home. This note was dictated utilizing voice recognition software which may lead to typographical errors. I apologize in advance if the situation occurs. If questions arise please do not hesitate to contact me or call our department.         This note was dictated utilizing voice recognition software which may lead to typographical errors. I apologize in advance if the situation occurs. If questions arise please do not hesitate to contact me or call our department.     Shellie Hassan MD  10:14 AM

## 2021-10-24 RX ORDER — FLUCONAZOLE 150 MG/1
150 TABLET ORAL
Qty: 1 TABLET | Refills: 0 | Status: SHIPPED | OUTPATIENT
Start: 2021-10-24 | End: 2021-10-24

## 2021-10-24 NOTE — ED NOTES
The patient called in with complaint of vaginal discharge 1 week after being seen for a urinary tract infection. The patient is feeling much better from her urinary tract infection however is having a discharge which she considers to be a yeast infection based on her experience. The patient's urine pregnancy was negative during that visit and I was able to discuss with her over the phone after getting 2 patient identifiers as she has no concerns for being pregnant and will call her in a dose of Diflucan. The patient agrees with the plan will follow closely with her primary provider and will return if at all worsened or concerned. We did encourage her to come to the emergency department for reevaluation if needed however said that she does not feel like she needs to.   Kelley Ferrer, DO 8:57 AM

## 2021-11-09 ENCOUNTER — OFFICE VISIT (OUTPATIENT)
Dept: PULMONOLOGY | Age: 46
End: 2021-11-09
Payer: MEDICAID

## 2021-11-09 VITALS
HEART RATE: 87 BPM | BODY MASS INDEX: 21.02 KG/M2 | TEMPERATURE: 98.2 F | SYSTOLIC BLOOD PRESSURE: 123 MMHG | DIASTOLIC BLOOD PRESSURE: 74 MMHG | HEIGHT: 65 IN | OXYGEN SATURATION: 100 % | RESPIRATION RATE: 18 BRPM | WEIGHT: 126.2 LBS

## 2021-11-09 DIAGNOSIS — J45.40 MODERATE PERSISTENT EXTRINSIC ASTHMA WITHOUT COMPLICATION: Primary | ICD-10-CM

## 2021-11-09 DIAGNOSIS — Z91.09 ENVIRONMENTAL ALLERGIES: ICD-10-CM

## 2021-11-09 DIAGNOSIS — Z23 NEEDS FLU SHOT: ICD-10-CM

## 2021-11-09 PROCEDURE — 99214 OFFICE O/P EST MOD 30 MIN: CPT | Performed by: INTERNAL MEDICINE

## 2021-11-09 PROCEDURE — 90686 IIV4 VACC NO PRSV 0.5 ML IM: CPT | Performed by: INTERNAL MEDICINE

## 2021-11-09 PROCEDURE — 90471 IMMUNIZATION ADMIN: CPT | Performed by: INTERNAL MEDICINE

## 2021-11-09 RX ORDER — AZITHROMYCIN 250 MG/1
TABLET, FILM COATED ORAL
COMMUNITY
Start: 2021-10-27 | End: 2022-01-05 | Stop reason: SDUPTHER

## 2021-11-09 RX ORDER — FLUCONAZOLE 150 MG/1
TABLET ORAL
COMMUNITY
Start: 2021-10-27 | End: 2022-01-05 | Stop reason: SDUPTHER

## 2021-11-09 RX ORDER — ALBUTEROL SULFATE 90 UG/1
2 AEROSOL, METERED RESPIRATORY (INHALATION)
Qty: 1 EACH | Refills: 5 | Status: SHIPPED | OUTPATIENT
Start: 2021-11-09 | End: 2022-05-23

## 2021-11-09 RX ORDER — PREDNISONE 20 MG/1
TABLET ORAL
COMMUNITY
Start: 2021-10-27 | End: 2021-11-09

## 2021-11-09 RX ORDER — IPRATROPIUM BROMIDE AND ALBUTEROL SULFATE 2.5; .5 MG/3ML; MG/3ML
3 SOLUTION RESPIRATORY (INHALATION)
Qty: 180 ML | Refills: 5 | Status: SHIPPED | OUTPATIENT
Start: 2021-11-09 | End: 2022-05-23

## 2021-11-09 RX ORDER — FLUTICASONE PROPIONATE AND SALMETEROL 500; 50 UG/1; UG/1
1 POWDER RESPIRATORY (INHALATION) 2 TIMES DAILY
Qty: 1 EACH | Refills: 5 | Status: SHIPPED | OUTPATIENT
Start: 2021-11-09 | End: 2022-08-19 | Stop reason: SDUPTHER

## 2021-11-09 RX ORDER — BENZONATATE 200 MG/1
CAPSULE ORAL
COMMUNITY
Start: 2021-08-19 | End: 2022-10-18

## 2021-11-09 RX ORDER — BACLOFEN 20 MG/1
TABLET ORAL
COMMUNITY
Start: 2021-09-03

## 2021-11-09 NOTE — PROGRESS NOTES
Starr Moser presents today for   Chief Complaint   Patient presents with    Follow-up       Is someone accompanying this pt? no    Is the patient using any DME equipment during OV? Yes, Nebulizer    -[employer]    Depression Screening:  3 most recent PHQ Screens 11/9/2021   Little interest or pleasure in doing things Not at all   Feeling down, depressed, irritable, or hopeless Not at all   Total Score PHQ 2 0       Learning Assessment:  Learning Assessment 2/25/2020   PRIMARY LEARNER Patient   PRIMARY LANGUAGE ENGLISH   LEARNER PREFERENCE PRIMARY READING     LISTENING     DEMONSTRATION   ANSWERED BY Patient     -   RELATIONSHIP SELF       Abuse Screening:  No flowsheet data found. Fall Risk  No flowsheet data found. Coordination of Care:  1. Have you been to the ER, urgent care clinic since your last visit? Hospitalized since your last visit? No    2. Have you seen or consulted any other health care providers outside of the [address] [place] [address] since your last visit? Include any pap smears or colon screening. no    Patient has had 2 Covid-19 vaccines. Patient will receive flu vaccine in office today.

## 2021-11-09 NOTE — PROGRESS NOTES
HISTORY OF PRESENT ILLNESS  Denise Hoff is a 42 y.o. female following up after an ED visit. Pt with asthma, now well controlled on Xolair. Pt denies any ED visits or hospital admissions for asthma. Pt has transferred to a new job location where her desk is located under an air vent which sometimes results in SOB and wheezing, relieved by Albuterol. 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 Since her last visit, complaints have resolved and pt has started back on Xolair. No new complaints are registered. Review of Systems   Constitutional: Negative for chills, diaphoresis, fever, malaise/fatigue and weight loss. Weight gain   HENT: Negative for ear discharge, ear pain, hearing loss, nosebleeds, sinus pain, sore throat and tinnitus. Congestion: resolved. Eyes: Negative for blurred vision, double vision, photophobia, pain, discharge and redness. Respiratory: Negative for hemoptysis, sputum production, wheezing and stridor. Cough: rare. Shortness of breath: occasional.    Cardiovascular: Negative for chest pain, palpitations, orthopnea, claudication, leg swelling and PND. Gastrointestinal: Negative for abdominal pain, blood in stool, constipation, diarrhea, heartburn, melena, nausea and vomiting. Genitourinary: Negative for dysuria, flank pain, frequency, hematuria and urgency. Musculoskeletal: Negative for back pain, falls, joint pain, myalgias and neck pain. Skin: Negative for itching and rash. Neurological: Negative for dizziness, tingling, tremors, sensory change, speech change, focal weakness, seizures, loss of consciousness, weakness and headaches. Endo/Heme/Allergies: Negative for environmental allergies and polydipsia. Does not bruise/bleed easily. Psychiatric/Behavioral: Negative for depression, hallucinations, memory loss, substance abuse and suicidal ideas. The patient is nervous/anxious. The patient does not have insomnia.       Past Medical History:   Diagnosis Date    Anxiety attack     Asthma     Asthmatic bronchitis     Bronchitis     Catamenial disorder     Hernia     Influenza     Migraine     Migraines     Mild asthma     Psychiatric disorder     anxiety     Torticollis      Current Outpatient Medications on File Prior to Visit   Medication Sig Dispense Refill    fluconazole (DIFLUCAN) 150 mg tablet       ondansetron (Zofran ODT) 4 mg disintegrating tablet Take 1 Tablet by mouth every eight (8) hours as needed for Nausea. 30 Tablet 5    topiramate (TOPAMAX) 25 mg tablet Take 1 Tablet by mouth two (2) times a day. 60 Tablet 11    rizatriptan (MAXALT-MLT) 10 mg disintegrating tablet Place one tab under the tongue at headache onset. May repeat x 1 after 2 hours. Max two tabs in 24 hours. 12 Tablet 11    Xolair 150 mg/mL syrg INJECT 300MG (150MG/1ML X 2 syringe) SUBCUTANEOUSLY ONCE EVERY 30 DAYS. MAX 150MG PER INJECTION SITE. 2 Each 10    acetaminophen (Tylenol Extra Strength) 500 mg tablet Take 2 Tabs by mouth every six (6) hours as needed for Pain. 20 Tab 0    fluticasone propionate (FLONASE) 50 mcg/actuation nasal spray USE 2 SPRAYS INTO EACH NOSTRIL ONCE DAILY 1 Bottle 5    montelukast (SINGULAIR) 10 mg tablet TAKE 1 TABLET BY MOUTH EVERY DAY 90 Tab 1    LORazepam (ATIVAN) 0.5 mg tablet Take 0.5 mg by mouth every eight (8) hours as needed for Anxiety.  fexofenadine-pseudoephedrine (Allegra-D 12 Hour)  mg Tb12 Take 1 Tab by mouth every twelve (12) hours.       azithromycin (ZITHROMAX) 250 mg tablet  (Patient not taking: Reported on 11/9/2021)      baclofen (LIORESAL) 20 mg tablet TAKE 1 TABLET BY MOUTH EVERY 8 HOURS AS NEEDED (Patient not taking: Reported on 11/9/2021)      benzonatate (TESSALON) 200 mg capsule 1 (ONE) CAPSULE BY MOUTH EVERY EIGHT HOURS, AS NEEDED (Patient not taking: Reported on 11/9/2021)       Current Facility-Administered Medications on File Prior to Visit   Medication Dose Route Frequency Provider Last Rate Last Admin    omalizumab Ilah Hernandezhing) SQ syringe 300 mg  300 mg SubCUTAneous Q30D Pa WOMACK MD   300 mg at 09/10/21 1611    omalizumab (XOLAIR) SQ syringe 300 mg  300 mg SubCUTAneous Q30D Carolina Ayala MD   300 mg at 08/13/21 1607    omalizumab (XOLAIR) SQ syringe 300 mg  300 mg SubCUTAneous Q30D Alba Kamara MD   300 mg at 07/09/21 1551    omalizumab (XOLAIR) SQ syringe 300 mg  300 mg SubCUTAneous Q30D Alba Kamara MD   300 mg at 06/11/21 1557    omalizumab (XOLAIR) SQ syringe 300 mg  300 mg SubCUTAneous Q30D Carolina Ayaal MD   300 mg at 05/14/21 1106     Allergies   Allergen Reactions    Aspirin Anaphylaxis, Itching and Swelling    Ibuprofen Anaphylaxis    Pcn [Penicillins] Itching    Shellfish Derived Anaphylaxis and Itching    Claritin [Loratadine] Anxiety and Cough    Phenergan [Promethazine] Anxiety and Swelling    Seafood Swelling    Shellfish Containing Products Hives     Social History     Socioeconomic History    Marital status:      Spouse name: Not on file    Number of children: Not on file    Years of education: Not on file    Highest education level: Not on file   Occupational History    Not on file   Tobacco Use    Smoking status: Never Smoker    Smokeless tobacco: Never Used   Substance and Sexual Activity    Alcohol use: No    Drug use: No    Sexual activity: Yes     Partners: Male   Other Topics Concern    Not on file   Social History Narrative    Not on file     Social Determinants of Health     Financial Resource Strain:     Difficulty of Paying Living Expenses: Not on file   Food Insecurity:     Worried About Running Out of Food in the Last Year: Not on file    Alireza of Food in the Last Year: Not on file   Transportation Needs:     Lack of Transportation (Medical): Not on file    Lack of Transportation (Non-Medical):  Not on file   Physical Activity:     Days of Exercise per Week: Not on file    Minutes of Exercise per Session: Not on file   Stress:     Feeling of Stress : Not on file   Social Connections:     Frequency of Communication with Friends and Family: Not on file    Frequency of Social Gatherings with Friends and Family: Not on file    Attends Nondenominational Services: Not on file    Active Member of Clubs or Organizations: Not on file    Attends Club or Organization Meetings: Not on file    Marital Status: Not on file   Intimate Partner Violence:     Fear of Current or Ex-Partner: Not on file    Emotionally Abused: Not on file    Physically Abused: Not on file    Sexually Abused: Not on file   Housing Stability:     Unable to Pay for Housing in the Last Year: Not on file    Number of [place] in the Last Year: Not on file    Unstable Housing in the Last Year: Not on file     Blood pressure 123/74, pulse 87, temperature 98.2 °F (36.8 °C), temperature source Temporal, resp. rate 18, height 5' 5\" (1.651 m), weight 57.2 kg (126 lb 3.2 oz), SpO2 100 %. Physical Exam  Constitutional:       General: She is not in acute distress. Appearance: She is well-developed. She is not ill-appearing, toxic-appearing or diaphoretic. HENT:      Head: Normocephalic and atraumatic. Right Ear: External ear normal.      Left Ear: External ear normal.      Nose:      Comments: Deferred      Mouth/Throat:      Comments: Deferred   Eyes:      General: No scleral icterus. Right eye: No discharge. Left eye: No discharge. Conjunctiva/sclera: Conjunctivae normal.      Pupils: Pupils are equal, round, and reactive to light. Neck:      Thyroid: No thyromegaly. Vascular: No carotid bruit or JVD. Trachea: No tracheal deviation. Cardiovascular:      Rate and Rhythm: Normal rate and regular rhythm. Heart sounds: Normal heart sounds. No murmur heard. No friction rub. No gallop. Pulmonary:      Effort: Pulmonary effort is normal. No respiratory distress.       Breath sounds: Normal breath sounds. No stridor. No wheezing, rhonchi or rales. Chest:      Chest wall: No tenderness. Abdominal:      General: Abdomen is flat. Palpations: Abdomen is soft. There is no mass. Tenderness: There is no abdominal tenderness. There is no rebound. Musculoskeletal:         General: No swelling, tenderness, deformity or signs of injury. Cervical back: No rigidity or tenderness. Right lower leg: No edema. Left lower leg: No edema. Lymphadenopathy:      Cervical: No cervical adenopathy. Skin:     General: Skin is warm and dry. Coloration: Skin is not jaundiced or pale. Findings: No bruising, erythema, lesion or rash. Neurological:      General: No focal deficit present. Mental Status: She is alert and oriented to person, place, and time. Coordination: Coordination normal.   Psychiatric:         Behavior: Behavior normal.         Thought Content: Thought content normal.         Judgment: Judgment normal.       Spirometry: mild obstruction , improved from study done 6/14/2012  XR Results (most recent):  Results from [place] encounter on 03/27/20    XR CHEST PA LAT    Narrative  TWO VIEW CHEST RADIOGRAPH    CPT CODE: 04373    INDICATION: Asthma, cough, congestion for one week. COMPARISON: 2/24/2020    FINDINGS:    The cardiomediastinal silhouette is within normal limits. The pulmonary  vascular markings are unremarkable. There is no evidence of focal  consolidation, pleural effusion or pneumothorax. Evaluation of the osseous  structures demonstrates no focal abnormality. Impression  IMPRESSION:    No acute pulmonary disease. ASSESSMENT and PLAN  Encounter Diagnoses   Name Primary?  Moderate persistent extrinsic asthma without complication Yes    Needs flu shot     Environmental allergies      Asthma is well controlled, would continue current regimen including Xolair. Refills written for Advair, Albuterol and Duoneb.   Flu vaccine today  RTC 6 months, spirometry on return

## 2021-11-09 NOTE — PATIENT INSTRUCTIONS
Vaccine Information Statement    Influenza (Flu) Vaccine (Inactivated or Recombinant): What You Need to Know    Many vaccine information statements are available in Romansh and other languages. See www.immunize.org/vis. Hojas de información sobre vacunas están disponibles en español y en muchos otros idiomas. Visite www.immunize.org/vis. 1. Why get vaccinated? Influenza vaccine can prevent influenza (flu). Flu is a contagious disease that spreads around the [facility] every year, usually between October and May. Anyone can get the flu, but it is more dangerous for some people. Infants and young children, people 72 years and older, pregnant people, and people with certain health conditions or a weakened immune system are at greatest risk of flu complications. Pneumonia, bronchitis, sinus infections, and ear infections are examples of flu-related complications. If you have a medical condition, such as heart disease, cancer, or diabetes, flu can make it worse. Flu can cause fever and chills, sore throat, muscle aches, fatigue, cough, headache, and runny or stuffy nose. Some people may have vomiting and diarrhea, though this is more common in children than adults. In an average year, thousands of people in the [facility] die from flu, and many more are hospitalized. Flu vaccine prevents millions of illnesses and flu-related visits to the doctor each year. 2. Influenza vaccines     CDC recommends everyone 6 months and older get vaccinated every flu season. Children 6 months through 6years of age may need 2 doses during a single flu season. Everyone else needs only 1 dose each flu season. It takes about 2 weeks for protection to develop after vaccination. There are many flu viruses, and they are always changing. Each year a new flu vaccine is made to protect against the influenza viruses believed to be likely to cause disease in the upcoming flu season.  Even when the vaccine doesnt exactly match these viruses, it may still provide some protection. Influenza vaccine does not cause flu. Influenza vaccine may be given at the same time as other vaccines. 3. Talk with your health care provider    Tell your vaccination provider if the person getting the vaccine:   Has had an allergic reaction after a previous dose of influenza vaccine, or has any severe, life-threatening allergies    Has ever had Guillain-Barré Syndrome (also called GBS)    In some cases, your health care provider may decide to postpone influenza vaccination until a future visit. Influenza vaccine can be administered at any time during pregnancy. People who are or will be pregnant during influenza season should receive inactivated influenza vaccine. People with minor illnesses, such as a cold, may be vaccinated. People who are moderately or severely ill should usually wait until they recover before getting influenza vaccine. Your health care provider can give you more information. 4. Risks of a vaccine reaction     Soreness, redness, and swelling where the shot is given, fever, muscle aches, and headache can happen after influenza vaccination.  There may be a very small increased risk of Guillain-Barré Syndrome (GBS) after inactivated influenza vaccine (the flu shot). Sulema Lambert children who get the flu shot along with pneumococcal vaccine (PCV13) and/or DTaP vaccine at the same time might be slightly more likely to have a seizure caused by fever. Tell your health care provider if a child who is getting flu vaccine has ever had a seizure. People sometimes faint after medical procedures, including vaccination. Tell your provider if you feel dizzy or have vision changes or ringing in the ears. As with any medicine, there is a very remote chance of a vaccine causing a severe allergic reaction, other serious injury, or death. 5. What if there is a serious problem?     An allergic reaction could occur after the vaccinated person leaves the clinic. If you see signs of a severe allergic reaction (hives, swelling of the face and throat, difficulty breathing, a fast heartbeat, dizziness, or weakness), call 9-1-1 and get the person to the nearest hospital.    For other signs that concern you, call your health care provider. Adverse reactions should be reported to the Vaccine Adverse Event Reporting System (VAERS). Your health care provider will usually file this report, or you can do it yourself. Visit the VAERS website at www.vaers. [facility].gov or call 1-690.616.2281. VAERS is only for reporting reactions, and VAERS staff members do not give medical advice. 6. The National Vaccine Injury Compensation Program    The formerly [facility] Vaccine Injury Compensation Program (VICP) is a federal program that was created to compensate people who may have been injured by certain vaccines. Claims regarding alleged injury or death due to vaccination have a time limit for filing, which may be as short as two years. Visit the VICP website at www.[facility].gov/vaccinecompensation or call 5-493.218.9611 to learn about the program and about filing a claim. 7. How can I learn more?  Ask your health care provider.  Call your local or state health department.  Visit the website of the Food and Drug Administration (FDA) for vaccine package inserts and additional information at www.fda.gov/vaccines-blood-biologics/vaccines.  Contact the Centers for Disease Control and Prevention (CDC):  - Call 3-804.271.4929 (1-800-CDC-INFO) or  - Visit CDCs influenza website at www.cdc.gov/flu. Vaccine Information Statement   Inactivated Influenza Vaccine   8/6/2021  [address] CRISTINA Valdez [id number]   Department of Health and Human Services  Centers for Disease Control and Prevention    Office Use Only

## 2021-11-09 NOTE — PROGRESS NOTES
COVID vaccine Randjanie Perla) received from [address] [place] [address] on 3/68/5472(0IR dose) and 2/25/2021 (2nd dose) per vaccine card. Card scanned into patient's chart. Immunization record updated.

## 2021-11-11 ENCOUNTER — TELEPHONE (OUTPATIENT)
Dept: PULMONOLOGY | Age: 46
End: 2021-11-11

## 2021-11-11 DIAGNOSIS — J45.40 MODERATE PERSISTENT EXTRINSIC ASTHMA WITHOUT COMPLICATION: Primary | ICD-10-CM

## 2021-11-11 NOTE — TELEPHONE ENCOUNTER
Order placed for nebulizer machine, per Verbal Order from Dr. Brad Beltran on 11/11/2021. Last office visit: 11/2021      Provider is aware of last office visit and follow up. No further action requested from provider.

## 2021-11-11 NOTE — TELEPHONE ENCOUNTER
Pt was seen on 11/9/21 and requested that Dr. Pricila Cazares order her a new nebulizer because the one that she has, the cord is cracked and is leaking medicine. Pt would like to know status.  Please advise [phone]

## 2021-11-19 ENCOUNTER — CLINICAL SUPPORT (OUTPATIENT)
Dept: PULMONOLOGY | Age: 46
End: 2021-11-19
Payer: MEDICAID

## 2021-11-19 DIAGNOSIS — J45.40 MODERATE PERSISTENT EXTRINSIC ASTHMA WITHOUT COMPLICATION: Primary | ICD-10-CM

## 2021-11-19 PROCEDURE — 96372 THER/PROPH/DIAG INJ SC/IM: CPT | Performed by: INTERNAL MEDICINE

## 2021-11-19 NOTE — PROGRESS NOTES
Chief Complaint   Patient presents with   Kris Lenz is here for Xolair injection. Patient did not have any reaction to last injection. Patient states the Xolair is helping her Asthma. Injected subcutaneous : 150 mg given in Left upper arm SQ,  150 mg given in Right upper arm SQ. Lot Number: [id number]  Exp Date: 11/2022  Total Xolair Dose: 300 mg every 4 weeks  Epipen is with patient. Patient observed for 15 minutes. No reaction at injection site. Patient given Xolair reaction sheet.

## 2021-11-23 ENCOUNTER — TELEPHONE (OUTPATIENT)
Dept: PULMONOLOGY | Age: 46
End: 2021-11-23

## 2021-12-10 NOTE — TELEPHONE ENCOUNTER
Pt needs refill of singulair sent to [facility] [address]. She also needs another epi pen.

## 2021-12-11 RX ORDER — MONTELUKAST SODIUM 10 MG/1
TABLET ORAL
Qty: 90 TABLET | Refills: 1 | Status: SHIPPED | OUTPATIENT
Start: 2021-12-11 | End: 2022-05-31

## 2021-12-14 NOTE — PROGRESS NOTES
Patient received flu vaccine 0.5ml in left deltoid. Tolerated well. No signs or symptoms of distress noted. Most current VIS given and consent signed. she was observed for 10 min post injection. There were no reactions observed.

## 2021-12-17 ENCOUNTER — CLINICAL SUPPORT (OUTPATIENT)
Dept: PULMONOLOGY | Age: 46
End: 2021-12-17
Payer: MEDICAID

## 2021-12-17 DIAGNOSIS — J45.50 SEVERE PERSISTENT ASTHMA WITHOUT COMPLICATION: ICD-10-CM

## 2021-12-17 DIAGNOSIS — J45.40 MODERATE PERSISTENT EXTRINSIC ASTHMA WITHOUT COMPLICATION: Primary | ICD-10-CM

## 2021-12-17 PROCEDURE — 96372 THER/PROPH/DIAG INJ SC/IM: CPT | Performed by: INTERNAL MEDICINE

## 2021-12-17 NOTE — PROGRESS NOTES
Chief Complaint   Patient presents with   Jairon Miranda is here for Xolair injection. Patient did not have any reaction to last injection. Patient states the Xolair is helping her Asthma. Injected subcutaneous : 150 mg given in Left upper arm SQ,  150 mg given in Right upper arm SQ. Lot Number: [id number]  Exp Date: 7/2022  Total Xolair Dose: 300 mg every 4 weeks  Epipen is with patient. Patient observed for 15 minutes. No reaction at injection site. Patient given Xolair reaction sheet.

## 2022-01-05 ENCOUNTER — TELEPHONE (OUTPATIENT)
Dept: PULMONOLOGY | Age: 47
End: 2022-01-05

## 2022-01-05 DIAGNOSIS — J45.901 EXACERBATION OF PERSISTENT ASTHMA, UNSPECIFIED ASTHMA SEVERITY: Primary | ICD-10-CM

## 2022-01-05 RX ORDER — FLUCONAZOLE 150 MG/1
150 TABLET ORAL DAILY
Qty: 1 TABLET | Refills: 0 | Status: SHIPPED | OUTPATIENT
Start: 2022-01-05 | End: 2022-01-06

## 2022-01-05 RX ORDER — AZITHROMYCIN 250 MG/1
TABLET, FILM COATED ORAL
Qty: 6 TABLET | Refills: 0 | Status: SHIPPED | OUTPATIENT
Start: 2022-01-05 | End: 2022-03-20 | Stop reason: SDUPTHER

## 2022-01-05 RX ORDER — PREDNISONE 20 MG/1
60 TABLET ORAL
Qty: 21 TABLET | Refills: 0 | Status: SHIPPED | OUTPATIENT
Start: 2022-01-05 | End: 2022-01-12

## 2022-01-05 NOTE — TELEPHONE ENCOUNTER
Pt had a covid test done today @ [facility] that Dr. Candi Dotson ordered. Pt works at [employer] She called office today stating that she would need a note to return to work for Monday if the covid results are negative. Will our doctors write a note for her to return to work without being seen? Results are not in the system yet.  Please advise [phone]

## 2022-01-05 NOTE — PROGRESS NOTES
Patient called call line this morning. Followed by DR. 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 Patient last saw on Christmas. Reports she had COVID test on 12/30/21 that was negative  She now has developed chest congestion. Has had to start using her albuterol inhaler. Sputum ranges from clear to yellow. No hemoptysis. , + chest congestion. No diarrhea, no vomiting. No loss of sense of smell or taste. - will treat patient with steroid burst and z-louise. Retest for COVID-19. Patient to monitor for any new symptoms and/or fever. Continue with PRN albuterol MDI or nebulizer. If conditions worsen patient may need to go to ED.   Follow up with Dr. Gómez Crowder DO, [address] [facility]  Pulmonary, Critical Care, and Sleep Medicine

## 2022-01-05 NOTE — TELEPHONE ENCOUNTER
Patient states she was feeling sick and called our on call doctor Dr. Nelida Stevens this am early. She advised her with sxs to get Covid tested and placed an order and also tx her with antibiotic and prednisone. Patient had Covid testing done and they advised it will not be back until Friday.  Her work advised her she would need to call our office Friday to get a note to return on Monday if testing is negative  Patient to call us back Friday to proceed with results and if ok to return to work

## 2022-01-17 ENCOUNTER — CLINICAL SUPPORT (OUTPATIENT)
Dept: PULMONOLOGY | Age: 47
End: 2022-01-17
Payer: MEDICAID

## 2022-01-17 DIAGNOSIS — J45.50 SEVERE PERSISTENT ASTHMA WITHOUT COMPLICATION: ICD-10-CM

## 2022-01-17 DIAGNOSIS — J45.40 MODERATE PERSISTENT EXTRINSIC ASTHMA WITHOUT COMPLICATION: Primary | ICD-10-CM

## 2022-01-17 PROCEDURE — 96372 THER/PROPH/DIAG INJ SC/IM: CPT | Performed by: INTERNAL MEDICINE

## 2022-01-17 NOTE — PROGRESS NOTES
Chief Complaint   Patient presents with   Harry Schwartz is here for Xolair injection. Patient did not have any reaction to last injection. Patient states the Xolair is helping her Asthma. Injected subcutaneous : 150 mg given in Left upper arm SQ,  150 mg given in Right upper arm SQ. Lot Number: [id number]  Exp Date: 8/2022  Total Xolair Dose: 300 mg every 4 weeks  Epipen is with patient. Patient observed for 15 minutes. No reaction at injection site. Patient given Xolair reaction sheet.

## 2022-01-24 ENCOUNTER — TELEPHONE (OUTPATIENT)
Dept: PULMONOLOGY | Age: 47
End: 2022-01-24

## 2022-01-25 RX ORDER — TOPIRAMATE 25 MG/1
25 TABLET ORAL 2 TIMES DAILY
Qty: 60 TABLET | Refills: 11 | OUTPATIENT
Start: 2022-01-25

## 2022-02-17 ENCOUNTER — CLINICAL SUPPORT (OUTPATIENT)
Dept: PULMONOLOGY | Age: 47
End: 2022-02-17
Payer: MEDICAID

## 2022-02-17 DIAGNOSIS — J45.50 SEVERE PERSISTENT ASTHMA WITHOUT COMPLICATION: Primary | ICD-10-CM

## 2022-02-17 NOTE — PROGRESS NOTES
Chief Complaint   Patient presents with   Barb Appl is here for Xolair injection. Patient did not have any reaction to last injection. Patient states the Xolair is helping her Asthma. Injected subcutaneous : 150 mg given in Left upper arm SQ,  150 mg given in Right upper arm SQ. Lot Number: [id number]  Exp Date: 8/2022  Total Xolair Dose: 300 mg every 4 weeks  Epipen is with patient. Patient observed for 15 minutes. No reaction at injection site. Patient given Xolair reaction sheet.

## 2022-02-18 PROCEDURE — 96372 THER/PROPH/DIAG INJ SC/IM: CPT | Performed by: INTERNAL MEDICINE

## 2022-03-18 ENCOUNTER — CLINICAL SUPPORT (OUTPATIENT)
Dept: PULMONOLOGY | Age: 47
End: 2022-03-18
Payer: MEDICAID

## 2022-03-18 DIAGNOSIS — J45.50 SEVERE PERSISTENT ASTHMA WITHOUT COMPLICATION: Primary | ICD-10-CM

## 2022-03-18 PROCEDURE — 96372 THER/PROPH/DIAG INJ SC/IM: CPT | Performed by: INTERNAL MEDICINE

## 2022-03-18 NOTE — PROGRESS NOTES
Chief Complaint   Patient presents with   Kassi Motley is here for Xolair injection. Patient did not have any reaction to last injection. Patient states the Xolair is helping her Asthma. Injected subcutaneous : 150 mg given in Left upper arm SQ,  150 mg given in Right upper arm SQ. Lot Number: [id number]  Exp Date: 11/2022  Total Xolair Dose: 300 mg every 4 weeks  Epipen is with patient. Patient observed for 15 minutes. No reaction at injection site. Patient given Xolair reaction sheet.

## 2022-03-19 PROBLEM — F41.9 ANXIETY: Status: ACTIVE | Noted: 2021-09-07

## 2022-03-19 PROBLEM — J30.9 ATOPIC RHINITIS: Status: ACTIVE | Noted: 2021-09-07

## 2022-03-19 PROBLEM — Z76.0 ENCOUNTER FOR ISSUE OF REPEAT PRESCRIPTION: Status: ACTIVE | Noted: 2021-09-07

## 2022-03-19 PROBLEM — G43.009 MIGRAINE WITHOUT AURA AND WITHOUT STATUS MIGRAINOSUS, NOT INTRACTABLE: Status: ACTIVE | Noted: 2017-02-27

## 2022-03-19 PROBLEM — G43.909 MIGRAINE WITHOUT STATUS MIGRAINOSUS, NOT INTRACTABLE: Status: ACTIVE | Noted: 2021-09-07

## 2022-03-19 PROBLEM — J45.901 ASTHMA EXACERBATION: Status: ACTIVE | Noted: 2018-05-15

## 2022-03-20 DIAGNOSIS — J45.50 SEVERE PERSISTENT ASTHMA WITHOUT COMPLICATION: Primary | ICD-10-CM

## 2022-03-20 PROBLEM — L25.9 CONTACT DERMATITIS: Status: ACTIVE | Noted: 2021-09-07

## 2022-03-20 PROBLEM — J06.9 ACUTE UPPER RESPIRATORY INFECTION: Status: ACTIVE | Noted: 2021-09-07

## 2022-03-20 RX ORDER — PREDNISONE 20 MG/1
40 TABLET ORAL DAILY
Qty: 5 TABLET | Refills: 0 | Status: SHIPPED | OUTPATIENT
Start: 2022-03-20 | End: 2022-03-25 | Stop reason: ALTCHOICE

## 2022-03-20 RX ORDER — AZITHROMYCIN 250 MG/1
TABLET, FILM COATED ORAL
Qty: 6 TABLET | Refills: 0 | Status: SHIPPED | OUTPATIENT
Start: 2022-03-20 | End: 2022-04-07 | Stop reason: ALTCHOICE

## 2022-03-21 NOTE — PROGRESS NOTES
Patient calling Sunday night through answering service reporting  increased asthma symptoms    Current Symptoms  No fever or chills  No hemoptysis  No Chest pain  No Syncope  + Nasal congestion  + Couch  + Yellow Sputum  No hemoptysis  No Nausea or vomiting  No GERD or GI upset  No Diarrhea  + Urine Void and normal BM           ill - Dx. With upper airway infection at Patient care- OTC       Current Meds  Advair 500/50 BID  Singulair 10mg daily  DuoNeb PRN  Albuterol  Allegra D  Flonase  - Had Xolair shot last week in our clinic- patient reports tolerating well      Treatment Plas  - Continue with current meds noted above  Start the following  - Steroid burst   - Z-louise  -Spiriva      Patient told to go to ED for any further decline in status.   Follow up with Dr. Keeley Fortune DO, [address] [facility]  Pulmonary, Critical Care, and Sleep Medicine

## 2022-03-25 ENCOUNTER — TELEPHONE (OUTPATIENT)
Dept: PULMONOLOGY | Age: 47
End: 2022-03-25

## 2022-03-25 RX ORDER — PREDNISONE 10 MG/1
TABLET ORAL
Qty: 18 TABLET | Refills: 0 | Status: SHIPPED | OUTPATIENT
Start: 2022-03-25 | End: 2022-05-09 | Stop reason: SDUPTHER

## 2022-03-25 NOTE — TELEPHONE ENCOUNTER
Pt ROSLYN([phone]). Still coughing up thick yellow phlegm and was on Prednisone for two days and has one more day left on the antibiotic. She feels that the Prednisone was helping her and wants to know if Dr Murali Zabala would send more Prednisone to [facility]

## 2022-04-07 ENCOUNTER — TELEPHONE (OUTPATIENT)
Dept: PULMONOLOGY | Age: 47
End: 2022-04-07

## 2022-04-07 RX ORDER — FLUCONAZOLE 200 MG/1
200 TABLET ORAL DAILY
Qty: 7 TABLET | Refills: 0 | Status: SHIPPED | OUTPATIENT
Start: 2022-04-07 | End: 2022-04-14

## 2022-04-07 NOTE — TELEPHONE ENCOUNTER
Roosevelt DURAND([phone] or [phone]). She has developed a yeast infection from the antibiotic that Dr Vianca Sullivan prescribed and she wants to know if she will send something to to treat the yeast infection to [address] [place] [address].

## 2022-04-08 NOTE — TELEPHONE ENCOUNTER
Called patient, no answer, left message requesting return call. Patient notified diflucan sent to pharmacy.

## 2022-04-15 ENCOUNTER — CLINICAL SUPPORT (OUTPATIENT)
Dept: PULMONOLOGY | Age: 47
End: 2022-04-15
Payer: MEDICAID

## 2022-04-15 DIAGNOSIS — J45.50 SEVERE PERSISTENT ASTHMA WITHOUT COMPLICATION: Primary | ICD-10-CM

## 2022-04-15 PROCEDURE — 96372 THER/PROPH/DIAG INJ SC/IM: CPT | Performed by: INTERNAL MEDICINE

## 2022-04-15 NOTE — PROGRESS NOTES
Chief Complaint   Patient presents with   Papi Lav is here for Xolair injection. Patient did not have any reaction to last injection. Patient states the Xolair is helping her Asthma. Injected subcutaneous : 150 mg given in Left upper arm SQ,  150 mg given in Right upper arm SQ. Lot Number: [id number]  Exp Date: 1/2023  Total Xolair Dose: 300 mg every 4 weeks  Epipen is with patient. Patient observed for 15 minutes. No reaction at injection site. Patient given Xolair reaction sheet.

## 2022-04-28 LAB
CHOLEST SERPL-MCNC: 142 MG/DL
GLUCOSE SERPL-MCNC: 84 MG/DL (ref 74–99)
HDLC SERPL-MCNC: 58 MG/DL (ref 40–60)
LDLC SERPL CALC-MCNC: 71.2 MG/DL (ref 0–100)
TRIGL SERPL-MCNC: 64 MG/DL (ref ?–150)

## 2022-05-06 RX ORDER — OMALIZUMAB 202.5 MG/1.4ML
300 INJECTION, SOLUTION SUBCUTANEOUS
Qty: 300 MG | Refills: 11 | Status: SHIPPED | COMMUNITY
Start: 2022-05-06

## 2022-05-06 NOTE — TELEPHONE ENCOUNTER
Pt needs new order for xolair. Has an appt on 5/13. Pt uses [facility].   Fax- [phone]  Phone- [phone]  Option 3

## 2022-05-09 ENCOUNTER — TELEPHONE (OUTPATIENT)
Dept: PULMONOLOGY | Age: 47
End: 2022-05-09

## 2022-05-09 RX ORDER — AZITHROMYCIN 250 MG/1
250 TABLET, FILM COATED ORAL DAILY
Qty: 5 TABLET | Refills: 0 | Status: SHIPPED | OUTPATIENT
Start: 2022-05-09 | End: 2022-05-14

## 2022-05-09 RX ORDER — PREDNISONE 10 MG/1
TABLET ORAL
Qty: 18 TABLET | Refills: 0 | Status: SHIPPED | OUTPATIENT
Start: 2022-05-09 | End: 2022-07-12 | Stop reason: SDUPTHER

## 2022-05-09 NOTE — TELEPHONE ENCOUNTER
Patient called with c/o cold like symptoms with cough productive of yellow mucus. No fever. Daughter with similar symptoms. Home covid test negative  Nebulizer not helping. Will prescribe Zithromax, Prednisone taper. Instructed to call if no improvement or change in symptoms.

## 2022-05-12 ENCOUNTER — CLINICAL SUPPORT (OUTPATIENT)
Dept: PULMONOLOGY | Age: 47
End: 2022-05-12
Payer: MEDICAID

## 2022-05-12 DIAGNOSIS — J45.50 SEVERE PERSISTENT ASTHMA WITHOUT COMPLICATION: Primary | ICD-10-CM

## 2022-05-12 PROCEDURE — 96372 THER/PROPH/DIAG INJ SC/IM: CPT | Performed by: INTERNAL MEDICINE

## 2022-05-12 NOTE — PROGRESS NOTES
Chief Complaint   Patient presents with   Fatmata Ellison is here for Xolair injection. Patient did not have any reaction to last injection. Patient states the Xolair is helping her Asthma. Injected subcutaneous : 150 mg given in Left upper arm SQ,  150 mg given in Right upper arm SQ. Lot Number: [id number]  Exp Date: 2/2023  Total Xolair Dose: 300 mg every 4 weeks  Epipen is with patient. Patient observed for 15 minutes. No reaction at injection site. Patient given Xolair reaction sheet.

## 2022-05-18 ENCOUNTER — TELEPHONE (OUTPATIENT)
Dept: PULMONOLOGY | Age: 47
End: 2022-05-18

## 2022-05-19 ENCOUNTER — TELEPHONE (OUTPATIENT)
Dept: PULMONOLOGY | Age: 47
End: 2022-05-19

## 2022-05-19 NOTE — TELEPHONE ENCOUNTER
Pt states that the antibiotic that was prescribed to her has given her a yeast infection and would like  to prescribe something for it

## 2022-05-20 ENCOUNTER — TELEPHONE (OUTPATIENT)
Dept: PULMONOLOGY | Age: 47
End: 2022-05-20

## 2022-05-20 RX ORDER — FLUCONAZOLE 200 MG/1
200 TABLET ORAL DAILY
Qty: 7 TABLET | Refills: 0 | Status: SHIPPED | OUTPATIENT
Start: 2022-05-20 | End: 2022-05-27

## 2022-05-20 RX ORDER — MICONAZOLE NITRATE 1200MG-2%
1 KIT VAGINAL
Qty: 1 KIT | Refills: 1 | Status: SHIPPED | OUTPATIENT
Start: 2022-05-20 | End: 2022-05-20

## 2022-05-20 NOTE — TELEPHONE ENCOUNTER
Left message for patient to call me  Need to know where the yeast infection is-is a throat or other area before I send any prescriptions

## 2022-05-20 NOTE — TELEPHONE ENCOUNTER
Per pt, Dr. Tod Agrawal called in wrong medication for her yeast infection. She states she usually gets diflucan. Please change to diflucan and call pt at [phone] once done.

## 2022-05-23 RX ORDER — IPRATROPIUM BROMIDE AND ALBUTEROL SULFATE 2.5; .5 MG/3ML; MG/3ML
SOLUTION RESPIRATORY (INHALATION)
Qty: 180 ML | Refills: 5 | Status: SHIPPED | OUTPATIENT
Start: 2022-05-23 | End: 2022-10-29

## 2022-05-23 RX ORDER — ALBUTEROL SULFATE 90 UG/1
AEROSOL, METERED RESPIRATORY (INHALATION)
Qty: 8.5 EACH | Refills: 5 | Status: SHIPPED | OUTPATIENT
Start: 2022-05-23 | End: 2022-10-29

## 2022-05-31 RX ORDER — MONTELUKAST SODIUM 10 MG/1
TABLET ORAL
Qty: 90 TABLET | Refills: 1 | Status: SHIPPED | OUTPATIENT
Start: 2022-05-31

## 2022-06-09 ENCOUNTER — CLINICAL SUPPORT (OUTPATIENT)
Dept: PULMONOLOGY | Age: 47
End: 2022-06-09
Payer: MEDICAID

## 2022-06-09 DIAGNOSIS — J45.50 SEVERE PERSISTENT ASTHMA WITHOUT COMPLICATION: Primary | ICD-10-CM

## 2022-06-09 PROCEDURE — 96372 THER/PROPH/DIAG INJ SC/IM: CPT | Performed by: INTERNAL MEDICINE

## 2022-06-09 NOTE — PROGRESS NOTES
Chief Complaint   Patient presents with   Papi Lav is here for Xolair injection. Patient did not have any reaction to last injection. Patient states the Xolair is helping her Asthma. Injected subcutaneous : 150 mg given in Left upper arm SQ,  150 mg given in Right upper arm SQ. Lot Number: [id number]  Exp Date: 3/2023  Total Xolair Dose: 300 mg every 4 weeks  Epipen is with patient. Patient observed for 15 minutes. No reaction at injection site. Patient given Xolair reaction sheet.

## 2022-06-11 ENCOUNTER — HOSPITAL ENCOUNTER (EMERGENCY)
Age: 47
Discharge: HOME | End: 2022-06-12
Attending: EMERGENCY MEDICINE
Payer: MEDICAID

## 2022-06-11 DIAGNOSIS — G43.001 MIGRAINE WITHOUT AURA AND WITH STATUS MIGRAINOSUS, NOT INTRACTABLE: Primary | ICD-10-CM

## 2022-06-11 PROCEDURE — 74011636637 HC RX REV CODE- 636/637: Performed by: EMERGENCY MEDICINE

## 2022-06-11 PROCEDURE — 96375 TX/PRO/DX INJ NEW DRUG ADDON: CPT

## 2022-06-11 PROCEDURE — 99284 EMERGENCY DEPT VISIT MOD MDM: CPT

## 2022-06-11 PROCEDURE — 96372 THER/PROPH/DIAG INJ SC/IM: CPT

## 2022-06-11 PROCEDURE — 74011250636 HC RX REV CODE- 250/636: Performed by: EMERGENCY MEDICINE

## 2022-06-11 PROCEDURE — 96374 THER/PROPH/DIAG INJ IV PUSH: CPT

## 2022-06-11 RX ORDER — MORPHINE SULFATE 2 MG/ML
2 INJECTION, SOLUTION INTRAMUSCULAR; INTRAVENOUS
Status: COMPLETED | OUTPATIENT
Start: 2022-06-11 | End: 2022-06-11

## 2022-06-11 RX ORDER — SUMATRIPTAN 6 MG/.5ML
6 INJECTION, SOLUTION SUBCUTANEOUS ONCE
Status: COMPLETED | OUTPATIENT
Start: 2022-06-11 | End: 2022-06-11

## 2022-06-11 RX ORDER — ONDANSETRON 2 MG/ML
4 INJECTION INTRAMUSCULAR; INTRAVENOUS
Status: COMPLETED | OUTPATIENT
Start: 2022-06-11 | End: 2022-06-11

## 2022-06-11 RX ADMIN — MORPHINE SULFATE 2 MG: 2 INJECTION, SOLUTION INTRAMUSCULAR; INTRAVENOUS at 23:46

## 2022-06-11 RX ADMIN — ONDANSETRON 4 MG: 2 INJECTION INTRAMUSCULAR; INTRAVENOUS at 23:46

## 2022-06-11 RX ADMIN — SUMATRIPTAN SUCCINATE 6 MG: 6 INJECTION SUBCUTANEOUS at 22:17

## 2022-06-12 VITALS
HEIGHT: 65 IN | HEART RATE: 85 BPM | TEMPERATURE: 97.7 F | RESPIRATION RATE: 16 BRPM | OXYGEN SATURATION: 99 % | WEIGHT: 126 LBS | BODY MASS INDEX: 20.99 KG/M2 | DIASTOLIC BLOOD PRESSURE: 67 MMHG | SYSTOLIC BLOOD PRESSURE: 114 MMHG

## 2022-06-12 RX ORDER — RIZATRIPTAN BENZOATE 10 MG/1
10 TABLET ORAL
Qty: 50 TABLET | Refills: 0 | Status: SHIPPED | OUTPATIENT
Start: 2022-06-12 | End: 2022-06-12

## 2022-06-12 NOTE — ROUTINE PROCESS
Clemente Brambila is a 42 y.o. female that was discharged in stable. Pt was accompanied by spouse. Pt is not driving. The patients diagnosis, condition and treatment were explained to  patient and aftercare instructions were given. The patient verbalized understanding. Patient armband removed and shredded.

## 2022-06-12 NOTE — ED PROVIDER NOTES
HPI 68-year-old female with history of migraine. She ran out of her migraine medication 2 days ago. She developed a headache this afternoon that is similar to her past migraine. No fever chills sore throat earaches. Past Medical History:   Diagnosis Date    Anxiety attack     Asthma     Asthmatic bronchitis     Bronchitis     Catamenial disorder     Hernia     Influenza     Migraine     Migraines     Mild asthma     Psychiatric disorder     anxiety     Torticollis        Past Surgical History:   Procedure Laterality Date    HX BACK SURGERY      HX  SECTION  2009    [place]  DELIVERY ONLY           Family History:   Problem Relation Age of Onset    Heart Disease Other         grandmothers    Asthma Brother     Lung Disease Paternal Aunt         cronic bronchitis    Asthma Paternal Grandmother     High Cholesterol Mother        Social History     Socioeconomic History    Marital status:      Spouse name: Not on file    Number of children: Not on file    Years of education: Not on file    Highest education level: Not on file   Occupational History    Not on file   Tobacco Use    Smoking status: Never Smoker    Smokeless tobacco: Never Used   Substance and Sexual Activity    Alcohol use: No    Drug use: No    Sexual activity: Yes     Partners: Male   Other Topics Concern    Not on file   Social History Narrative    Not on file     Social Determinants of Health     Financial Resource Strain:     Difficulty of Paying Living Expenses: Not on file   Food Insecurity:     Worried About Running Out of Food in the Last Year: Not on file    Alireza of Food in the Last Year: Not on file   Transportation Needs:     Lack of Transportation (Medical): Not on file    Lack of Transportation (Non-Medical):  Not on file   Physical Activity:     Days of Exercise per Week: Not on file    Minutes of Exercise per Session: Not on file   Stress:     Feeling of Stress : Not on file Social Connections:     Frequency of Communication with Friends and Family: Not on file    Frequency of Social Gatherings with Friends and Family: Not on file    Attends Quaker Services: Not on file    Active Member of Clubs or Organizations: Not on file    Attends Club or Organization Meetings: Not on file    Marital Status: Not on file   Intimate Partner Violence:     Fear of Current or Ex-Partner: Not on file    Emotionally Abused: Not on file    Physically Abused: Not on file    Sexually Abused: Not on file   Housing Stability:     Unable to Pay for Housing in the Last Year: Not on file    Number of [place] in the Last Year: Not on file    Unstable Housing in the Last Year: Not on file         ALLERGIES: Aspirin, Ibuprofen, Pcn [penicillins], Shellfish derived, Claritin [loratadine], Phenergan [promethazine], Seafood, and Shellfish containing products    Review of Systems   Constitutional: Negative. HENT: Negative. Eyes: Negative. Respiratory: Negative. Cardiovascular: Negative. Gastrointestinal: Negative. Endocrine: Negative. Genitourinary: Negative. Musculoskeletal: Negative. Skin: Negative. Allergic/Immunologic: Negative. Neurological: Negative. Hematological: Negative. Psychiatric/Behavioral: Negative. All other systems reviewed and are negative. Vitals:    06/11/22 2142   BP: 116/66   Pulse: 88   Resp: 18   Temp: 97.7 °F (36.5 °C)   SpO2: 100%   Weight: 57.2 kg (126 lb)   Height: 5' 5\" (1.651 m)            Physical Exam  Vitals and nursing note reviewed. Constitutional:       General: She is not in acute distress. Appearance: She is well-developed. She is not diaphoretic. HENT:      Head: Normocephalic. Right Ear: External ear normal.      Left Ear: External ear normal.      Mouth/Throat:      Pharynx: No oropharyngeal exudate. Eyes:      General: No scleral icterus. Right eye: No discharge.          Left eye: No discharge. Conjunctiva/sclera: Conjunctivae normal.      Pupils: Pupils are equal, round, and reactive to light. Neck:      Thyroid: No thyromegaly. Vascular: No JVD. Trachea: No tracheal deviation. Cardiovascular:      Rate and Rhythm: Normal rate and regular rhythm. Heart sounds: Normal heart sounds. No murmur heard. No friction rub. No gallop. Pulmonary:      Effort: Pulmonary effort is normal. No respiratory distress. Breath sounds: Normal breath sounds. No stridor. No wheezing or rales. Chest:      Chest wall: No tenderness. Abdominal:      General: Bowel sounds are normal. There is no distension. Palpations: Abdomen is soft. There is no mass. Tenderness: There is no abdominal tenderness. There is no guarding or rebound. Musculoskeletal:         General: No tenderness. Normal range of motion. Cervical back: Normal range of motion and neck supple. Lymphadenopathy:      Cervical: No cervical adenopathy. Skin:     General: Skin is warm and dry. Coloration: Skin is not pale. Findings: No erythema or rash. Neurological:      Mental Status: She is alert and oriented to person, place, and time. Cranial Nerves: No cranial nerve deficit. Motor: No abnormal muscle tone. Coordination: Coordination normal.      Deep Tendon Reflexes: Reflexes normal.          MDM       Procedures     Diagnosis: Migraine headache, tension headache    Hospital course Course: Patient treated with Imitrex SC and later morphine. Patient headache resolved. Disp: D/C home. F/U PCP in 3 days. Return to ER prn. Renewed Jens. Dictation disclaimer:  Please note that this dictation was completed with [employer], the computer voice recognition software. Quite often unanticipated grammatical, syntax, homophones, and other interpretive errors are inadvertently transcribed by the computer software. Please disregard these errors.   Please excuse any errors that have escaped final proofreading.

## 2022-06-13 ENCOUNTER — PATIENT OUTREACH (OUTPATIENT)
Dept: OTHER | Age: 47
End: 2022-06-13

## 2022-06-13 NOTE — PROGRESS NOTES
Patient on report as eligible for Case Management. Left discreet message on voicemail with this CM contact information. Will attempt to contact again to offer [address] [place] [address] Management services. Per chart review, patient has f/up with pulmonology on 7/8/22. Plan: Attempt another outreach on 6/14. ED visit to [facility] on 6/11/22 for migraine. Red flags:  Call 911 anytime you think you may need emergency care. For example, call if:  You have signs of a stroke. These may include:  Sudden numbness, paralysis, or weakness in your face, arm, or leg, especially on only one  side of your body. Sudden vision changes. Sudden trouble speaking. Sudden confusion or trouble understanding simple statements. Sudden problems with walking or balance. A sudden, severe headache that is different from past headaches. Call your doctor now or seek immediate medical care if:  You have new or worse nausea and vomiting. You have a new or higher fever. Your headache gets much worse.

## 2022-06-14 ENCOUNTER — PATIENT OUTREACH (OUTPATIENT)
Dept: OTHER | Age: 47
End: 2022-06-14

## 2022-06-14 NOTE — PROGRESS NOTES
Patient identified as eligible for [address] [place] [address] services. Second telephone outreach attempted. Left discreet voicemail with this CM confidential contact information. Will send UTR letter via mail, as patient does not appear to have My Chart. Will route letter to [facility] [employer] to print and mail. Will attempt another outreach in two weeks, 6/28.

## 2022-06-14 NOTE — LETTER
6/14/2022 9:53 AM    Ms. Deanne Vick [phone]        Dear Ms. Santy Stevens,    My name is Barry Haji, Associate Care Manager for [facility] and I have been trying to reach you. The Associate Care Management (ACM) program is a free-of-charge confidential service provided to our associates and their family members covered by the [facility]. The program will provide an associate and his/her family with the [facility] expertise to assist in navigating the health care delivery system, provider services, and their overall care needsso as to assure and improve health care interactions and enhance the quality of life. This program is designed to provide you with the opportunity to have a [employer] partner with you for the following services:     1) when you come home from the hospital or emergency room   2) when help is needed to manage your disease   3) when you need assistance coordinating services or appointments  4) when you need additional education, resources or assistance reaching your Be Well Health Program goals/requirements such as Be Well With Diabetes    [facility] is dedicated to empowering the good health of its community and improving the quality of care and care experiences for associates and their families. We are committed to safeguarding patient confidentiality and privacy, assuring that every associate has the respect he or she deserves in managing their health. The information shared with your care manager will not be shared with anyone else aside from those you identify as part of your care team, and will only be used to assist you with any identified care needs. Please contact me if you would like this service provided to you. Sincerely,    Nina Alicea RN, [address] [place] [address] [address] [phone] [place] [phone]  [email or web address]  [facility] email: [email or web address]

## 2022-06-24 ENCOUNTER — PATIENT OUTREACH (OUTPATIENT)
Dept: OTHER | Age: 47
End: 2022-06-24

## 2022-06-24 NOTE — PROGRESS NOTES
6/24/22 WON Guillermo assisting ACM, Janeen Hernandez, LILY with outreach/UTR letter. Letter generated, printed, and mailed.

## 2022-06-28 ENCOUNTER — PATIENT OUTREACH (OUTPATIENT)
Dept: OTHER | Age: 47
End: 2022-06-28

## 2022-06-28 NOTE — PROGRESS NOTES
Patient identified as eligible for [address] [place] [address] services. Third telephone outreach attempted. Left discreet voicemail with this CM confidential contact information. UTR letter sent on 6/24/22. Per chart review, patient has appointment with Pulmonary on 7/8/22. Patient called back. States she is doing fine. No needs at this time. Encouraged her to reach out should she change her mind. She was appreciative.

## 2022-07-08 ENCOUNTER — CLINICAL SUPPORT (OUTPATIENT)
Dept: PULMONOLOGY | Age: 47
End: 2022-07-08
Payer: MEDICAID

## 2022-07-08 DIAGNOSIS — J45.50 SEVERE PERSISTENT ASTHMA WITHOUT COMPLICATION: Primary | ICD-10-CM

## 2022-07-08 PROCEDURE — 96372 THER/PROPH/DIAG INJ SC/IM: CPT | Performed by: INTERNAL MEDICINE

## 2022-07-08 NOTE — PROGRESS NOTES
Chief Complaint   Patient presents with   Tia Arrow is here for Xolair injection. Patient did not have any reaction to last injection. Patient states the Xolair is helping her Asthma. Injected subcutaneous : 150 mg given in Left upper arm SQ,  150 mg given in Right upper arm SQ. Lot Number: [id number]  Exp Date: 3/2023  Total Xolair Dose: 300 mg every 4* weeks  Epipen is with patient. Patient observed for 15 minutes. No reaction at injection site. Patient given Xolair reaction sheet.

## 2022-07-10 ENCOUNTER — HOSPITAL ENCOUNTER (EMERGENCY)
Age: 47
Discharge: HOME | End: 2022-07-10
Attending: STUDENT IN AN ORGANIZED HEALTH CARE EDUCATION/TRAINING PROGRAM
Payer: MEDICAID

## 2022-07-10 VITALS
BODY MASS INDEX: 21.33 KG/M2 | SYSTOLIC BLOOD PRESSURE: 109 MMHG | RESPIRATION RATE: 18 BRPM | OXYGEN SATURATION: 100 % | TEMPERATURE: 99.4 F | HEIGHT: 65 IN | HEART RATE: 90 BPM | WEIGHT: 128 LBS | DIASTOLIC BLOOD PRESSURE: 60 MMHG

## 2022-07-10 DIAGNOSIS — Z20.822 SUSPECTED COVID-19 VIRUS INFECTION: ICD-10-CM

## 2022-07-10 DIAGNOSIS — D72.819 LEUKOPENIA, UNSPECIFIED TYPE: ICD-10-CM

## 2022-07-10 DIAGNOSIS — M79.10 MYALGIA: Primary | ICD-10-CM

## 2022-07-10 DIAGNOSIS — G43.009 MIGRAINE WITHOUT AURA AND WITHOUT STATUS MIGRAINOSUS, NOT INTRACTABLE: ICD-10-CM

## 2022-07-10 LAB
ANION GAP SERPL CALC-SCNC: 5 MMOL/L (ref 3–18)
BASOPHILS # BLD: 0 K/UL (ref 0–0.1)
BASOPHILS NFR BLD: 0 % (ref 0–2)
BUN SERPL-MCNC: 9 MG/DL (ref 7–18)
BUN/CREAT SERPL: 9 (ref 12–20)
CALCIUM SERPL-MCNC: 9 MG/DL (ref 8.5–10.1)
CHLORIDE SERPL-SCNC: 110 MMOL/L (ref 100–111)
CO2 SERPL-SCNC: 23 MMOL/L (ref 21–32)
CREAT SERPL-MCNC: 0.95 MG/DL (ref 0.6–1.3)
DIFFERENTIAL METHOD BLD: ABNORMAL
EOSINOPHIL # BLD: 0 K/UL (ref 0–0.4)
EOSINOPHIL NFR BLD: 0 % (ref 0–5)
ERYTHROCYTE [DISTWIDTH] IN BLOOD BY AUTOMATED COUNT: 12.8 % (ref 11.6–14.5)
FLUAV RNA SPEC QL NAA+PROBE: NOT DETECTED
FLUBV RNA SPEC QL NAA+PROBE: NOT DETECTED
GLUCOSE SERPL-MCNC: 96 MG/DL (ref 74–99)
HCG SERPL QL: NEGATIVE
HCT VFR BLD AUTO: 34.3 % (ref 35–45)
HGB BLD-MCNC: 11.3 G/DL (ref 12–16)
IMM GRANULOCYTES # BLD AUTO: 0 K/UL (ref 0–0.04)
IMM GRANULOCYTES NFR BLD AUTO: 0 % (ref 0–0.5)
LYMPHOCYTES # BLD: 0.7 K/UL (ref 0.9–3.6)
LYMPHOCYTES NFR BLD: 16 % (ref 21–52)
MAGNESIUM SERPL-MCNC: 2 MG/DL (ref 1.6–2.6)
MCH RBC QN AUTO: 26.9 PG (ref 24–34)
MCHC RBC AUTO-ENTMCNC: 32.9 G/DL (ref 31–37)
MCV RBC AUTO: 81.7 FL (ref 78–100)
MONOCYTES # BLD: 0.9 K/UL (ref 0.05–1.2)
MONOCYTES NFR BLD: 19 % (ref 3–10)
NEUTS SEG # BLD: 2.9 K/UL (ref 1.8–8)
NEUTS SEG NFR BLD: 64 % (ref 40–73)
NRBC # BLD: 0 K/UL (ref 0–0.01)
NRBC BLD-RTO: 0 PER 100 WBC
PLATELET # BLD AUTO: 220 K/UL (ref 135–420)
PMV BLD AUTO: 11 FL (ref 9.2–11.8)
POTASSIUM SERPL-SCNC: 3.7 MMOL/L (ref 3.5–5.5)
RBC # BLD AUTO: 4.2 M/UL (ref 4.2–5.3)
SARS-COV-2, COV2: DETECTED
SODIUM SERPL-SCNC: 138 MMOL/L (ref 136–145)
WBC # BLD AUTO: 4.5 K/UL (ref 4.6–13.2)

## 2022-07-10 PROCEDURE — 74011250637 HC RX REV CODE- 250/637: Performed by: STUDENT IN AN ORGANIZED HEALTH CARE EDUCATION/TRAINING PROGRAM

## 2022-07-10 PROCEDURE — 83735 ASSAY OF MAGNESIUM: CPT

## 2022-07-10 PROCEDURE — 87636 SARSCOV2 & INF A&B AMP PRB: CPT

## 2022-07-10 PROCEDURE — 85025 COMPLETE CBC W/AUTO DIFF WBC: CPT

## 2022-07-10 PROCEDURE — 96365 THER/PROPH/DIAG IV INF INIT: CPT

## 2022-07-10 PROCEDURE — 96375 TX/PRO/DX INJ NEW DRUG ADDON: CPT

## 2022-07-10 PROCEDURE — 80048 BASIC METABOLIC PNL TOTAL CA: CPT

## 2022-07-10 PROCEDURE — 84703 CHORIONIC GONADOTROPIN ASSAY: CPT

## 2022-07-10 PROCEDURE — 99284 EMERGENCY DEPT VISIT MOD MDM: CPT

## 2022-07-10 PROCEDURE — 74011250636 HC RX REV CODE- 250/636: Performed by: STUDENT IN AN ORGANIZED HEALTH CARE EDUCATION/TRAINING PROGRAM

## 2022-07-10 RX ORDER — DIPHENHYDRAMINE HCL 25 MG
25 CAPSULE ORAL
Status: COMPLETED | OUTPATIENT
Start: 2022-07-10 | End: 2022-07-10

## 2022-07-10 RX ORDER — MAGNESIUM SULFATE HEPTAHYDRATE 40 MG/ML
2 INJECTION, SOLUTION INTRAVENOUS ONCE
Status: COMPLETED | OUTPATIENT
Start: 2022-07-10 | End: 2022-07-10

## 2022-07-10 RX ORDER — ACETAMINOPHEN 500 MG
1000 TABLET ORAL
Qty: 24 TABLET | Refills: 0 | Status: SHIPPED | OUTPATIENT
Start: 2022-07-10

## 2022-07-10 RX ORDER — METHOCARBAMOL 750 MG/1
750 TABLET, FILM COATED ORAL
Qty: 14 TABLET | Refills: 0 | Status: SHIPPED | OUTPATIENT
Start: 2022-07-10 | End: 2022-07-24

## 2022-07-10 RX ORDER — DEXAMETHASONE SODIUM PHOSPHATE 4 MG/ML
10 INJECTION, SOLUTION INTRA-ARTICULAR; INTRALESIONAL; INTRAMUSCULAR; INTRAVENOUS; SOFT TISSUE ONCE
Status: COMPLETED | OUTPATIENT
Start: 2022-07-10 | End: 2022-07-10

## 2022-07-10 RX ORDER — PROCHLORPERAZINE EDISYLATE 5 MG/ML
10 INJECTION INTRAMUSCULAR; INTRAVENOUS
Status: COMPLETED | OUTPATIENT
Start: 2022-07-10 | End: 2022-07-10

## 2022-07-10 RX ORDER — ONDANSETRON 4 MG/1
4 TABLET, FILM COATED ORAL
Qty: 14 TABLET | Refills: 0 | Status: SHIPPED | OUTPATIENT
Start: 2022-07-10 | End: 2022-10-18 | Stop reason: ALTCHOICE

## 2022-07-10 RX ADMIN — MAGNESIUM SULFATE HEPTAHYDRATE 2 G: 2 INJECTION, SOLUTION INTRAVENOUS at 09:34

## 2022-07-10 RX ADMIN — DEXAMETHASONE SODIUM PHOSPHATE 10 MG: 4 INJECTION, SOLUTION INTRAMUSCULAR; INTRAVENOUS at 09:32

## 2022-07-10 RX ADMIN — DIPHENHYDRAMINE HYDROCHLORIDE 25 MG: 25 CAPSULE ORAL at 08:14

## 2022-07-10 RX ADMIN — SODIUM CHLORIDE 1000 ML: 900 INJECTION, SOLUTION INTRAVENOUS at 08:14

## 2022-07-10 RX ADMIN — PROCHLORPERAZINE EDISYLATE 10 MG: 5 INJECTION INTRAMUSCULAR; INTRAVENOUS at 08:15

## 2022-07-10 NOTE — ED PROVIDER NOTES
EMERGENCY DEPARTMENT HISTORY AND PHYSICAL EXAM      Date: 7/10/2022  Patient Name: Denise Hoff    History of Presenting Illness     Chief Complaint   Patient presents with    Headache    Generalized Body Aches       History (Context): Denise Hoff is a 41 y.o. female with a past medical history significant for anxiety, asthma, migraines comes into the ED today due to headache, myalgias, and sore throat. Patient states symptoms began a few days ago and have worsened since onset. Admits to taking a triptan and Tylenol for treatment of her symptoms without significant improvement. States that she has had associated chills but denies any fever, chest pain, dyspnea, cough, abdominal pain, nausea, vomiting, or diarrhea. States symptoms are severe in quality. Denies any alleviating or exacerbating factors.       PCP: Jose Lindquist NP    Current Facility-Administered Medications   Medication Dose Route Frequency Provider Last Rate Last Admin    sodium chloride 0.9 % bolus infusion 1,000 mL  1,000 mL IntraVENous ONCE Darin Smith DO        prochlorperazine (COMPAZINE) injection 10 mg  10 mg IntraVENous NOW Clemente Chunestanali VELASCO DO        diphenhydrAMINE (BENADRYL) capsule 25 mg  25 mg Oral NOW Darin Smith DO        omalizumab Waldemar Libel) SQ syringe 300 mg  300 mg SubCUTAneous Q30D Maurice WOMACK MD   300 mg at 07/08/22 1557    omalizumab (XOLAIR) SQ syringe 300 mg  300 mg SubCUTAneous Q30D Chapincito Molina MD   300 mg at 06/09/22 1324    omalizumab (XOLAIR) SQ syringe 300 mg  300 mg SubCUTAneous Q30D Chapincito Molina MD   300 mg at 05/12/22 1343    omalizumab (XOLAIR) SQ syringe 300 mg  300 mg SubCUTAneous Q30D Dennis Caldwell DO   300 mg at 04/15/22 1558    omalizumab (XOLAIR) SQ syringe 300 mg  300 mg SubCUTAneous Q30D Kiki Severe, MD   300 mg at 03/18/22 1624    omalizumab (XOLAIR) SQ syringe 300 mg  300 mg SubCUTAneous Q30D Kiki Severe, MD   300 mg at 02/18/22 8209    omalizumab (XOLAIR) SQ syringe 300 mg  300 mg SubCUTAneous Q30D Jocelyne Lincoln MD   300 mg at 01/17/22 1523     Current Outpatient Medications   Medication Sig Dispense Refill    montelukast (SINGULAIR) 10 mg tablet TAKE 1 TABLET BY MOUTH EVERY DAY 90 Tablet 1    albuterol (PROVENTIL HFA, VENTOLIN HFA, PROAIR HFA) 90 mcg/actuation inhaler INHALE 2 PUFFS BY MOUTH EVERY 4 HOURS AS NEEDED FOR WHEEZE 8.5 Each 5    albuterol-ipratropium (DUO-NEB) 2.5 mg-0.5 mg/3 ml nebu INHALE 1 VIAL VIA NEBULIZER EVERY 6 HOURS AS NEEDED FOR WHEEZING OR SHORTNESS OF BREATH 180 mL 5    predniSONE (DELTASONE) 10 mg tablet 30 mg po dailyx 3 days 20 mg po daily x 3 days 10 mg po daily x3 days 18 Tablet 0    omalizumab (Xolair) 150 mg solr 300 mg by SubCUTAneous route every thirty (30) days. 300 mg 11    tiotropium (SPIRIVA) 18 mcg inhalation capsule Take 1 Capsule by inhalation daily. 30 Capsule 1    baclofen (LIORESAL) 20 mg tablet TAKE 1 TABLET BY MOUTH EVERY 8 HOURS AS NEEDED (Patient not taking: Reported on 11/9/2021)      benzonatate (TESSALON) 200 mg capsule 1 (ONE) CAPSULE BY MOUTH EVERY EIGHT HOURS, AS NEEDED (Patient not taking: Reported on 11/9/2021)      fluticasone propion-salmeteroL (ADVAIR/WIXELA) 500-50 mcg/dose diskus inhaler Take 1 Puff by inhalation two (2) times a day. 1 Each 5    ondansetron (Zofran ODT) 4 mg disintegrating tablet Take 1 Tablet by mouth every eight (8) hours as needed for Nausea. 30 Tablet 5    topiramate (TOPAMAX) 25 mg tablet Take 1 Tablet by mouth two (2) times a day. 60 Tablet 11    rizatriptan (MAXALT-MLT) 10 mg disintegrating tablet Place one tab under the tongue at headache onset. May repeat x 1 after 2 hours. Max two tabs in 24 hours. 12 Tablet 11    acetaminophen (Tylenol Extra Strength) 500 mg tablet Take 2 Tabs by mouth every six (6) hours as needed for Pain.  20 Tab 0    fluticasone propionate (FLONASE) 50 mcg/actuation nasal spray USE 2 SPRAYS INTO EACH NOSTRIL ONCE DAILY 1 Bottle 5    LORazepam (ATIVAN) 0.5 mg tablet Take 0.5 mg by mouth every eight (8) hours as needed for Anxiety.  fexofenadine-pseudoephedrine (Allegra-D 12 Hour)  mg Tb12 Take 1 Tab by mouth every twelve (12) hours. Past History     Past Medical History:   Past Medical History:   Diagnosis Date    Anxiety attack     Asthma     Asthmatic bronchitis     Bronchitis     Catamenial disorder     Hernia     Influenza     Migraine     Migraines     Mild asthma     Psychiatric disorder     anxiety     Torticollis        Past Surgical History:  Past Surgical History:   Procedure Laterality Date    HX BACK SURGERY      HX  SECTION  2009    [place]  DELIVERY ONLY         Family History:  Family History   Problem Relation Age of Onset    Heart Disease Other         grandmothers    Asthma Brother     Lung Disease Paternal Aunt         cronic bronchitis    Asthma Paternal Grandmother     High Cholesterol Mother        Social History:   Social History     Tobacco Use    Smoking status: Never Smoker    Smokeless tobacco: Never Used   Substance Use Topics    Alcohol use: No    Drug use: No       Allergies: Allergies   Allergen Reactions    Aspirin Anaphylaxis, Itching and Swelling    Ibuprofen Anaphylaxis    Pcn [Penicillins] Itching    Shellfish Derived Anaphylaxis and Itching    Claritin [Loratadine] Anxiety and Cough    Phenergan [Promethazine] Anxiety and Swelling    Seafood Swelling    Shellfish Containing Products Hives       PMH, PSH, family history, social history, allergies reviewed with the patient with significant items noted above. Review of Systems   Review of Systems   Constitutional: Positive for chills. Negative for fever. HENT: Negative for sore throat. Eyes: Negative for visual disturbance. Respiratory: Negative for shortness of breath. Cardiovascular: Negative for chest pain.    Gastrointestinal: Negative for abdominal pain, nausea and vomiting. Genitourinary: Negative for difficulty urinating. Musculoskeletal: Positive for arthralgias and myalgias. Skin: Negative for rash. Neurological: Positive for headaches. Negative for weakness and numbness. Physical Exam     Vitals:    07/10/22 0801   BP: 97/77   Pulse: (!) 107   Resp: 20   Temp: 99.4 °F (37.4 °C)   SpO2: 100%   Weight: 58.1 kg (128 lb)   Height: 5' 5\" (1.651 m)       Physical Exam  Vitals and nursing note reviewed. Constitutional:       General: She is in acute distress. Appearance: Normal appearance. She is ill-appearing. She is not toxic-appearing. HENT:      Head: Normocephalic and atraumatic. Mouth/Throat:      Mouth: Mucous membranes are moist.   Eyes:      General: No scleral icterus. Conjunctiva/sclera: Conjunctivae normal.   Cardiovascular:      Rate and Rhythm: Regular rhythm. Tachycardia present. Comments: Normal peripheral perfusion  Pulmonary:      Effort: Pulmonary effort is normal. No respiratory distress. Abdominal:      General: There is no distension. Palpations: Abdomen is soft. Tenderness: There is no abdominal tenderness. There is no guarding or rebound. Musculoskeletal:         General: No deformity. Normal range of motion. Cervical back: Normal range of motion and neck supple. Skin:     General: Skin is warm and dry. Findings: No rash. Neurological:      General: No focal deficit present. Mental Status: She is alert and oriented to person, place, and time. Mental status is at baseline. Motor: No weakness. Gait: Gait normal.   Psychiatric:         Mood and Affect: Mood normal.         Thought Content: Thought content normal.         Diagnostic Study Results     Labs -   No results found for this or any previous visit (from the past 12 hour(s)).  Labs Reviewed - No data to display    Radiologic Studies -   No orders to display     CT Results  (Last 48 hours)    None        CXR Results  (Last 48 hours)    None          The laboratory results, imaging results, and other diagnostic exams were reviewed in the EMR. Medical Decision Making   I am the first provider for this patient. I reviewed the vital signs, available nursing notes, past medical history, past surgical history, family history and social history. Vital Signs-Reviewed the patient's vital signs. Records Reviewed: Personally, on initial evaluation    MDM:   Vinny Villasenor presents with complaint of diffuse myalgias and headache  DDX includes but is not limited to: COVID, dehydration, migraine    Patient overall ill-appearing, in acute distress secondary to pain but otherwise hemodynamically stable but tachycardic. Will obtain lab work for further evaluation of patients complaint. Will continue to monitor and evaluate patient while in the ED. Orders as below:  Orders Placed This Encounter    COVID-19 WITH INFLUENZA A/B    CBC WITH AUTOMATED DIFF    BASIC METABOLIC PANEL    HCG QL SERUM    MAGNESIUM    sodium chloride 0.9 % bolus infusion 1,000 mL    prochlorperazine (COMPAZINE) injection 10 mg    diphenhydrAMINE (BENADRYL) capsule 25 mg        ED Course:   ED Course as of 07/10/22 1531   Sun Jul 10, 2022   0853 Patient's hemoglobin 11.3, white count 4.5, otherwise labs grossly within normal limits. We will continue to monitor patient. [DV]   1113 Patient states improvement of her headache however continues to have leg pain. [DV]   4131 Patient's vitals normalized. Will discharge patient home with strict return precautions and follow-up recommendations. Patient verbalized understanding is without any further questions. [DV]      ED Course User Index  [DV] Isis Gallagher DO           Procedures:  Procedures        Diagnosis and Disposition     CLINICAL IMPRESSION:  No diagnosis found.   Current Discharge Medication List          Disposition: Home    Patient condition at time of disposition: Stable    DISCHARGE NOTE:   Pt has been reexamined. Patient has no new complaints, changes, or physical findings. Care plan outlined and precautions discussed. Results were reviewed with the patient. All medications were reviewed with the patient. All of pt's questions and concerns were addressed. Alarm symptoms and return precautions associated with chief complaint and evaluation were reviewed with the patient in detail. The patient demonstrated adequate understanding. Patient was instructed to follow up with PCP, as well as strict return precautions to the ED upon further deterioration. Patient is ready to go home. The patient is happy with this plan        Dragon Disclaimer     Please note that this dictation was completed with [employer], the computer voice recognition software. Quite often unanticipated grammatical, syntax, homophones, and other interpretive errors are inadvertently transcribed by the computer software. Please disregard these errors. Please excuse any errors that have escaped final proofreading. Angel PRATT.

## 2022-07-10 NOTE — Clinical Note
[address] [facility] EMERGENCY DEPT  [id number] [address] [phone]    Work/School Note    Date: 7/10/2022     To Whom It May concern:    Thomas Peñaloza was evaluated by the following provider(s):  Attending Provider: Ethel Delano virus is suspected. Per the CDC guidelines we recommend home isolation until the following conditions are all met:    1. At least five days have passed since symptoms first appeared and/or had a close exposure,   2. After home isolation for five days, wearing a mask around others for the next five days,  3. At least 24 have passed since last fever without the use of fever-reducing medications and  4.  Symptoms (eg cough, shortness of breath) have improved      Sincerely,          Ivone Ray,

## 2022-07-10 NOTE — ED TRIAGE NOTES
Pt c/o headache for 2 days. C/p bilateral leg and shoulder pain since yesterday.  States she took her migraine med without relief

## 2022-07-10 NOTE — Clinical Note
[address] [facility] EMERGENCY DEPT  [id number] [address] [phone]    Work/School Note    Date: 7/10/2022     To Whom It May concern:    Thomas Peñaloza was evaluated by the following provider(s):  Attending Provider: Ethel Purdum virus is suspected. Per the CDC guidelines we recommend home isolation until the following conditions are all met:    1. At least five days have passed since symptoms first appeared and/or had a close exposure,   2. After home isolation for five days, wearing a mask around others for the next five days,  3. At least 24 have passed since last fever without the use of fever-reducing medications and  4.  Symptoms (eg cough, shortness of breath) have improved      Sincerely,          Ivone Ray,

## 2022-07-11 ENCOUNTER — TELEPHONE (OUTPATIENT)
Dept: PULMONOLOGY | Age: 47
End: 2022-07-11

## 2022-07-11 ENCOUNTER — PATIENT OUTREACH (OUTPATIENT)
Dept: OTHER | Age: 47
End: 2022-07-11

## 2022-07-11 NOTE — TELEPHONE ENCOUNTER
Pt went to [facility] ED 7/10/22 and tested positive for covid. She called office today stating that she is now coughing up 'yellow cold'. She is requesting prednisone and antibiotics be sent to [facility] on [address]  Please call pt back today [phone]

## 2022-07-11 NOTE — PROGRESS NOTES
Date/Time:  7/11/2022 2:42 PM  Attempted to reach patient by telephone. Left HIPPA compliant message requesting a return call. Will attempt to reach patient again.

## 2022-07-12 ENCOUNTER — PATIENT OUTREACH (OUTPATIENT)
Dept: OTHER | Age: 47
End: 2022-07-12

## 2022-07-12 RX ORDER — PREDNISONE 10 MG/1
TABLET ORAL
Qty: 18 TABLET | Refills: 0 | Status: SHIPPED | OUTPATIENT
Start: 2022-07-12 | End: 2022-10-18 | Stop reason: ALTCHOICE

## 2022-07-15 ENCOUNTER — PATIENT OUTREACH (OUTPATIENT)
Dept: OTHER | Age: 47
End: 2022-07-15

## 2022-07-15 NOTE — PROGRESS NOTES
Date/Time:  7/15/2022 9:16 AM  Attempted to reach patient by telephone. Left HIPPA compliant message requesting a return call.

## 2022-07-22 ENCOUNTER — PATIENT OUTREACH (OUTPATIENT)
Dept: OTHER | Age: 47
End: 2022-07-22

## 2022-07-22 NOTE — PROGRESS NOTES
Date/Time:  7/22/2022 2:51 PM  Attempted to reach patient by telephone. Left HIPPA compliant message requesting a return call. Will resolve episode if I do not hear back from patient.

## 2022-08-01 ENCOUNTER — PATIENT OUTREACH (OUTPATIENT)
Dept: OTHER | Age: 47
End: 2022-08-01

## 2022-08-01 NOTE — PROGRESS NOTES
Patient resolved from Transition of Care episode on 8/1/22  Unable to reach patient for COVID CARLOS initial assessment.

## 2022-08-02 ENCOUNTER — TELEPHONE (OUTPATIENT)
Dept: NEUROLOGY | Age: 47
End: 2022-08-02

## 2022-08-02 DIAGNOSIS — G43.001 MIGRAINE WITHOUT AURA AND WITH STATUS MIGRAINOSUS, NOT INTRACTABLE: Primary | ICD-10-CM

## 2022-08-02 RX ORDER — SUMATRIPTAN 100 MG/1
100 TABLET, FILM COATED ORAL
Qty: 9 TABLET | Refills: 3 | Status: SHIPPED | OUTPATIENT
Start: 2022-08-02 | End: 2022-08-02

## 2022-08-02 NOTE — TELEPHONE ENCOUNTER
Patient called and stated that her insurance is no longer covering her rizatriptan and would like to be prescribed a new medication for her headaches

## 2022-08-02 NOTE — TELEPHONE ENCOUNTER
Pt. Called because she was disconnected from call with nurse because of connection issues she said you can reach her at [phone]  Please advise.

## 2022-08-03 ENCOUNTER — TELEPHONE (OUTPATIENT)
Dept: NEUROLOGY | Age: 47
End: 2022-08-03

## 2022-08-03 NOTE — TELEPHONE ENCOUNTER
Pt. Called to let the nurse know she no longer needs the prior authorization for the sumatriptan because her insurance is going to pay for her rizatriptan so she is going to continue with that.

## 2022-08-04 ENCOUNTER — CLINICAL SUPPORT (OUTPATIENT)
Dept: PULMONOLOGY | Age: 47
End: 2022-08-04
Payer: MEDICAID

## 2022-08-04 DIAGNOSIS — J45.50 SEVERE PERSISTENT ASTHMA WITHOUT COMPLICATION: Primary | ICD-10-CM

## 2022-08-04 PROCEDURE — 96372 THER/PROPH/DIAG INJ SC/IM: CPT | Performed by: INTERNAL MEDICINE

## 2022-08-04 NOTE — PROGRESS NOTES
Chief Complaint   Patient presents with    Injection     [facility] is here for Xolair injection. Patient did not have any reaction to last injection. Patient states the Xolair is helping her Asthma. Injected subcutaneous : 150 mg given in Left upper arm SQ,  150 mg given in Right upper arm SQ. Lot Number: [id number]  Exp Date: 4/2023  Total Xolair Dose: 300 mg every 4 weeks  Epipen is with patient. Patient observed for 15 minutes. No reaction at injection site. Patient given Xolair reaction sheet.

## 2022-08-15 ENCOUNTER — TELEPHONE (OUTPATIENT)
Dept: NEUROLOGY | Age: 47
End: 2022-08-15

## 2022-08-16 DIAGNOSIS — G43.001 MIGRAINE WITHOUT AURA AND WITH STATUS MIGRAINOSUS, NOT INTRACTABLE: Primary | ICD-10-CM

## 2022-08-16 RX ORDER — RIZATRIPTAN BENZOATE 10 MG/1
10 TABLET, ORALLY DISINTEGRATING ORAL
Qty: 10 TABLET | Refills: 1 | Status: SHIPPED | OUTPATIENT
Start: 2022-08-16 | End: 2022-08-16

## 2022-08-20 RX ORDER — FLUTICASONE PROPIONATE 50 MCG
SPRAY, SUSPENSION (ML) NASAL
Qty: 1 EACH | Refills: 5 | Status: SHIPPED | OUTPATIENT
Start: 2022-08-20

## 2022-08-20 RX ORDER — FLUTICASONE PROPIONATE AND SALMETEROL 500; 50 UG/1; UG/1
1 POWDER RESPIRATORY (INHALATION) 2 TIMES DAILY
Qty: 1 EACH | Refills: 5 | Status: SHIPPED | OUTPATIENT
Start: 2022-08-20 | End: 2022-10-12

## 2022-08-29 ENCOUNTER — TELEPHONE (OUTPATIENT)
Dept: NEUROLOGY | Age: 47
End: 2022-08-29

## 2022-08-29 RX ORDER — RIZATRIPTAN BENZOATE 10 MG/1
TABLET ORAL
Qty: 10 TABLET | Refills: 1 | Status: SHIPPED | OUTPATIENT
Start: 2022-08-29 | End: 2022-10-04 | Stop reason: SDUPTHER

## 2022-08-29 RX ORDER — RIZATRIPTAN BENZOATE 10 MG/1
TABLET ORAL
COMMUNITY
Start: 2022-07-06 | End: 2022-08-29 | Stop reason: SDUPTHER

## 2022-08-29 NOTE — TELEPHONE ENCOUNTER
Pt. Called stating that she needs a refill for her rizatriptan because it is only filled every 15 days. Please advise.

## 2022-09-06 ENCOUNTER — CLINICAL SUPPORT (OUTPATIENT)
Dept: PULMONOLOGY | Age: 47
End: 2022-09-06
Payer: MEDICAID

## 2022-09-06 DIAGNOSIS — J45.50 SEVERE PERSISTENT ASTHMA WITHOUT COMPLICATION: Primary | ICD-10-CM

## 2022-09-06 PROCEDURE — 96372 THER/PROPH/DIAG INJ SC/IM: CPT | Performed by: INTERNAL MEDICINE

## 2022-09-06 NOTE — PROGRESS NOTES
Chief Complaint   Patient presents with    Injection     [facility] is here for Xolair injection. Patient did not have any reaction to last injection. Patient states the Xolair is helping her Asthma. Injected subcutaneous : 150 mg given in Left upper arm SQ,  150 mg given in Right upper arm SQ. Lot Number: [id number]  Exp Date: 6/2023  Total Xolair Dose: 300 mg every 4 weeks  Epipen is with patient. Patient observed for 15 minutes. No reaction at injection site. Patient given Xolair reaction sheet.

## 2022-09-28 ENCOUNTER — TELEPHONE (OUTPATIENT)
Dept: PULMONOLOGY | Age: 47
End: 2022-09-28

## 2022-09-28 NOTE — TELEPHONE ENCOUNTER
Pt states she is at work, but sick with coughing up sputum, sob, headache, sore throat. Please call [phone].

## 2022-09-28 NOTE — TELEPHONE ENCOUNTER
Patient stated last Friday with congestion, sputum light yellow in color, cough, HA and sore throat. She has only used Muscinex  She did a rapid Covid test last pm that was negative. She works at [address] [place] [address] and is going at 2pm to be tested there. Wants to know if she should get antibiotic at this point.

## 2022-10-04 NOTE — TELEPHONE ENCOUNTER
Requested Prescriptions     Pending Prescriptions Disp Refills    rizatriptan (MAXALT) 10 mg tablet 10 Tablet 1     Sig: TAKE 1 TABLET BY MOUTH ONCE AS NEEDED FOR HEADACHE, MAY REPEAT IN 2 HOURS IF NEEDED    topiramate (TOPAMAX) 25 mg tablet 60 Tablet 11     Sig: Take 1 Tablet by mouth two (2) times a day.      Patient scheduled for 10/18

## 2022-10-05 RX ORDER — RIZATRIPTAN BENZOATE 10 MG/1
TABLET ORAL
Qty: 10 TABLET | Refills: 1 | Status: SHIPPED | OUTPATIENT
Start: 2022-10-05 | End: 2022-10-12

## 2022-10-05 RX ORDER — TOPIRAMATE 25 MG/1
25 TABLET ORAL 2 TIMES DAILY
Qty: 60 TABLET | Refills: 1 | Status: SHIPPED | OUTPATIENT
Start: 2022-10-05 | End: 2022-10-11 | Stop reason: SDUPTHER

## 2022-10-07 ENCOUNTER — CLINICAL SUPPORT (OUTPATIENT)
Dept: PULMONOLOGY | Age: 47
End: 2022-10-07
Payer: MEDICAID

## 2022-10-07 DIAGNOSIS — J45.50 SEVERE PERSISTENT ASTHMA WITHOUT COMPLICATION: Primary | ICD-10-CM

## 2022-10-07 PROCEDURE — 96372 THER/PROPH/DIAG INJ SC/IM: CPT | Performed by: INTERNAL MEDICINE

## 2022-10-07 NOTE — PROGRESS NOTES
Chief Complaint   Patient presents with    Injection     Alka Hanson is here for Xolair injection. Patient did not have any reaction to last injection. Patient states the Xolair is helping her Asthma. Injected subcutaneous : 150 mg given in Left upper arm SQ,  150 mg given in Right upper arm SQ. Lot Number: [id number]  Exp Date: 5/2023  Total Xolair Dose: 300 mg every 4 weeks  Epipen is with patient. Patient observed for 15 minutes. No reaction at injection site. Patient given Xolair reaction sheet.

## 2022-10-11 ENCOUNTER — TELEPHONE (OUTPATIENT)
Dept: NEUROLOGY | Age: 47
End: 2022-10-11

## 2022-10-11 RX ORDER — TOPIRAMATE 25 MG/1
25 TABLET ORAL 2 TIMES DAILY
Qty: 60 TABLET | Refills: 1 | Status: SHIPPED | OUTPATIENT
Start: 2022-10-11 | End: 2022-10-18

## 2022-10-11 NOTE — TELEPHONE ENCOUNTER
Patient called and stated she went to  her rizatriptan from pharmacy and was only given 2 tablets instead of 10. Called pharmacy to follow up and they stated more then likely it is a insurance reason. She was given 2 because her insurance only approves 12 for the month she would have been able to collect all 12 on 10/12.  Please advise on if patient would be able to get refill for other 10 tablets on 10/12

## 2022-10-12 RX ORDER — FLUTICASONE PROPIONATE AND SALMETEROL 50; 500 UG/1; UG/1
POWDER RESPIRATORY (INHALATION)
Qty: 60 EACH | Refills: 5 | Status: SHIPPED | OUTPATIENT
Start: 2022-10-12

## 2022-10-12 RX ORDER — RIZATRIPTAN BENZOATE 10 MG/1
TABLET ORAL
Qty: 12 TABLET | Refills: 1 | Status: SHIPPED | OUTPATIENT
Start: 2022-10-12 | End: 2022-10-18 | Stop reason: ALTCHOICE

## 2022-10-18 ENCOUNTER — OFFICE VISIT (OUTPATIENT)
Dept: NEUROLOGY | Age: 47
End: 2022-10-18
Payer: MEDICAID

## 2022-10-18 VITALS
BODY MASS INDEX: 21.92 KG/M2 | HEART RATE: 89 BPM | SYSTOLIC BLOOD PRESSURE: 102 MMHG | OXYGEN SATURATION: 98 % | RESPIRATION RATE: 20 BRPM | WEIGHT: 131.6 LBS | HEIGHT: 65 IN | DIASTOLIC BLOOD PRESSURE: 70 MMHG

## 2022-10-18 DIAGNOSIS — G43.001 MIGRAINE WITHOUT AURA AND WITH STATUS MIGRAINOSUS, NOT INTRACTABLE: Primary | ICD-10-CM

## 2022-10-18 PROCEDURE — 99213 OFFICE O/P EST LOW 20 MIN: CPT | Performed by: NURSE PRACTITIONER

## 2022-10-18 RX ORDER — ONDANSETRON 4 MG/1
4 TABLET, ORALLY DISINTEGRATING ORAL
Qty: 20 TABLET | Refills: 3 | Status: SHIPPED | OUTPATIENT
Start: 2022-10-18

## 2022-10-18 RX ORDER — TOPIRAMATE 25 MG/1
TABLET ORAL
Qty: 120 TABLET | Refills: 5 | Status: SHIPPED | OUTPATIENT
Start: 2022-10-18

## 2022-10-18 RX ORDER — RIZATRIPTAN BENZOATE 10 MG/1
10 TABLET, ORALLY DISINTEGRATING ORAL AS NEEDED
Qty: 12 TABLET | Refills: 6 | Status: SHIPPED | OUTPATIENT
Start: 2022-10-18

## 2022-10-18 RX ORDER — TOPIRAMATE 25 MG/1
25 TABLET ORAL 2 TIMES DAILY
COMMUNITY
End: 2022-10-18 | Stop reason: SDUPTHER

## 2022-10-18 NOTE — PROGRESS NOTES
[facility] [address]. [facility], [address]  Office:  [phone]  Fax: [phone]  Chief Complaint   Patient presents with    Migraine       HPI: Lynn Cortés presents in follow-up for migraines. She was last seen here on 10/1/2021 by CAL Toro in virtual video visit. It was noted that she was having about 4 headache days per month. She was on Topamax 25 mg daily and using Maxalt as needed. She will wait to use the Maxalt at home because it can make her sleepy. Denied OTC medication use. Denied focal deficits. Does have nausea with headaches and uses Zofran as needed. She was continued on Topamax 25 mg daily. She has a prescription to take up to 50 mg daily. Uses Maxalt for abortive headache therapy. They discussed consideration for using Ubrelvy or Nurtec in the future. Reported she does not tolerate triptans when at work due to drowsiness. She uses Zofran as needed for nausea. She presents today in follow-up. Was getting dizzy/lightheaded a few days prior to menses starting, associated migraines, bad migraines coming on/off menses. Migraines will wake her from sleep, sharp pains in head during menses or prior. Currently taking Topamax for preventative. Taking 25 mg BID. Tolerates it fine. Gets associated nausea with the migraines. Does not have any more Zofran. The Zofran helps. The rizatriptan usually works but she has to take a second dose. The second dose will usually work. Getting 12 pills does not last the whole month. Menses comes every 21 days. Has migraines before, during, and after menses. They make her miserable at work and home. She works scheduling surgery patients, has to be at work early. Will take a Tylenol to try to get her through work. Drinks plenty of water. Has always had the pattern of having the migraines bad during menstrual cycle, and associated nausea.   She is allergic to Naproxen, and allergic to ibuprofen (anaphylaxis). The migraines keep her up at night, she cannot rest.  Had Frova for abortive in 2018 but it was noted it did not help. Was on amitriptyline in 2018 but stopped due to weight gain. Propranolol was tried in the past but she has history of asthma. Tylenol 650 mg or 1000 mg does nothing for the migraine. If she doesn't have rizatriptan she may end up going to the ER for a migraine. Past Medical History:   Diagnosis Date    Anxiety attack     Asthma     Asthmatic bronchitis     Bronchitis     Catamenial disorder     Hernia     Influenza     Migraine     Migraines     Mild asthma     Psychiatric disorder     anxiety     Torticollis        Past Surgical History:   Procedure Laterality Date    HX BACK SURGERY      HX  SECTION      MI  DELIVERY ONLY         Current Outpatient Medications   Medication Sig Dispense Refill    topiramate (Topamax) 25 mg tablet Take 1 tab by mouth in AM and 2 tabs in PM for 1 week. Then can increase to 2 tabs twice daily thereafter if needed. Indications: migraine prevention 120 Tablet 5    rizatriptan (MAXALT-MLT) 10 mg disintegrating tablet Take 1 Tablet by mouth as needed for Migraine. May repeat dose x 1 after 2 hours if needed. Max 2 doses in 24 hours. 12 Tablet 6    ondansetron (ZOFRAN ODT) 4 mg disintegrating tablet Take 1 Tablet by mouth every eight (8) hours as needed for Nausea or Vomiting. 20 Tablet 3    rimegepant (NURTEC) 75 mg disintegrating tablet Take 1 Tablet by mouth as needed for Migraine. Max 1 dose in 24 hours. Indications: a migraine headache 8 Tablet 6    Advair Diskus 500-50 mcg/dose diskus inhaler INHALE 1 PUFF BY MOUTH TWICE A DAY 60 Each 5    fluticasone propionate (FLONASE) 50 mcg/actuation nasal spray USE 2 SPRAYS INTO EACH NOSTRIL ONCE DAILY 1 Each 5    acetaminophen (TYLENOL) 500 mg tablet Take 2 Tablets by mouth every six (6) hours as needed for Pain.  24 Tablet 0    montelukast (SINGULAIR) 10 mg tablet TAKE 1 TABLET BY MOUTH EVERY DAY 90 Tablet 1    albuterol (PROVENTIL HFA, VENTOLIN HFA, PROAIR HFA) 90 mcg/actuation inhaler INHALE 2 PUFFS BY MOUTH EVERY 4 HOURS AS NEEDED FOR WHEEZE 8.5 Each 5    albuterol-ipratropium (DUO-NEB) 2.5 mg-0.5 mg/3 ml nebu INHALE 1 VIAL VIA NEBULIZER EVERY 6 HOURS AS NEEDED FOR WHEEZING OR SHORTNESS OF BREATH 180 mL 5    omalizumab (Xolair) 150 mg solr 300 mg by SubCUTAneous route every thirty (30) days. 300 mg 11    baclofen (LIORESAL) 20 mg tablet TAKE 1 TABLET BY MOUTH EVERY 8 HOURS AS NEEDED      acetaminophen (Tylenol Extra Strength) 500 mg tablet Take 2 Tabs by mouth every six (6) hours as needed for Pain. 20 Tab 0    LORazepam (ATIVAN) 0.5 mg tablet Take 0.5 mg by mouth every eight (8) hours as needed for Anxiety. fexofenadine-pseudoephedrine (ALLEGRA-D)  mg Tb12 Take 1 Tab by mouth every twelve (12) hours.        Current Facility-Administered Medications   Medication Dose Route Frequency Provider Last Rate Last Admin    omalizumab (XOLAIR) SQ syringe 300 mg  300 mg SubCUTAneous Q30D Nisha WOMACK MD   300 mg at 09/06/22 1405    omalizumab (XOLAIR) SQ syringe 300 mg  300 mg SubCUTAneous Q30D Jessica WOMACK DO   300 mg at 08/04/22 1445    omalizumab (XOLAIR) SQ syringe 300 mg  300 mg SubCUTAneous Q30D Misael Osuna MD   300 mg at 07/08/22 1557    omalizumab (XOLAIR) SQ syringe 300 mg  300 mg SubCUTAneous Q30D Jesusita Lovell MD   300 mg at 06/09/22 1324    omalizumab (XOLAIR) SQ syringe 300 mg  300 mg SubCUTAneous Q30D Jesusita Lovell MD   300 mg at 05/12/22 1343    omalizumab (XOLAIR) SQ syringe 300 mg  300 mg SubCUTAneous Q30D Dennis Caldwell DO   300 mg at 04/15/22 1558    omalizumab (XOLAIR) SQ syringe 300 mg  300 mg SubCUTAneous Q30D Misael Osuna MD   300 mg at 03/18/22 1624    omalizumab (XOLAIR) SQ syringe 300 mg  300 mg SubCUTAneous Q30D Misael Osuna MD   300 mg at 02/18/22 0842    omalizumab (XOLAIR) SQ syringe 300 mg  300 mg SubCUTAneous Q30D Nisha WOMACK MD   300 mg at 01/17/22 1523        Allergies   Allergen Reactions    Aspirin Anaphylaxis, Itching and Swelling    Ibuprofen Anaphylaxis    Pcn [Penicillins] Itching    Shellfish Derived Anaphylaxis and Itching    Claritin [Loratadine] Anxiety and Cough    Phenergan [Promethazine] Anxiety and Swelling    Seafood Swelling    Shellfish Containing Products Hives       Social History     Tobacco Use    Smoking status: Never    Smokeless tobacco: Never   Substance Use Topics    Alcohol use: No    Drug use: No       Family History   Problem Relation Age of Onset    Heart Disease Other         grandmothers    Asthma Brother     Lung Disease Paternal Aunt         cronic bronchitis    Asthma Paternal Grandmother     High Cholesterol Mother        Review of Systems:  GENERAL: Denies fever or fatigue  CARDIAC: No CP or SOB  PULMONARY: No cough or SOB  MUSCULOSKELETAL: No new joint pain  NEURO: SEE HPI    Physical Examination:  Visit Vitals  /70 (BP 1 Location: Left upper arm, BP Patient Position: Sitting, BP Cuff Size: Adult)   Pulse 89   Resp 20   Ht 5' 5\" (1.651 m)   Wt 59.7 kg (131 lb 9.6 oz)   LMP 10/14/2022 (Exact Date)   SpO2 98%   BMI 21.90 kg/m²       Alert, in NAD. Heart is regular. Oriented x3. Speech is fluent. Speech clear. EOMs are full, PERRL, VFFTC, no nystagmus. Mask on. Strength and tone are normal. No drift of the bilateral upper extremities. Fine finger movements symmetrical. FNF intact bilaterally. DTRs +2.  Gait is normal.      Impression/Plan: This is a 72-year-old female who presents in follow-up for migraines. Migraines are menstrual related. She gets bad migraines before, during, or after menses. Her cycles come every 21 days. Rizatriptan usually helps for acute treatment but has to take the second dose and does not get enough in the month. Also cannot tolerate it well at work due to tiredness with the medication. She is currently on Topamax 25 mg twice daily.   We will increase the Topamax to help for migraine preventative as she is bothered by the headaches around the menstrual cycle. Can take 25 mg daily in a.m. and 50 mg in p.m. for the first week and if needed, can increase to 50 mg twice daily. Alternatively can adjust to 100 mg nightly and nothing in the morning if this is more tolerable for her. We will provide Nurtec for acute treatment as she gets insufficient results for acute treatment on the current regimen and she cannot take NSAIDs due to allergy of anaphylaxis, and acetaminophen does not help. Advised on potential side effects of the medication. Zofran as needed for nausea. Follow up in 3 months. Diagnoses and all orders for this visit:    1. Migraine without aura and with status migrainosus, not intractable  -     topiramate (Topamax) 25 mg tablet; Take 1 tab by mouth in AM and 2 tabs in PM for 1 week. Then can increase to 2 tabs twice daily thereafter if needed. Indications: migraine prevention  -     rizatriptan (MAXALT-MLT) 10 mg disintegrating tablet; Take 1 Tablet by mouth as needed for Migraine. May repeat dose x 1 after 2 hours if needed. Max 2 doses in 24 hours. -     ondansetron (ZOFRAN ODT) 4 mg disintegrating tablet; Take 1 Tablet by mouth every eight (8) hours as needed for Nausea or Vomiting.  -     rimegepant (NURTEC) 75 mg disintegrating tablet; Take 1 Tablet by mouth as needed for Migraine. Max 1 dose in 24 hours. Indications: a migraine headache    Total time 29 minutes with 15 minutes spent in counseling. Signed By: Mel Sun NP        PLEASE NOTE:   Portions of this document may have been produced using voice recognition software. Unrecognized errors in transcription may be present.

## 2022-10-29 RX ORDER — IPRATROPIUM BROMIDE AND ALBUTEROL SULFATE 2.5; .5 MG/3ML; MG/3ML
SOLUTION RESPIRATORY (INHALATION)
Qty: 180 ML | Refills: 5 | Status: SHIPPED | OUTPATIENT
Start: 2022-10-29

## 2022-10-29 RX ORDER — ALBUTEROL SULFATE 90 UG/1
AEROSOL, METERED RESPIRATORY (INHALATION)
Qty: 8.5 EACH | Refills: 5 | Status: SHIPPED | OUTPATIENT
Start: 2022-10-29

## 2022-11-04 ENCOUNTER — TELEPHONE (OUTPATIENT)
Dept: NEUROLOGY | Age: 47
End: 2022-11-04

## 2022-11-04 NOTE — TELEPHONE ENCOUNTER
Attempted to contact patient regarding Rx rizatriptan, Pharmacy reports Rx's not excepting due to discrepancy of insurance carriers, [employer] vs [employer]. Last reported Rx fill per Delfin.

## 2022-11-04 NOTE — TELEPHONE ENCOUNTER
Patient called and stated she rec'd a text from her pharmacy stated her insurance will not cover her rizatriptan. Medication was changed at her last appointment so her insurance would cover medication. Please advise.

## 2022-11-11 ENCOUNTER — CLINICAL SUPPORT (OUTPATIENT)
Dept: PULMONOLOGY | Age: 47
End: 2022-11-11
Payer: MEDICAID

## 2022-11-11 DIAGNOSIS — J45.50 SEVERE PERSISTENT ASTHMA WITHOUT COMPLICATION: Primary | ICD-10-CM

## 2022-11-11 PROCEDURE — 96372 THER/PROPH/DIAG INJ SC/IM: CPT | Performed by: INTERNAL MEDICINE

## 2022-11-11 NOTE — PROGRESS NOTES
Chief Complaint   Patient presents with    Injection     T[facility] Good[facility] is here for Xolair injection. Patient did not have any reaction to last injection. Patient states the Xolair is helping her Asthma. Injected subcutaneous : 150 mg given in Left upper arm SQ,  150 mg given in Right upper arm SQ. Lot Number: [id number]  Exp Date: 5/2023  Total Xolair Dose: 300 mg every 4 weeks  Epipen is with patient. Patient observed for 15 minutes. No reaction at injection site. Patient given Xolair reaction sheet.

## 2022-12-09 ENCOUNTER — CLINICAL SUPPORT (OUTPATIENT)
Dept: PULMONOLOGY | Age: 47
End: 2022-12-09
Payer: MEDICAID

## 2022-12-09 DIAGNOSIS — J45.50 SEVERE PERSISTENT ASTHMA WITHOUT COMPLICATION: Primary | ICD-10-CM

## 2022-12-09 NOTE — PROGRESS NOTES
Chief Complaint   Patient presents with    Injection     Jewel Arroyo is here for Xolair injection. Patient did not have any reaction to last injection. Patient states the Xolair is helping her Asthma. Injected subcutaneous : 150 mg given in Left upper arm SQ,  150 mg given in Right upper arm SQ. Lot Number: [id number]  Exp Date: 5/2023  Total Xolair Dose: 300 mg every 4 weeks  Epipen is with patient. Patient observed for 15 minutes. No reaction at injection site. Patient given Xolair reaction sheet.

## 2022-12-14 RX ORDER — MONTELUKAST SODIUM 10 MG/1
10 TABLET ORAL DAILY
Qty: 90 TABLET | Refills: 1 | Status: SHIPPED | OUTPATIENT
Start: 2022-12-14

## 2022-12-30 ENCOUNTER — HOSPITAL ENCOUNTER (EMERGENCY)
Age: 47
Discharge: HOME | End: 2022-12-30
Attending: STUDENT IN AN ORGANIZED HEALTH CARE EDUCATION/TRAINING PROGRAM
Payer: MEDICAID

## 2022-12-30 VITALS
DIASTOLIC BLOOD PRESSURE: 75 MMHG | RESPIRATION RATE: 16 BRPM | BODY MASS INDEX: 20.83 KG/M2 | HEART RATE: 90 BPM | HEIGHT: 65 IN | WEIGHT: 125 LBS | TEMPERATURE: 98 F | SYSTOLIC BLOOD PRESSURE: 118 MMHG | OXYGEN SATURATION: 100 %

## 2022-12-30 DIAGNOSIS — N61.1 BREAST ABSCESS: Primary | ICD-10-CM

## 2022-12-30 PROCEDURE — 99283 EMERGENCY DEPT VISIT LOW MDM: CPT

## 2022-12-30 RX ORDER — CLINDAMYCIN HYDROCHLORIDE 150 MG/1
150 CAPSULE ORAL 4 TIMES DAILY
Qty: 28 CAPSULE | Refills: 0 | Status: SHIPPED | OUTPATIENT
Start: 2022-12-30 | End: 2023-01-06

## 2022-12-30 NOTE — ED TRIAGE NOTES
Pt to ED for eval of boil on left breast that pt found this AM. Pt reports pain, no drainage. Currently on clindamycin by dentist, tx started yesterday.

## 2022-12-30 NOTE — ED PROVIDER NOTES
EMERGENCY DEPARTMENT HISTORY AND PHYSICAL EXAM        Date: 12/30/2022  Patient Name: Juan Putnam    History of Presenting Illness     Chief Complaint   Patient presents with    Abscess       History Provided By: Patient    HPI: Juan Putnam, 41 y.o. female  presents to the ED with cc of left breast abscess. Patient reports development of painful swollen nodule to the left lower breast this morning. She reports aching and tenderness, worse with contact. She denies fevers, chills, chest pain, shortness of breath. No nausea or vomiting. Patient states she was seen by dental provider yesterday and started on clindamycin 300 mg 4 times daily.       PCP: Daniel Stoddard NP    Current Facility-Administered Medications on File Prior to Encounter   Medication Dose Route Frequency Provider Last Rate Last Admin    omalizumab (XOLAIR) SQ syringe 300 mg  300 mg SubCUTAneous Q30D Jaya WOMACK MD   300 mg at 12/09/22 1527    omalizumab (XOLAIR) SQ syringe 300 mg  300 mg SubCUTAneous Q30D Dennis Caldwell, DO   300 mg at 11/11/22 1607    omalizumab (XOLAIR) SQ syringe 300 mg  300 mg SubCUTAneous Q30D Jaya WOMACK MD   300 mg at 09/06/22 1405    omalizumab (XOLAIR) SQ syringe 300 mg  300 mg SubCUTAneous Q30D Jeanne WOMACK, DO   300 mg at 08/04/22 1445    omalizumab (XOLAIR) SQ syringe 300 mg  300 mg SubCUTAneous Q30D Jaya WOMACK MD   300 mg at 07/08/22 1557    omalizumab (XOLAIR) SQ syringe 300 mg  300 mg SubCUTAneous Q30D Renetta Palacios MD   300 mg at 06/09/22 1324    omalizumab (XOLAIR) SQ syringe 300 mg  300 mg SubCUTAneous Q30D Renetta Palacios MD   300 mg at 05/12/22 1343    omalizumab (XOLAIR) SQ syringe 300 mg  300 mg SubCUTAneous Q30D Dennis Caldwell DO   300 mg at 04/15/22 1558    omalizumab (XOLAIR) SQ syringe 300 mg  300 mg SubCUTAneous Q30D Jaya WOMACK MD   300 mg at 03/18/22 1624    omalizumab (XOLAIR) SQ syringe 300 mg  300 mg SubCUTAneous Q30D Nasir Hoff MD   300 mg at 02/18/22 0842    omalizumab Fozia Clifton) SQ syringe 300 mg  300 mg SubCUTAneous Q30D Jocelyne Lincoln MD   300 mg at 01/17/22 1523     Current Outpatient Medications on File Prior to Encounter   Medication Sig Dispense Refill    montelukast (SINGULAIR) 10 mg tablet Take 1 Tablet by mouth daily. 90 Tablet 1    rizatriptan (MAXALT-MLT) 10 mg disintegrating tablet TAKE 1 TABLET BY MOUTH ONCE AS NEEDED FOR HEADACHE MAY REPEAT IN 2 HOURS MAX 2 TABS/24 HOURS 12 Tablet 1    albuterol-ipratropium (DUO-NEB) 2.5 mg-0.5 mg/3 ml nebu INHALE 1 VIAL VIA NEBULIZER EVERY 6 HOURS AS NEEDED FOR WHEEZING OR SHORTNESS OF BREATH 180 mL 5    albuterol (PROVENTIL HFA, VENTOLIN HFA, PROAIR HFA) 90 mcg/actuation inhaler INHALE 2 PUFFS BY MOUTH EVERY 4 HOURS AS NEEDED FOR WHEEZING 8.5 Each 5    topiramate (Topamax) 25 mg tablet Take 1 tab by mouth in AM and 2 tabs in PM for 1 week. Then can increase to 2 tabs twice daily thereafter if needed. Indications: migraine prevention 120 Tablet 5    ondansetron (ZOFRAN ODT) 4 mg disintegrating tablet Take 1 Tablet by mouth every eight (8) hours as needed for Nausea or Vomiting. 20 Tablet 3    rimegepant (NURTEC) 75 mg disintegrating tablet Take 1 Tablet by mouth as needed for Migraine. Max 1 dose in 24 hours. Indications: a migraine headache 8 Tablet 6    Advair Diskus 500-50 mcg/dose diskus inhaler INHALE 1 PUFF BY MOUTH TWICE A DAY 60 Each 5    fluticasone propionate (FLONASE) 50 mcg/actuation nasal spray USE 2 SPRAYS INTO EACH NOSTRIL ONCE DAILY 1 Each 5    acetaminophen (TYLENOL) 500 mg tablet Take 2 Tablets by mouth every six (6) hours as needed for Pain. 24 Tablet 0    omalizumab (Xolair) 150 mg solr 300 mg by SubCUTAneous route every thirty (30) days. 300 mg 11    baclofen (LIORESAL) 20 mg tablet TAKE 1 TABLET BY MOUTH EVERY 8 HOURS AS NEEDED      acetaminophen (Tylenol Extra Strength) 500 mg tablet Take 2 Tabs by mouth every six (6) hours as needed for Pain.  20 Tab 0    LORazepam (ATIVAN) 0.5 mg tablet Take 0.5 mg by mouth every eight (8) hours as needed for Anxiety. fexofenadine-pseudoephedrine (ALLEGRA-D)  mg Tb12 Take 1 Tab by mouth every twelve (12) hours. Past History     Past Medical History:  Past Medical History:   Diagnosis Date    Anxiety attack     Asthma     Asthmatic bronchitis     Bronchitis     Catamenial disorder     Hernia     Influenza     Migraine     Migraines     Mild asthma     Psychiatric disorder     anxiety     Torticollis        Past Surgical History:  Past Surgical History:   Procedure Laterality Date    HX BACK SURGERY      HX  SECTION  2009    [place]  DELIVERY ONLY         Family History:  Family History   Problem Relation Age of Onset    Heart Disease Other         grandmothers    Asthma Brother     Lung Disease Paternal Aunt         cronic bronchitis    Asthma Paternal Grandmother     High Cholesterol Mother        Social History:  Social History     Tobacco Use    Smoking status: Never    Smokeless tobacco: Never   Substance Use Topics    Alcohol use: No    Drug use: No       Allergies: Allergies   Allergen Reactions    Aspirin Anaphylaxis, Itching and Swelling    Ibuprofen Anaphylaxis    Pcn [Penicillins] Itching    Shellfish Derived Anaphylaxis and Itching    Claritin [Loratadine] Anxiety and Cough    Phenergan [Promethazine] Anxiety and Swelling    Seafood Swelling    Shellfish Containing Products Hives         Review of Systems   Review of Systems  Constitutional: no fevers or chills  HEENT: no URI symptoms  Respiratory: no cough, dyspnea, pleuritic pain  Cardio: no chest pain or palpitations  GI: no abdominal pain, nausea, vomiting  MSK: no myalgias, arthralgias  Skin: + Left breast abscess  Neuro: no headache, dizziness, weakness, parasthesias  Psych: no change to mental status or behavior      Physical Exam   Physical Exam  CONSTITUTIONAL:  Alert, in no apparent distress;  well developed;  well nourished.   HEENT:  Head NCAT, EOMI, non-icteric sclera, normal conjunctiva, membranes are moist  NECK:  No cervical or axillary lymphadenopathy, neck supple  RESPIRATORY:  Chest clear, equal breath sounds, good air movement. CARDIOVASCULAR:  Regular rate and rhythm. No murmur  MSK: ROM intact to all extremities, no joint swelling  NEURO: Sensation intact to light touch to all extremities, no ataxia  SKIN: Small, 1 cm fluctuant nodule to the left medial lower breast, tender with minimal surrounding erythema  PSYCH:  Alert and normal affect. Diagnostic Study Results     Labs -   No results found for this or any previous visit (from the past 12 hour(s)). Radiologic Studies -   No orders to display     CT Results  (Last 48 hours)      None          CXR Results  (Last 48 hours)      None            Medical Decision Making   I am the first provider for this patient. I reviewed the vital signs, available nursing notes, past medical history, past surgical history, family history and social history. Vital Signs-Reviewed the patient's vital signs. Patient Vitals for the past 12 hrs:   Temp Pulse Resp BP SpO2   12/30/22 1219 98 °F (36.7 °C) 90 16 118/75 100 %         Provider Notes (Medical Decision Making):   Patient presenting with acute onset of left breast pain, appears to have very small abscess, likely began as folliculitis. Patient states she was seen by dental provider yesterday and is currently on clindamycin, has had 1 dose of 300 mg. I have offered her aspiration, she has declined. She would like to attempt warm compresses with spontaneous expression at home. We will increase her clindamycin to 450mg q6h and have her follow-up with PCP. ED Course:   Initial assessment performed. The patients presenting problems have been discussed, and they are in agreement with the care plan formulated and outlined with them.   I have encouraged them to ask questions as they arise throughout their visit. Disposition:  Discharged    PLAN:  1. Current Discharge Medication List        START taking these medications    Details   clindamycin (CLEOCIN) 150 mg capsule Take 1 Capsule by mouth four (4) times daily for 7 days. Qty: 28 Capsule, Refills: 0  Start date: 12/30/2022, End date: 1/6/2023           2. Follow-up Information       Follow up With Specialties Details Why Contact Info    Aide Fu NP Nurse Practitioner In 3 days  [address] [place] [address]  [phone]  [phone] [address] [facility] EMERGENCY DEPT Emergency Medicine  If symptoms worsen [address] [facility]  [phone]          Return to ED if worse     Diagnosis     Clinical Impression:   1. Breast abscess        Attestations:    Andrew Bradley PA-C    Please note that this dictation was completed with [employer], the computer voice recognition software. Quite often unanticipated grammatical, syntax, homophones, and other interpretive errors are inadvertently transcribed by the computer software. Please disregard these errors. Please excuse any errors that have escaped final proofreading. Thank you.

## 2022-12-30 NOTE — DISCHARGE INSTRUCTIONS
Apply warm compresses several times daily. Tylenol and Ibuprofen for pain as needed. Add 150mg cap to current 300mg cap. Finish all antibiotics.

## 2023-01-09 ENCOUNTER — CLINICAL SUPPORT (OUTPATIENT)
Dept: PULMONOLOGY | Age: 48
End: 2023-01-09
Payer: MEDICAID

## 2023-01-09 DIAGNOSIS — J45.50 SEVERE PERSISTENT ASTHMA WITHOUT COMPLICATION: Primary | ICD-10-CM

## 2023-01-09 PROCEDURE — 96372 THER/PROPH/DIAG INJ SC/IM: CPT | Performed by: INTERNAL MEDICINE

## 2023-01-09 NOTE — PROGRESS NOTES
Chief Complaint   Patient presents with    Injection    Asthma     xolair   Chuy Colón is here for Xolair injection. Patient did not have any reaction to last injection. Patient states the Xolair is helping her Asthma. Injected subcutaneous : 150 mg given in Left upper arm SQ,  150 mg given in Right upper arm SQ. Lot Number: [id number]  Exp Date: 110/2023  Total Xolair Dose: 300 mg every 4 weeks  Epipen is with patient. Patient observed for 15 minutes. No reaction at injection site. Patient given Xolair reaction sheet.

## 2023-01-18 ENCOUNTER — OFFICE VISIT (OUTPATIENT)
Dept: NEUROLOGY | Age: 48
End: 2023-01-18
Payer: MEDICAID

## 2023-01-18 VITALS
SYSTOLIC BLOOD PRESSURE: 90 MMHG | HEIGHT: 65 IN | RESPIRATION RATE: 16 BRPM | BODY MASS INDEX: 21.66 KG/M2 | HEART RATE: 77 BPM | DIASTOLIC BLOOD PRESSURE: 60 MMHG | WEIGHT: 130 LBS | OXYGEN SATURATION: 98 %

## 2023-01-18 DIAGNOSIS — G43.001 MIGRAINE WITHOUT AURA AND WITH STATUS MIGRAINOSUS, NOT INTRACTABLE: ICD-10-CM

## 2023-01-18 RX ORDER — TOPIRAMATE 25 MG/1
TABLET ORAL
Qty: 90 TABLET | Refills: 5 | Status: SHIPPED | OUTPATIENT
Start: 2023-01-18

## 2023-01-18 RX ORDER — RIZATRIPTAN BENZOATE 10 MG/1
TABLET, ORALLY DISINTEGRATING ORAL
Qty: 12 TABLET | Refills: 5 | Status: SHIPPED | OUTPATIENT
Start: 2023-01-18

## 2023-01-18 RX ORDER — ONDANSETRON 4 MG/1
4 TABLET, ORALLY DISINTEGRATING ORAL
Qty: 20 TABLET | Refills: 5 | Status: SHIPPED | OUTPATIENT
Start: 2023-01-18

## 2023-01-18 NOTE — PROGRESS NOTES
[address] [address]. [facility] [place], [address]  Office:  [phone]  Fax: [phone]  Chief Complaint   Patient presents with    Follow-up       HPI: Dominic Lopez presents in follow-up for migraines. She was last seen here on 10/18/2022. She has menstrual related migraines. Topamax was increased from 25 mg BID to 25 mg in AM and 50 mg in PM for a week then if needed can increase to 50 mg BID. Can change to 100 mg nightly if more tolerable. She takes rizatriptan for acute treatment but has to use the second dose and does not have enough. Nurtec was added. She cannot take NSAIDs. Acetaminophen does not help. She presents today in follow-up. She is with her young daughter. She has been up since 3 to be at work by 5. She woke with a migraine. Rizatriptan helps but makes her drowsy. Nurtec helped first time but no longer. Got Nurtec, still finds herself having to use the rizatriptan. Rizatriptan works. So she suffers at work, wiats til she gets home to take rizatriptan. She wants to have something to be able to take at work and function. Knows she can take rizatriptan at home and rest. Sometimes can feel it in her sleep- shooting pain, wakes her up in the middle of the night, horrible, has to call in for those, cannot make it to work at 5. She increased the Topamax. She is taking 50 mg BID. It didn't seem to lower the frequency. The migraines still occur around menstrual cycle. Knows when she is going on it because she gets a severe migraine. The first day is bad, and when going off, she gets another bad migraine. A lot of associated nausea, no vomiting; she is lightheaded and dizzy sometimes. BP is 90/60, she reports it is low all the time. Dizziness: not severe, a little lightheaded, a tiny bit of spinning, not really. Hydrating plenty with water. Mildly low hemoglobin on 7/10/22 (11.3). Denies blood sugar problems.   She can be just hit with sharp shooting pains in her head. Always around menstrual cycle. Tylenol doesn't help at all. Denies neurological changes. Denies vision changes. She had an MRI brain in the past, years ago. Past Medical History:   Diagnosis Date    Anxiety attack     Asthma     Asthmatic bronchitis     Bronchitis     Catamenial disorder     Hernia     Influenza     Migraine     Migraines     Mild asthma     Psychiatric disorder     anxiety     Torticollis        Past Surgical History:   Procedure Laterality Date    HX BACK SURGERY      HX  SECTION      [place]  DELIVERY ONLY         Current Outpatient Medications   Medication Sig Dispense Refill    rizatriptan (MAXALT-MLT) 10 mg disintegrating tablet TAKE 1 TABLET BY MOUTH ONCE AS NEEDED FOR HEADACHE MAY REPEAT IN 2 HOURS MAX 2 TABS/24 HOURS 12 Tablet 5    topiramate (Topamax) 25 mg tablet Take 1 tab by mouth in AM and 2 tabs in PM.  Indications: migraine prevention 90 Tablet 5    ubrogepant (UBRELVY) 100 mg tablet Take 1 Tablet by mouth as needed for Migraine. May repeat dose x 1 after 2 hours if needed. Max 2 doses in 24 hours. 16 Tablet 5    ondansetron (ZOFRAN ODT) 4 mg disintegrating tablet Take 1 Tablet by mouth every eight (8) hours as needed for Nausea or Vomiting. 20 Tablet 5    montelukast (SINGULAIR) 10 mg tablet Take 1 Tablet by mouth daily.  90 Tablet 1    albuterol-ipratropium (DUO-NEB) 2.5 mg-0.5 mg/3 ml nebu INHALE 1 VIAL VIA NEBULIZER EVERY 6 HOURS AS NEEDED FOR WHEEZING OR SHORTNESS OF BREATH 180 mL 5    albuterol (PROVENTIL HFA, VENTOLIN HFA, PROAIR HFA) 90 mcg/actuation inhaler INHALE 2 PUFFS BY MOUTH EVERY 4 HOURS AS NEEDED FOR WHEEZING 8.5 Each 5    Advair Diskus 500-50 mcg/dose diskus inhaler INHALE 1 PUFF BY MOUTH TWICE A DAY 60 Each 5    fluticasone propionate (FLONASE) 50 mcg/actuation nasal spray USE 2 SPRAYS INTO EACH NOSTRIL ONCE DAILY 1 Each 5    omalizumab (Xolair) 150 mg solr 300 mg by SubCUTAneous route every thirty (30) days. 300 mg 11    baclofen (LIORESAL) 20 mg tablet TAKE 1 TABLET BY MOUTH EVERY 8 HOURS AS NEEDED      LORazepam (ATIVAN) 0.5 mg tablet Take 0.5 mg by mouth every eight (8) hours as needed for Anxiety. fexofenadine-pseudoephedrine (ALLEGRA-D)  mg Tb12 Take 1 Tab by mouth every twelve (12) hours. acetaminophen (TYLENOL) 500 mg tablet Take 2 Tablets by mouth every six (6) hours as needed for Pain. 24 Tablet 0    acetaminophen (Tylenol Extra Strength) 500 mg tablet Take 2 Tabs by mouth every six (6) hours as needed for Pain.  20 Tab 0     Current Facility-Administered Medications   Medication Dose Route Frequency Provider Last Rate Last Admin    omalizumab (XOLAIR) SQ syringe 300 mg  300 mg SubCUTAneous Q30D Danyelle WOMACK MD   300 mg at 01/09/23 1700    omalizumab (XOLAIR) SQ syringe 300 mg  300 mg SubCUTAneous Q30D Danyelle WOMACK MD   300 mg at 12/09/22 1527    omalizumab (XOLAIR) SQ syringe 300 mg  300 mg SubCUTAneous Q30D Dennis Caldwell, DO   300 mg at 11/11/22 1607    omalizumab (XOLAIR) SQ syringe 300 mg  300 mg SubCUTAneous Q30D Danyelle WOMACK MD   300 mg at 09/06/22 1405    omalizumab (XOLAIR) SQ syringe 300 mg  300 mg SubCUTAneous Q30D Bam WOMACK DO   300 mg at 08/04/22 1445    omalizumab (XOLAIR) SQ syringe 300 mg  300 mg SubCUTAneous Q30D Danyelle WOMACK MD   300 mg at 07/08/22 1557    omalizumab (XOLAIR) SQ syringe 300 mg  300 mg SubCUTAneous Q30D Abbey Walker MD   300 mg at 06/09/22 1324    omalizumab (XOLAIR) SQ syringe 300 mg  300 mg SubCUTAneous Q30D Abbey Walker MD   300 mg at 05/12/22 1343    omalizumab (XOLAIR) SQ syringe 300 mg  300 mg SubCUTAneous Q30D Dennis Caldwell DO   300 mg at 04/15/22 1558    omalizumab (XOLAIR) SQ syringe 300 mg  300 mg SubCUTAneous Q30D Danyelle WOMACK MD   300 mg at 03/18/22 1624    omalizumab (XOLAIR) SQ syringe 300 mg  300 mg SubCUTAneous Q30D Doretta Cooks, MD   300 mg at 02/18/22 0842    omalizumab (XOLAIR) SQ syringe 300 mg 300 mg SubCUTAneous Q30D Caro Beck MD   300 mg at 01/17/22 1523        Allergies   Allergen Reactions    Aspirin Anaphylaxis, Itching and Swelling    Ibuprofen Anaphylaxis    Pcn [Penicillins] Itching    Shellfish Derived Anaphylaxis and Itching    Claritin [Loratadine] Anxiety and Cough    Phenergan [Promethazine] Anxiety and Swelling    Seafood Swelling    Shellfish Containing Products Hives       Social History     Tobacco Use    Smoking status: Never    Smokeless tobacco: Never   Substance Use Topics    Alcohol use: No    Drug use: No       Family History   Problem Relation Age of Onset    Heart Disease Other         grandmothers    Asthma Brother     Lung Disease Paternal Aunt         cronic bronchitis    Asthma Paternal Grandmother     High Cholesterol Mother        Review of Systems:  GENERAL: Denies fever or fatigue  CARDIAC: No CP or SOB  PULMONARY: No cough or SOB  MUSCULOSKELETAL: No new joint pain  NEURO: SEE HPI    Physical Examination:  Visit Vitals  BP 90/60   Pulse 77   Resp 16   Ht 5' 5\" (1.651 m)   Wt 59 kg (130 lb)   LMP 12/23/2022 (Exact Date)   SpO2 98%   BMI 21.63 kg/m²       Alert, in NAD. Heart is regular. Oriented x3. Speech is fluent. Speech clear. EOMs are full, PERRL, VFFTC, no nystagmus. No facial asymmetry. Tongue is midline. Strength and tone are normal. No drift of the bilateral upper extremities. Fine finger movements symmetrical. FNF intact bilaterally. DTRs +2, gait symmetric and steady. Impression/Plan: This is a 30-year-old right-handed female who presents in follow-up for migraines. Migraines are menstrual related. She gets bad migraines before, during, or after menses. Her cycles come every 21 days. Rizatriptan usually helps for acute treatment but has to take the second dose and does not get enough in the month. Also cannot tolerate it well at work due to tiredness with the medication. She is currently on Topamax 50 mg BID.   She did not get benefit from the increase. Will lower to 25 mg in AM and 50 mg in PM.  Rizatriptan for acute treatment (but cannot tolerate it and function at work). Will try Ubrelvy instead of Nurtec for acute treatment. She cannot take NSAIDs due to allergy of anaphylaxis, and acetaminophen does not help. Continue Zofran as needed for nausea. May consider another triptan if Cristopher Nancy does not help. MRI brain due to difficult to treat migraines (reports she had one in the past years ago). Follow up with PCP for dizziness; may have some relation to mild anemia and borderline low BP. Encouraged adequate fluid intake. Follow up in 3-4 months. Diagnoses and all orders for this visit:    1. Migraine without aura and with status migrainosus, not intractable  -     rizatriptan (MAXALT-MLT) 10 mg disintegrating tablet; TAKE 1 TABLET BY MOUTH ONCE AS NEEDED FOR HEADACHE MAY REPEAT IN 2 HOURS MAX 2 TABS/24 HOURS  -     topiramate (Topamax) 25 mg tablet; Take 1 tab by mouth in AM and 2 tabs in PM.  Indications: migraine prevention  -     ondansetron (ZOFRAN ODT) 4 mg disintegrating tablet; Take 1 Tablet by mouth every eight (8) hours as needed for Nausea or Vomiting.  -     MRI BRAIN WO CONT; Future    Other orders  -     ubrogepant (UBRELVY) 100 mg tablet; Take 1 Tablet by mouth as needed for Migraine. May repeat dose x 1 after 2 hours if needed. Max 2 doses in 24 hours. Signed By: Nakita Lomeli NP        PLEASE NOTE:   Portions of this document may have been produced using voice recognition software. Unrecognized errors in transcription may be present.

## 2023-01-30 ENCOUNTER — TELEPHONE (OUTPATIENT)
Dept: PULMONOLOGY | Age: 48
End: 2023-01-30

## 2023-01-30 ENCOUNTER — OFFICE VISIT (OUTPATIENT)
Dept: PULMONOLOGY | Age: 48
End: 2023-01-30
Payer: MEDICAID

## 2023-01-30 VITALS
SYSTOLIC BLOOD PRESSURE: 111 MMHG | HEART RATE: 78 BPM | DIASTOLIC BLOOD PRESSURE: 73 MMHG | BODY MASS INDEX: 21.52 KG/M2 | RESPIRATION RATE: 22 BRPM | TEMPERATURE: 98.1 F | WEIGHT: 129.2 LBS | OXYGEN SATURATION: 100 % | HEIGHT: 65 IN

## 2023-01-30 DIAGNOSIS — J45.40 MODERATE PERSISTENT ASTHMA WITHOUT COMPLICATION: Primary | ICD-10-CM

## 2023-01-30 DIAGNOSIS — R76.8 ELEVATED IGE LEVEL: ICD-10-CM

## 2023-01-30 DIAGNOSIS — Z91.09 ENVIRONMENTAL ALLERGIES: ICD-10-CM

## 2023-01-30 PROCEDURE — 99214 OFFICE O/P EST MOD 30 MIN: CPT | Performed by: INTERNAL MEDICINE

## 2023-01-30 RX ORDER — FLUTICASONE PROPIONATE 50 MCG
SPRAY, SUSPENSION (ML) NASAL
Qty: 1 EACH | Refills: 5 | Status: SHIPPED | OUTPATIENT
Start: 2023-01-30

## 2023-01-30 RX ORDER — FLUTICASONE PROPIONATE 50 MCG
SPRAY, SUSPENSION (ML) NASAL
Qty: 1 EACH | Refills: 5 | Status: SHIPPED | OUTPATIENT
Start: 2023-01-30 | End: 2023-01-30 | Stop reason: SDUPTHER

## 2023-01-30 RX ORDER — FLUTICASONE PROPIONATE AND SALMETEROL 500; 50 UG/1; UG/1
POWDER RESPIRATORY (INHALATION)
Qty: 60 EACH | Refills: 5 | Status: SHIPPED | OUTPATIENT
Start: 2023-01-30

## 2023-01-30 RX ORDER — EPINEPHRINE 0.3 MG/.3ML
0.3 INJECTION SUBCUTANEOUS
Qty: 2 EACH | Refills: 0 | Status: SHIPPED | OUTPATIENT
Start: 2023-01-30 | End: 2023-01-30 | Stop reason: SDUPTHER

## 2023-01-30 RX ORDER — ALBUTEROL SULFATE 90 UG/1
2 AEROSOL, METERED RESPIRATORY (INHALATION)
Qty: 8.5 EACH | Refills: 5 | Status: SHIPPED | OUTPATIENT
Start: 2023-01-30

## 2023-01-30 RX ORDER — IPRATROPIUM BROMIDE AND ALBUTEROL SULFATE 2.5; .5 MG/3ML; MG/3ML
SOLUTION RESPIRATORY (INHALATION)
Qty: 180 ML | Refills: 5 | Status: SHIPPED | OUTPATIENT
Start: 2023-01-30

## 2023-01-30 RX ORDER — FLUTICASONE PROPIONATE AND SALMETEROL 500; 50 UG/1; UG/1
POWDER RESPIRATORY (INHALATION)
Qty: 60 EACH | Refills: 5 | Status: SHIPPED | OUTPATIENT
Start: 2023-01-30 | End: 2023-01-30 | Stop reason: SDUPTHER

## 2023-01-30 RX ORDER — EPINEPHRINE 0.3 MG/.3ML
0.3 INJECTION SUBCUTANEOUS
Qty: 2 EACH | Refills: 0 | Status: SHIPPED | OUTPATIENT
Start: 2023-01-30 | End: 2023-01-30

## 2023-01-30 RX ORDER — ALBUTEROL SULFATE 90 UG/1
2 AEROSOL, METERED RESPIRATORY (INHALATION)
Qty: 8.5 EACH | Refills: 5 | Status: SHIPPED | OUTPATIENT
Start: 2023-01-30 | End: 2023-01-30 | Stop reason: SDUPTHER

## 2023-01-30 RX ORDER — IPRATROPIUM BROMIDE AND ALBUTEROL SULFATE 2.5; .5 MG/3ML; MG/3ML
SOLUTION RESPIRATORY (INHALATION)
Qty: 180 ML | Refills: 5 | Status: SHIPPED | OUTPATIENT
Start: 2023-01-30 | End: 2023-01-30 | Stop reason: SDUPTHER

## 2023-01-30 NOTE — TELEPHONE ENCOUNTER
All [facility] have to go to [facility]  She is not able to use [facility] anylonger

## 2023-01-30 NOTE — PROGRESS NOTES
HISTORY OF PRESENT ILLNESS  Lynn Cortés is a 41 y.o. female following up after an ED visit. Pt with asthma, now well controlled on Xolair. Pt denies any ED visits or hospital admissions for asthma but was seen in ED for other issues including COVID (recovered at home), migraine and breast abscess. 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 She is compliant with all meds including Xolair. Review of Systems   Constitutional:  Negative for chills, diaphoresis, fever, malaise/fatigue and weight loss. Weight gain   HENT:  Negative for congestion, ear discharge, ear pain, hearing loss, nosebleeds, sinus pain, sore throat and tinnitus. Eyes:  Negative for blurred vision, double vision, photophobia, pain, discharge and redness. Respiratory:  Negative for cough, hemoptysis, sputum production, wheezing and stridor. Shortness of breath: rare. Cardiovascular:  Negative for chest pain, palpitations, orthopnea, claudication, leg swelling and PND. Gastrointestinal:  Negative for abdominal pain, blood in stool, constipation, diarrhea, heartburn, melena, nausea and vomiting. Genitourinary:  Negative for dysuria, flank pain, frequency, hematuria and urgency. Musculoskeletal:  Negative for back pain, falls, joint pain, myalgias and neck pain. Skin:  Negative for itching and rash. Neurological:  Positive for headaches (due to migraines). Negative for dizziness, tingling, tremors, sensory change, speech change, focal weakness, seizures, loss of consciousness and weakness. Endo/Heme/Allergies:  Negative for environmental allergies and polydipsia. Does not bruise/bleed easily. Psychiatric/Behavioral:  Negative for depression, hallucinations, memory loss, substance abuse and suicidal ideas. The patient is not nervous/anxious and does not have insomnia.     Past Medical History:   Diagnosis Date    Anxiety attack     Asthma     Asthmatic bronchitis Bronchitis     Catamenial disorder     Hernia     Influenza     Migraine     Migraines     Mild asthma     Psychiatric disorder     anxiety     Torticollis      Current Outpatient Medications on File Prior to Visit   Medication Sig Dispense Refill    rizatriptan (MAXALT-MLT) 10 mg disintegrating tablet TAKE 1 TABLET BY MOUTH ONCE AS NEEDED FOR HEADACHE MAY REPEAT IN 2 HOURS MAX 2 TABS/24 HOURS 12 Tablet 5    topiramate (Topamax) 25 mg tablet Take 1 tab by mouth in AM and 2 tabs in PM.  Indications: migraine prevention 90 Tablet 5    ubrogepant (UBRELVY) 100 mg tablet Take 1 Tablet by mouth as needed for Migraine. May repeat dose x 1 after 2 hours if needed. Max 2 doses in 24 hours. 16 Tablet 5    ondansetron (ZOFRAN ODT) 4 mg disintegrating tablet Take 1 Tablet by mouth every eight (8) hours as needed for Nausea or Vomiting. 20 Tablet 5    montelukast (SINGULAIR) 10 mg tablet Take 1 Tablet by mouth daily. 90 Tablet 1    acetaminophen (TYLENOL) 500 mg tablet Take 2 Tablets by mouth every six (6) hours as needed for Pain. 24 Tablet 0    omalizumab (Xolair) 150 mg solr 300 mg by SubCUTAneous route every thirty (30) days. 300 mg 11    baclofen (LIORESAL) 20 mg tablet TAKE 1 TABLET BY MOUTH EVERY 8 HOURS AS NEEDED      acetaminophen (Tylenol Extra Strength) 500 mg tablet Take 2 Tabs by mouth every six (6) hours as needed for Pain. 20 Tab 0    LORazepam (ATIVAN) 0.5 mg tablet Take 0.5 mg by mouth every eight (8) hours as needed for Anxiety. fexofenadine-pseudoephedrine (ALLEGRA-D)  mg Tb12 Take 1 Tab by mouth every twelve (12) hours.        Current Facility-Administered Medications on File Prior to Visit   Medication Dose Route Frequency Provider Last Rate Last Admin    omalizumab (XOLAIR) SQ syringe 300 mg  300 mg SubCUTAneous Q30D René Renner MD   300 mg at 01/09/23 1700    omalizumab (XOLAIR) SQ syringe 300 mg  300 mg SubCUTAneous Q30D René Renner MD   300 mg at 12/09/22 1527    omalizumab Mart Carlton SQ syringe 300 mg  300 mg SubCUTAneous Q30D Dennis Caldwell, DO   300 mg at 11/11/22 1607    omalizumab (XOLAIR) SQ syringe 300 mg  300 mg SubCUTAneous Q30D Yuly WOMACK MD   300 mg at 09/06/22 1405    omalizumab (XOLAIR) SQ syringe 300 mg  300 mg SubCUTAneous Q30D Joseph Rubio M, DO   300 mg at 08/04/22 1445    omalizumab (XOLAIR) SQ syringe 300 mg  300 mg SubCUTAneous Q30D Bernadine Cottrell MD   300 mg at 07/08/22 1557    omalizumab (XOLAIR) SQ syringe 300 mg  300 mg SubCUTAneous Q30D Madison Sepulveda MD   300 mg at 06/09/22 1324    omalizumab (XOLAIR) SQ syringe 300 mg  300 mg SubCUTAneous Q30D Madison Sepulveda MD   300 mg at 05/12/22 1343    omalizumab (XOLAIR) SQ syringe 300 mg  300 mg SubCUTAneous Q30D Dennis Caldwell, DO   300 mg at 04/15/22 1558    omalizumab (XOLAIR) SQ syringe 300 mg  300 mg SubCUTAneous Q30D Yuly WOMACK MD   300 mg at 03/18/22 1624    omalizumab (XOLAIR) SQ syringe 300 mg  300 mg SubCUTAneous Q30D Yuly WOMACK MD   300 mg at 02/18/22 5238    omalizumab Dilip Score) SQ syringe 300 mg  300 mg SubCUTAneous Q30D Yuly WOMACK MD   300 mg at 01/17/22 1523     Allergies   Allergen Reactions    Aspirin Anaphylaxis, Itching and Swelling    Ibuprofen Anaphylaxis    Pcn [Penicillins] Itching    Shellfish Derived Anaphylaxis and Itching    Claritin [Loratadine] Anxiety and Cough    Phenergan [Promethazine] Anxiety and Swelling    Seafood Swelling    Shellfish Containing Products Hives     Social History     Socioeconomic History    Marital status:      Spouse name: Not on file    Number of children: Not on file    Years of education: Not on file    Highest education level: Not on file   Occupational History    Not on file   Tobacco Use    Smoking status: Never    Smokeless tobacco: Never   Substance and Sexual Activity    Alcohol use: No    Drug use: No    Sexual activity: Yes     Partners: Male   Other Topics Concern    Not on file   Social History Narrative    Not on file Social Determinants of Health     Financial Resource Strain: Not on file   Food Insecurity: Not on file   Transportation Needs: Not on file   Physical Activity: Not on file   Stress: Not on file   Social Connections: Not on file   Intimate Partner Violence: Not on file   Housing Stability: Not on file     Blood pressure 111/73, pulse 78, temperature 98.1 °F (36.7 °C), temperature source Temporal, resp. rate 22, height 5' 5\" (1.651 m), weight 58.6 kg (129 lb 3.2 oz), SpO2 100 %. Physical Exam  Constitutional:       General: She is not in acute distress. Appearance: She is well-developed. She is not ill-appearing, toxic-appearing or diaphoretic. HENT:      Head: Normocephalic and atraumatic. Right Ear: External ear normal.      Left Ear: External ear normal.      Nose: Nose normal. No congestion. Mouth/Throat:      Mouth: Mucous membranes are moist.      Pharynx: No oropharyngeal exudate. Eyes:      General: No scleral icterus. Right eye: No discharge. Left eye: No discharge. Conjunctiva/sclera: Conjunctivae normal.      Pupils: Pupils are equal, round, and reactive to light. Neck:      Thyroid: No thyromegaly. Vascular: No carotid bruit or JVD. Trachea: No tracheal deviation. Cardiovascular:      Rate and Rhythm: Normal rate and regular rhythm. Heart sounds: Normal heart sounds. No murmur heard. No friction rub. No gallop. Pulmonary:      Effort: Pulmonary effort is normal. No respiratory distress. Breath sounds: Normal breath sounds. No stridor. No wheezing, rhonchi or rales. Chest:      Chest wall: No tenderness. Abdominal:      General: Abdomen is flat. Palpations: Abdomen is soft. There is no mass. Tenderness: There is no abdominal tenderness. There is no rebound. Musculoskeletal:         General: No swelling, tenderness, deformity or signs of injury. Cervical back: No rigidity or tenderness.       Right lower leg: No edema. Left lower leg: No edema. Lymphadenopathy:      Cervical: No cervical adenopathy. Skin:     General: Skin is warm and dry. Coloration: Skin is not jaundiced or pale. Findings: No bruising, erythema, lesion or rash. Neurological:      General: No focal deficit present. Mental Status: She is alert and oriented to person, place, and time. Coordination: Coordination normal.   Psychiatric:         Behavior: Behavior normal.         Thought Content: Thought content normal.         Judgment: Judgment normal.     Spirometry: mild obstruction , improved from study done 6/14/2012  XR Results (most recent):  Results from [place] encounter on 03/27/20    XR CHEST PA LAT    Narrative  TWO VIEW CHEST RADIOGRAPH    CPT CODE: 76034    INDICATION: Asthma, cough, congestion for one week. COMPARISON: 2/24/2020    FINDINGS:    The cardiomediastinal silhouette is within normal limits. The pulmonary  vascular markings are unremarkable. There is no evidence of focal  consolidation, pleural effusion or pneumothorax. Evaluation of the osseous  structures demonstrates no focal abnormality. Impression  IMPRESSION:    No acute pulmonary disease. ASSESSMENT and PLAN  Encounter Diagnoses   Name Primary? Moderate persistent asthma without complication Yes    Elevated IgE level     Environmental allergies        Asthma is well controlled, would continue current regimen including Xolair. Refills written for Advair, Albuterol, Duoneb and Epipen .   Trigger avoidance counseled  RTC 6 months  Spirometry on return visit

## 2023-01-30 NOTE — PROGRESS NOTES
Marilynvirginie RatliffSabino presents today for   Chief Complaint   Patient presents with    Asthma    Follow-up       Is someone accompanying this pt? No    Is the patient using any DME equipment during OV? No    -DME Company NA    Depression Screening:  3 most recent PHQ Screens 11/9/2021   Little interest or pleasure in doing things Not at all   Feeling down, depressed, irritable, or hopeless Not at all   Total Score PHQ 2 0       Learning Assessment:  Learning Assessment 2/25/2020   PRIMARY LEARNER Patient   PRIMARY LANGUAGE ENGLISH   LEARNER PREFERENCE PRIMARY READING     LISTENING     DEMONSTRATION   ANSWERED BY Patient     -   RELATIONSHIP SELF       Abuse Screening:  No flowsheet data found. Fall Risk  No flowsheet data found. Coordination of Care:  1. Have you been to the ER, urgent care clinic since your last visit? Hospitalized since your last visit? [facility] 12/23-Abcess    2. Have you seen or consulted any other health care providers outside of the [address] [place] [address] since your last visit? Include any pap smears or colon screening. PCP

## 2023-02-06 ENCOUNTER — TELEPHONE (OUTPATIENT)
Dept: NEUROLOGY | Age: 48
End: 2023-02-06

## 2023-02-06 DIAGNOSIS — G43.001 MIGRAINE WITHOUT AURA AND WITH STATUS MIGRAINOSUS, NOT INTRACTABLE: Primary | ICD-10-CM

## 2023-02-06 RX ORDER — ELETRIPTAN HYDROBROMIDE 40 MG/1
40 TABLET, FILM COATED ORAL AS NEEDED
Qty: 12 TABLET | Refills: 5 | Status: SHIPPED | OUTPATIENT
Start: 2023-02-06

## 2023-02-07 NOTE — TELEPHONE ENCOUNTER
Attempted to contact patient regarding medication change. No answer. Left message to return call to office.

## 2023-02-13 ENCOUNTER — NURSE ONLY (OUTPATIENT)
Age: 48
End: 2023-02-13
Payer: COMMERCIAL

## 2023-02-13 DIAGNOSIS — J45.40 MODERATE PERSISTENT ASTHMA, UNCOMPLICATED: Primary | ICD-10-CM

## 2023-02-13 NOTE — PROGRESS NOTES
Tomi Ko is here for Xolair injection. Patient did not have any reaction to last injection. Patient states the Xolair is helping her Asthma. Injected subcutaneous : 150 mg given in Left upper arm SQ,  150 mg given in Right upper arm SQ. Lot Number: [id number]  Exp Date: 12/2023  Total Xolair Dose: 300 mg every 4 weeks  Epipen is with patient. Patient observed for 15 minutes. No reaction at injection site. Patient given Xolair reaction sheet.

## 2023-02-14 PROCEDURE — 96372 THER/PROPH/DIAG INJ SC/IM: CPT | Performed by: INTERNAL MEDICINE

## 2023-03-14 ENCOUNTER — TELEPHONE (OUTPATIENT)
Age: 48
End: 2023-03-14

## 2023-03-16 ENCOUNTER — NURSE ONLY (OUTPATIENT)
Age: 48
End: 2023-03-16
Payer: COMMERCIAL

## 2023-03-16 DIAGNOSIS — J45.40 MODERATE PERSISTENT ASTHMA, UNCOMPLICATED: Primary | ICD-10-CM

## 2023-03-16 RX ORDER — TOPIRAMATE 25 MG/1
TABLET ORAL
Qty: 90 TABLET | Refills: 2 | Status: SHIPPED | OUTPATIENT
Start: 2023-03-16

## 2023-03-16 NOTE — PROGRESS NOTES
Orquidea Powellnner is here for Xolair injection. Patient did not have any reaction to last injection. Patient states the Xolair is helping her Asthma. Injected subcutaneous : 150 mg given in Left upper arm SQ,  150 mg given in Right upper arm SQ. Lot Number: [id number]  Exp Date: [id number]  Total Xolair Dose: 300 mg every 4 weeks  Epipen is with patient. Patient observed for 15 minutes. No reaction at injection site. Patient given Xolair reaction sheet.

## 2023-03-17 PROCEDURE — 96372 THER/PROPH/DIAG INJ SC/IM: CPT | Performed by: INTERNAL MEDICINE

## 2023-03-18 ENCOUNTER — HOSPITAL ENCOUNTER (EMERGENCY)
Facility: HOSPITAL | Age: 48
Discharge: HOME | End: 2023-03-18
Attending: EMERGENCY MEDICINE
Payer: COMMERCIAL

## 2023-03-18 VITALS
OXYGEN SATURATION: 99 % | HEART RATE: 94 BPM | TEMPERATURE: 98.8 F | DIASTOLIC BLOOD PRESSURE: 53 MMHG | RESPIRATION RATE: 18 BRPM | SYSTOLIC BLOOD PRESSURE: 93 MMHG

## 2023-03-18 DIAGNOSIS — G43.001 MIGRAINE WITHOUT AURA AND WITH STATUS MIGRAINOSUS, NOT INTRACTABLE: Primary | ICD-10-CM

## 2023-03-18 LAB
ALBUMIN SERPL-MCNC: 3.6 G/DL (ref 3.4–5)
ALBUMIN/GLOB SERPL: 0.9 (ref 0.8–1.7)
ALP SERPL-CCNC: 53 U/L (ref 45–117)
ALT SERPL-CCNC: 12 U/L (ref 13–56)
ANION GAP SERPL CALC-SCNC: 6 MMOL/L (ref 3–18)
AST SERPL-CCNC: 10 U/L (ref 10–38)
BASOPHILS # BLD: 0 K/UL (ref 0–0.1)
BASOPHILS NFR BLD: 0 % (ref 0–2)
BILIRUB SERPL-MCNC: 0.7 MG/DL (ref 0.2–1)
BUN SERPL-MCNC: 14 MG/DL (ref 7–18)
BUN/CREAT SERPL: 13 (ref 12–20)
CALCIUM SERPL-MCNC: 8.6 MG/DL (ref 8.5–10.1)
CHLORIDE SERPL-SCNC: 111 MMOL/L (ref 100–111)
CO2 SERPL-SCNC: 20 MMOL/L (ref 21–32)
CREAT SERPL-MCNC: 1.12 MG/DL (ref 0.6–1.3)
DIFFERENTIAL METHOD BLD: ABNORMAL
EOSINOPHIL # BLD: 0 K/UL (ref 0–0.4)
EOSINOPHIL NFR BLD: 1 % (ref 0–5)
ERYTHROCYTE [DISTWIDTH] IN BLOOD BY AUTOMATED COUNT: 13.1 % (ref 11.6–14.5)
FLUAV AG NPH QL IA: NEGATIVE
FLUBV AG NOSE QL IA: NEGATIVE
GLOBULIN SER CALC-MCNC: 4.1 G/DL (ref 2–4)
GLUCOSE SERPL-MCNC: 95 MG/DL (ref 74–99)
HCT VFR BLD AUTO: 36.3 % (ref 35–45)
HGB BLD-MCNC: 12.1 G/DL (ref 12–16)
IMM GRANULOCYTES # BLD AUTO: 0 K/UL (ref 0–0.04)
IMM GRANULOCYTES NFR BLD AUTO: 0 % (ref 0–0.5)
LIPASE SERPL-CCNC: 102 U/L (ref 73–393)
LYMPHOCYTES # BLD: 0.9 K/UL (ref 0.9–3.6)
LYMPHOCYTES NFR BLD: 18 % (ref 21–52)
MCH RBC QN AUTO: 26.8 PG (ref 24–34)
MCHC RBC AUTO-ENTMCNC: 33.3 G/DL (ref 31–37)
MCV RBC AUTO: 80.5 FL (ref 78–100)
MONOCYTES # BLD: 0.4 K/UL (ref 0.05–1.2)
MONOCYTES NFR BLD: 8 % (ref 3–10)
NEUTS SEG # BLD: 3.6 K/UL (ref 1.8–8)
NEUTS SEG NFR BLD: 74 % (ref 40–73)
NRBC # BLD: 0 K/UL (ref 0–0.01)
NRBC BLD-RTO: 0 PER 100 WBC
PLATELET # BLD AUTO: 210 K/UL (ref 135–420)
PMV BLD AUTO: 11 FL (ref 9.2–11.8)
POTASSIUM SERPL-SCNC: 3.6 MMOL/L (ref 3.5–5.5)
PROT SERPL-MCNC: 7.7 G/DL (ref 6.4–8.2)
RBC # BLD AUTO: 4.51 M/UL (ref 4.2–5.3)
SARS-COV-2 RDRP RESP QL NAA+PROBE: NOT DETECTED
SODIUM SERPL-SCNC: 137 MMOL/L (ref 136–145)
SOURCE: NORMAL
WBC # BLD AUTO: 4.9 K/UL (ref 4.6–13.2)

## 2023-03-18 PROCEDURE — 85025 COMPLETE CBC W/AUTO DIFF WBC: CPT

## 2023-03-18 PROCEDURE — 83690 ASSAY OF LIPASE: CPT

## 2023-03-18 PROCEDURE — 87635 SARS-COV-2 COVID-19 AMP PRB: CPT

## 2023-03-18 PROCEDURE — 96374 THER/PROPH/DIAG INJ IV PUSH: CPT

## 2023-03-18 PROCEDURE — 2580000003 HC RX 258: Performed by: EMERGENCY MEDICINE

## 2023-03-18 PROCEDURE — 87804 INFLUENZA ASSAY W/OPTIC: CPT

## 2023-03-18 PROCEDURE — 99284 EMERGENCY DEPT VISIT MOD MDM: CPT

## 2023-03-18 PROCEDURE — 6360000002 HC RX W HCPCS: Performed by: EMERGENCY MEDICINE

## 2023-03-18 PROCEDURE — 96375 TX/PRO/DX INJ NEW DRUG ADDON: CPT

## 2023-03-18 PROCEDURE — 80053 COMPREHEN METABOLIC PANEL: CPT

## 2023-03-18 PROCEDURE — 96376 TX/PRO/DX INJ SAME DRUG ADON: CPT

## 2023-03-18 RX ORDER — BUTALBITAL, ACETAMINOPHEN, CAFFEINE AND CODEINE PHOSPHATE 300; 50; 40; 30 MG/1; MG/1; MG/1; MG/1
1 CAPSULE ORAL EVERY 4 HOURS PRN
Qty: 15 CAPSULE | Refills: 0 | Status: SHIPPED | OUTPATIENT
Start: 2023-03-18 | End: 2023-03-28

## 2023-03-18 RX ORDER — MORPHINE SULFATE 2 MG/ML
2 INJECTION, SOLUTION INTRAMUSCULAR; INTRAVENOUS
Status: COMPLETED | OUTPATIENT
Start: 2023-03-18 | End: 2023-03-18

## 2023-03-18 RX ORDER — ONDANSETRON 2 MG/ML
4 INJECTION INTRAMUSCULAR; INTRAVENOUS
Status: COMPLETED | OUTPATIENT
Start: 2023-03-18 | End: 2023-03-18

## 2023-03-18 RX ORDER — METOCLOPRAMIDE HYDROCHLORIDE 5 MG/ML
5 INJECTION INTRAMUSCULAR; INTRAVENOUS
Status: COMPLETED | OUTPATIENT
Start: 2023-03-18 | End: 2023-03-18

## 2023-03-18 RX ORDER — DIPHENHYDRAMINE HYDROCHLORIDE 50 MG/ML
25 INJECTION INTRAMUSCULAR; INTRAVENOUS
Status: COMPLETED | OUTPATIENT
Start: 2023-03-18 | End: 2023-03-18

## 2023-03-18 RX ORDER — 0.9 % SODIUM CHLORIDE 0.9 %
500 INTRAVENOUS SOLUTION INTRAVENOUS ONCE
Status: COMPLETED | OUTPATIENT
Start: 2023-03-18 | End: 2023-03-18

## 2023-03-18 RX ADMIN — MORPHINE SULFATE 2 MG: 2 INJECTION, SOLUTION INTRAMUSCULAR; INTRAVENOUS at 20:46

## 2023-03-18 RX ADMIN — MORPHINE SULFATE 2 MG: 2 INJECTION, SOLUTION INTRAMUSCULAR; INTRAVENOUS at 22:14

## 2023-03-18 RX ADMIN — METOCLOPRAMIDE 5 MG: 5 INJECTION, SOLUTION INTRAMUSCULAR; INTRAVENOUS at 22:14

## 2023-03-18 RX ADMIN — SODIUM CHLORIDE 500 ML: 9 INJECTION, SOLUTION INTRAVENOUS at 20:24

## 2023-03-18 RX ADMIN — MORPHINE SULFATE 2 MG: 2 INJECTION, SOLUTION INTRAMUSCULAR; INTRAVENOUS at 21:32

## 2023-03-18 RX ADMIN — ONDANSETRON 4 MG: 2 INJECTION INTRAMUSCULAR; INTRAVENOUS at 21:32

## 2023-03-18 RX ADMIN — DIPHENHYDRAMINE HYDROCHLORIDE 25 MG: 50 INJECTION INTRAMUSCULAR; INTRAVENOUS at 20:24

## 2023-03-18 RX ADMIN — ONDANSETRON 4 MG: 2 INJECTION INTRAMUSCULAR; INTRAVENOUS at 20:24

## 2023-03-18 ASSESSMENT — PAIN DESCRIPTION - LOCATION
LOCATION: HEAD;LEG
LOCATION: LEG;HEAD
LOCATION: LEG

## 2023-03-18 ASSESSMENT — PAIN DESCRIPTION - ORIENTATION
ORIENTATION: RIGHT;LEFT
ORIENTATION: LEFT;RIGHT
ORIENTATION: LEFT;RIGHT

## 2023-03-18 ASSESSMENT — ENCOUNTER SYMPTOMS
NAUSEA: 1
RESPIRATORY NEGATIVE: 1
SORE THROAT: 0

## 2023-03-18 ASSESSMENT — PAIN SCALES - GENERAL
PAINLEVEL_OUTOF10: 10
PAINLEVEL_OUTOF10: 9
PAINLEVEL_OUTOF10: 6
PAINLEVEL_OUTOF10: 8

## 2023-03-18 ASSESSMENT — PAIN DESCRIPTION - DESCRIPTORS
DESCRIPTORS: THROBBING;STABBING

## 2023-03-18 ASSESSMENT — PAIN - FUNCTIONAL ASSESSMENT: PAIN_FUNCTIONAL_ASSESSMENT: 0-10

## 2023-03-19 NOTE — ED PROVIDER NOTES
`[facility] EM[facility]  eMERGENCY dEPARTMENT eNCOUnter      Pt Name: Ben Pastor  MRN: [id number]  [place] 1975 of evaluation: 3/18/2023  Provider:Uche Gunn MD    Сергей Jon is a 41 y.o. female  c/o having  body aches, severe migraine, and nausea. X 2 days She states she has history of migraines. Denies fever. She c/o \"stomach pain with nausea\". She states she HAS received her Covid and Flu vaccine. ROS  Review of Systems   Constitutional:  Positive for activity change and appetite change. Negative for fever. HENT:  Negative for sore throat. Respiratory: Negative. Cardiovascular: Negative. Gastrointestinal:  Positive for nausea. Musculoskeletal: Negative. Neurological:  Positive for headaches. Negative for dizziness, facial asymmetry, weakness and light-headedness. Psychiatric/Behavioral:  Positive for agitation. Negative for behavioral problems. Except as noted above the remainder of the review of systems was reviewed and negative.        PAST MEDICAL HISTORY     Past Medical History:   Diagnosis Date    Anxiety attack     Asthma     Asthmatic bronchitis     Bronchitis     Catamenial disorder     Hernia     Influenza     Migraine     Migraines     Mild asthma     Psychiatric disorder     anxiety     Torticollis          SURGICAL HISTORY       Past Surgical History:   Procedure Laterality Date    BACK SURGERY       DELIVERY ONLY       SECTION           CURRENTMEDICATIONS       Previous Medications    ACETAMINOPHEN (TYLENOL) 500 MG TABLET    Take 1,000 mg by mouth every 6 hours as needed    ALBUTEROL SULFATE HFA (PROVENTIL;VENTOLIN;PROAIR) 108 (90 BASE) MCG/ACT INHALER    Inhale 2 puffs into the lungs every 4 hours as needed    BACLOFEN (LIORESAL) 20 MG TABLET    TAKE 1 TABLET BY MOUTH EVERY 8 HOURS AS NEEDED    ELETRIPTAN (RELPAX) 40 MG TABLET    Take 40 mg by mouth as needed    FEXOFENADINE-PSEUDOEPHEDRINE (ALLEGRA-D 12HR)  MG PER EXTENDED RELEASE TABLET    Take 1 tablet by mouth in the morning and 1 tablet in the evening. FLUTICASONE (FLONASE) 50 MCG/ACT NASAL SPRAY    USE 2 SPRAYS INTO EACH NOSTRIL ONCE DAILY    FLUTICASONE-SALMETEROL (ADVAIR DISKUS) 500-50 MCG/ACT AEPB DISKUS INHALER    INHALE 1 PUFF BY MOUTH TWICE A DAY    IPRATROPIUM-ALBUTEROL (DUONEB) 0.5-2.5 (3) MG/3ML SOLN NEBULIZER SOLUTION    INHALE 1 VIAL VIA NEBULIZER EVERY 6 HOURS AS NEEDED FOR WHEEZING OR SHORTNESS OF BREATH    LORAZEPAM (ATIVAN) 0.5 MG TABLET    Take 0.5 mg by mouth every 8 hours as needed.     MONTELUKAST (SINGULAIR) 10 MG TABLET    Take 10 mg by mouth daily    OMALIZUMAB (XOLAIR) 150 MG/ML SOSY INJECTION    Inject 300 mg into the skin every 30 days    OMALIZUMAB 150 MG INJECTION    Inject 300 mg into the skin every 30 days    ONDANSETRON (ZOFRAN-ODT) 4 MG DISINTEGRATING TABLET    Take 4 mg by mouth every 8 hours as needed    RIZATRIPTAN (MAXALT-MLT) 10 MG DISINTEGRATING TABLET    TAKE 1 TABLET BY MOUTH ONCE AS NEEDED FOR HEADACHE MAY REPEAT IN 2 HOURS MAX 2 TABS/24 HOURS    TOPIRAMATE (TOPAMAX) 25 MG TABLET    Take 1 tab by mouth in AM and 2 tabs in PM.  Indications: migraine prevention    UBROGEPANT 100 MG TABS    Take 100 mg by mouth as needed       ALLERGIES     Aspirin, Ibuprofen, Penicillins, Shellfish allergy, Shellfish-derived products, Loratadine, and Promethazine    FAMILY HISTORY       Family History   Problem Relation Age of Onset    Heart Disease Other         grandmothers    High Cholesterol Mother     Asthma Paternal Grandmother     Lung Disease Paternal Aunt         cronic bronchitis    Asthma Brother           SOCIAL HISTORY       Social History     Socioeconomic History    Marital status:    Tobacco Use    Smoking status: Never    Smokeless tobacco: Never   Substance and Sexual Activity    Alcohol use: No    Drug use: No         PHYSICAL EXAM       ED Triage Vitals [03/18/23 2004]   BP Temp Temp src Heart Rate Resp SpO2 Height Weight   117/75 98.8 °F (37.1 °C) -- 94 18 97 % -- --       Physical Exam  Constitutional:       Appearance: She is well-developed. HENT:      Head: Atraumatic. Cardiovascular:      Rate and Rhythm: Normal rate and regular rhythm. Pulmonary:      Breath sounds: Normal breath sounds. Neurological:      Mental Status: She is alert. Comments: (+) severe headache       No results found for this or any previous visit (from the past 24 hour(s)). No results found. PROCEDURES:  Unless otherwise noted below, none           EMERGENCY DEPARTMENT COURSE and DIFFERENTIALDIAGNOSIS/ MDM:   Vitals:    Vitals:    03/18/23 2004   BP: 117/75   Pulse: 94   Resp: 18   Temp: 98.8 °F (37.1 °C)   SpO2: 97%       MDM    8:20 PM Upon re-evaluation the patient's symptoms have improved. Pt has non-toxic appearance and condition is stable for discharge. Patient was informed of tests & results, instructed to f/u with PCP and return to the ED upon worsening of symptoms. All questions and concerns were addressed. FINAL IMPRESSION      Migrqaine     DISPOSITION/PLAN     DISPOSITION  D/C  home    DISCHARGE MEDICATIONS:     Medication List        START taking these medications      butalbital-acetaminophen-caffeine-codeine -15-30 MG per capsule  Commonly known as: FIORICET WITH CODEINE  Take 1 capsule by mouth every 4 hours as needed (headache) for up to 10 days.  Max Daily Amount: 6 capsules            ASK your doctor about these medications      acetaminophen 500 MG tablet  Commonly known as: TYLENOL     Advair Diskus 500-50 MCG/ACT Aepb diskus inhaler  Generic drug: fluticasone-salmeterol     albuterol sulfate  (90 Base) MCG/ACT inhaler  Commonly known as: PROVENTIL;VENTOLIN;PROAIR     baclofen 20 MG tablet  Commonly known as: LIORESAL     eletriptan 40 MG tablet  Commonly known as: RELPAX     fexofenadine-pseudoephedrine  MG per extended release tablet  Commonly known as: ALLEGRA-D 12HR     fluticasone 50 MCG/ACT nasal spray  Commonly known as: FLONASE     ipratropium-albuterol 0.5-2.5 (3) MG/3ML Soln nebulizer solution  Commonly known as: DUONEB     LORazepam 0.5 MG tablet  Commonly known as: ATIVAN     montelukast 10 MG tablet  Commonly known as: SINGULAIR     * omalizumab 150 MG/ML Sosy injection  Commonly known as: XOLAIR     * omalizumab 150 MG injection  Generic drug: omalizumab     ondansetron 4 MG disintegrating tablet  Commonly known as: ZOFRAN-ODT     rizatriptan 10 MG disintegrating tablet  Commonly known as: MAXALT-MLT     topiramate 25 MG tablet  Commonly known as: TOPAMAX  Take 1 tab by mouth in AM and 2 tabs in PM.  Indications: migraine prevention     Ubrogepant 100 MG Tabs           * This list has 2 medication(s) that are the same as other medications prescribed for you. Read the directions carefully, and ask your doctor or other care provider to review them with you. Where to Get Your Medications        These medications were sent to [address] [place] [address],[address], [address] [facility] [address]  [address]      Phone: [phone]   butalbital-acetaminophen-caffeine-codeine -56-30 MG per capsule              PATIENT REFERRED TO:  PCP. F/U in  3 days. Return to ER prn        (Please note that portions of this note were completed with a voice recognitionprogram.  Efforts were made to edit the dictations but occasionally words are mis-transcribed.)    Peggy Squires.  Marlin Garcia MD(electronically signed)  Attending Emergency Physician          Pam Jameson MD  03/18/23 4108

## 2023-03-19 NOTE — ED TRIAGE NOTES
Patient c/o body aches, severe migraine, and nausea. X 2 days She states she has history of migraines. Denies fever. She c/o \"stomach pain with nausea\". She states she HAS received her Covid and Flu vaccine.

## 2023-03-19 NOTE — ED NOTES
Patient voiced improvement to her pain, but states \"head and stomach still hurt some\". Provider informed.      Elizabet Klein RN  03/18/23 2122

## 2023-03-19 NOTE — DISCHARGE INSTRUCTIONS
Migraine Headache: Care Instructions  Overview     Migraines are painful, throbbing headaches that often start on one side of the head. They may cause nausea and vomiting and make you sensitive to light, sound, or smell. Without treatment, migraines can last from 4 hours to a few days. Medicines can help prevent migraines or stop them after they have started. Your doctor can help you find which ones work best for you. Follow-up care is a key part of your treatment and safety. Be sure to make and go to all appointments, and call your doctor if you are having problems. It's also a good idea to know your test results and keep a list of the medicines you take. How can you care for yourself at home? Do not drive if you have taken a prescription pain medicine. Rest in a quiet, dark room until your headache is gone. Close your eyes, and try to relax or go to sleep. Don't watch TV or read. Put a cold, moist cloth or cold pack on the painful area for 10 to 20 minutes at a time. Put a thin cloth between the cold pack and your skin. Use a warm, moist towel or a heating pad set on low to relax tight shoulder and neck muscles. Have someone gently massage your neck and shoulders. Take your medicines exactly as prescribed. Call your doctor if you think you are having a problem with your medicine. You will get more details on the specific medicines your doctor prescribes. Don't take medicine for headache pain too often. Talk to your doctor if you are taking medicine more than 2 days a week to stop a headache. Taking too much pain medicine can lead to more headaches. These are called medicine-overuse headaches. To prevent migraines  Keep a headache diary so you can figure out what triggers your headaches. Avoiding triggers may help you prevent headaches. Record when each headache began, how long it lasted, and what the pain was like.  Write down any other symptoms you had with the headache, such as nausea, flashing lights or dark spots, or sensitivity to bright light or loud noise. Note if the headache occurred near your period. List anything that might have triggered the headache. Triggers may include certain foods (chocolate, cheese, wine) or odors, smoke, bright light, stress, or lack of sleep. If your doctor has prescribed medicine for your migraines, take it as directed. You may have medicine that you take only when you get a migraine and medicine that you take all the time to help prevent migraines. If your doctor has prescribed medicine for when you get a headache, take it at the first sign of a migraine, unless your doctor has given you other instructions. If your doctor has prescribed medicine to prevent migraines, take it exactly as prescribed. Call your doctor if you think you are having a problem with your medicine. Find healthy ways to deal with stress. Migraines are most common during or right after stressful times. Try finding ways to reduce stress like practicing mindfulness or deep breathing exercises. Get plenty of sleep and exercise. But be careful to not push yourself too hard during exercise. It may trigger a headache. Eat meals on a regular schedule. Avoid foods and drinks that often trigger migraines. These include chocolate, alcohol (especially red wine and port), aspartame, monosodium glutamate (MSG), and some additives found in foods (such as hot dogs, hughes, cold cuts, aged cheeses, and pickled foods). Limit caffeine. Don't drink too much coffee, tea, or soda. But don't quit caffeine suddenly. That can also give you migraines. Do not smoke or allow others to smoke around you. If you need help quitting, talk to your doctor about stop-smoking programs and medicines. These can increase your chances of quitting for good. If you are taking birth control pills or hormone therapy, talk to your doctor about whether they are triggering your migraines. When should you call for help?    Call 46 anytime you think you may need emergency care. For example, call if:    You have signs of a stroke. These may include:  Sudden numbness, paralysis, or weakness in your face, arm, or leg, especially on only one side of your body. Sudden vision changes. Sudden trouble speaking. Sudden confusion or trouble understanding simple statements. Sudden problems with walking or balance. A sudden, severe headache that is different from past headaches. Call your doctor now or seek immediate medical care if:    You have new or worse nausea and vomiting. You have a new or higher fever. Your headache gets much worse. Watch closely for changes in your health, and be sure to contact your doctor if:    You are not getting better after 2 days (48 hours). Where can you learn more? Go to http://www.woods.com/ and enter U690 to learn more about \"Migraine Headache: Care Instructions. \"  Current as of: August 25, 2022               Content Version: 13.6  © 2006-2023 [employer]wise, Inc[employer]. Care instructions adapted under license by [facility] ([facility]). If you have questions about a medical condition or this instruction, always ask your healthcare professional. [place] [address] any warranty or liability for your use of this information.

## 2023-03-20 ENCOUNTER — CARE COORDINATION (OUTPATIENT)
Dept: OTHER | Facility: CLINIC | Age: 48
End: 2023-03-20

## 2023-03-20 RX ORDER — RIZATRIPTAN BENZOATE 10 MG/1
10 TABLET, ORALLY DISINTEGRATING ORAL PRN
Qty: 12 TABLET | Refills: 6 | Status: SHIPPED | OUTPATIENT
Start: 2023-03-20 | End: 2023-04-20 | Stop reason: SDUPTHER

## 2023-03-20 NOTE — CARE COORDINATION
Ambulatory Care Coordination Note    ACM attempted to reach patient for introduction to Associate Care Management related to ED visit. HIPAA compliant message left requesting a return phone call. Will attempt to outreach patient again.

## 2023-03-21 NOTE — TELEPHONE ENCOUNTER
Please inform patient that her mammogram is normal. Thank you very much. Pt called stating that our office was working on getting her a new nebulizer through [employer]. Pt stated that [employer] told her that they were waiting on a bidamas form?  Please advise [phone]

## 2023-03-22 ENCOUNTER — CARE COORDINATION (OUTPATIENT)
Dept: OTHER | Facility: CLINIC | Age: 48
End: 2023-03-22

## 2023-03-22 NOTE — CARE COORDINATION
Ambulatory Care Coordination Note    ACM attempted 2nd outreach to reach patient for introduction to Associate Care Management related to ED visit. HIPAA compliant message left requesting a return phone call at patients convenience. Unable to Reach Letter sent to patient via [employer]. Will continue to outreach patient.

## 2023-03-29 ENCOUNTER — TELEPHONE (OUTPATIENT)
Age: 48
End: 2023-03-29

## 2023-03-31 ENCOUNTER — HOSPITAL ENCOUNTER (EMERGENCY)
Facility: HOSPITAL | Age: 48
Discharge: HOME | End: 2023-03-31
Attending: EMERGENCY MEDICINE
Payer: COMMERCIAL

## 2023-03-31 ENCOUNTER — APPOINTMENT (OUTPATIENT)
Facility: HOSPITAL | Age: 48
End: 2023-03-31
Payer: COMMERCIAL

## 2023-03-31 VITALS
OXYGEN SATURATION: 99 % | DIASTOLIC BLOOD PRESSURE: 60 MMHG | RESPIRATION RATE: 18 BRPM | SYSTOLIC BLOOD PRESSURE: 121 MMHG | TEMPERATURE: 97.5 F | HEART RATE: 79 BPM

## 2023-03-31 DIAGNOSIS — J40 BRONCHITIS: Primary | ICD-10-CM

## 2023-03-31 PROCEDURE — 99283 EMERGENCY DEPT VISIT LOW MDM: CPT

## 2023-03-31 PROCEDURE — 2500000003 HC RX 250 WO HCPCS: Performed by: EMERGENCY MEDICINE

## 2023-03-31 PROCEDURE — 6370000000 HC RX 637 (ALT 250 FOR IP): Performed by: EMERGENCY MEDICINE

## 2023-03-31 PROCEDURE — 71046 X-RAY EXAM CHEST 2 VIEWS: CPT

## 2023-03-31 RX ORDER — GUAIFENESIN 200 MG/10ML
200 LIQUID ORAL
Status: COMPLETED | OUTPATIENT
Start: 2023-03-31 | End: 2023-03-31

## 2023-03-31 RX ORDER — DOXYCYCLINE HYCLATE 100 MG/1
100 CAPSULE ORAL
Status: COMPLETED | OUTPATIENT
Start: 2023-03-31 | End: 2023-03-31

## 2023-03-31 RX ORDER — GUAIFENESIN AND CODEINE PHOSPHATE 100; 10 MG/5ML; MG/5ML
5 SOLUTION ORAL 3 TIMES DAILY PRN
Qty: 100 ML | Refills: 0 | Status: SHIPPED | OUTPATIENT
Start: 2023-03-31 | End: 2023-04-10

## 2023-03-31 RX ORDER — DOXYCYCLINE HYCLATE 100 MG
100 TABLET ORAL 2 TIMES DAILY
Qty: 14 TABLET | Refills: 0 | Status: SHIPPED | OUTPATIENT
Start: 2023-03-31 | End: 2023-04-07

## 2023-03-31 RX ADMIN — GUAIFENESIN 200 MG: 200 SOLUTION ORAL at 05:04

## 2023-03-31 RX ADMIN — DOXYCYCLINE HYCLATE 100 MG: 100 CAPSULE ORAL at 05:17

## 2023-03-31 ASSESSMENT — ENCOUNTER SYMPTOMS
ABDOMINAL PAIN: 0
TROUBLE SWALLOWING: 0
WHEEZING: 0
VOMITING: 0
SHORTNESS OF BREATH: 0
COUGH: 1
NAUSEA: 0

## 2023-03-31 NOTE — ED TRIAGE NOTES
Patient states she recently finished Medrol pack and Zithromax for respiratory infection, but still having cough, congestion, and sputum production. She states she was tested for flu and Covid and negative.

## 2023-03-31 NOTE — ED PROVIDER NOTES
[facility] EMERGENCY DEPT  eMERGENCY dEPARTMENT eNCOUnter      Pt Name: Shanique Morillo  MRN: [id number]  Armspaulagfreyes 1975 of evaluation: 3/31/2023  Provider:Uche Ro MD    Pradip Perry is a 41 y.o. female  c/o having a non productive cough x 1 week. Patient states she recently was seen in the ER and was started on a Medrol pack and Zithromax for a respiratory infection. She stats she finished the antibiotics , but still have a cough, congestion, and sputum production. She states she was tested for flu and Covid. Both tests were negative    ROS  Review of Systems   Constitutional:  Positive for activity change, appetite change and fatigue. Negative for fever. HENT:  Positive for congestion. Negative for trouble swallowing. Respiratory:  Positive for cough. Negative for shortness of breath and wheezing. Cardiovascular:  Negative for chest pain and palpitations. Gastrointestinal:  Negative for abdominal pain, nausea and vomiting. Genitourinary: Negative. Musculoskeletal: Negative. Skin: Negative. Neurological: Negative. Except as noted above the remainder of the review of systems was reviewed and negative.        PAST MEDICAL HISTORY     Past Medical History:   Diagnosis Date    Anxiety attack     Asthma     Asthmatic bronchitis     Bronchitis     Catamenial disorder     Hernia     Influenza     Migraine     Migraines     Mild asthma     Psychiatric disorder     anxiety     Torticollis          SURGICAL HISTORY       Past Surgical History:   Procedure Laterality Date    BACK SURGERY       DELIVERY ONLY       SECTION           CURRENTMEDICATIONS       Previous Medications    ACETAMINOPHEN (TYLENOL) 500 MG TABLET    Take 1,000 mg by mouth every 6 hours as needed    ALBUTEROL SULFATE HFA (PROVENTIL;VENTOLIN;PROAIR) 108 (90 BASE) MCG/ACT INHALER    Inhale 2 puffs into the lungs every 4 hours as needed    BACLOFEN (LIORESAL) 20 MG Smoking status: Never    Smokeless tobacco: Never   Substance and Sexual Activity    Alcohol use: No    Drug use: No         PHYSICAL EXAM       ED Triage Vitals [03/31/23 0450]   BP Temp Temp src Heart Rate Resp SpO2 Height Weight   121/60 97.5 °F (36.4 °C) -- 79 18 99 % -- --       Physical Exam  Constitutional:       Appearance: She is well-developed. Eyes:      Pupils: Pupils are equal, round, and reactive to light. Cardiovascular:      Rate and Rhythm: Normal rate. Pulmonary:      Effort: Pulmonary effort is normal. No tachypnea or respiratory distress. Breath sounds: Normal breath sounds. No stridor. Abdominal:      Palpations: Abdomen is soft. Musculoskeletal:         General: Normal range of motion. Cervical back: Normal range of motion. Skin:     Findings: No rash. Neurological:      Mental Status: She is alert. No results found for this or any previous visit (from the past 24 hour(s)). No results found. PROCEDURES:  Unless otherwise noted below, none           EMERGENCY DEPARTMENT COURSE and DIFFERENTIALDIAGNOSIS/ MDM:   Vitals:    Vitals:    03/31/23 0450   BP: 121/60   Pulse: 79   Resp: 18   Temp: 97.5 °F (36.4 °C)   SpO2: 99%     CXR: interpreted by me      MDM      5:04 AM Upon re-evaluation the patient's symptoms have improved. Pt has non-toxic appearance and condition is stable for discharge. Patient was informed of tests & results, instructed to f/u with PCP and return to the ED upon worsening of symptoms. All questions and concerns were addressed. Procedures    FINAL IMPRESSION      Bronchitis    DISPOSITION/PLAN     DISPOSITION :  D/C home. F/U PCP in 5 days. Return to Er prn. DISCHARGE MEDICATIONS: Rx: doxycycline, robitussin  New Prescriptions    No medications on file            PATIENT REFERRED TO:  No follow-up provider specified.       (Please note that portions of this note were completed with a voice recognitionprogram.  Efforts were made to

## 2023-04-03 ENCOUNTER — CARE COORDINATION (OUTPATIENT)
Dept: OTHER | Facility: CLINIC | Age: 48
End: 2023-04-03

## 2023-04-03 NOTE — LETTER
Dear Trae Eckert    My name is Alexia Bradshaw, Associate Care Manager for [facility] and I have been trying to reach you. The Associate Care Management (ACM) program is a free-of-charge confidential service provided to our employees and their family members covered by the [facility]. The program will provide an associate and his/her family with the [address]'s expertise to assist in navigating the health care delivery system, provider services, and their overall care needs--so as to assure and improve health care interactions and enhance the quality of life. This program is designed to provide you with the opportunity to have a [facility] partner with you for the following services:     1) when you come home from the hospital or emergency room   2) when help is needed to manage your disease   3) when you need assistance coordinating services or appointments  4) when you need additional education, resources or assistance reaching your Be Well Health Program goals/requirements such as Be Well With Diabetes      [facility] is dedicated to empowering the good health of its community and improving the quality of care and care experiences for employees and their families. We are committed to safeguarding patient confidentiality and privacy, assuring that every employee has the respect he or she deserves in managing their health. The information shared with your care manager will not be shared with anyone else aside from those you identify as part of your care team, and will only be used to assist you with any identified care needs. Please contact me if you would like this service provided to you.      Sincerely,    Alexia Bradshaw RN   Associate Care Management   Phone [phone]  [email or web address]

## 2023-04-03 NOTE — CARE COORDINATION
Ambulatory Care Coordination Note    ACM attempted to outreach to patient for introduction to Associate Care Management related to ED visit x2. HIPAA compliant message left requesting a return phone call at patient convenience. Already sent UTR letter visit [place] but pt has not read. Pt returned to ED over the weekend. Unable to Reach Letter sent to patient via Mail. Will continue to outreach.

## 2023-04-04 ENCOUNTER — HOSPITAL ENCOUNTER (EMERGENCY)
Facility: HOSPITAL | Age: 48
Discharge: HOME | End: 2023-04-04
Attending: EMERGENCY MEDICINE
Payer: COMMERCIAL

## 2023-04-04 VITALS
OXYGEN SATURATION: 98 % | WEIGHT: 126 LBS | RESPIRATION RATE: 22 BRPM | BODY MASS INDEX: 20.99 KG/M2 | SYSTOLIC BLOOD PRESSURE: 105 MMHG | DIASTOLIC BLOOD PRESSURE: 70 MMHG | TEMPERATURE: 98.3 F | HEIGHT: 65 IN | HEART RATE: 101 BPM

## 2023-04-04 DIAGNOSIS — T78.40XA ALLERGIC REACTION, INITIAL ENCOUNTER: Primary | ICD-10-CM

## 2023-04-04 PROCEDURE — 2580000003 HC RX 258: Performed by: EMERGENCY MEDICINE

## 2023-04-04 PROCEDURE — 6370000000 HC RX 637 (ALT 250 FOR IP): Performed by: EMERGENCY MEDICINE

## 2023-04-04 PROCEDURE — 94664 DEMO&/EVAL PT USE INHALER: CPT

## 2023-04-04 PROCEDURE — 2500000003 HC RX 250 WO HCPCS: Performed by: EMERGENCY MEDICINE

## 2023-04-04 PROCEDURE — 94640 AIRWAY INHALATION TREATMENT: CPT

## 2023-04-04 PROCEDURE — 6360000002 HC RX W HCPCS: Performed by: EMERGENCY MEDICINE

## 2023-04-04 PROCEDURE — 94761 N-INVAS EAR/PLS OXIMETRY MLT: CPT

## 2023-04-04 PROCEDURE — A4216 STERILE WATER/SALINE, 10 ML: HCPCS | Performed by: EMERGENCY MEDICINE

## 2023-04-04 RX ORDER — BUTALBITAL, ACETAMINOPHEN AND CAFFEINE 300; 40; 50 MG/1; MG/1; MG/1
1 CAPSULE ORAL
Status: COMPLETED | OUTPATIENT
Start: 2023-04-04 | End: 2023-04-04

## 2023-04-04 RX ORDER — EPINEPHRINE 1 MG/ML
0.3 INJECTION, SOLUTION INTRAMUSCULAR; SUBCUTANEOUS ONCE
Status: COMPLETED | OUTPATIENT
Start: 2023-04-04 | End: 2023-04-04

## 2023-04-04 RX ORDER — IPRATROPIUM BROMIDE AND ALBUTEROL SULFATE 2.5; .5 MG/3ML; MG/3ML
1 SOLUTION RESPIRATORY (INHALATION) ONCE
Status: COMPLETED | OUTPATIENT
Start: 2023-04-04 | End: 2023-04-04

## 2023-04-04 RX ORDER — DIPHENHYDRAMINE HYDROCHLORIDE 50 MG/ML
50 INJECTION INTRAMUSCULAR; INTRAVENOUS ONCE
Status: COMPLETED | OUTPATIENT
Start: 2023-04-04 | End: 2023-04-04

## 2023-04-04 RX ORDER — METHYLPREDNISOLONE SODIUM SUCCINATE 125 MG/2ML
125 INJECTION, POWDER, LYOPHILIZED, FOR SOLUTION INTRAMUSCULAR; INTRAVENOUS ONCE
Status: COMPLETED | OUTPATIENT
Start: 2023-04-04 | End: 2023-04-04

## 2023-04-04 RX ORDER — FAMOTIDINE 20 MG/1
20 TABLET, FILM COATED ORAL 2 TIMES DAILY
Qty: 60 TABLET | Refills: 0 | Status: SHIPPED | OUTPATIENT
Start: 2023-04-04

## 2023-04-04 RX ORDER — EPINEPHRINE 1 MG/ML
INJECTION, SOLUTION, CONCENTRATE INTRAVENOUS
Status: DISCONTINUED
Start: 2023-04-04 | End: 2023-04-04 | Stop reason: HOSPADM

## 2023-04-04 RX ORDER — EPINEPHRINE 0.3 MG/.3ML
0.3 INJECTION SUBCUTANEOUS ONCE
Qty: 2 EACH | Refills: 0 | Status: SHIPPED | OUTPATIENT
Start: 2023-04-04 | End: 2023-04-04

## 2023-04-04 RX ORDER — DIPHENHYDRAMINE HCL 25 MG
25 CAPSULE ORAL EVERY 6 HOURS PRN
Qty: 20 CAPSULE | Refills: 0 | Status: SHIPPED | OUTPATIENT
Start: 2023-04-04 | End: 2023-04-14

## 2023-04-04 RX ORDER — PREDNISONE 50 MG/1
50 TABLET ORAL DAILY
Qty: 5 TABLET | Refills: 0 | Status: SHIPPED | OUTPATIENT
Start: 2023-04-04 | End: 2023-04-09

## 2023-04-04 RX ADMIN — BUTALBITA,ACETAMINOPHEN AND CAFFEINE 1 CAPSULE: 50; 300; 40 CAPSULE ORAL at 16:28

## 2023-04-04 RX ADMIN — METHYLPREDNISOLONE SODIUM SUCCINATE 125 MG: 125 INJECTION, POWDER, FOR SOLUTION INTRAMUSCULAR; INTRAVENOUS at 09:50

## 2023-04-04 RX ADMIN — FAMOTIDINE 40 MG: 10 INJECTION INTRAVENOUS at 09:51

## 2023-04-04 RX ADMIN — DIPHENHYDRAMINE HYDROCHLORIDE 50 MG: 50 INJECTION, SOLUTION INTRAMUSCULAR; INTRAVENOUS at 09:50

## 2023-04-04 RX ADMIN — EPINEPHRINE 0.3 MG: 1 INJECTION INTRAMUSCULAR; INTRAVENOUS; SUBCUTANEOUS at 09:48

## 2023-04-04 RX ADMIN — IPRATROPIUM BROMIDE AND ALBUTEROL SULFATE 1 AMPULE: 2.5; .5 SOLUTION RESPIRATORY (INHALATION) at 09:50

## 2023-04-04 ASSESSMENT — PAIN - FUNCTIONAL ASSESSMENT: PAIN_FUNCTIONAL_ASSESSMENT: NONE - DENIES PAIN

## 2023-04-04 ASSESSMENT — PAIN SCALES - GENERAL: PAINLEVEL_OUTOF10: 9

## 2023-04-04 ASSESSMENT — PAIN DESCRIPTION - LOCATION: LOCATION: HEAD

## 2023-04-04 NOTE — ED PROVIDER NOTES
EMERGENCY DEPARTMENT HISTORY AND PHYSICAL EXAM      Patient Name: Carlin Birch  MRN: [id number]  YOB: 1975  Provider: Светлана Kilgore MD  PCP: JANETTE Fleming NP   Time/Date of evaluation: 3:42 PM EDT on 23    History of Presenting Illness     Chief Complaint   Patient presents with    Shortness of Breath       History Provided By: Patient and rapid response team      [facility] Carlin Birch is a 41 y.o. 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 She states that she used a box of home hair dye last evening. She started having swelling around her face and her scalp. Her scalp and her face also have diffuse urticaria. Today her allergic reaction progressed, and she felt like she had a lump in her throat with some hoarseness and respiratory distress. A rapid response was called and she was brought to the ED for further evaluation. Past History     Past Medical History:  Past Medical History:   Diagnosis Date    Anxiety attack     Asthma     Asthmatic bronchitis     Bronchitis     Catamenial disorder     Hernia     Influenza     Migraine     Migraines     Mild asthma     Psychiatric disorder     anxiety     Torticollis        Past Surgical History:  Past Surgical History:   Procedure Laterality Date    BACK SURGERY       DELIVERY ONLY       SECTION         Family History:  Family History   Problem Relation Age of Onset    Heart Disease Other         grandmothers    High Cholesterol Mother     Asthma Paternal Grandmother     Lung Disease Paternal Aunt         cronic bronchitis    Asthma Brother        Social History:  Social History     Tobacco Use    Smoking status: Never    Smokeless tobacco: Never   Substance Use Topics    Alcohol use: No    Drug use:  No

## 2023-04-04 NOTE — ED NOTES
Staff med emergency called. PT was found sitting in chair in acute respiratory distress and notable urticaria, redness, and swelling around face and scalp. PT complained of lump in her throat with mild hoarseness to voice and notable tachypnea. PT stated she used a new hair dye the night prior. Hx of Asthma with hospitalization, Ax to ASA, phenergan, and flexeril. PT assisted to wheelchair and taken to ED and assisted from chair to hospital bed in ED room 18.      Christine Valencia  04/04/23 8774

## 2023-04-04 NOTE — Clinical Note
Karen Moreira was seen and treated in our emergency department on 4/4/2023. She may return to work on 04/06/2023. If you have any questions or concerns, please don't hesitate to call.       Yun Pace MD

## 2023-04-04 NOTE — PROGRESS NOTES
Respiratory Care Practitioner (RCP) responded to emergency for employee in Admitting. Patient stated having allergic reaction to hair dye, head visibly swelling, red with whelps, shortness of breath, feeling like throat has a knot in it and closing up. Also, stated she is a asthmatic. Patient given Duoneb by RCP, per ordering physician, and Epi by Nurse. Patient states her airway is improving, and visually whelps on side of head have disappeared, but redness around her head/hair is still present. Physician made aware.

## 2023-04-04 NOTE — PROGRESS NOTES
completed the initial Spiritual Assessment of the patient, and offered Pastoral Care support to patient in bed 18 of the emergency room. Patient came to emergency room due to a Medical Alert to out patient registration Friends were with her and family was called. Patient does not have any Denominational/cultural needs that will affect patients preferences in health care. Chaplains will continue to follow and will provide pastoral care on an as needed/requested basis.     Debra Brody  Spiritual Care Department  [phone] NO soledad  no loose teeth from neck tmd> 3 fbd  No neck or airway issues noted upon exam.

## 2023-04-05 ENCOUNTER — CARE COORDINATION (OUTPATIENT)
Dept: OTHER | Facility: CLINIC | Age: 48
End: 2023-04-05

## 2023-04-05 NOTE — CARE COORDINATION
Ambulatory Care Coordination Note     ACM attempted to outreach to patient for introduction to Associate Care Management related to ED visit x3. HIPAA compliant message left requesting a return phone call at patient convenience. Already sent UTR letter via [address] and Mail. Pt returned to ED again yesterday. Will continue to outreach.

## 2023-04-18 ENCOUNTER — CARE COORDINATION (OUTPATIENT)
Dept: OTHER | Facility: CLINIC | Age: 48
End: 2023-04-18

## 2023-04-18 NOTE — CARE COORDINATION
Ambulatory Care Coordination Note    ACM attempted third and final call to patient for introduction to Associate Care Management. HIPAA compliant message left requesting a return phone call at patient convenience. Final Unable to Reach Letter sent to patient via [email or web address]. No further outreach scheduled with this ACM, patient has been provided with this ACM's contact information. ACM will sign off care team at this time.      Future Appointments   Date Time Provider Brittny Phan   5/11/2023  2:00 PM PPA NURSE VISIT [facility] BS AMB   5/18/2023  2:00 PM Michael Reyes, APRN - NP [facility] BS AMB

## 2023-04-20 ENCOUNTER — TELEPHONE (OUTPATIENT)
Age: 48
End: 2023-04-20

## 2023-04-20 RX ORDER — RIZATRIPTAN BENZOATE 10 MG/1
10 TABLET, ORALLY DISINTEGRATING ORAL PRN
Qty: 12 TABLET | Refills: 6 | Status: SHIPPED | OUTPATIENT
Start: 2023-04-20

## 2023-05-12 ENCOUNTER — NURSE ONLY (OUTPATIENT)
Age: 48
End: 2023-05-12
Payer: COMMERCIAL

## 2023-05-12 DIAGNOSIS — J45.40 MODERATE PERSISTENT ASTHMA, UNCOMPLICATED: Primary | ICD-10-CM

## 2023-05-15 PROCEDURE — 96372 THER/PROPH/DIAG INJ SC/IM: CPT | Performed by: INTERNAL MEDICINE

## 2023-05-18 ENCOUNTER — TELEPHONE (OUTPATIENT)
Age: 48
End: 2023-05-18

## 2023-05-18 NOTE — TELEPHONE ENCOUNTER
Patient calling to inform Dr Hayley Castellano that  for  2 days she has been coughing up yellow mucus, chest pain and she is using nebullizer. Please call pt back to further discuss and advise.  Contact # [phone]

## 2023-05-18 NOTE — TELEPHONE ENCOUNTER
Patient c/o cough and congestion. Yellow sputum x 2 days. No fever.  [address]

## 2023-05-19 ENCOUNTER — TELEMEDICINE (OUTPATIENT)
Age: 48
End: 2023-05-19
Payer: COMMERCIAL

## 2023-05-19 DIAGNOSIS — G43.001 MIGRAINE WITHOUT AURA, NOT INTRACTABLE, WITH STATUS MIGRAINOSUS: Primary | ICD-10-CM

## 2023-05-19 PROCEDURE — 99213 OFFICE O/P EST LOW 20 MIN: CPT | Performed by: NURSE PRACTITIONER

## 2023-05-20 RX ORDER — ONDANSETRON 4 MG/1
4 TABLET, ORALLY DISINTEGRATING ORAL EVERY 8 HOURS PRN
Qty: 30 TABLET | Refills: 3 | Status: SHIPPED | OUTPATIENT
Start: 2023-05-19

## 2023-05-20 RX ORDER — TOPIRAMATE 25 MG/1
TABLET ORAL
Qty: 90 TABLET | Refills: 2 | Status: SHIPPED | OUTPATIENT
Start: 2023-05-19

## 2023-05-20 RX ORDER — RIZATRIPTAN BENZOATE 10 MG/1
10 TABLET, ORALLY DISINTEGRATING ORAL PRN
Qty: 12 TABLET | Refills: 6 | Status: SHIPPED | OUTPATIENT
Start: 2023-05-19

## 2023-05-20 RX ORDER — ELETRIPTAN HYDROBROMIDE 40 MG/1
40 TABLET, FILM COATED ORAL PRN
Qty: 12 TABLET | Refills: 5 | Status: SHIPPED | OUTPATIENT
Start: 2023-05-20

## 2023-05-24 ENCOUNTER — HOSPITAL ENCOUNTER (EMERGENCY)
Facility: HOSPITAL | Age: 48
Discharge: HOME | End: 2023-05-24
Attending: EMERGENCY MEDICINE
Payer: COMMERCIAL

## 2023-05-24 VITALS
TEMPERATURE: 98.9 F | RESPIRATION RATE: 17 BRPM | SYSTOLIC BLOOD PRESSURE: 134 MMHG | OXYGEN SATURATION: 99 % | WEIGHT: 125 LBS | DIASTOLIC BLOOD PRESSURE: 84 MMHG | BODY MASS INDEX: 20.83 KG/M2 | HEIGHT: 65 IN | HEART RATE: 92 BPM

## 2023-05-24 DIAGNOSIS — M79.604 BILATERAL LEG PAIN: Primary | ICD-10-CM

## 2023-05-24 DIAGNOSIS — M79.605 BILATERAL LEG PAIN: Primary | ICD-10-CM

## 2023-05-24 DIAGNOSIS — J06.9 ACUTE UPPER RESPIRATORY INFECTION: ICD-10-CM

## 2023-05-24 LAB
DEPRECATED S PYO AG THROAT QL EIA: NEGATIVE
SARS-COV-2 RDRP RESP QL NAA+PROBE: NOT DETECTED
SOURCE: NORMAL

## 2023-05-24 PROCEDURE — 87635 SARS-COV-2 COVID-19 AMP PRB: CPT

## 2023-05-24 PROCEDURE — 87880 STREP A ASSAY W/OPTIC: CPT

## 2023-05-24 PROCEDURE — 87070 CULTURE OTHR SPECIMN AEROBIC: CPT

## 2023-05-24 PROCEDURE — 99283 EMERGENCY DEPT VISIT LOW MDM: CPT

## 2023-05-24 ASSESSMENT — ENCOUNTER SYMPTOMS
TROUBLE SWALLOWING: 0
RHINORRHEA: 0
VOICE CHANGE: 0
VOMITING: 0
CHEST TIGHTNESS: 0
ABDOMINAL PAIN: 0
SORE THROAT: 1
DIARRHEA: 0
COUGH: 0
NAUSEA: 0
SHORTNESS OF BREATH: 0

## 2023-05-24 NOTE — DISCHARGE INSTRUCTIONS
Check MyChart in 24 hours for throat culture results    Covid test was negative today    Continue medications as prescribed by PCP

## 2023-05-24 NOTE — ED NOTES
Discharge instructions reviewed with patient. Patient verbalized understanding. Patient advised to follow up as directed on discharge instructions. Patient denies questions, needs or concerns at this time. Patient verbalized understanding. No s/sx of distress noted.        Levar Santos RN  05/24/23 3744

## 2023-05-24 NOTE — ED PROVIDER NOTES
EMERGENCY DEPARTMENT HISTORY AND PHYSICAL EXAM        Date: 5/24/2023  Patient Name: Jeremy Grullon    History of Presenting Illness     Chief Complaint   Patient presents with    Leg Pain    Cough    Chills       History Provided By: History obtained from patient    HPI: Jeremy Grullon, 41 y.o. female PMHx significant for anxiety, migraine, persistent asthma presents by personal vehicle to the ED with cc of cold symptoms with severe leg pain that started last night. Patient states his leg pain is identical to when she had COVID and she would like to be tested for COVID. She was already prescribed a Medrol Dosepak and azithromycin by her primary care provider, however they were only available for a telehealth visit. She denies any shortness of breath, has been using her home nebulizer with DuoNeb solution that she uses for her chronic asthma. She denies fever, endorses some chills. She has a good appetite. Denies shortness of breath, lightheadedness. No nausea, vomiting, diarrhea, fever, shortness of breath, leg swelling     There are no other complaints, changes, or physical findings at this time. PCP: JANETTE Morales NP    Current Facility-Administered Medications on File Prior to Encounter   Medication Dose Route Frequency Provider Last Rate Last Admin    omalizumab Ceola Drain) injection 300 mg  300 mg SubCUTAneous Q28 Days Sidra Sky MD   300 mg at 03/17/23 1014     Current Outpatient Medications on File Prior to Encounter   Medication Sig Dispense Refill    topiramate (TOPAMAX) 25 MG tablet Take 1 tab by mouth in AM and 2 tabs in PM.  Indications: migraine prevention 90 tablet 2    rizatriptan (MAXALT-MLT) 10 MG disintegrating tablet Take 1 tablet by mouth as needed for Migraine May repeat dose x 1 after 2 hours if needed. Max 2 doses in 24 hours.  12 tablet 6    ondansetron (ZOFRAN-ODT) 4 MG disintegrating tablet Take 1 tablet by mouth every 8 hours as needed for Nausea or Vomiting 30

## 2023-05-25 ENCOUNTER — CARE COORDINATION (OUTPATIENT)
Dept: OTHER | Facility: CLINIC | Age: 48
End: 2023-05-25

## 2023-05-25 NOTE — CARE COORDINATION
Ambulatory Care Coordination Note    LPN CC attempted to reach patient for introduction to Associate Care Management related to Bilateral Leg Pain; URI  . HIPAA compliant message left requesting a return phone call at patient convenience. Plan for follow-up call in 3-5 days      Future Appointments   Date Time Provider Brittny Phan   6/9/2023  2:00 PM PPA NURSE VISIT [facility] BS AMB       AZITHROMYCIN 250 MG TABLET  BENZONATATE 200 MG CAPSULE  FLUCONAZOLE 150 MG TABLET  METHYLPREDNISOLONE 4 MG DOSEPK    Call 911 anytime you think you may need emergency care.  For example, call if:   You have severe trouble breathing  Shortness of breath or gasping for air, chest tightness or coughing or wheezing  Symptoms that fail to respond to use of quick-relief inhaler  Pale or blue lips or fingernails (cyanosis)  Having trouble walking or talking due to shortness of breath    Call your doctor now or seek immediate medical care if:  You have Increased use of quick-relief inhaler  Fatigue and dark bags under your eyes

## 2023-05-26 LAB
BACTERIA SPEC CULT: NORMAL
SERVICE CMNT-IMP: NORMAL

## 2023-05-31 ENCOUNTER — CARE COORDINATION (OUTPATIENT)
Dept: OTHER | Facility: CLINIC | Age: 48
End: 2023-05-31

## 2023-05-31 NOTE — CARE COORDINATION
Ambulatory Care Coordination Note    LPN CC attempted 2nd outreach to patient for introduction to Associate Care Management related to URI. HIPAA compliant message left requesting a return phone call at patient convenience. Unable to Reach Letter sent to patient via [employer]. Will continue to outreach.       Future Appointments   Date Time Provider Brittny Phan   6/9/2023  2:00 PM PPA NURSE VISIT [facility]

## 2023-06-09 ENCOUNTER — NURSE ONLY (OUTPATIENT)
Age: 48
End: 2023-06-09
Payer: COMMERCIAL

## 2023-06-09 DIAGNOSIS — J45.40 MODERATE PERSISTENT ASTHMA, UNCOMPLICATED: Primary | ICD-10-CM

## 2023-06-09 PROCEDURE — 96372 THER/PROPH/DIAG INJ SC/IM: CPT | Performed by: INTERNAL MEDICINE

## 2023-06-09 NOTE — PROGRESS NOTES
Chris Mesa is here for Xolair injection. Patient did not have any reaction to last injection. Patient states the Xolair is helping her Asthma. Injected subcutaneous : 150 mg given in Left upper arm SQ,  150 mg given in Right upper arm SQ. Lot Number: [id number]  Exp Date: 3/2024  Total Xolair Dose: 300 mg every 4 weeks  Epipen is with patient. Patient observed for 15 minutes. No reaction at injection site. Patient given Xolair reaction sheet.

## 2023-06-19 ENCOUNTER — CARE COORDINATION (OUTPATIENT)
Dept: OTHER | Facility: CLINIC | Age: 48
End: 2023-06-19

## 2023-06-19 NOTE — CARE COORDINATION
Ambulatory Care Coordination Note    LPN CC attempted third and final call to patient for introduction to Associate Care Management. HIPAA compliant message left requesting a return phone call at patient convenience. Final Unable to Reach Letter sent to patient via "[employer]". No further outreach scheduled with this LPN CC, patient has been provided with this LPN CC's contact information. ACM will sign off care team at this time.      Future Appointments   Date Time Provider Brittny Phan   7/7/2023  2:00 PM PPA NURSE VISIT [facility]

## 2023-07-05 ENCOUNTER — TELEPHONE (OUTPATIENT)
Age: 48
End: 2023-07-05

## 2023-07-07 ENCOUNTER — NURSE ONLY (OUTPATIENT)
Age: 48
End: 2023-07-07

## 2023-07-07 DIAGNOSIS — J45.40 MODERATE PERSISTENT ASTHMA, UNCOMPLICATED: Primary | ICD-10-CM

## 2023-07-07 NOTE — PROGRESS NOTES
Sarah Hooks is here for Xolair injection. Patient did not have any reaction to last injection. Patient states the Xolair is helping her Asthma. Injected subcutaneous : 150 mg given in Left upper arm SQ,  150 mg given in Right upper arm SQ. Lot Number: [id number]  Exp Date: 06/30/2024  Total Xolair Dose: 300 mg every 4 weeks  Epipen is with patient. Patient observed for 15 minutes. No reaction at injection site. Patient given Xolair reaction sheet.

## 2023-07-18 RX ORDER — IPRATROPIUM BROMIDE AND ALBUTEROL SULFATE 2.5; .5 MG/3ML; MG/3ML
SOLUTION RESPIRATORY (INHALATION)
Qty: 360 ML | Refills: 4 | Status: SHIPPED | OUTPATIENT
Start: 2023-07-18

## 2023-07-18 RX ORDER — ALBUTEROL SULFATE 90 UG/1
2 AEROSOL, METERED RESPIRATORY (INHALATION) EVERY 4 HOURS PRN
Qty: 18 G | Refills: 4 | Status: SHIPPED | OUTPATIENT
Start: 2023-07-18

## 2023-07-18 RX ORDER — MONTELUKAST SODIUM 10 MG/1
10 TABLET ORAL DAILY
Qty: 30 TABLET | Refills: 5 | Status: SHIPPED | OUTPATIENT
Start: 2023-07-18

## 2023-07-18 RX ORDER — FLUTICASONE PROPIONATE AND SALMETEROL 500; 50 UG/1; UG/1
POWDER RESPIRATORY (INHALATION)
Qty: 60 EACH | Refills: 5 | Status: SHIPPED | OUTPATIENT
Start: 2023-07-18

## 2023-07-18 NOTE — TELEPHONE ENCOUNTER
Pt would like a refill on her medication. To be filled at [facility] aid on [address]  For further information please call [phone]

## 2023-08-03 ENCOUNTER — NURSE ONLY (OUTPATIENT)
Age: 48
End: 2023-08-03

## 2023-08-03 DIAGNOSIS — J45.40 MODERATE PERSISTENT ASTHMA, UNCOMPLICATED: Primary | ICD-10-CM

## 2023-08-03 NOTE — PROGRESS NOTES
Joslyn Dixon is here for Xolair injection. Patient did not have any reaction to last injection. Patient states the Xolair is helping her Asthma. Injected subcutaneous : 150 mg given in Left upper arm SQ,  150 mg given in Right upper arm SQ. Lot Number: [id number]  Exp Date: 6/2024  Total Xolair Dose: 300 mg every 4 weeks  Epipen is with patient. Patient observed for 15 minutes. No reaction at injection site. Patient given Xolair reaction sheet.

## 2023-08-21 ENCOUNTER — HOSPITAL ENCOUNTER (EMERGENCY)
Facility: HOSPITAL | Age: 48
Discharge: HOME | End: 2023-08-21
Attending: STUDENT IN AN ORGANIZED HEALTH CARE EDUCATION/TRAINING PROGRAM
Payer: COMMERCIAL

## 2023-08-21 VITALS
HEIGHT: 65 IN | BODY MASS INDEX: 20.49 KG/M2 | DIASTOLIC BLOOD PRESSURE: 70 MMHG | RESPIRATION RATE: 18 BRPM | HEART RATE: 92 BPM | SYSTOLIC BLOOD PRESSURE: 121 MMHG | WEIGHT: 123 LBS | OXYGEN SATURATION: 100 %

## 2023-08-21 DIAGNOSIS — G43.001 MIGRAINE WITHOUT AURA, NOT INTRACTABLE, WITH STATUS MIGRAINOSUS: ICD-10-CM

## 2023-08-21 DIAGNOSIS — R51.9 NONINTRACTABLE HEADACHE, UNSPECIFIED CHRONICITY PATTERN, UNSPECIFIED HEADACHE TYPE: Primary | ICD-10-CM

## 2023-08-21 LAB
ALBUMIN SERPL-MCNC: 3.9 G/DL (ref 3.4–5)
ALBUMIN/GLOB SERPL: 0.9 (ref 0.8–1.7)
ALP SERPL-CCNC: 61 U/L (ref 45–117)
ALT SERPL-CCNC: 17 U/L (ref 13–56)
ANION GAP SERPL CALC-SCNC: 6 MMOL/L (ref 3–18)
AST SERPL-CCNC: 10 U/L (ref 10–38)
BASOPHILS # BLD: 0 K/UL (ref 0–0.1)
BASOPHILS NFR BLD: 1 % (ref 0–2)
BILIRUB SERPL-MCNC: 0.2 MG/DL (ref 0.2–1)
BUN SERPL-MCNC: 12 MG/DL (ref 7–18)
BUN/CREAT SERPL: 10 (ref 12–20)
CALCIUM SERPL-MCNC: 9.1 MG/DL (ref 8.5–10.1)
CHLORIDE SERPL-SCNC: 111 MMOL/L (ref 100–111)
CO2 SERPL-SCNC: 21 MMOL/L (ref 21–32)
CREAT SERPL-MCNC: 1.16 MG/DL (ref 0.6–1.3)
DIFFERENTIAL METHOD BLD: ABNORMAL
EOSINOPHIL # BLD: 0.1 K/UL (ref 0–0.4)
EOSINOPHIL NFR BLD: 2 % (ref 0–5)
ERYTHROCYTE [DISTWIDTH] IN BLOOD BY AUTOMATED COUNT: 13.2 % (ref 11.6–14.5)
GLOBULIN SER CALC-MCNC: 4.3 G/DL (ref 2–4)
GLUCOSE SERPL-MCNC: 82 MG/DL (ref 74–99)
HCT VFR BLD AUTO: 37.2 % (ref 35–45)
HGB BLD-MCNC: 12.3 G/DL (ref 12–16)
IMM GRANULOCYTES # BLD AUTO: 0 K/UL (ref 0–0.04)
IMM GRANULOCYTES NFR BLD AUTO: 1 % (ref 0–0.5)
LYMPHOCYTES # BLD: 1.6 K/UL (ref 0.9–3.6)
LYMPHOCYTES NFR BLD: 43 % (ref 21–52)
MCH RBC QN AUTO: 26.5 PG (ref 24–34)
MCHC RBC AUTO-ENTMCNC: 33.1 G/DL (ref 31–37)
MCV RBC AUTO: 80 FL (ref 78–100)
MONOCYTES # BLD: 0.4 K/UL (ref 0.05–1.2)
MONOCYTES NFR BLD: 10 % (ref 3–10)
NEUTS SEG # BLD: 1.6 K/UL (ref 1.8–8)
NEUTS SEG NFR BLD: 43 % (ref 40–73)
NRBC # BLD: 0 K/UL (ref 0–0.01)
NRBC BLD-RTO: 0 PER 100 WBC
PLATELET # BLD AUTO: 217 K/UL (ref 135–420)
PMV BLD AUTO: 10.8 FL (ref 9.2–11.8)
POTASSIUM SERPL-SCNC: 3.7 MMOL/L (ref 3.5–5.5)
PROT SERPL-MCNC: 8.2 G/DL (ref 6.4–8.2)
RBC # BLD AUTO: 4.65 M/UL (ref 4.2–5.3)
SODIUM SERPL-SCNC: 138 MMOL/L (ref 136–145)
WBC # BLD AUTO: 3.7 K/UL (ref 4.6–13.2)

## 2023-08-21 PROCEDURE — 96374 THER/PROPH/DIAG INJ IV PUSH: CPT

## 2023-08-21 PROCEDURE — 6360000002 HC RX W HCPCS: Performed by: EMERGENCY MEDICINE

## 2023-08-21 PROCEDURE — 2580000003 HC RX 258: Performed by: EMERGENCY MEDICINE

## 2023-08-21 PROCEDURE — 99284 EMERGENCY DEPT VISIT MOD MDM: CPT

## 2023-08-21 PROCEDURE — 85025 COMPLETE CBC W/AUTO DIFF WBC: CPT

## 2023-08-21 PROCEDURE — 96375 TX/PRO/DX INJ NEW DRUG ADDON: CPT

## 2023-08-21 PROCEDURE — 6370000000 HC RX 637 (ALT 250 FOR IP): Performed by: EMERGENCY MEDICINE

## 2023-08-21 PROCEDURE — 80053 COMPREHEN METABOLIC PANEL: CPT

## 2023-08-21 RX ORDER — PROCHLORPERAZINE MALEATE 10 MG
10 TABLET ORAL EVERY 6 HOURS PRN
Qty: 15 TABLET | Refills: 0 | Status: SHIPPED | OUTPATIENT
Start: 2023-08-21

## 2023-08-21 RX ORDER — RIZATRIPTAN BENZOATE 10 MG/1
10 TABLET, ORALLY DISINTEGRATING ORAL PRN
Qty: 12 TABLET | Refills: 0 | Status: SHIPPED | OUTPATIENT
Start: 2023-08-21

## 2023-08-21 RX ORDER — 0.9 % SODIUM CHLORIDE 0.9 %
1000 INTRAVENOUS SOLUTION INTRAVENOUS ONCE
Status: COMPLETED | OUTPATIENT
Start: 2023-08-21 | End: 2023-08-21

## 2023-08-21 RX ORDER — DIPHENHYDRAMINE HYDROCHLORIDE 50 MG/ML
25 INJECTION INTRAMUSCULAR; INTRAVENOUS
Status: COMPLETED | OUTPATIENT
Start: 2023-08-21 | End: 2023-08-21

## 2023-08-21 RX ORDER — SODIUM CHLORIDE 9 MG/ML
INJECTION, SOLUTION INTRAVENOUS CONTINUOUS
Status: DISCONTINUED | OUTPATIENT
Start: 2023-08-21 | End: 2023-08-21

## 2023-08-21 RX ORDER — ACETAMINOPHEN 500 MG
1000 TABLET ORAL
Status: COMPLETED | OUTPATIENT
Start: 2023-08-21 | End: 2023-08-21

## 2023-08-21 RX ORDER — PROCHLORPERAZINE EDISYLATE 5 MG/ML
10 INJECTION INTRAMUSCULAR; INTRAVENOUS
Status: COMPLETED | OUTPATIENT
Start: 2023-08-21 | End: 2023-08-21

## 2023-08-21 RX ADMIN — PROCHLORPERAZINE EDISYLATE 10 MG: 5 INJECTION INTRAMUSCULAR; INTRAVENOUS at 07:55

## 2023-08-21 RX ADMIN — DIPHENHYDRAMINE HYDROCHLORIDE 25 MG: 50 INJECTION, SOLUTION INTRAMUSCULAR; INTRAVENOUS at 07:55

## 2023-08-21 RX ADMIN — SODIUM CHLORIDE 1000 ML: 9 INJECTION, SOLUTION INTRAVENOUS at 07:53

## 2023-08-21 RX ADMIN — ACETAMINOPHEN 1000 MG: 500 TABLET ORAL at 07:55

## 2023-08-21 ASSESSMENT — PAIN SCALES - GENERAL: PAINLEVEL_OUTOF10: 9

## 2023-08-21 ASSESSMENT — ENCOUNTER SYMPTOMS
VOMITING: 1
NAUSEA: 1
CHEST TIGHTNESS: 0
EYES NEGATIVE: 1
ABDOMINAL PAIN: 0

## 2023-08-21 ASSESSMENT — PAIN DESCRIPTION - FREQUENCY: FREQUENCY: CONTINUOUS

## 2023-08-21 ASSESSMENT — LIFESTYLE VARIABLES
HOW OFTEN DO YOU HAVE A DRINK CONTAINING ALCOHOL: NEVER
HOW MANY STANDARD DRINKS CONTAINING ALCOHOL DO YOU HAVE ON A TYPICAL DAY: PATIENT DOES NOT DRINK

## 2023-08-21 ASSESSMENT — PAIN DESCRIPTION - DESCRIPTORS: DESCRIPTORS: ACHING

## 2023-08-21 ASSESSMENT — PAIN - FUNCTIONAL ASSESSMENT
PAIN_FUNCTIONAL_ASSESSMENT: PREVENTS OR INTERFERES SOME ACTIVE ACTIVITIES AND ADLS
PAIN_FUNCTIONAL_ASSESSMENT: 0-10

## 2023-08-21 ASSESSMENT — PAIN DESCRIPTION - ORIENTATION: ORIENTATION: RIGHT;LEFT

## 2023-08-21 ASSESSMENT — PAIN DESCRIPTION - PAIN TYPE: TYPE: CHRONIC PAIN

## 2023-08-21 ASSESSMENT — PAIN DESCRIPTION - LOCATION: LOCATION: HEAD

## 2023-08-21 NOTE — DISCHARGE INSTRUCTIONS
Patient to follow-up with your primary doctor and your migraine provider. I refilled your Maxalt however will be important to talk with your migraine provider or primary doctor about the use of Relpax. I would not recommend using both last this is recommended to you by your primary migraine provider. Compazine will make you drowsy but may help if you have a headache that is progressing. Please return if at all worsened or concerned.

## 2023-08-21 NOTE — ED NOTES
Assumed care of patient. PIV to be started by ER tech, labs obtained.       Ezekiel Ahumada, KRIS  08/21/23 5555

## 2023-08-21 NOTE — ED TRIAGE NOTES
Pt c/o migraine since last night. Pt stated \"that she been having a migraine on her temples since last night\". Pt also stated \"that her migraine is sensitive to light\".     Pt has a Hx of migraines and do see a Neurologist.    Past Medical History:   Diagnosis Date    Anxiety attack     Asthma     Asthmatic bronchitis     Bronchitis     Catamenial disorder     Hernia     Influenza     Migraine     Migraines     Mild asthma     Psychiatric disorder     anxiety     Torticollis

## 2023-08-21 NOTE — ED PROVIDER NOTES
EMERGENCY DEPARTMENT HISTORY AND PHYSICAL EXAM    8:19 AM      Date: 8/21/2023  Patient Name: Merle Kaufman    History of Presenting Illness     Chief Complaint   Patient presents with    Migraine       History From: Patient  Patient is a 82-year-old female with a history of migraine headache, anxiety, asthma, contact dermatitis, the presents emergency department with gradual onset of migraine headache that began yesterday and worsened overnight. Patient said that she is out of her Maxalt and did not have any of her abortive medications. Patient's had progressive symptoms with nausea and vomiting and says when it gets this bad she needs to come to the hospital.  Patient denies any numbness or tingling or difficulty ambulating. Patient denies any fevers, chills, or abrupt onset of the headache. Patient works at the [employer] at [employer] and is not a smoker, drinker, or drug user. Nursing Notes were all reviewed and agreed with or any disagreements were addressed in the HPI.     PCP: JANETTE Bedoya NP    Current Facility-Administered Medications   Medication Dose Route Frequency Provider Last Rate Last Admin    sodium chloride 0.9 % bolus 1,000 mL  1,000 mL IntraVENous Once Vandana Ribera .6 mL/hr at 08/21/23 0753 1,000 mL at 08/21/23 0753    0.9 % sodium chloride infusion   IntraVENous Continuous Vandana Ribera MD        omalizumab Walker Tano) injection 300 mg  300 mg SubCUTAneous Q28 Days Mala Camacho MD   300 mg at 03/17/23 1014     Current Outpatient Medications   Medication Sig Dispense Refill    albuterol sulfate HFA (PROVENTIL;VENTOLIN;PROAIR) 108 (90 Base) MCG/ACT inhaler Inhale 2 puffs into the lungs every 4 hours as needed for Wheezing or Shortness of Breath 18 g 4    fluticasone-salmeterol (ADVAIR) 500-50 MCG/ACT AEPB diskus inhaler INHALE 1 PUFF BY MOUTH TWICE A DAY 60 each 5    ipratropium 0.5 mg-albuterol 2.5 mg (DUONEB) 0.5-2.5 (3) MG/3ML SOLN

## 2023-08-21 NOTE — ED NOTES
Discharge instructions given to patient. Follow up information provided. Verbalized understanding.       Kristel Ramirez RN  08/21/23 7960

## 2023-08-24 ENCOUNTER — PROCEDURE VISIT (OUTPATIENT)
Age: 48
End: 2023-08-24

## 2023-08-24 VITALS — HEIGHT: 65 IN | BODY MASS INDEX: 21.49 KG/M2 | WEIGHT: 129 LBS

## 2023-08-24 DIAGNOSIS — J45.909 UNCOMPLICATED ASTHMA, UNSPECIFIED ASTHMA SEVERITY, UNSPECIFIED WHETHER PERSISTENT: Primary | ICD-10-CM

## 2023-08-26 ENCOUNTER — HOSPITAL ENCOUNTER (EMERGENCY)
Facility: HOSPITAL | Age: 48
Discharge: HOME | End: 2023-08-26
Attending: EMERGENCY MEDICINE
Payer: COMMERCIAL

## 2023-08-26 VITALS
OXYGEN SATURATION: 100 % | HEART RATE: 107 BPM | BODY MASS INDEX: 21.49 KG/M2 | DIASTOLIC BLOOD PRESSURE: 83 MMHG | RESPIRATION RATE: 18 BRPM | SYSTOLIC BLOOD PRESSURE: 135 MMHG | WEIGHT: 129 LBS | HEIGHT: 65 IN

## 2023-08-26 DIAGNOSIS — G43.909 MIGRAINE WITHOUT STATUS MIGRAINOSUS, NOT INTRACTABLE, UNSPECIFIED MIGRAINE TYPE: Primary | ICD-10-CM

## 2023-08-26 PROCEDURE — 6360000002 HC RX W HCPCS: Performed by: EMERGENCY MEDICINE

## 2023-08-26 PROCEDURE — 2580000003 HC RX 258: Performed by: EMERGENCY MEDICINE

## 2023-08-26 PROCEDURE — 99284 EMERGENCY DEPT VISIT MOD MDM: CPT

## 2023-08-26 PROCEDURE — 96365 THER/PROPH/DIAG IV INF INIT: CPT

## 2023-08-26 PROCEDURE — 96375 TX/PRO/DX INJ NEW DRUG ADDON: CPT

## 2023-08-26 PROCEDURE — 96366 THER/PROPH/DIAG IV INF ADDON: CPT

## 2023-08-26 PROCEDURE — 6370000000 HC RX 637 (ALT 250 FOR IP): Performed by: EMERGENCY MEDICINE

## 2023-08-26 RX ORDER — MAGNESIUM SULFATE IN WATER 40 MG/ML
2000 INJECTION, SOLUTION INTRAVENOUS
Status: COMPLETED | OUTPATIENT
Start: 2023-08-26 | End: 2023-08-26

## 2023-08-26 RX ORDER — DIPHENHYDRAMINE HYDROCHLORIDE 50 MG/ML
25 INJECTION INTRAMUSCULAR; INTRAVENOUS
Status: COMPLETED | OUTPATIENT
Start: 2023-08-26 | End: 2023-08-26

## 2023-08-26 RX ORDER — METOCLOPRAMIDE HYDROCHLORIDE 5 MG/ML
10 INJECTION INTRAMUSCULAR; INTRAVENOUS
Status: COMPLETED | OUTPATIENT
Start: 2023-08-26 | End: 2023-08-26

## 2023-08-26 RX ORDER — ACETAMINOPHEN 500 MG
1000 TABLET ORAL
Status: COMPLETED | OUTPATIENT
Start: 2023-08-26 | End: 2023-08-26

## 2023-08-26 RX ORDER — METOCLOPRAMIDE 10 MG/1
10 TABLET ORAL 4 TIMES DAILY PRN
Qty: 30 TABLET | Refills: 1 | Status: SHIPPED | OUTPATIENT
Start: 2023-08-26

## 2023-08-26 RX ADMIN — WATER 125 MG: 1 INJECTION INTRAMUSCULAR; INTRAVENOUS; SUBCUTANEOUS at 19:23

## 2023-08-26 RX ADMIN — MAGNESIUM SULFATE HEPTAHYDRATE 2000 MG: 40 INJECTION, SOLUTION INTRAVENOUS at 19:22

## 2023-08-26 RX ADMIN — DIPHENHYDRAMINE HYDROCHLORIDE 25 MG: 50 INJECTION, SOLUTION INTRAMUSCULAR; INTRAVENOUS at 18:27

## 2023-08-26 RX ADMIN — ACETAMINOPHEN 1000 MG: 500 TABLET ORAL at 18:29

## 2023-08-26 RX ADMIN — METOCLOPRAMIDE 10 MG: 5 INJECTION, SOLUTION INTRAMUSCULAR; INTRAVENOUS at 18:25

## 2023-08-26 ASSESSMENT — PAIN DESCRIPTION - LOCATION: LOCATION: HEAD

## 2023-08-26 ASSESSMENT — PAIN DESCRIPTION - FREQUENCY: FREQUENCY: CONTINUOUS

## 2023-08-26 ASSESSMENT — LIFESTYLE VARIABLES
HOW OFTEN DO YOU HAVE A DRINK CONTAINING ALCOHOL: MONTHLY OR LESS
HOW MANY STANDARD DRINKS CONTAINING ALCOHOL DO YOU HAVE ON A TYPICAL DAY: PATIENT DOES NOT DRINK

## 2023-08-26 ASSESSMENT — PAIN SCALES - GENERAL: PAINLEVEL_OUTOF10: 10

## 2023-08-26 ASSESSMENT — PAIN DESCRIPTION - DESCRIPTORS: DESCRIPTORS: ACHING

## 2023-08-26 NOTE — ED NOTES
Patient arrived to ER 7 with strong steady gait. Verified full name and date of birth. Patient states she woke with migraine and took medication for migraines without relief. Patient describes head pain as throbbing, constant, and unbearable. Patient states nausea, no vomiting. IV initiated. Allergies verified. Medications given. Call light within reach, awaiting further orders.      Becca Pierre RN  08/26/23 2981

## 2023-08-26 NOTE — ED PROVIDER NOTES
EMERGENCY DEPARTMENT HISTORY AND PHYSICAL EXAM    6:05 PM EDT seen at this time in room QB        Date: 8/26/2023  Patient Name: Iveth Castañeda    History of Presenting Illness     Chief Complaint   Patient presents with    Migraine         History Provided By: patient    Additional History (Context): Iveth Castañeda is a 57 y.o. female presents with history of migraines, this migraine started up around 7 AM, photophobia severe pain, a bit of nausea but no vomiting. She took triptans at home without sufficient relief. Unsure if she is ever had Toradol Reglan Benadryl. Has allergy listed to aspirin and ibuprofen. Parul Peoples PCP: JANETTE Gonzalez NP    Chief Complaint:   Duration:    Timing:    Location:   Quality:   Severity:   Modifying Factors:   Associated Symptoms:       Current Facility-Administered Medications   Medication Dose Route Frequency Provider Last Rate Last Admin    magnesium sulfate 2000 mg in 50 mL IVPB premix  2,000 mg IntraVENous NOW Marisela Pablo MD        methylPREDNISolone sodium succ (SOLU-MEDROL) 125 mg in sterile water 2 mL injection  125 mg IntraVENous NOW Marisela Pablo MD        omalizumab Lian Bustillos) injection 300 mg  300 mg SubCUTAneous Q28 Days Pepper Welch MD   300 mg at 03/17/23 1014     Current Outpatient Medications   Medication Sig Dispense Refill    rizatriptan (MAXALT-MLT) 10 MG disintegrating tablet Take 1 tablet by mouth as needed for Migraine May repeat dose x 1 after 2 hours if needed. Max 2 doses in 24 hours.  12 tablet 0    prochlorperazine (COMPAZINE) 10 MG tablet Take 1 tablet by mouth every 6 hours as needed (headache with nausea) 15 tablet 0    albuterol sulfate HFA (PROVENTIL;VENTOLIN;PROAIR) 108 (90 Base) MCG/ACT inhaler Inhale 2 puffs into the lungs every 4 hours as needed for Wheezing or Shortness of Breath 18 g 4    fluticasone-salmeterol (ADVAIR) 500-50 MCG/ACT AEPB diskus inhaler INHALE 1 PUFF BY MOUTH TWICE A DAY 60 each 5    ipratropium 0.5 in 24 hours. Qty: 12 tablet, Refills: 5    Associated Diagnoses: Migraine without aura, not intractable, with status migrainosus      famotidine (PEPCID) 20 MG tablet Take 1 tablet by mouth 2 times daily  Qty: 60 tablet, Refills: 0      EPINEPHrine (EPIPEN 2-JYOTHI) 0.3 MG/0.3ML SOAJ injection Inject 0.3 mLs into the muscle once for 1 dose Use as directed for allergic reaction  Qty: 2 each, Refills: 0      omalizumab (XOLAIR) 150 MG/ML SOSY injection Inject 300 mg into the skin every 28 days Pre filled syringe  Qty: 2 each, Refills: 11      acetaminophen (TYLENOL) 500 MG tablet Take 2 tablets by mouth every 6 hours as needed      baclofen (LIORESAL) 20 MG tablet TAKE 1 TABLET BY MOUTH EVERY 8 HOURS AS NEEDED      fexofenadine-pseudoephedrine (ALLEGRA-D 12HR)  MG per extended release tablet Take 1 tablet by mouth in the morning and 1 tablet in the evening. fluticasone (FLONASE) 50 MCG/ACT nasal spray USE 2 SPRAYS INTO EACH NOSTRIL ONCE DAILY      LORazepam (ATIVAN) 0.5 MG tablet Take 1 tablet by mouth every 8 hours as needed. DISCONTINUED MEDICATIONS:  Current Discharge Medication List          PATIENT REFERRED TO:  Follow Up with:  No follow-up provider specified.   _______________________________    Notes:    Merline Redwood, MD using Dragon dictation      _______________________________     Augustus Monk MD  08/26/23 1244

## 2023-08-26 NOTE — ED NOTES
Report given to Rn taking over care. Patient is alert, c/o of no relief with medication. GCS 15. MD notified of continued pain. RN to resume care notified. Patient is aware of transfer of care. RN care relinquished at this time.      Trey Muniz RN  08/26/23 1912

## 2023-08-26 NOTE — ED TRIAGE NOTES
Pt with history of migraines presents with migraine since this am. Has taken medication with no relief. Endorses nausea and pain concentrated to temporal region.  Pt [id number]

## 2023-08-27 NOTE — ED NOTES
Patient up for discharge. Discharge instructions reviewed with patient who verbalized understanding. Patient discharged to home. Patient discharged ambulatory. Valuables with patient.       Current Discharge Medication List        START taking these medications    Details   metoclopramide (REGLAN) 10 MG tablet Take 1 tablet by mouth 4 times daily as needed (headache or nausea)  Qty: 30 tablet, Refills: 4502 Lianna 951, RN  08/26/23 8099

## 2023-08-27 NOTE — DISCHARGE INSTRUCTIONS
Drink lots of fluids. Consider caffeine, magnesium supplements and over-the-counter headache medications to help.

## 2023-08-27 NOTE — ED NOTES
Patient signed out to me pending reevaluation for her migraine. She does think this appears to be her regular migraine. She does follow with a neurologist.  She does feel clinically improved. States the headache is still lingering but improved. She is comfortable to plan to be discharged at this time. We will send her home with a prescription for Reglan. Discussed alternative therapies as well that can help with her pain. Patient stable for discharge at this time. Patient is in agreement with the plan to be discharged at this time. All the patient's questions were answered. Patient was given written instructions on the diagnosis, and states understanding of the plan moving forward. We did discuss important signs and symptoms that should prompt quick return to the emergency department. Disposition: Patient was discharged home in stable condition.   They will follow up with neurology    Prescriptions: Reglan    Diagnosis: Acute migraine     Shelli Valdivia MD  08/26/23 1611

## 2023-08-30 ENCOUNTER — NURSE ONLY (OUTPATIENT)
Age: 48
End: 2023-08-30

## 2023-08-30 DIAGNOSIS — J45.909 UNCOMPLICATED ASTHMA, UNSPECIFIED ASTHMA SEVERITY, UNSPECIFIED WHETHER PERSISTENT: Primary | ICD-10-CM

## 2023-08-30 NOTE — PROGRESS NOTES
Sarah Hooks is here for Xolair injection. Patient did not have any reaction to last injection. Patient states the Xolair is helping her Asthma. Injected subcutaneous : 150 mg given in Left upper arm SQ,  150 mg given in Right upper arm SQ. Lot Number: [id number]  Exp Date: 7/2024  Total Xolair Dose: 300 mg every 4 weeks  Epipen is with patient. Patient observed for 15 minutes. No reaction at injection site. Patient given Xolair reaction sheet.

## 2023-08-31 ENCOUNTER — CARE COORDINATION (OUTPATIENT)
Dept: OTHER | Facility: CLINIC | Age: 48
End: 2023-08-31

## 2023-09-06 ENCOUNTER — HOSPITAL ENCOUNTER (EMERGENCY)
Facility: HOSPITAL | Age: 48
Discharge: HOME | End: 2023-09-07
Attending: EMERGENCY MEDICINE
Payer: COMMERCIAL

## 2023-09-06 VITALS
OXYGEN SATURATION: 100 % | HEIGHT: 65 IN | WEIGHT: 129 LBS | RESPIRATION RATE: 17 BRPM | DIASTOLIC BLOOD PRESSURE: 74 MMHG | HEART RATE: 70 BPM | TEMPERATURE: 98.5 F | BODY MASS INDEX: 21.49 KG/M2 | SYSTOLIC BLOOD PRESSURE: 117 MMHG

## 2023-09-06 DIAGNOSIS — J40 BRONCHITIS: Primary | ICD-10-CM

## 2023-09-06 LAB
FLUAV AG NPH QL IA: NEGATIVE
FLUBV AG NOSE QL IA: NEGATIVE
SARS-COV-2 RDRP RESP QL NAA+PROBE: NOT DETECTED
SOURCE: NORMAL

## 2023-09-06 PROCEDURE — 87635 SARS-COV-2 COVID-19 AMP PRB: CPT

## 2023-09-06 PROCEDURE — 99283 EMERGENCY DEPT VISIT LOW MDM: CPT

## 2023-09-06 PROCEDURE — 87804 INFLUENZA ASSAY W/OPTIC: CPT

## 2023-09-06 RX ORDER — AZITHROMYCIN 250 MG/1
250 TABLET, FILM COATED ORAL DAILY
Qty: 4 TABLET | Refills: 0 | Status: SHIPPED | OUTPATIENT
Start: 2023-09-06 | End: 2023-09-06

## 2023-09-06 RX ORDER — AZITHROMYCIN 250 MG/1
250 TABLET, FILM COATED ORAL SEE ADMIN INSTRUCTIONS
Qty: 6 TABLET | Refills: 0 | Status: SHIPPED | OUTPATIENT
Start: 2023-09-06 | End: 2023-09-11

## 2023-09-06 RX ORDER — FLUCONAZOLE 150 MG/1
150 TABLET ORAL ONCE
Qty: 1 TABLET | Refills: 0 | Status: SHIPPED | OUTPATIENT
Start: 2023-09-07 | End: 2023-09-07

## 2023-09-06 ASSESSMENT — PAIN SCALES - GENERAL: PAINLEVEL_OUTOF10: 6

## 2023-09-06 ASSESSMENT — PAIN - FUNCTIONAL ASSESSMENT: PAIN_FUNCTIONAL_ASSESSMENT: 0-10

## 2023-09-07 ENCOUNTER — CARE COORDINATION (OUTPATIENT)
Dept: OTHER | Facility: CLINIC | Age: 48
End: 2023-09-07

## 2023-09-07 NOTE — CARE COORDINATION
Ambulatory Care Coordination Note  2023    Patient Current Location:  [facility]    ACM contacted the patient by telephone. Verified name and  with patient as identifiers. Provided introduction to self, and explanation of the ACM role. ACM: Darwin Dang RN    Challenges to be reviewed by the provider   Additional needs identified to be addressed with provider: Yes  High ED Utilization                 Method of communication with provider:  pt will make f/u appt ASAP with PCP and neuro . Pt was seen at [facility] ER 23 - 23. Diagnosis: bronchitis    Previously seen at [facility] ER 23 and 23  Diagnosis: migraine     Pt reported she continues to have a stuffy nose, body aches and a productive cough with clear thick mucus. Denies CP, SOB, wheeze or temp. Pt confirmed she is taking all prescribed meds except for Reglan that she was, \"prescribed for migraines. \" Still need to . Pt reported she called PCP office 23 for an appt, but, \"first available wasn't until first of Dec.\" Encouraged to call PCP in AM and requested ER f/u appt ASAP. Advise office that she has been seen in ER X 3 within the pat 30 days. Pt also sees pulmonary for asthma. Appt 23. Pt reported she does have a supplemental insurance. Encouraged pt to opt for an urgent care facility, [facility] etc if possible in the future versus ER for cost savings. Pt has also been seen in the ER X 2 for a migraine. Pt is under the care of neuro for migraines, but did not contact provider to update nor request an earlier appt. F/U appt isn't until Nov. Encouraged to contact in AM and request earlier appt if possible. Currently does not have a migraine. Offered patient enrollment in the Remote Patient Monitoring (RPM) program for in-home monitoring: NA.     Ambulatory Care Coordination Assessment    Care Coordination Protocol  Week 1 - Initial Assessment     Do you have all of your prescriptions and are they filled?: No  How

## 2023-09-07 NOTE — ED TRIAGE NOTES
Pt ambulatory to triage c/o exposure to covid / covid symptoms (body aches, SOB, runny nose, cough) x 2 days

## 2023-09-14 ENCOUNTER — CARE COORDINATION (OUTPATIENT)
Dept: OTHER | Facility: CLINIC | Age: 48
End: 2023-09-14

## 2023-09-14 NOTE — CARE COORDINATION
Ambulatory Care Coordination Note    ACM attempted to reach patient for Associate Care Management related to pt being being seen again at [facility] ER again on 9/12/23. Diagnosis: flu like s/s - COVID positive     Pt was also seen at [facility] ER 9/6/23 - 9/7/23. Diagnosis: bronchitis     Previously seen at [facility] ER 8/26/23 and 8/21/23  Diagnosis: migraine . No voicemail available, , will continue to outreach. Plan for follow-up call in 5-7 days    Future Appointments   Date Time Provider [address]   9/29/2023  2:00 PM PPA NURSE VISIT [facility] BS AMB   10/23/2023  3:45 PM Everardo Castellon MD [facility] BS AMB       Goals         Care Coordination Related to Migraines (pt-stated)       Establish PCP relationships and regularly scheduled appointments. PCP - TBD  Pulmonary - 9/29/23  Neuro - Nov ?    9/14/23 Call placed to patient, no answer. Voicemail not available        Knowledge and adherence to medication plan. Taking prescribed meds        Supportive resources in place to maintain patient in the community (ie., home health, equipment, DME, refer to, etc.)       [employer] - # [phone]  [employer] Online   *Go to www.[employer]. [employer] to create an account. Your copay is $15   Nurse Access line 24/7 located on the back of the insurance card  Be Well online   [address] [employer] [address] Urgent [address]  # [phone]  HR Service Now - [facility] Workday  IT - [phone]  MyChart Help - # 1- [phone]  Leave of absence # [phone] option 1 or call the dedicated line at [address] [place] [address] ([phone]). [employer] agents are available weekdays 8:30 a.m. - 10:30 p.m. ET. [employer] Matters - [phone] Go to [employer] password [email or web address] to access resources, educational information, and self-service options.           Understand ASTHMA action plan. (ie. when to seek medical care, understanding green, yellow, and red zones, how and when to adjust

## 2023-09-19 ASSESSMENT — ENCOUNTER SYMPTOMS
TROUBLE SWALLOWING: 0
COUGH: 1
SORE THROAT: 0

## 2023-09-19 NOTE — ED PROVIDER NOTES
`[facility] EMERGENCY DEPT  eMERGENCY dEPARTMENT eNCOUnter      Pt Name: Sarah Hooks  MRN: [id number]  [address] 1975 of evaluation: 2023  Provider:MD Dmitri Genaos Knife is a 57 y.o. female  /C/O exposure to covid 23. She C/O  having covid symptoms (body aches, SOB, runny nose, cough) x 2 days    ROS    Review of Systems   Constitutional:  Positive for activity change, chills and fatigue. Negative for fever. Appetite change: malaise. HENT:  Positive for postnasal drip. Negative for congestion, sore throat and trouble swallowing. Respiratory:  Positive for cough. Cardiovascular:  Negative for chest pain and palpitations. Musculoskeletal:  Positive for myalgias (body aches). Neurological:  Negative for dizziness and weakness. Except as noted above the remainder of the review of systems was reviewed and negative. PAST MEDICAL HISTORY     Past Medical History:   Diagnosis Date    Anxiety attack     Asthma     Asthmatic bronchitis     Bronchitis     Catamenial disorder     Hernia     Influenza     Migraine     Migraines     Mild asthma     Psychiatric disorder     anxiety     Torticollis          SURGICAL HISTORY       Past Surgical History:   Procedure Laterality Date    BACK SURGERY       DELIVERY ONLY       SECTION            [facility]       Discharge Medication List as of 2023 11:34 PM        CONTINUE these medications which have NOT CHANGED    Details   metoclopramide (REGLAN) 10 MG tablet Take 1 tablet by mouth 4 times daily as needed (headache or nausea), Disp-30 tablet, R-1Normal      rizatriptan (MAXALT-MLT) 10 MG disintegrating tablet Take 1 tablet by mouth as needed for Migraine May repeat dose x 1 after 2 hours if needed. Max 2 doses in 24 hours. , Disp-12 tablet, R-0Normal      prochlorperazine (COMPAZINE) 10 MG tablet Take 1 tablet by mouth every 6 hours as needed (headache with nausea), Disp-15

## 2023-09-20 ENCOUNTER — TELEPHONE (OUTPATIENT)
Age: 48
End: 2023-09-20

## 2023-09-20 DIAGNOSIS — G43.001 MIGRAINE WITHOUT AURA, NOT INTRACTABLE, WITH STATUS MIGRAINOSUS: ICD-10-CM

## 2023-09-20 NOTE — TELEPHONE ENCOUNTER
Patient called and states she needs a prior auth for rizatriptan (MAXALT-MLT) 10 MG disintegrating tablet. Please advise.

## 2023-09-20 NOTE — TELEPHONE ENCOUNTER
Patient states this is the best number to contact her after 3 pm is [phone] and to contact her 6-2:30 is [phone]. Please advise.

## 2023-09-21 ENCOUNTER — CARE COORDINATION (OUTPATIENT)
Dept: OTHER | Facility: CLINIC | Age: 48
End: 2023-09-21

## 2023-09-21 NOTE — CARE COORDINATION
Ambulatory Care Coordination Note    ACM attempted to reach patient for Associate Care Management related to pt being being seen again at [facility] ER again on 9/12/23. Diagnosis: flu like s/s - COVID positive      Pt was also seen at [facility] ER 9/6/23 - 9/7/23. Diagnosis: bronchitis     Previously seen at [facility] ER 8/26/23 and 8/21/23  Diagnosis: migraine . Andrew Pelayo No voicemail available, Lost to follow up letter via mail. Follow-up 3 weeks     Future Appointments   Date Time Provider [address]   9/29/2023  2:00 PM PPA NURSE VISIT [facility] BS AMB   10/23/2023  3:45 PM Daniel Gray MD [facility] BS AMB       Goals         Care Coordination Related to Migraines (pt-stated)       Establish PCP relationships and regularly scheduled appointments. PCP - TBD  Pulmonary - 9/29/23  Neuro - Nov ?    9/14/23 Call placed to patient, no answer. Voicemail not available    9/21/23 Call placed to patient, no answer. Voicemail not available. Lost to follow-up letter mailed to pt. Knowledge and adherence to medication plan. Taking prescribed meds        Supportive resources in place to maintain patient in the community (ie., home health, equipment, DME, refer to, etc.)       [employer] [facility] - # [phone]  [employer] Online   *Go to www.[employer]. [employer] to create an account. Your copay is $15   Nurse Access line 24/7 located on the back of the insurance card  Be Well online   [address] Urgent [address] [place] [address] # [phone]  HR Service Now - [facility] Workday  IT - [phone]  [employer] Help - # 1- [phone]  Leave of absence # [phone] option 1 or call the dedicated line at [facility] ([phone]). [employer] agents are available weekdays 8:30 a.m. - 10:30 p.m. ET. Life Matters - [phone] Go to [employer] password [email or web address] to access resources, educational information, and self-service options. Understand ASTHMA action plan.

## 2023-09-28 RX ORDER — RIZATRIPTAN BENZOATE 10 MG/1
10 TABLET, ORALLY DISINTEGRATING ORAL PRN
Qty: 12 TABLET | Refills: 5 | Status: SHIPPED | OUTPATIENT
Start: 2023-09-28

## 2023-09-29 ENCOUNTER — NURSE ONLY (OUTPATIENT)
Age: 48
End: 2023-09-29
Payer: COMMERCIAL

## 2023-09-29 DIAGNOSIS — J45.40 MODERATE PERSISTENT ASTHMA, UNCOMPLICATED: Primary | ICD-10-CM

## 2023-09-29 PROCEDURE — 96372 THER/PROPH/DIAG INJ SC/IM: CPT | Performed by: INTERNAL MEDICINE

## 2023-09-29 NOTE — PROGRESS NOTES
Nata Siddiqi is here for Xolair injection. Patient did not have any reaction to last injection. Patient states the Xolair is helping her Asthma. Injected subcutaneous : 150 mg given in Left upper arm SQ,  150 mg given in Right upper arm SQ. Lot Number: [id number]  Exp Date: 8/2024  Total Xolair Dose: 300 mg every 4 weeks  Epipen is with patient. Patient observed for 15 minutes. No reaction at injection site. Patient given Xolair reaction sheet.

## 2023-10-23 ENCOUNTER — OFFICE VISIT (OUTPATIENT)
Age: 48
End: 2023-10-23
Payer: COMMERCIAL

## 2023-10-23 VITALS
OXYGEN SATURATION: 100 % | DIASTOLIC BLOOD PRESSURE: 70 MMHG | TEMPERATURE: 98.6 F | WEIGHT: 133.4 LBS | RESPIRATION RATE: 20 BRPM | HEART RATE: 103 BPM | SYSTOLIC BLOOD PRESSURE: 111 MMHG | BODY MASS INDEX: 22.23 KG/M2 | HEIGHT: 65 IN

## 2023-10-23 DIAGNOSIS — J45.901 MODERATE ASTHMA WITH ACUTE EXACERBATION, UNSPECIFIED WHETHER PERSISTENT: Primary | ICD-10-CM

## 2023-10-23 DIAGNOSIS — U09.9 POST COVID-19 CONDITION, UNSPECIFIED: ICD-10-CM

## 2023-10-23 DIAGNOSIS — J44.89 ASTHMA WITH COPD: ICD-10-CM

## 2023-10-23 PROCEDURE — 99214 OFFICE O/P EST MOD 30 MIN: CPT | Performed by: INTERNAL MEDICINE

## 2023-10-23 RX ORDER — PREDNISONE 10 MG/1
TABLET ORAL
Qty: 18 TABLET | Refills: 0 | Status: SHIPPED | OUTPATIENT
Start: 2023-10-23

## 2023-10-23 ASSESSMENT — ENCOUNTER SYMPTOMS
VOICE CHANGE: 0
APNEA: 0
PHOTOPHOBIA: 0
SORE THROAT: 0
ABDOMINAL PAIN: 0
STRIDOR: 0
CHOKING: 0
COLOR CHANGE: 0
CONSTIPATION: 0
SINUS PRESSURE: 0
TROUBLE SWALLOWING: 0
EYE PAIN: 0
COUGH: 1
VOMITING: 0
DIARRHEA: 0
EYE REDNESS: 0
NAUSEA: 0
WHEEZING: 1
CHEST TIGHTNESS: 0
EYE DISCHARGE: 0
EYE ITCHING: 0
FACIAL SWELLING: 0
SHORTNESS OF BREATH: 1
BLOOD IN STOOL: 0

## 2023-10-23 NOTE — PROGRESS NOTES
Klebercaitlin Salazar presents today for   Chief Complaint   Patient presents with    Asthma    Follow-up       Is someone accompanying this pt? No    Is the patient using any DME equipment during OV? No    -DME Company NA    Depression Screening:         No data to display                Learning Needs Questionnaire:     No question data found. Fall Risk:          No data to display                 Abuse Screening:          No data to display                  Coordination of Care:    1. Have you been to the ER, urgent care clinic since your last visit? Hospitalized since your last visit? [address] 09/21/23: Covid +    2. Have you seen or consulted any other health care providers outside of the [address] since your last visit? Include any pap smears or colon screening. PCP    Medication list has been update per patient.

## 2023-10-23 NOTE — PROGRESS NOTES
Yoli Uriostegui (:  1975) is a 57 y.o. female,Established patient, here for evaluation of the following chief complaint(s):  Asthma, Follow-up, and Cough         ASSESSMENT/PLAN:  1. Moderate asthma with acute exacerbation, unspecified whether persistent  2. Post covid-19 condition, unspecified  3. Asthma with COPD    Pt with asthma, previously well controlled until recent COVID infection. She is almost back to baseline with some residual post COVID sequela and possibly mild asthma exacerbation associated with this. Start short Prednisone taper, Rx sent  Continue Advair and Xolair at same doses. Prn Albuterol. Trigger avoidance counseled  Pt is up to date with vaccinations. Return in about 6 months (around 2024). Subjective   SUBJECTIVE/OBJECTIVE:  Pt with asthma, now well controlled on Xolair. Pt denies any ED visits or hospital admissions for asthma but was seen in ED for other issues including COVID ( at [facility]), for which she recovered at home. Since then she has had a lingering non productive cough, intermittent brain fog, shortness of breath giselle with moderate exertion. She has also noted wheezing over baseline although this seems somewhat improved from a month ago. She has been transferred to a different unit where she has less workplace stress and less anxiety. She is compliant with all meds including Xolair. Review of Systems   Constitutional:  Negative for activity change, appetite change, chills, diaphoresis, fatigue, fever and unexpected weight change. HENT:  Positive for congestion. Negative for facial swelling, hearing loss, nosebleeds, sinus pressure, sore throat, tinnitus, trouble swallowing and voice change. Eyes:  Negative for photophobia, pain, discharge, redness, itching and visual disturbance. Respiratory:  Positive for cough, shortness of breath (residual) and wheezing (mild).  Negative for apnea, choking, chest tightness and

## 2023-10-26 ENCOUNTER — NURSE ONLY (OUTPATIENT)
Age: 48
End: 2023-10-26

## 2023-10-26 DIAGNOSIS — J45.901 MODERATE ASTHMA WITH ACUTE EXACERBATION, UNSPECIFIED WHETHER PERSISTENT: Primary | ICD-10-CM

## 2023-10-26 NOTE — PROGRESS NOTES
Ronny Schilder is here for Xolair injection. Patient did not have any reaction to last injection. Patient states the Xolair is helping her Asthma. Injected subcutaneous : 150 mg given in Left upper arm SQ,  150 mg given in Right upper arm SQ. Lot Number: [id number]  Exp Date: 9/2024  Total Xolair Dose: 300 mg every 4 weeks  Epipen is with patient. Patient observed for 15 minutes. No reaction at injection site. Patient given Xolair reaction sheet.

## 2023-10-31 ENCOUNTER — CARE COORDINATION (OUTPATIENT)
Dept: OTHER | Facility: CLINIC | Age: 48
End: 2023-10-31

## 2023-11-02 ENCOUNTER — TELEPHONE (OUTPATIENT)
Age: 48
End: 2023-11-02

## 2023-11-02 DIAGNOSIS — G43.001 MIGRAINE WITHOUT AURA, NOT INTRACTABLE, WITH STATUS MIGRAINOSUS: ICD-10-CM

## 2023-11-02 RX ORDER — ELETRIPTAN HYDROBROMIDE 40 MG/1
40 TABLET, FILM COATED ORAL PRN
Qty: 12 TABLET | Refills: 2 | Status: SHIPPED | OUTPATIENT
Start: 2023-11-02

## 2023-11-02 NOTE — TELEPHONE ENCOUNTER
Spoke with [employer], regarding Relpax prior Auth. Reports formulary changed in July. Insurance company sending PA form via fax.

## 2023-11-07 ENCOUNTER — TELEPHONE (OUTPATIENT)
Age: 48
End: 2023-11-07

## 2023-11-08 ENCOUNTER — TELEPHONE (OUTPATIENT)
Age: 48
End: 2023-11-08

## 2023-11-08 NOTE — TELEPHONE ENCOUNTER
Spoke with patient's Mom verified name and , informed insurance company sending Prior Auth form. Unable to obtain PA over the Phone.

## 2023-11-10 ENCOUNTER — TELEPHONE (OUTPATIENT)
Age: 48
End: 2023-11-10

## 2023-11-10 DIAGNOSIS — G43.001 MIGRAINE WITHOUT AURA, NOT INTRACTABLE, WITH STATUS MIGRAINOSUS: Primary | ICD-10-CM

## 2023-11-10 RX ORDER — BUTALBITAL, ACETAMINOPHEN AND CAFFEINE 50; 325; 40 MG/1; MG/1; MG/1
1 TABLET ORAL EVERY 12 HOURS PRN
Qty: 20 TABLET | Refills: 0 | Status: SHIPPED | OUTPATIENT
Start: 2023-11-10

## 2023-11-10 NOTE — TELEPHONE ENCOUNTER
Spoke with patient, verified name and , informed patient NP, Monhegan to place order for Fioricet. Made patient aware completed faxed form for eletriptan.

## 2023-11-10 NOTE — TELEPHONE ENCOUNTER
Pt would like a refill on her flonase to be sent to [facility] on [address].  For further information to please call [phone]

## 2023-11-13 RX ORDER — FLUTICASONE PROPIONATE 50 MCG
SPRAY, SUSPENSION (ML) NASAL
Qty: 16 G | Refills: 3 | Status: SHIPPED | OUTPATIENT
Start: 2023-11-13

## 2023-11-27 ENCOUNTER — NURSE ONLY (OUTPATIENT)
Age: 48
End: 2023-11-27
Payer: COMMERCIAL

## 2023-11-27 DIAGNOSIS — J45.901 MODERATE ASTHMA WITH ACUTE EXACERBATION, UNSPECIFIED WHETHER PERSISTENT: Primary | ICD-10-CM

## 2023-11-27 NOTE — PROGRESS NOTES
Frank Forman is here for Xolair injection. Patient did not have any reaction to last injection. Patient states the Xolair is helping her Asthma. Injected subcutaneous : 150 mg given in Left upper arm SQ,  150 mg given in Right upper arm SQ. Lot Number: [id number]  Exp Date: 09/2024  Total Xolair Dose: 300 mg every 4 weeks  Epipen is with patient. Patient observed for 15 minutes. No reaction at injection site. Patient given Xolair reaction sheet.

## 2023-11-28 ENCOUNTER — TELEPHONE (OUTPATIENT)
Age: 48
End: 2023-11-28

## 2023-11-28 PROCEDURE — 96372 THER/PROPH/DIAG INJ SC/IM: CPT | Performed by: INTERNAL MEDICINE

## 2023-12-05 ENCOUNTER — TELEPHONE (OUTPATIENT)
Age: 48
End: 2023-12-05

## 2023-12-27 ENCOUNTER — NURSE ONLY (OUTPATIENT)
Age: 48
End: 2023-12-27
Payer: COMMERCIAL

## 2023-12-27 DIAGNOSIS — J45.901 MODERATE ASTHMA WITH ACUTE EXACERBATION, UNSPECIFIED WHETHER PERSISTENT: Primary | ICD-10-CM

## 2023-12-27 NOTE — PROGRESS NOTES
Sarah Rao is here for Xolair injection. Patient did not have any reaction to last injection. Patient states the Xolair is helping her Asthma. Injected subcutaneous : 150 mg given in Left upper arm SQ,  150 mg given in Right upper arm SQ. Lot Number: [id number]  Exp Date: 10/2024  Total Xolair Dose: 300 mg every 4 weeks  Epipen is with patient. Patient observed for 15 minutes. No reaction at injection site. Patient given Xolair reaction sheet.

## 2023-12-29 PROCEDURE — 96372 THER/PROPH/DIAG INJ SC/IM: CPT | Performed by: INTERNAL MEDICINE

## 2024-01-09 ENCOUNTER — TELEPHONE (OUTPATIENT)
Age: 49
End: 2024-01-09

## 2024-01-16 ENCOUNTER — TELEPHONE (OUTPATIENT)
Age: 49
End: 2024-01-16

## 2024-01-17 DIAGNOSIS — G43.001 MIGRAINE WITHOUT AURA, NOT INTRACTABLE, WITH STATUS MIGRAINOSUS: ICD-10-CM

## 2024-01-19 RX ORDER — ELETRIPTAN HYDROBROMIDE 40 MG/1
40 TABLET, FILM COATED ORAL PRN
Qty: 12 TABLET | Refills: 2 | Status: SHIPPED | OUTPATIENT
Start: 2024-01-19

## 2024-01-19 RX ORDER — RIZATRIPTAN BENZOATE 10 MG/1
10 TABLET, ORALLY DISINTEGRATING ORAL PRN
Qty: 12 TABLET | Refills: 5 | Status: SHIPPED | OUTPATIENT
Start: 2024-01-19

## 2024-01-24 ENCOUNTER — NURSE ONLY (OUTPATIENT)
Age: 49
End: 2024-01-24
Payer: MEDICAID

## 2024-01-24 DIAGNOSIS — J45.901 MODERATE ASTHMA WITH ACUTE EXACERBATION, UNSPECIFIED WHETHER PERSISTENT: Primary | ICD-10-CM

## 2024-01-24 NOTE — PROGRESS NOTES
Virginia Clement is here for Xolair injection.   Patient did not have any reaction to last injection. Patient states the Xolair is helping her Asthma.     Injected subcutaneous : 150 mg given in Left upper arm SQ,  150 mg given in Right upper arm SQ.  Lot Number: [id number]  Exp Date: 10/2024  Total Xolair Dose: 300 mg every 4 weeks  Epipen is with patient.  Patient observed for 15 minutes. No reaction at injection site.     Patient given Xolair reaction sheet.

## 2024-01-25 ENCOUNTER — TELEPHONE (OUTPATIENT)
Age: 49
End: 2024-01-25

## 2024-01-25 PROCEDURE — 96372 THER/PROPH/DIAG INJ SC/IM: CPT | Performed by: INTERNAL MEDICINE

## 2024-01-25 RX ORDER — AZITHROMYCIN 250 MG/1
250 TABLET, FILM COATED ORAL SEE ADMIN INSTRUCTIONS
Qty: 6 TABLET | Refills: 0 | Status: SHIPPED | OUTPATIENT
Start: 2024-01-25 | End: 2024-01-30

## 2024-01-25 RX ORDER — PREDNISONE 10 MG/1
TABLET ORAL
Qty: 18 TABLET | Refills: 0 | Status: SHIPPED | OUTPATIENT
Start: 2024-01-25

## 2024-01-25 NOTE — TELEPHONE ENCOUNTER
Patient has sore throat, chest congestion, yellowish sputum and sob from chest tightness. No fever since yesterday.  Feels she needs Prednisone taper and antibiotic. [facility] [place]

## 2024-01-25 NOTE — TELEPHONE ENCOUNTER
Patient stated that she received her Xolair shot yesterday. Today she is not feeling well.  Symptoms:    Sore throat, SOB and coughing up yellowish phlegm.      Please call back pt, she is asking for steroids and antibiotics [phone]

## 2024-01-26 ENCOUNTER — TELEPHONE (OUTPATIENT)
Age: 49
End: 2024-01-26

## 2024-01-26 RX ORDER — FLUCONAZOLE 100 MG/1
100 TABLET ORAL DAILY
Qty: 3 TABLET | Refills: 0 | Status: SHIPPED | OUTPATIENT
Start: 2024-01-26 | End: 2024-01-29

## 2024-01-26 NOTE — TELEPHONE ENCOUNTER
Patient is requesting a Rx for Diflucan. Pt stated that the antibiotic gives her a yeast infection.

## 2024-03-08 ENCOUNTER — TELEPHONE (OUTPATIENT)
Age: 49
End: 2024-03-08

## 2024-03-14 DIAGNOSIS — G43.001 MIGRAINE WITHOUT AURA, NOT INTRACTABLE, WITH STATUS MIGRAINOSUS: ICD-10-CM

## 2024-03-21 ENCOUNTER — NURSE ONLY (OUTPATIENT)
Age: 49
End: 2024-03-21
Payer: MEDICAID

## 2024-03-21 DIAGNOSIS — J45.901 MODERATE ASTHMA WITH ACUTE EXACERBATION, UNSPECIFIED WHETHER PERSISTENT: Primary | ICD-10-CM

## 2024-03-21 PROCEDURE — 96372 THER/PROPH/DIAG INJ SC/IM: CPT | Performed by: INTERNAL MEDICINE

## 2024-03-21 RX ORDER — ELETRIPTAN HYDROBROMIDE 40 MG/1
40 TABLET, FILM COATED ORAL PRN
Qty: 12 TABLET | Refills: 2 | Status: SHIPPED | OUTPATIENT
Start: 2024-03-21

## 2024-03-21 NOTE — PROGRESS NOTES
Virginia Clement is here for Xolair injection.   Patient did not have any reaction to last injection. Patient states the Xolair is helping her Asthma.     Injected subcutaneous : 150 mg given in Left upper arm SQ,  150 mg given in Right upper arm SQ.  Lot Number: [id number]  Exp Date: 1/2025  Total Xolair Dose: 300 mg every 4 weeks  Epipen is with patient.  Patient observed for 15 minutes. No reaction at injection site.     Patient given Xolair reaction sheet.

## 2024-03-25 ENCOUNTER — TELEPHONE (OUTPATIENT)
Age: 49
End: 2024-03-25

## 2024-04-17 RX ORDER — ALBUTEROL SULFATE 90 UG/1
AEROSOL, METERED RESPIRATORY (INHALATION)
Qty: 8.5 G | Refills: 3 | Status: SHIPPED | OUTPATIENT
Start: 2024-04-17

## 2024-04-22 ENCOUNTER — TELEPHONE (OUTPATIENT)
Age: 49
End: 2024-04-22

## 2024-05-01 ENCOUNTER — NURSE ONLY (OUTPATIENT)
Age: 49
End: 2024-05-01
Payer: MEDICAID

## 2024-05-01 DIAGNOSIS — J45.901 MODERATE ASTHMA WITH ACUTE EXACERBATION, UNSPECIFIED WHETHER PERSISTENT: Primary | ICD-10-CM

## 2024-05-01 DIAGNOSIS — J45.40 MODERATE PERSISTENT ASTHMA, UNCOMPLICATED: ICD-10-CM

## 2024-05-01 PROCEDURE — 96372 THER/PROPH/DIAG INJ SC/IM: CPT | Performed by: INTERNAL MEDICINE

## 2024-05-01 NOTE — PROGRESS NOTES
Virginia Clement is here for Xolair injection.   Patient did not have any reaction to last injection. Patient states the Xolair is helping her Asthma.     Injected subcutaneous : 150 mg given in Left upper arm SQ,  150 mg given in Right upper arm SQ.  Lot Number: [id number]  Exp Date: 4/2024  Total Xolair Dose: 300 mg every 4 weeks  Epipen is with patient.  Patient observed for 15 minutes. No reaction at injection site.     Patient given Xolair reaction sheet.

## 2024-05-17 RX ORDER — IPRATROPIUM BROMIDE AND ALBUTEROL SULFATE 2.5; .5 MG/3ML; MG/3ML
SOLUTION RESPIRATORY (INHALATION)
Qty: 360 ML | Refills: 4 | Status: SHIPPED | OUTPATIENT
Start: 2024-05-17

## 2024-05-29 ENCOUNTER — NURSE ONLY (OUTPATIENT)
Age: 49
End: 2024-05-29
Payer: MEDICAID

## 2024-05-29 VITALS — WEIGHT: 133 LBS | BODY MASS INDEX: 22.13 KG/M2

## 2024-05-29 DIAGNOSIS — J45.50 SEVERE PERSISTENT ASTHMA WITHOUT COMPLICATION: Primary | ICD-10-CM

## 2024-05-29 PROCEDURE — 96372 THER/PROPH/DIAG INJ SC/IM: CPT | Performed by: INTERNAL MEDICINE

## 2024-05-29 NOTE — PROGRESS NOTES
Kodyeri Clement is here for Xolair injection.   Patient did not have any reaction to last injection. Patient states the Xolair is helping her Asthma.     Injected subcutaneous : 150 mg given in Left upper arm SQ,  150 mg given in Right upper arm SQ.  Lot Number: [id number]  Exp Date: 05/31/2025    Total Xolair Dose: 300mg every 4 weeks  Epipen is with patient.  Patient observed for 15 minutes. No reaction at injection site.

## 2024-06-04 ENCOUNTER — TELEPHONE (OUTPATIENT)
Age: 49
End: 2024-06-04

## 2024-06-04 DIAGNOSIS — G43.001 MIGRAINE WITHOUT AURA, NOT INTRACTABLE, WITH STATUS MIGRAINOSUS: ICD-10-CM

## 2024-06-11 RX ORDER — RIZATRIPTAN BENZOATE 10 MG/1
10 TABLET, ORALLY DISINTEGRATING ORAL PRN
Qty: 12 TABLET | Refills: 1 | Status: SHIPPED | OUTPATIENT
Start: 2024-06-11 | End: 2024-07-15 | Stop reason: SDUPTHER

## 2024-06-11 RX ORDER — ELETRIPTAN HYDROBROMIDE 40 MG/1
40 TABLET, FILM COATED ORAL PRN
Qty: 12 TABLET | Refills: 1 | Status: SHIPPED | OUTPATIENT
Start: 2024-06-11 | End: 2024-07-15 | Stop reason: SDUPTHER

## 2024-06-20 DIAGNOSIS — G43.001 MIGRAINE WITHOUT AURA, NOT INTRACTABLE, WITH STATUS MIGRAINOSUS: ICD-10-CM

## 2024-06-25 RX ORDER — TOPIRAMATE 25 MG/1
TABLET ORAL
Qty: 90 TABLET | Refills: 1 | Status: SHIPPED | OUTPATIENT
Start: 2024-06-25

## 2024-06-26 ENCOUNTER — NURSE ONLY (OUTPATIENT)
Age: 49
End: 2024-06-26

## 2024-06-26 DIAGNOSIS — J45.50 SEVERE PERSISTENT ASTHMA WITHOUT COMPLICATION: Primary | ICD-10-CM

## 2024-06-26 NOTE — PROGRESS NOTES
Virginia Clement is here for Xolair injection.   Patient did not have any reaction to last injection. Patient states the Xolair is helping her Asthma.     Injected subcutaneous : 150 mg given in Left upper arm SQ,  150 mg given in Right upper arm SQ.  Lot Number: [id number]  Exp Date: 7/30/2025  Total Xolair Dose: 300 mg every 4 weeks  Epipen is with patient.  Patient observed for 15 minutes. No reaction at injection site.     Patient given Xolair reaction sheet.

## 2024-07-12 ENCOUNTER — TELEPHONE (OUTPATIENT)
Age: 49
End: 2024-07-12

## 2024-07-29 ENCOUNTER — NURSE ONLY (OUTPATIENT)
Age: 49
End: 2024-07-29
Payer: MEDICAID

## 2024-07-29 DIAGNOSIS — J45.50 SEVERE PERSISTENT ASTHMA WITHOUT COMPLICATION: Primary | ICD-10-CM

## 2024-07-29 PROCEDURE — 96372 THER/PROPH/DIAG INJ SC/IM: CPT | Performed by: HOSPITALIST

## 2024-07-29 NOTE — PROGRESS NOTES
Virginia Clement is here for Xolair injection.   Patient did not have any reaction to last injection. Patient states the Xolair is helping her Asthma.     Injected subcutaneous : 150 mg given in Left upper arm SQ,  150 mg given in Right upper arm SQ.  Lot Number: [id number]  Exp Date: 07/31/2025  Total Xolair Dose: 300 mg every4  weeks  Epipen is with patient.  Patient observed for 15 minutes. No reaction at injection site.     Patient given Xolair reaction sheet.

## 2024-08-15 ENCOUNTER — TELEPHONE (OUTPATIENT)
Age: 49
End: 2024-08-15

## 2024-08-15 RX ORDER — PREDNISONE 10 MG/1
TABLET ORAL
Qty: 18 TABLET | Refills: 0 | Status: SHIPPED | OUTPATIENT
Start: 2024-08-15

## 2024-08-15 NOTE — TELEPHONE ENCOUNTER
Pt wants to speak with nurse in regards to not feeling well. Pt has been coughing  up cold and that she feels it in her chest. Pt don't want it to get worse. Please advise [phone]

## 2024-08-15 NOTE — TELEPHONE ENCOUNTER
Spoke with the patient SX started about 3 days ago with cough and chest congestion mucus is light yellow having some sob and tightness has been using her rescue inhaler. No fever chills or body aches.  Please advise

## 2024-08-26 ENCOUNTER — NURSE ONLY (OUTPATIENT)
Age: 49
End: 2024-08-26
Payer: MEDICAID

## 2024-08-26 VITALS — WEIGHT: 132.4 LBS | BODY MASS INDEX: 22.03 KG/M2

## 2024-08-26 DIAGNOSIS — J45.50 SEVERE PERSISTENT ASTHMA WITHOUT COMPLICATION: Primary | ICD-10-CM

## 2024-08-26 PROCEDURE — 96372 THER/PROPH/DIAG INJ SC/IM: CPT | Performed by: HOSPITALIST

## 2024-08-26 NOTE — PROGRESS NOTES
Virginia Clement is here for Xolair injection.   Patient did not have any reaction to last injection. Patient states the Xolair is helping her Asthma.     Injected subcutaneous : 150 mg given in Left upper arm SQ,  150 mg given in Right upper arm SQ.  Lot Number: [id number]  Exp Date: 08/30/25  Total Xolair Dose: 300 mg every 4 weeks  Epipen is with patient.  Patient observed for 15 minutes. No reaction at injection site.     Patient given Xolair reaction sheet.

## 2024-08-27 DIAGNOSIS — G43.001 MIGRAINE WITHOUT AURA, NOT INTRACTABLE, WITH STATUS MIGRAINOSUS: ICD-10-CM

## 2024-08-29 ENCOUNTER — TELEPHONE (OUTPATIENT)
Facility: HOSPITAL | Age: 49
End: 2024-08-29

## 2024-08-29 NOTE — TELEPHONE ENCOUNTER
Specialty Medication Service    Date: 8/29/2024  Patient's Name: Virginia Clement YOB: 1975            _____________________________________________________________________________________________    Prescription for Xolair has been sent to [facility] and is rejecting for a prior authorization and out of network pharmacy (must use [facility]). Noticed per patient chart she has been using Xolair for quite some time. However, no claims with [facility] insurance and no prior authorization on file for patient. Messaged provider to find out if order was sent to pharmacy intentionally or if patient is able to obtain as Buy and Bill thru Medical. If patient needs now obtain thru prescription coverage will work with provider to get order sent to [facility] and assist with prior authorization.     Will follow-up accordingly.     Gera Fatima, PharmD [facility]  Ambulatory Clinical Pharmacist  Specialty Medication Services  Phone: [phone] option #4  Fax: [phone]    For Pharmacy Admin Tracking Only    Program: SMS  CPA in place:  No  Recommendation Provided To: Provider: 1 via Note to Provider  Intervention Detail: Benefit Assistance  Intervention Accepted By: Provider: 0  Time Spent (min): 20

## 2024-09-03 RX ORDER — TOPIRAMATE 25 MG/1
TABLET, FILM COATED ORAL
Qty: 90 TABLET | Refills: 0 | Status: SHIPPED | OUTPATIENT
Start: 2024-09-03

## 2024-09-12 RX ORDER — FLUTICASONE PROPIONATE AND SALMETEROL 500; 50 UG/1; UG/1
POWDER RESPIRATORY (INHALATION)
Qty: 1 EACH | Refills: 2 | Status: SHIPPED | OUTPATIENT
Start: 2024-09-12

## 2024-09-16 ENCOUNTER — CLINICAL DOCUMENTATION (OUTPATIENT)
Age: 49
End: 2024-09-16

## 2024-09-19 ENCOUNTER — TELEPHONE (OUTPATIENT)
Age: 49
End: 2024-09-19

## 2024-09-19 RX ORDER — EPINEPHRINE 0.3 MG/.3ML
INJECTION SUBCUTANEOUS
Qty: 2 EACH | Refills: 0 | Status: SHIPPED | OUTPATIENT
Start: 2024-09-19

## 2024-09-24 ENCOUNTER — TELEPHONE (OUTPATIENT)
Age: 49
End: 2024-09-24

## 2024-09-24 NOTE — TELEPHONE ENCOUNTER
Patient requesting refill on ondansetron (ZOFRAN-ODT) 4 MG disintegrating tablet , eletriptan (RELPAX) 40 MG tablet

## 2024-09-26 ENCOUNTER — NURSE ONLY (OUTPATIENT)
Age: 49
End: 2024-09-26

## 2024-09-26 VITALS — BODY MASS INDEX: 21.97 KG/M2 | WEIGHT: 132 LBS

## 2024-09-26 DIAGNOSIS — J45.50 SEVERE PERSISTENT ASTHMA WITHOUT COMPLICATION: Primary | ICD-10-CM

## 2024-09-27 NOTE — PROGRESS NOTES
Chief Complaint   Patient presents with   Alroy Reason is here for Xolair injection. Patient did not have any reaction to last injection. Patient states the Xolair is helping her Asthma. Injected subcutaneous : 150 mg given in Left upper arm SQ,  150 mg given in Right upper arm SQ. Lot Number: [id number]  Exp Date: 9/2022  Total Xolair Dose: 300 mg every 4 weeks  Epipen is with patient. Patient observed for 15 minutes. No reaction at injection site. Patient given Xolair reaction sheet. Dr Song    Received a fax from [facility] division of Hepatology.    The patient was seen by Dr DAMON Cast on 9/23/2024.    Office visit notes can be viewed in Care Everywhere.    Last visit with you on 7/10/2023.    Thank you

## 2024-10-14 ENCOUNTER — TELEPHONE (OUTPATIENT)
Age: 49
End: 2024-10-14

## 2024-10-14 NOTE — TELEPHONE ENCOUNTER
Calling to see if Dr. Palma has faxed the [facility] paperwork. Spoke with Kelly last week. Please call back.

## 2024-10-14 NOTE — TELEPHONE ENCOUNTER
Patient advised it is not done yet Dr Palma was working in the ICU all last week but is in the office this week

## 2024-10-17 ENCOUNTER — TELEPHONE (OUTPATIENT)
Age: 49
End: 2024-10-17

## 2024-10-28 ENCOUNTER — NURSE ONLY (OUTPATIENT)
Age: 49
End: 2024-10-28

## 2024-10-28 VITALS — WEIGHT: 132 LBS | BODY MASS INDEX: 21.97 KG/M2

## 2024-10-28 DIAGNOSIS — J45.50 SEVERE PERSISTENT ASTHMA WITHOUT COMPLICATION: Primary | ICD-10-CM

## 2024-10-28 NOTE — PROGRESS NOTES
Virginia Clement is here for Xolair injection.   Patient did not have any reaction to last injection. Patient states the Xolair is helping her Asthma.     Injected subcutaneous : 150 mg given in Left upper arm SQ,  150 mg given in Right upper arm SQ.  Lot Number: [id number]  Exp Date: 11/30/2025  Total Xolair Dose: 300 mg every 4 weeks  Epipen is with patient.  Patient observed for 15 minutes. No reaction at injection site.     Patient given Xolair reaction sheet.

## 2024-10-29 DIAGNOSIS — G43.001 MIGRAINE WITHOUT AURA, NOT INTRACTABLE, WITH STATUS MIGRAINOSUS: ICD-10-CM

## 2024-10-30 RX ORDER — RIZATRIPTAN BENZOATE 10 MG/1
10 TABLET, ORALLY DISINTEGRATING ORAL PRN
Qty: 12 TABLET | Refills: 1 | Status: SHIPPED | OUTPATIENT
Start: 2024-10-30

## 2024-11-01 ENCOUNTER — TELEPHONE (OUTPATIENT)
Age: 49
End: 2024-11-01

## 2024-11-01 DIAGNOSIS — G43.001 MIGRAINE WITHOUT AURA, NOT INTRACTABLE, WITH STATUS MIGRAINOSUS: ICD-10-CM

## 2024-11-05 ENCOUNTER — TELEPHONE (OUTPATIENT)
Age: 49
End: 2024-11-05

## 2024-11-05 DIAGNOSIS — G43.001 MIGRAINE WITHOUT AURA, NOT INTRACTABLE, WITH STATUS MIGRAINOSUS: ICD-10-CM

## 2024-11-05 NOTE — TELEPHONE ENCOUNTER
Patient called requesting refill on her eletritptan , stated she was about to go to the emergency room due to her migraines ( nayely )

## 2024-11-06 ENCOUNTER — TELEPHONE (OUTPATIENT)
Age: 49
End: 2024-11-06

## 2024-11-06 RX ORDER — ONDANSETRON 4 MG/1
4 TABLET, ORALLY DISINTEGRATING ORAL EVERY 8 HOURS PRN
Qty: 30 TABLET | Refills: 0 | Status: SHIPPED | OUTPATIENT
Start: 2024-11-06 | End: 2024-12-31 | Stop reason: SDUPTHER

## 2024-11-06 RX ORDER — ELETRIPTAN HYDROBROMIDE 40 MG/1
40 TABLET, FILM COATED ORAL PRN
Qty: 12 TABLET | Refills: 0 | Status: SHIPPED | OUTPATIENT
Start: 2024-11-06

## 2024-11-06 NOTE — TELEPHONE ENCOUNTER
Pt is calling to speak w/Kelly in regards to checking the status of her ppwk that she dropped off. Please advise [phone]

## 2024-11-11 NOTE — TELEPHONE ENCOUNTER
Patient contacted, name and  verified.  Patient was read note from NP Saman note. (See note)  Patient states she was contacted by pharmacy her medicine is therem she has follow-up scheduled in Dec.

## 2024-11-18 ENCOUNTER — TELEPHONE (OUTPATIENT)
Age: 49
End: 2024-11-18

## 2024-11-18 NOTE — TELEPHONE ENCOUNTER
Pt would like to speak with [facility]Z nurse in regards to her xolair needing a prior auth. Contact information  [phone] for [facility]. For further information please call [phone]

## 2024-11-18 NOTE — TELEPHONE ENCOUNTER
Disregards prev call. Pt call back and stated that the ins does not need a prior auth her medication has been approve up until sometime next yr.

## 2024-11-27 ENCOUNTER — NURSE ONLY (OUTPATIENT)
Age: 49
End: 2024-11-27
Payer: MEDICAID

## 2024-11-27 VITALS — BODY MASS INDEX: 21.97 KG/M2 | WEIGHT: 132 LBS

## 2024-11-27 DIAGNOSIS — J45.50 SEVERE PERSISTENT ASTHMA WITHOUT COMPLICATION: Primary | ICD-10-CM

## 2024-11-27 PROCEDURE — 96372 THER/PROPH/DIAG INJ SC/IM: CPT | Performed by: INTERNAL MEDICINE

## 2024-11-27 NOTE — PROGRESS NOTES
Virginia Clement is here for Xolair injection.   Patient did not have any reaction to last injection. Patient states the Xolair is helping her Asthma.     Injected subcutaneous : 150 mg given in Left upper arm SQ,  150 mg given in Right upper arm SQ.  Lot Number: [id number]  Exp Date: 12/30/25  Total Xolair Dose: 300 mg every 4 weeks  Epipen is with patient.  Patient observed for 15 minutes. No reaction at injection site.     Patient given Xolair reaction sheet.

## 2024-12-06 ENCOUNTER — TELEPHONE (OUTPATIENT)
Age: 49
End: 2024-12-06

## 2024-12-06 NOTE — TELEPHONE ENCOUNTER
Pt called to speak with Kelly to let her know that her updated ADA paperwork was not received with the date. She would like the paperwork refaxed to [employer] and a copy sent to her email as well.

## 2024-12-23 ENCOUNTER — NURSE ONLY (OUTPATIENT)
Age: 49
End: 2024-12-23
Payer: MEDICAID

## 2024-12-23 VITALS — WEIGHT: 132 LBS | BODY MASS INDEX: 21.97 KG/M2

## 2024-12-23 DIAGNOSIS — J45.50 SEVERE PERSISTENT ASTHMA WITHOUT COMPLICATION: Primary | ICD-10-CM

## 2024-12-23 PROCEDURE — 96372 THER/PROPH/DIAG INJ SC/IM: CPT | Performed by: HOSPITALIST

## 2024-12-23 NOTE — PROGRESS NOTES
Virginia Clement is here for Xolair injection.   Patient did not have any reaction to last injection. Patient states the Xolair is helping her Asthma.     Injected subcutaneous : 150 mg given in Left upper arm SQ,  150 mg given in Right upper arm SQ.  Lot Number: [id number]  Exp Date: 12/30/2025  Total Xolair Dose: 300 mg every 4 weeks  Epipen is with patient.  Patient observed for 15 minutes. No reaction at injection site.     Patient given Xolair reaction sheet.

## 2024-12-24 ENCOUNTER — TELEPHONE (OUTPATIENT)
Age: 49
End: 2024-12-24

## 2024-12-24 RX ORDER — FLUTICASONE PROPIONATE AND SALMETEROL 500; 50 UG/1; UG/1
POWDER RESPIRATORY (INHALATION)
Qty: 1 EACH | Refills: 3 | Status: SHIPPED | OUTPATIENT
Start: 2024-12-24

## 2024-12-31 ENCOUNTER — OFFICE VISIT (OUTPATIENT)
Age: 49
End: 2024-12-31

## 2024-12-31 VITALS
DIASTOLIC BLOOD PRESSURE: 63 MMHG | TEMPERATURE: 98.3 F | OXYGEN SATURATION: 99 % | BODY MASS INDEX: 23.19 KG/M2 | HEART RATE: 91 BPM | SYSTOLIC BLOOD PRESSURE: 91 MMHG | HEIGHT: 65 IN | WEIGHT: 139.2 LBS

## 2024-12-31 DIAGNOSIS — G43.001 MIGRAINE WITHOUT AURA, NOT INTRACTABLE, WITH STATUS MIGRAINOSUS: Primary | ICD-10-CM

## 2024-12-31 RX ORDER — TOPIRAMATE 50 MG/1
50 TABLET, FILM COATED ORAL 2 TIMES DAILY
Qty: 180 TABLET | Refills: 3 | Status: SHIPPED | OUTPATIENT
Start: 2024-12-31

## 2024-12-31 RX ORDER — RIZATRIPTAN BENZOATE 10 MG/1
10 TABLET, ORALLY DISINTEGRATING ORAL PRN
Qty: 12 TABLET | Refills: 11 | Status: SHIPPED | OUTPATIENT
Start: 2024-12-31

## 2024-12-31 RX ORDER — ONDANSETRON 4 MG/1
4 TABLET, ORALLY DISINTEGRATING ORAL EVERY 8 HOURS PRN
Qty: 30 TABLET | Refills: 11 | Status: SHIPPED | OUTPATIENT
Start: 2024-12-31

## 2024-12-31 RX ORDER — ELETRIPTAN HYDROBROMIDE 40 MG/1
40 TABLET, FILM COATED ORAL PRN
Qty: 12 TABLET | Refills: 11 | Status: SHIPPED | OUTPATIENT
Start: 2024-12-31

## 2024-12-31 NOTE — PATIENT INSTRUCTIONS
Patient instructions:  -topiramate 50 mg twice daily for migraine prevention  Let me know if you need an increase  Call sooner if needed    -rizatriptan or eletriptan for acute migraine treatment, not in same day    -Zofran as needed for nausea

## 2024-12-31 NOTE — PROGRESS NOTES
inhaler inhale 1 puff by mouth and INTO THE LUNGS twice a day 1 each 3    EPINEPHrine (EPIPEN) 0.3 MG/0.3ML SOAJ injection INJECT 0.3ML INTRAMUSCULARLY ONCE AS NEEDED FOR ALLERGIC RESPONSE FOR UP TO 1 DOSE 2 each 0    omalizumab (XOLAIR) 150 MG/ML SOSY injection Inject 300 mg into the skin every 28 days Pre filled syringe 2 each 11    ipratropium 0.5 mg-albuterol 2.5 mg (DUONEB) 0.5-2.5 (3) MG/3ML SOLN nebulizer solution inhale contents of 1 vial ( 3 milliliters ) in nebulizer by mouth and INTO THE LUNGS every 6 hours if needed for wheezing or shortness of breath 360 mL 4    albuterol sulfate HFA (PROVENTIL;VENTOLIN;PROAIR) 108 (90 Base) MCG/ACT inhaler inhale 2 puffs every 4 hours if needed for wheezing 8.5 g 3    fluticasone (FLONASE) 50 MCG/ACT nasal spray USE 2 SPRAYS INTO EACH NOSTRIL ONCE DAILY 16 g 3    montelukast (SINGULAIR) 10 MG tablet Take 1 tablet by mouth daily 30 tablet 5    acetaminophen (TYLENOL) 500 MG tablet Take 2 tablets by mouth every 6 hours as needed      baclofen (LIORESAL) 20 MG tablet TAKE 1 TABLET BY MOUTH EVERY 8 HOURS AS NEEDED      fexofenadine-pseudoephedrine (ALLEGRA-D 12HR)  MG per extended release tablet Take 1 tablet by mouth in the morning and 1 tablet in the evening.      LORazepam (ATIVAN) 0.5 MG tablet Take 1 tablet by mouth every 8 hours as needed.       Current Facility-Administered Medications   Medication Dose Route Frequency Provider Last Rate Last Admin    omalizumab (XOLAIR) injection 300 mg  300 mg SubCUTAneous Q28 Days Stefanie Palma MD   300 mg at 06/26/24 1656        Allergies   Allergen Reactions    Aspirin Anaphylaxis, Itching and Swelling     Other reaction(s): rash/itching    Ibuprofen Anaphylaxis    Penicillins Itching and Anaphylaxis    Shellfish-Derived Products Swelling    Loratadine Anxiety and Cough    Promethazine Anxiety and Swelling       Social History     Tobacco Use    Smoking status: Never    Smokeless tobacco: Never   Substance Use Topics

## 2025-01-09 ENCOUNTER — OFFICE VISIT (OUTPATIENT)
Age: 50
End: 2025-01-09
Payer: MEDICAID

## 2025-01-09 VITALS
SYSTOLIC BLOOD PRESSURE: 126 MMHG | DIASTOLIC BLOOD PRESSURE: 71 MMHG | BODY MASS INDEX: 23.3 KG/M2 | OXYGEN SATURATION: 100 % | TEMPERATURE: 99.5 F | HEART RATE: 87 BPM | WEIGHT: 140 LBS | RESPIRATION RATE: 18 BRPM

## 2025-01-09 DIAGNOSIS — J44.89 ASTHMA WITH COPD (HCC): ICD-10-CM

## 2025-01-09 DIAGNOSIS — Z91.09 ENVIRONMENTAL ALLERGIES: ICD-10-CM

## 2025-01-09 DIAGNOSIS — J45.40 MODERATE PERSISTENT ASTHMA WITHOUT COMPLICATION: Primary | ICD-10-CM

## 2025-01-09 PROCEDURE — 99214 OFFICE O/P EST MOD 30 MIN: CPT | Performed by: INTERNAL MEDICINE

## 2025-01-09 ASSESSMENT — ENCOUNTER SYMPTOMS
VOMITING: 0
VOICE CHANGE: 0
COUGH: 0
CHEST TIGHTNESS: 0
DIARRHEA: 0
EYE REDNESS: 0
CONSTIPATION: 0
NAUSEA: 0
COLOR CHANGE: 0
BLOOD IN STOOL: 0
ABDOMINAL PAIN: 0
SINUS PRESSURE: 0
SHORTNESS OF BREATH: 1
WHEEZING: 1
PHOTOPHOBIA: 0
CHOKING: 0
EYE PAIN: 0
FACIAL SWELLING: 0
SORE THROAT: 0
EYE ITCHING: 0
TROUBLE SWALLOWING: 0
APNEA: 0
STRIDOR: 0
EYE DISCHARGE: 0

## 2025-01-09 NOTE — PROGRESS NOTES
Virginia JODI Clement presents today for   Chief Complaint   Patient presents with    Asthma    Follow-up       Is someone accompanying this pt? No    Is the patient using any DME equipment during OV? No    -DME Company NA    Depression Screening:         No data to display                Learning Needs Questionnaire:     No question data found.      Fall Risk:          No data to display                 Abuse Screening:          No data to display                  Coordination of Care:    1. Have you been to the ER, urgent care clinic since your last visit? Hospitalized since your last visit? No    2. Have you seen or consulted any other health care providers outside of the [facility] since your last visit? Include any pap smears or colon screening. PCP    Medication list has been update per patient.        
diskus inhaler inhale 1 puff by mouth and INTO THE LUNGS twice a day 1 each 3    EPINEPHrine (EPIPEN) 0.3 MG/0.3ML SOAJ injection INJECT 0.3ML INTRAMUSCULARLY ONCE AS NEEDED FOR ALLERGIC RESPONSE FOR UP TO 1 DOSE 2 each 0    omalizumab (XOLAIR) 150 MG/ML SOSY injection Inject 300 mg into the skin every 28 days Pre filled syringe 2 each 11    ipratropium 0.5 mg-albuterol 2.5 mg (DUONEB) 0.5-2.5 (3) MG/3ML SOLN nebulizer solution inhale contents of 1 vial ( 3 milliliters ) in nebulizer by mouth and INTO THE LUNGS every 6 hours if needed for wheezing or shortness of breath 360 mL 4    albuterol sulfate HFA (PROVENTIL;VENTOLIN;PROAIR) 108 (90 Base) MCG/ACT inhaler inhale 2 puffs every 4 hours if needed for wheezing 8.5 g 3    fluticasone (FLONASE) 50 MCG/ACT nasal spray USE 2 SPRAYS INTO EACH NOSTRIL ONCE DAILY 16 g 3    montelukast (SINGULAIR) 10 MG tablet Take 1 tablet by mouth daily 30 tablet 5    acetaminophen (TYLENOL) 500 MG tablet Take 2 tablets by mouth every 6 hours as needed      baclofen (LIORESAL) 20 MG tablet TAKE 1 TABLET BY MOUTH EVERY 8 HOURS AS NEEDED      fexofenadine-pseudoephedrine (ALLEGRA-D 12HR)  MG per extended release tablet Take 1 tablet by mouth in the morning and 1 tablet in the evening.      LORazepam (ATIVAN) 0.5 MG tablet Take 1 tablet by mouth every 8 hours as needed.       Current Facility-Administered Medications on File Prior to Visit   Medication Dose Route Frequency Provider Last Rate Last Admin    omalizumab (XOLAIR) injection 300 mg  300 mg SubCUTAneous Q28 Days Stefanie Palma MD   300 mg at 06/26/24 4363     Allergies   Allergen Reactions    Aspirin Anaphylaxis, Itching and Swelling     Other reaction(s): rash/itching    Ibuprofen Anaphylaxis    Penicillins Itching and Anaphylaxis    Shellfish-Derived Products Swelling    Loratadine Anxiety and Cough    Promethazine Anxiety and Swelling     Family History   Problem Relation Age of Onset    Heart Disease Other

## 2025-01-13 ENCOUNTER — TELEPHONE (OUTPATIENT)
Age: 50
End: 2025-01-13

## 2025-01-13 NOTE — TELEPHONE ENCOUNTER
Patient called back stating she needs a refill on her rizatriptan , patient was told it was sent on 12/31 but stated the pharmacy does not have medication

## 2025-01-14 ENCOUNTER — TELEPHONE (OUTPATIENT)
Age: 50
End: 2025-01-14

## 2025-01-14 DIAGNOSIS — G43.001 MIGRAINE WITHOUT AURA, NOT INTRACTABLE, WITH STATUS MIGRAINOSUS: Primary | ICD-10-CM

## 2025-01-14 DIAGNOSIS — M62.838 MUSCLE SPASM: ICD-10-CM

## 2025-01-14 RX ORDER — METHYLPREDNISOLONE 4 MG/1
TABLET ORAL
Qty: 1 KIT | Refills: 0 | Status: SHIPPED | OUTPATIENT
Start: 2025-01-14 | End: 2025-01-20

## 2025-01-14 RX ORDER — TIZANIDINE 2 MG/1
2 TABLET ORAL 3 TIMES DAILY PRN
Qty: 30 TABLET | Refills: 1 | Status: SHIPPED | OUTPATIENT
Start: 2025-01-14

## 2025-01-14 NOTE — TELEPHONE ENCOUNTER
TC-call was made. Spoke with patient's mother. Left message for pt to call the office back. Patient's mother verbalizes with understanding.

## 2025-01-14 NOTE — TELEPHONE ENCOUNTER
TC-call was received back from pt. Pt states she is still having major discomfort with the migraines. Pt states new onset concerns is muscle spam's in her back, neck, and entire left side. Pt states she would like something for muscle spam's pain along with trying the steroid pack. Pharmacy was verified.

## 2025-01-27 ENCOUNTER — NURSE ONLY (OUTPATIENT)
Age: 50
End: 2025-01-27
Payer: COMMERCIAL

## 2025-01-27 DIAGNOSIS — J45.50 SEVERE PERSISTENT ASTHMA WITHOUT COMPLICATION: Primary | ICD-10-CM

## 2025-01-27 PROCEDURE — 96372 THER/PROPH/DIAG INJ SC/IM: CPT | Performed by: HOSPITALIST

## 2025-01-27 NOTE — PROGRESS NOTES
Virginia Clement is here for Xolair injection.   Patient did not have any reaction to last injection. Patient states the Xolair is helping her Asthma.     Injected subcutaneous : 150 mg given in Left upper arm SQ,  150 mg given in Right upper arm SQ.  Lot Number: [id number]  Exp Date: 01302026  Total Xolair Dose: 300 mg every 4 weeks  Epipen is with patient.  Patient observed for 15 minutes. No reaction at injection site.     Patient given Xolair reaction sheet.

## 2025-02-24 ENCOUNTER — TELEPHONE (OUTPATIENT)
Age: 50
End: 2025-02-24

## 2025-02-24 NOTE — TELEPHONE ENCOUNTER
Spoke with the patient she is wanting to take motrin but is concerned with her aspirin allergy and she also doesn't know where the allergy to ibuprofen came from she doesn't ever remember taking ibuprofen looks like it was noted in her chart back on 1/23/2014

## 2025-02-25 RX ORDER — PREDNISONE 20 MG/1
20 TABLET ORAL 2 TIMES DAILY
Qty: 10 TABLET | Refills: 0 | Status: SHIPPED | OUTPATIENT
Start: 2025-02-25 | End: 2025-03-02

## 2025-02-25 NOTE — TELEPHONE ENCOUNTER
Spoke with the patient and advised.  She does not have any prednisone on stand by she would like for you to send in some just to be on the safe side

## 2025-02-25 NOTE — PROGRESS NOTES
Sent in 5 day prednisone course for patient to have on hand in event of future asthma exacerbation or severe allergy reaction

## 2025-02-27 ENCOUNTER — NURSE ONLY (OUTPATIENT)
Age: 50
End: 2025-02-27
Payer: COMMERCIAL

## 2025-02-27 VITALS — BODY MASS INDEX: 22.17 KG/M2 | WEIGHT: 133.2 LBS

## 2025-02-27 DIAGNOSIS — J45.50 SEVERE PERSISTENT ASTHMA WITHOUT COMPLICATION (HCC): Primary | ICD-10-CM

## 2025-02-27 PROCEDURE — 96372 THER/PROPH/DIAG INJ SC/IM: CPT | Performed by: HOSPITALIST

## 2025-02-27 NOTE — PROGRESS NOTES
Virginia Clement is here for Xolair injection.   Patient did not have any reaction to last injection. Patient states the Xolair is helping her Asthma.     Injected subcutaneous : 150 mg given in Left upper arm SQ,  150 mg given in Right upper arm SQ.  Lot Number: [id number]  Exp Date: 01/2026  Total Xolair Dose: 300 mg every 4 weeks  Epipen is with patient.  Patient observed for 15 minutes. No reaction at injection site.     Patient given Xolair reaction sheet.

## 2025-03-19 ENCOUNTER — TELEPHONE (OUTPATIENT)
Age: 50
End: 2025-03-19

## 2025-03-19 NOTE — TELEPHONE ENCOUNTER
Patient dropped off paperwork for provider to sign in regards to Leave from employment due to serious health condition. Paperwork has been scanned to her chart and a copy added to provider's file up front.

## 2025-03-26 ENCOUNTER — CLINICAL SUPPORT (OUTPATIENT)
Age: 50
End: 2025-03-26
Payer: COMMERCIAL

## 2025-03-26 DIAGNOSIS — J45.50 SEVERE PERSISTENT ASTHMA WITHOUT COMPLICATION (HCC): Primary | ICD-10-CM

## 2025-03-26 PROCEDURE — 96372 THER/PROPH/DIAG INJ SC/IM: CPT | Performed by: HOSPITALIST

## 2025-03-26 NOTE — PROGRESS NOTES
Virginia Clement is here for Xolair injection.   Patient did not have any reaction to last injection. Patient states the Xolair is helping her Asthma.     Injected subcutaneous : 150 mg given in Left upper arm SQ,  150 mg given in Right upper arm SQ.  Lot Number: [id number]  Exp Date: 01/30/26  Total Xolair Dose: 300 mg every 4 weeks  Epipen is with patient.  Patient observed for 15 minutes. No reaction at injection site.     Patient given Xolair reaction sheet.

## 2025-04-24 ENCOUNTER — CLINICAL SUPPORT (OUTPATIENT)
Age: 50
End: 2025-04-24
Payer: COMMERCIAL

## 2025-04-24 DIAGNOSIS — J45.50 SEVERE PERSISTENT ASTHMA WITHOUT COMPLICATION (HCC): Primary | ICD-10-CM

## 2025-04-24 PROCEDURE — 96372 THER/PROPH/DIAG INJ SC/IM: CPT | Performed by: INTERNAL MEDICINE

## 2025-04-24 NOTE — PROGRESS NOTES
Virginia JODI Fayes is here for Xolair injection.   Patient did not have any reaction to last injection. Patient states the Xolair is helping her Asthma.     Injected subcutaneous : 150 mg given in Left upper arm SQ,  150 mg given in Right upper arm SQ.  Lot Number: [id number]  Exp Date: 02/28/2025  Total Xolair Dose: 300 mg every 4 weeks  Epipen is with patient.  Patient observed for 15 minutes. No reaction at injection site.     Patient given Xolair reaction sheet.

## 2025-04-30 RX ORDER — IPRATROPIUM BROMIDE AND ALBUTEROL SULFATE 2.5; .5 MG/3ML; MG/3ML
SOLUTION RESPIRATORY (INHALATION)
Qty: 360 ML | Refills: 4 | Status: SHIPPED | OUTPATIENT
Start: 2025-04-30

## 2025-05-22 ENCOUNTER — CLINICAL SUPPORT (OUTPATIENT)
Age: 50
End: 2025-05-22

## 2025-05-22 VITALS — HEIGHT: 65 IN | WEIGHT: 133.4 LBS | BODY MASS INDEX: 22.23 KG/M2

## 2025-05-22 RX ORDER — FLUTICASONE FUROATE AND VILANTEROL 100; 25 UG/1; UG/1
1 POWDER RESPIRATORY (INHALATION) DAILY
COMMUNITY

## 2025-05-22 NOTE — PROGRESS NOTES
Virginia Clement is here for Xolair injection.   Patient did not have any reaction to last injection. Patient states the Xolair is helping her Asthma.     Injected subcutaneous : 150 mg given in Left upper arm SQ,  150 mg given in Right upper arm SQ.  Lot Number: [id number]   Exp Date: 2/28/2026  Total Xolair Dose: 300 mg every 4 weeks  Epipen is with patient.  Patient observed for 15 minutes. No reaction at injection site.     Patient given Xolair reaction sheet.

## 2025-06-05 ENCOUNTER — TELEPHONE (OUTPATIENT)
Age: 50
End: 2025-06-05

## 2025-06-05 NOTE — TELEPHONE ENCOUNTER
Patient called requesting a call back regarding a note for work about her severe migraines. 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

## 2025-06-09 ENCOUNTER — TELEPHONE (OUTPATIENT)
Age: 50
End: 2025-06-09

## 2025-06-09 DIAGNOSIS — G43.001 MIGRAINE WITHOUT AURA, NOT INTRACTABLE, WITH STATUS MIGRAINOSUS: ICD-10-CM

## 2025-06-09 RX ORDER — ALBUTEROL SULFATE 90 UG/1
2 INHALANT RESPIRATORY (INHALATION) EVERY 4 HOURS PRN
Qty: 1 EACH | Refills: 3 | Status: SHIPPED | OUTPATIENT
Start: 2025-06-09

## 2025-06-09 NOTE — TELEPHONE ENCOUNTER
Terrie for albuterol - [facility] on [address]  [phone]

## 2025-06-11 RX ORDER — TOPIRAMATE 50 MG/1
50 TABLET, FILM COATED ORAL 2 TIMES DAILY
Qty: 180 TABLET | Refills: 0 | Status: SHIPPED | OUTPATIENT
Start: 2025-06-11

## 2025-06-11 NOTE — TELEPHONE ENCOUNTER
Pt states she needs a note for [facility] occupational health. The paperwork you filled out was for disability.

## 2025-06-30 ENCOUNTER — TELEPHONE (OUTPATIENT)
Age: 50
End: 2025-06-30

## 2025-06-30 ENCOUNTER — OFFICE VISIT (OUTPATIENT)
Age: 50
End: 2025-06-30
Payer: COMMERCIAL

## 2025-06-30 ENCOUNTER — CLINICAL SUPPORT (OUTPATIENT)
Age: 50
End: 2025-06-30

## 2025-06-30 VITALS
HEIGHT: 65 IN | SYSTOLIC BLOOD PRESSURE: 110 MMHG | HEART RATE: 72 BPM | DIASTOLIC BLOOD PRESSURE: 71 MMHG | WEIGHT: 134 LBS | OXYGEN SATURATION: 100 % | TEMPERATURE: 97.7 F | BODY MASS INDEX: 22.33 KG/M2

## 2025-06-30 DIAGNOSIS — M62.838 MUSCLE SPASM: ICD-10-CM

## 2025-06-30 DIAGNOSIS — J45.50 SEVERE PERSISTENT ASTHMA WITHOUT COMPLICATION (HCC): Primary | ICD-10-CM

## 2025-06-30 DIAGNOSIS — G43.001 MIGRAINE WITHOUT AURA, NOT INTRACTABLE, WITH STATUS MIGRAINOSUS: ICD-10-CM

## 2025-06-30 PROCEDURE — 99213 OFFICE O/P EST LOW 20 MIN: CPT | Performed by: NURSE PRACTITIONER

## 2025-06-30 RX ORDER — MAGNESIUM OXIDE 400 MG/1
400 TABLET ORAL DAILY
Qty: 30 TABLET | Refills: 11 | Status: SHIPPED | OUTPATIENT
Start: 2025-06-30

## 2025-06-30 RX ORDER — ONDANSETRON 4 MG/1
4 TABLET, ORALLY DISINTEGRATING ORAL EVERY 8 HOURS PRN
Qty: 30 TABLET | Refills: 11 | Status: SHIPPED | OUTPATIENT
Start: 2025-06-30

## 2025-06-30 RX ORDER — RIZATRIPTAN BENZOATE 10 MG/1
10 TABLET, ORALLY DISINTEGRATING ORAL PRN
Qty: 12 TABLET | Refills: 11 | Status: SHIPPED | OUTPATIENT
Start: 2025-06-30

## 2025-06-30 RX ORDER — TIZANIDINE 2 MG/1
2 TABLET ORAL 3 TIMES DAILY PRN
Qty: 30 TABLET | Refills: 1 | Status: SHIPPED | OUTPATIENT
Start: 2025-06-30

## 2025-06-30 RX ORDER — TOPIRAMATE 25 MG/1
25 TABLET, FILM COATED ORAL 2 TIMES DAILY
Qty: 180 TABLET | Refills: 2 | Status: SHIPPED | OUTPATIENT
Start: 2025-06-30

## 2025-06-30 RX ORDER — PREDNISONE 5 MG/1
TABLET ORAL
COMMUNITY
Start: 2025-06-16

## 2025-06-30 RX ORDER — ELETRIPTAN HYDROBROMIDE 40 MG/1
40 TABLET, FILM COATED ORAL PRN
Qty: 12 TABLET | Refills: 11 | Status: SHIPPED | OUTPATIENT
Start: 2025-06-30 | End: 2025-07-01 | Stop reason: SDUPTHER

## 2025-06-30 RX ORDER — TOPIRAMATE 50 MG/1
50 TABLET, FILM COATED ORAL 2 TIMES DAILY
Qty: 180 TABLET | Refills: 2 | Status: SHIPPED | OUTPATIENT
Start: 2025-06-30

## 2025-06-30 NOTE — PROGRESS NOTES
[facility]  [address] [id number]  Office:  [phone]  Fax: [phone]  Chief Complaint   Patient presents with    Follow-up     Migraines        HPI: Virginia Clement presents in follow-up for migraines.  She was last seen here on 2024.     She presents today in follow-up.  She is doing well.   Takes the Topamax 50 mg BID.  She gets breakthrough migraines for which she uses either the eletriptan or rizatriptan (does not take both in same day).  The frequency depends.  They can wake her in her sleep.  They are associated with nausea, dizziness, and photophobia.  It keeps her from activities.  They can last days.   She tolerates the Topamax.  It does not make her sleepy.  Sometimes uses the tizanidine for muscle spasms.  Has used prednisone dosepack for muscle spasms prescribed by PCP.  She needs to work from home because the fluorescent lights at work are a significant migraine trigger.      Past Medical History:   Diagnosis Date    Anxiety attack     Asthma     Asthmatic bronchitis     Bronchitis     Catamenial disorder     Hernia     Influenza     Migraine     Migraines     Mild asthma     Psychiatric disorder     anxiety     Torticollis        Past Surgical History:   Procedure Laterality Date    BACK SURGERY       DELIVERY ONLY       SECTION         Current Outpatient Medications   Medication Sig Dispense Refill    predniSONE 5 MG (21) TBPK TAKE AS DIRECTED IN PACKAGE      topiramate (TOPAMAX) 50 MG tablet Take 1 tablet by mouth 2 times daily Take with 25 mg tab for a total of 75 mg twice daily. 180 tablet 2    tiZANidine (ZANAFLEX) 2 MG tablet Take 1 tablet by mouth 3 times daily as needed (muscle spasms) 30 tablet 1    rizatriptan (MAXALT-MLT) 10 MG disintegrating tablet Take 1 tablet by mouth as needed for Migraine May repeat dose x 1 after 2 hours if needed. Max 2 doses in 24 hours. 12 tablet 11    ondansetron (ZOFRAN-ODT) 4 MG

## 2025-06-30 NOTE — PROGRESS NOTES
Kodyeri Fayes is here for Xolair injection.   Patient did not have any reaction to last injection. Patient states the Xolair is helping her Asthma.     Injected subcutaneous : 150 mg given in Left upper arm SQ,  150 mg given in Right upper arm SQ.  Lot Number: [id number]  Exp Date: 03/30/26  Total Xolair Dose: 300 mg every 4 weeks  Epipen is with patient.  Patient observed for 15 minutes. No reaction at injection site.     Patient given Xolair reaction sheet.

## 2025-06-30 NOTE — PATIENT INSTRUCTIONS
Patient instructions:  -increase topiramate to 75 mg twice daily    -add for migraine prevention: magnesium glycinate or magnesium oxide 200 mg twice a day or 400 mg daily (at night might help for sleep). Will prescribe magnesium oxide 400 mg daily but you might have to get it OTC.

## 2025-07-01 RX ORDER — ELETRIPTAN HYDROBROMIDE 40 MG/1
40 TABLET, FILM COATED ORAL PRN
Qty: 12 TABLET | Refills: 11 | Status: SHIPPED | OUTPATIENT
Start: 2025-07-01

## 2025-07-29 RX ORDER — FLUTICASONE PROPIONATE AND SALMETEROL 500; 50 UG/1; UG/1
POWDER RESPIRATORY (INHALATION)
Qty: 60 EACH | Refills: 5 | Status: SHIPPED | OUTPATIENT
Start: 2025-07-29

## 2025-07-30 ENCOUNTER — CLINICAL SUPPORT (OUTPATIENT)
Age: 50
End: 2025-07-30
Payer: COMMERCIAL

## 2025-07-30 DIAGNOSIS — J45.50 SEVERE PERSISTENT ASTHMA WITHOUT COMPLICATION (HCC): Primary | ICD-10-CM

## 2025-07-30 PROCEDURE — 96372 THER/PROPH/DIAG INJ SC/IM: CPT | Performed by: INTERNAL MEDICINE

## 2025-07-30 NOTE — PROGRESS NOTES
Virginia ZAPATA Clement is here for Xolair injection.   Patient did not have any reaction to last injection. Patient states the Xolair is helping her Asthma.     Injected subcutaneous : 150 mg given in Left upper arm SQ,  150 mg given in Right upper arm SQ.  Lot Number: [id number]  Exp Date: 02/28/2026  Total Xolair Dose: 300 mg every 4 weeks  Epipen is with patient.  Patient observed for 15 minutes. No reaction at injection site.     Patient given Xolair reaction sheet.

## 2025-08-28 ENCOUNTER — CLINICAL SUPPORT (OUTPATIENT)
Age: 50
End: 2025-08-28

## 2025-08-28 VITALS — WEIGHT: 134 LBS | BODY MASS INDEX: 22.3 KG/M2

## 2025-08-28 DIAGNOSIS — J45.50 SEVERE PERSISTENT ASTHMA WITHOUT COMPLICATION (HCC): Primary | ICD-10-CM
